# Patient Record
Sex: MALE | Race: WHITE | NOT HISPANIC OR LATINO | Employment: OTHER | URBAN - METROPOLITAN AREA
[De-identification: names, ages, dates, MRNs, and addresses within clinical notes are randomized per-mention and may not be internally consistent; named-entity substitution may affect disease eponyms.]

---

## 2018-03-22 ENCOUNTER — APPOINTMENT (OUTPATIENT)
Dept: RADIOLOGY | Facility: MEDICAL CENTER | Age: 68
DRG: 260 | End: 2018-03-22
Attending: HOSPITALIST
Payer: MEDICARE

## 2018-03-22 ENCOUNTER — HOSPITAL ENCOUNTER (OUTPATIENT)
Facility: MEDICAL CENTER | Age: 68
DRG: 260 | End: 2018-03-22
Payer: MEDICARE

## 2018-03-22 ENCOUNTER — RESOLUTE PROFESSIONAL BILLING HOSPITAL PROF FEE (OUTPATIENT)
Dept: HOSPITALIST | Facility: MEDICAL CENTER | Age: 68
End: 2018-03-22
Payer: MEDICARE

## 2018-03-22 ENCOUNTER — HOSPITAL ENCOUNTER (INPATIENT)
Facility: MEDICAL CENTER | Age: 68
LOS: 4 days | DRG: 260 | End: 2018-03-26
Attending: HOSPITALIST | Admitting: HOSPITALIST
Payer: MEDICARE

## 2018-03-22 DIAGNOSIS — I21.4 NSTEMI (NON-ST ELEVATED MYOCARDIAL INFARCTION) (HCC): ICD-10-CM

## 2018-03-22 PROBLEM — R91.8 PULMONARY INFILTRATES: Status: ACTIVE | Noted: 2018-03-22

## 2018-03-22 PROBLEM — Z89.511 S/P BILATERAL BKA (BELOW KNEE AMPUTATION) (HCC): Status: ACTIVE | Noted: 2018-03-22

## 2018-03-22 PROBLEM — R53.1 LEFT-SIDED WEAKNESS: Status: ACTIVE | Noted: 2018-03-22

## 2018-03-22 PROBLEM — Z89.512 S/P BILATERAL BKA (BELOW KNEE AMPUTATION) (HCC): Status: ACTIVE | Noted: 2018-03-22

## 2018-03-22 LAB
ALBUMIN SERPL BCP-MCNC: 2.5 G/DL (ref 3.2–4.9)
ALBUMIN/GLOB SERPL: 1 G/DL
ALP SERPL-CCNC: 79 U/L (ref 30–99)
ALT SERPL-CCNC: 5 U/L (ref 2–50)
ANION GAP SERPL CALC-SCNC: 4 MMOL/L (ref 0–11.9)
AST SERPL-CCNC: 13 U/L (ref 12–45)
BASOPHILS # BLD AUTO: 0.4 % (ref 0–1.8)
BASOPHILS # BLD: 0.03 K/UL (ref 0–0.12)
BILIRUB SERPL-MCNC: 0.5 MG/DL (ref 0.1–1.5)
BNP SERPL-MCNC: 650 PG/ML (ref 0–100)
BUN SERPL-MCNC: 22 MG/DL (ref 8–22)
CALCIUM SERPL-MCNC: 8.1 MG/DL (ref 8.5–10.5)
CHLORIDE SERPL-SCNC: 103 MMOL/L (ref 96–112)
CO2 SERPL-SCNC: 26 MMOL/L (ref 20–33)
CREAT SERPL-MCNC: 0.92 MG/DL (ref 0.5–1.4)
EOSINOPHIL # BLD AUTO: 0.1 K/UL (ref 0–0.51)
EOSINOPHIL NFR BLD: 1.3 % (ref 0–6.9)
ERYTHROCYTE [DISTWIDTH] IN BLOOD BY AUTOMATED COUNT: 44.5 FL (ref 35.9–50)
GLOBULIN SER CALC-MCNC: 2.4 G/DL (ref 1.9–3.5)
GLUCOSE BLD-MCNC: 373 MG/DL (ref 65–99)
GLUCOSE SERPL-MCNC: 298 MG/DL (ref 65–99)
HCT VFR BLD AUTO: 28.6 % (ref 42–52)
HGB BLD-MCNC: 9.7 G/DL (ref 14–18)
IMM GRANULOCYTES # BLD AUTO: 0.02 K/UL (ref 0–0.11)
IMM GRANULOCYTES NFR BLD AUTO: 0.3 % (ref 0–0.9)
LYMPHOCYTES # BLD AUTO: 1.21 K/UL (ref 1–4.8)
LYMPHOCYTES NFR BLD: 16.3 % (ref 22–41)
MCH RBC QN AUTO: 32.7 PG (ref 27–33)
MCHC RBC AUTO-ENTMCNC: 33.9 G/DL (ref 33.7–35.3)
MCV RBC AUTO: 96.3 FL (ref 81.4–97.8)
MONOCYTES # BLD AUTO: 0.63 K/UL (ref 0–0.85)
MONOCYTES NFR BLD AUTO: 8.5 % (ref 0–13.4)
NEUTROPHILS # BLD AUTO: 5.42 K/UL (ref 1.82–7.42)
NEUTROPHILS NFR BLD: 73.2 % (ref 44–72)
NRBC # BLD AUTO: 0 K/UL
NRBC BLD-RTO: 0 /100 WBC
PLATELET # BLD AUTO: 137 K/UL (ref 164–446)
PMV BLD AUTO: 9 FL (ref 9–12.9)
POTASSIUM SERPL-SCNC: 5 MMOL/L (ref 3.6–5.5)
PROCALCITONIN SERPL-MCNC: 0.1 NG/ML
PROT SERPL-MCNC: 4.9 G/DL (ref 6–8.2)
RBC # BLD AUTO: 2.97 M/UL (ref 4.7–6.1)
SODIUM SERPL-SCNC: 133 MMOL/L (ref 135–145)
TROPONIN I SERPL-MCNC: 1.93 NG/ML (ref 0–0.04)
WBC # BLD AUTO: 7.4 K/UL (ref 4.8–10.8)

## 2018-03-22 PROCEDURE — 80053 COMPREHEN METABOLIC PANEL: CPT

## 2018-03-22 PROCEDURE — 85025 COMPLETE CBC W/AUTO DIFF WBC: CPT

## 2018-03-22 PROCEDURE — 99223 1ST HOSP IP/OBS HIGH 75: CPT | Performed by: HOSPITALIST

## 2018-03-22 PROCEDURE — 36415 COLL VENOUS BLD VENIPUNCTURE: CPT

## 2018-03-22 PROCEDURE — 83880 ASSAY OF NATRIURETIC PEPTIDE: CPT

## 2018-03-22 PROCEDURE — 700102 HCHG RX REV CODE 250 W/ 637 OVERRIDE(OP): Performed by: HOSPITALIST

## 2018-03-22 PROCEDURE — 70450 CT HEAD/BRAIN W/O DYE: CPT

## 2018-03-22 PROCEDURE — 93005 ELECTROCARDIOGRAM TRACING: CPT | Performed by: HOSPITALIST

## 2018-03-22 PROCEDURE — 84484 ASSAY OF TROPONIN QUANT: CPT

## 2018-03-22 PROCEDURE — 82962 GLUCOSE BLOOD TEST: CPT

## 2018-03-22 PROCEDURE — 93010 ELECTROCARDIOGRAM REPORT: CPT | Performed by: INTERNAL MEDICINE

## 2018-03-22 PROCEDURE — 84145 PROCALCITONIN (PCT): CPT

## 2018-03-22 PROCEDURE — 770020 HCHG ROOM/CARE - TELE (206)

## 2018-03-22 PROCEDURE — A9270 NON-COVERED ITEM OR SERVICE: HCPCS | Performed by: HOSPITALIST

## 2018-03-22 RX ORDER — INSULIN GLARGINE 100 [IU]/ML
24 INJECTION, SOLUTION SUBCUTANEOUS NIGHTLY
Status: ON HOLD | COMMUNITY
End: 2019-06-11

## 2018-03-22 RX ORDER — ATORVASTATIN CALCIUM 40 MG/1
40 TABLET, FILM COATED ORAL
Status: DISCONTINUED | OUTPATIENT
Start: 2018-03-23 | End: 2018-03-26 | Stop reason: HOSPADM

## 2018-03-22 RX ORDER — DEXTROSE MONOHYDRATE 25 G/50ML
25 INJECTION, SOLUTION INTRAVENOUS
Status: DISCONTINUED | OUTPATIENT
Start: 2018-03-22 | End: 2018-03-26 | Stop reason: HOSPADM

## 2018-03-22 RX ORDER — POLYETHYLENE GLYCOL 3350 17 G/17G
1 POWDER, FOR SOLUTION ORAL
Status: DISCONTINUED | OUTPATIENT
Start: 2018-03-22 | End: 2018-03-26 | Stop reason: HOSPADM

## 2018-03-22 RX ORDER — INSULIN GLARGINE 100 [IU]/ML
10 INJECTION, SOLUTION SUBCUTANEOUS NIGHTLY
Status: DISCONTINUED | OUTPATIENT
Start: 2018-03-22 | End: 2018-03-26

## 2018-03-22 RX ORDER — ACETAMINOPHEN 325 MG/1
650 TABLET ORAL EVERY 6 HOURS PRN
Status: DISCONTINUED | OUTPATIENT
Start: 2018-03-22 | End: 2018-03-26 | Stop reason: HOSPADM

## 2018-03-22 RX ORDER — AMOXICILLIN 250 MG
2 CAPSULE ORAL 2 TIMES DAILY
Status: DISCONTINUED | OUTPATIENT
Start: 2018-03-22 | End: 2018-03-26 | Stop reason: HOSPADM

## 2018-03-22 RX ORDER — CEFAZOLIN SODIUM 2 G/100ML
2 INJECTION, SOLUTION INTRAVENOUS EVERY 8 HOURS
Status: DISCONTINUED | OUTPATIENT
Start: 2018-03-23 | End: 2018-03-24

## 2018-03-22 RX ORDER — SIMVASTATIN 20 MG
10 TABLET ORAL NIGHTLY
Status: DISCONTINUED | OUTPATIENT
Start: 2018-03-22 | End: 2018-03-22

## 2018-03-22 RX ORDER — INSULIN GLARGINE 100 [IU]/ML
23 INJECTION, SOLUTION SUBCUTANEOUS NIGHTLY
Status: DISCONTINUED | OUTPATIENT
Start: 2018-03-22 | End: 2018-03-22

## 2018-03-22 RX ORDER — SIMVASTATIN 10 MG
10 TABLET ORAL NIGHTLY
Status: ON HOLD | COMMUNITY
End: 2018-03-24

## 2018-03-22 RX ORDER — BISACODYL 10 MG
10 SUPPOSITORY, RECTAL RECTAL
Status: DISCONTINUED | OUTPATIENT
Start: 2018-03-22 | End: 2018-03-26 | Stop reason: HOSPADM

## 2018-03-22 RX ORDER — RAMIPRIL 5 MG/1
5 CAPSULE ORAL
Status: DISCONTINUED | OUTPATIENT
Start: 2018-03-22 | End: 2018-03-25

## 2018-03-22 RX ORDER — SODIUM CHLORIDE 9 MG/ML
INJECTION, SOLUTION INTRAVENOUS CONTINUOUS
Status: DISCONTINUED | OUTPATIENT
Start: 2018-03-23 | End: 2018-03-23

## 2018-03-22 RX ORDER — ONDANSETRON 4 MG/1
4 TABLET, ORALLY DISINTEGRATING ORAL EVERY 4 HOURS PRN
Status: DISCONTINUED | OUTPATIENT
Start: 2018-03-22 | End: 2018-03-26 | Stop reason: HOSPADM

## 2018-03-22 RX ORDER — RAMIPRIL 5 MG/1
5 CAPSULE ORAL
Status: ON HOLD | COMMUNITY
End: 2018-03-26

## 2018-03-22 RX ORDER — ONDANSETRON 2 MG/ML
4 INJECTION INTRAMUSCULAR; INTRAVENOUS EVERY 4 HOURS PRN
Status: DISCONTINUED | OUTPATIENT
Start: 2018-03-22 | End: 2018-03-23

## 2018-03-22 RX ADMIN — INSULIN GLARGINE 10 UNITS: 100 INJECTION, SOLUTION SUBCUTANEOUS at 22:16

## 2018-03-22 RX ADMIN — RAMIPRIL 5 MG: 5 CAPSULE ORAL at 22:15

## 2018-03-22 RX ADMIN — ASPIRIN 81 MG: 81 TABLET, COATED ORAL at 21:11

## 2018-03-22 RX ADMIN — SIMVASTATIN 10 MG: 20 TABLET, FILM COATED ORAL at 21:11

## 2018-03-22 ASSESSMENT — ENCOUNTER SYMPTOMS
NECK PAIN: 0
FOCAL WEAKNESS: 1
DIZZINESS: 0
COUGH: 0
BACK PAIN: 0
NAUSEA: 0
ORTHOPNEA: 0
CHILLS: 0
HEMOPTYSIS: 0
PALPITATIONS: 0
SPUTUM PRODUCTION: 0
VOMITING: 0

## 2018-03-22 ASSESSMENT — PATIENT HEALTH QUESTIONNAIRE - PHQ9
SUM OF ALL RESPONSES TO PHQ9 QUESTIONS 1 AND 2: 0
2. FEELING DOWN, DEPRESSED, IRRITABLE, OR HOPELESS: NOT AT ALL
1. LITTLE INTEREST OR PLEASURE IN DOING THINGS: NOT AT ALL

## 2018-03-22 ASSESSMENT — LIFESTYLE VARIABLES
EVER_SMOKED: NEVER
ALCOHOL_USE: NO

## 2018-03-22 ASSESSMENT — PAIN SCALES - GENERAL
PAINLEVEL_OUTOF10: 0
PAINLEVEL_OUTOF10: 0

## 2018-03-22 NOTE — PROGRESS NOTES
Patient is a direct admit from [facility].   Dr Varela is accepting the patient.   Dr Luz is consulting. Please call when patient arrives.   ADT signed and held, needs to be released when the patient arrives on the unit.

## 2018-03-23 ENCOUNTER — APPOINTMENT (OUTPATIENT)
Dept: RADIOLOGY | Facility: MEDICAL CENTER | Age: 68
DRG: 260 | End: 2018-03-23
Attending: HOSPITALIST
Payer: MEDICARE

## 2018-03-23 PROBLEM — E87.1 HYPONATREMIA: Status: ACTIVE | Noted: 2018-03-23

## 2018-03-23 PROBLEM — J96.01 ACUTE HYPOXEMIC RESPIRATORY FAILURE (HCC): Status: ACTIVE | Noted: 2018-03-22

## 2018-03-23 PROBLEM — N40.0 BPH (BENIGN PROSTATIC HYPERPLASIA): Status: ACTIVE | Noted: 2018-03-23

## 2018-03-23 PROBLEM — G93.41 ACUTE METABOLIC ENCEPHALOPATHY: Status: ACTIVE | Noted: 2018-03-23

## 2018-03-23 PROBLEM — I73.9 PVD (PERIPHERAL VASCULAR DISEASE) (HCC): Status: ACTIVE | Noted: 2018-03-23

## 2018-03-23 PROBLEM — D64.9 ANEMIA: Status: ACTIVE | Noted: 2018-03-23

## 2018-03-23 PROBLEM — R33.9 URINE RETENTION: Status: ACTIVE | Noted: 2018-03-23

## 2018-03-23 LAB
ANION GAP SERPL CALC-SCNC: 5 MMOL/L (ref 0–11.9)
BASOPHILS # BLD AUTO: 0.4 % (ref 0–1.8)
BASOPHILS # BLD: 0.03 K/UL (ref 0–0.12)
BUN SERPL-MCNC: 21 MG/DL (ref 8–22)
CALCIUM SERPL-MCNC: 7.9 MG/DL (ref 8.5–10.5)
CHLORIDE SERPL-SCNC: 102 MMOL/L (ref 96–112)
CO2 SERPL-SCNC: 26 MMOL/L (ref 20–33)
CREAT SERPL-MCNC: 0.8 MG/DL (ref 0.5–1.4)
EKG IMPRESSION: NORMAL
EOSINOPHIL # BLD AUTO: 0.09 K/UL (ref 0–0.51)
EOSINOPHIL NFR BLD: 1.2 % (ref 0–6.9)
ERYTHROCYTE [DISTWIDTH] IN BLOOD BY AUTOMATED COUNT: 43.7 FL (ref 35.9–50)
GLUCOSE BLD-MCNC: 116 MG/DL (ref 65–99)
GLUCOSE BLD-MCNC: 154 MG/DL (ref 65–99)
GLUCOSE BLD-MCNC: 177 MG/DL (ref 65–99)
GLUCOSE BLD-MCNC: 254 MG/DL (ref 65–99)
GLUCOSE BLD-MCNC: 459 MG/DL (ref 65–99)
GLUCOSE SERPL-MCNC: 390 MG/DL (ref 65–99)
HCT VFR BLD AUTO: 27.8 % (ref 42–52)
HGB BLD-MCNC: 9.4 G/DL (ref 14–18)
IMM GRANULOCYTES # BLD AUTO: 0.02 K/UL (ref 0–0.11)
IMM GRANULOCYTES NFR BLD AUTO: 0.3 % (ref 0–0.9)
INR PPP: 1.09 (ref 0.87–1.13)
LYMPHOCYTES # BLD AUTO: 1.06 K/UL (ref 1–4.8)
LYMPHOCYTES NFR BLD: 13.7 % (ref 22–41)
MCH RBC QN AUTO: 32.5 PG (ref 27–33)
MCHC RBC AUTO-ENTMCNC: 33.8 G/DL (ref 33.7–35.3)
MCV RBC AUTO: 96.2 FL (ref 81.4–97.8)
MONOCYTES # BLD AUTO: 0.55 K/UL (ref 0–0.85)
MONOCYTES NFR BLD AUTO: 7.1 % (ref 0–13.4)
NEUTROPHILS # BLD AUTO: 5.96 K/UL (ref 1.82–7.42)
NEUTROPHILS NFR BLD: 77.3 % (ref 44–72)
NRBC # BLD AUTO: 0 K/UL
NRBC BLD-RTO: 0 /100 WBC
PLATELET # BLD AUTO: 147 K/UL (ref 164–446)
PMV BLD AUTO: 9.4 FL (ref 9–12.9)
POTASSIUM SERPL-SCNC: 4.5 MMOL/L (ref 3.6–5.5)
PROTHROMBIN TIME: 13.8 SEC (ref 12–14.6)
RBC # BLD AUTO: 2.89 M/UL (ref 4.7–6.1)
SODIUM SERPL-SCNC: 133 MMOL/L (ref 135–145)
WBC # BLD AUTO: 7.7 K/UL (ref 4.8–10.8)

## 2018-03-23 PROCEDURE — 700105 HCHG RX REV CODE 258: Performed by: INTERNAL MEDICINE

## 2018-03-23 PROCEDURE — 700102 HCHG RX REV CODE 250 W/ 637 OVERRIDE(OP): Performed by: INTERNAL MEDICINE

## 2018-03-23 PROCEDURE — 700111 HCHG RX REV CODE 636 W/ 250 OVERRIDE (IP)

## 2018-03-23 PROCEDURE — 700111 HCHG RX REV CODE 636 W/ 250 OVERRIDE (IP): Performed by: HOSPITALIST

## 2018-03-23 PROCEDURE — A9270 NON-COVERED ITEM OR SERVICE: HCPCS | Performed by: HOSPITALIST

## 2018-03-23 PROCEDURE — 99233 SBSQ HOSP IP/OBS HIGH 50: CPT | Performed by: HOSPITALIST

## 2018-03-23 PROCEDURE — B2151ZZ FLUOROSCOPY OF LEFT HEART USING LOW OSMOLAR CONTRAST: ICD-10-PCS | Performed by: INTERNAL MEDICINE

## 2018-03-23 PROCEDURE — 93458 L HRT ARTERY/VENTRICLE ANGIO: CPT

## 2018-03-23 PROCEDURE — 82962 GLUCOSE BLOOD TEST: CPT | Mod: 91

## 2018-03-23 PROCEDURE — B2111ZZ FLUOROSCOPY OF MULTIPLE CORONARY ARTERIES USING LOW OSMOLAR CONTRAST: ICD-10-PCS | Performed by: INTERNAL MEDICINE

## 2018-03-23 PROCEDURE — 36415 COLL VENOUS BLD VENIPUNCTURE: CPT

## 2018-03-23 PROCEDURE — 85025 COMPLETE CBC W/AUTO DIFF WBC: CPT

## 2018-03-23 PROCEDURE — 99152 MOD SED SAME PHYS/QHP 5/>YRS: CPT

## 2018-03-23 PROCEDURE — 700102 HCHG RX REV CODE 250 W/ 637 OVERRIDE(OP): Performed by: HOSPITALIST

## 2018-03-23 PROCEDURE — 85610 PROTHROMBIN TIME: CPT

## 2018-03-23 PROCEDURE — A9270 NON-COVERED ITEM OR SERVICE: HCPCS | Performed by: INTERNAL MEDICINE

## 2018-03-23 PROCEDURE — C1769 GUIDE WIRE: HCPCS

## 2018-03-23 PROCEDURE — C1894 INTRO/SHEATH, NON-LASER: HCPCS

## 2018-03-23 PROCEDURE — 4A023N7 MEASUREMENT OF CARDIAC SAMPLING AND PRESSURE, LEFT HEART, PERCUTANEOUS APPROACH: ICD-10-PCS | Performed by: INTERNAL MEDICINE

## 2018-03-23 PROCEDURE — 360979 HCHG DIAGNOSTIC CATH

## 2018-03-23 PROCEDURE — 700101 HCHG RX REV CODE 250

## 2018-03-23 PROCEDURE — 80048 BASIC METABOLIC PNL TOTAL CA: CPT

## 2018-03-23 PROCEDURE — 770020 HCHG ROOM/CARE - TELE (206)

## 2018-03-23 RX ORDER — SODIUM CHLORIDE 450 MG/100ML
INJECTION, SOLUTION INTRAVENOUS
Status: DISCONTINUED
Start: 2018-03-23 | End: 2018-03-23

## 2018-03-23 RX ORDER — SODIUM CHLORIDE 9 MG/ML
INJECTION, SOLUTION INTRAVENOUS CONTINUOUS
Status: ACTIVE | OUTPATIENT
Start: 2018-03-23 | End: 2018-03-23

## 2018-03-23 RX ORDER — DIPHENHYDRAMINE HYDROCHLORIDE 50 MG/ML
25 INJECTION INTRAMUSCULAR; INTRAVENOUS EVERY 6 HOURS PRN
Status: DISCONTINUED | OUTPATIENT
Start: 2018-03-23 | End: 2018-03-24

## 2018-03-23 RX ORDER — TAMSULOSIN HYDROCHLORIDE 0.4 MG/1
0.4 CAPSULE ORAL
Status: DISCONTINUED | OUTPATIENT
Start: 2018-03-23 | End: 2018-03-26 | Stop reason: HOSPADM

## 2018-03-23 RX ORDER — SODIUM CHLORIDE 9 MG/ML
INJECTION, SOLUTION INTRAVENOUS CONTINUOUS
Status: DISPENSED | OUTPATIENT
Start: 2018-03-23 | End: 2018-03-23

## 2018-03-23 RX ORDER — DIPHENHYDRAMINE HYDROCHLORIDE 50 MG/ML
25 INJECTION INTRAMUSCULAR; INTRAVENOUS EVERY 6 HOURS PRN
Status: DISCONTINUED | OUTPATIENT
Start: 2018-03-23 | End: 2018-03-26 | Stop reason: HOSPADM

## 2018-03-23 RX ORDER — ONDANSETRON 2 MG/ML
4 INJECTION INTRAMUSCULAR; INTRAVENOUS EVERY 4 HOURS PRN
Status: DISCONTINUED | OUTPATIENT
Start: 2018-03-23 | End: 2018-03-26 | Stop reason: HOSPADM

## 2018-03-23 RX ORDER — OXYCODONE AND ACETAMINOPHEN 10; 325 MG/1; MG/1
1 TABLET ORAL ONCE
Status: COMPLETED | OUTPATIENT
Start: 2018-03-23 | End: 2018-03-23

## 2018-03-23 RX ORDER — DEXAMETHASONE SODIUM PHOSPHATE 4 MG/ML
4 INJECTION, SOLUTION INTRA-ARTICULAR; INTRALESIONAL; INTRAMUSCULAR; INTRAVENOUS; SOFT TISSUE
Status: DISCONTINUED | OUTPATIENT
Start: 2018-03-23 | End: 2018-03-24

## 2018-03-23 RX ORDER — HALOPERIDOL 5 MG/ML
1 INJECTION INTRAMUSCULAR EVERY 6 HOURS PRN
Status: DISCONTINUED | OUTPATIENT
Start: 2018-03-23 | End: 2018-03-26 | Stop reason: HOSPADM

## 2018-03-23 RX ORDER — LIDOCAINE HYDROCHLORIDE 20 MG/ML
INJECTION, SOLUTION INFILTRATION; PERINEURAL
Status: COMPLETED
Start: 2018-03-23 | End: 2018-03-23

## 2018-03-23 RX ORDER — ONDANSETRON 2 MG/ML
4 INJECTION INTRAMUSCULAR; INTRAVENOUS EVERY 4 HOURS PRN
Status: DISCONTINUED | OUTPATIENT
Start: 2018-03-23 | End: 2018-03-24

## 2018-03-23 RX ORDER — HEPARIN SODIUM,PORCINE 1000/ML
VIAL (ML) INJECTION
Status: COMPLETED
Start: 2018-03-23 | End: 2018-03-23

## 2018-03-23 RX ORDER — SCOLOPAMINE TRANSDERMAL SYSTEM 1 MG/1
1 PATCH, EXTENDED RELEASE TRANSDERMAL
Status: DISCONTINUED | OUTPATIENT
Start: 2018-03-23 | End: 2018-03-24

## 2018-03-23 RX ORDER — SCOLOPAMINE TRANSDERMAL SYSTEM 1 MG/1
1 PATCH, EXTENDED RELEASE TRANSDERMAL
Status: DISCONTINUED | OUTPATIENT
Start: 2018-03-23 | End: 2018-03-26 | Stop reason: HOSPADM

## 2018-03-23 RX ORDER — HALOPERIDOL 5 MG/ML
1 INJECTION INTRAMUSCULAR EVERY 6 HOURS PRN
Status: DISCONTINUED | OUTPATIENT
Start: 2018-03-23 | End: 2018-03-24

## 2018-03-23 RX ORDER — MIDAZOLAM HYDROCHLORIDE 1 MG/ML
INJECTION INTRAMUSCULAR; INTRAVENOUS
Status: DISPENSED
Start: 2018-03-23 | End: 2018-03-24

## 2018-03-23 RX ORDER — VERAPAMIL HYDROCHLORIDE 2.5 MG/ML
INJECTION, SOLUTION INTRAVENOUS
Status: COMPLETED
Start: 2018-03-23 | End: 2018-03-23

## 2018-03-23 RX ORDER — HEPARIN SODIUM 5000 [USP'U]/ML
5000 INJECTION, SOLUTION INTRAVENOUS; SUBCUTANEOUS EVERY 8 HOURS
Status: DISCONTINUED | OUTPATIENT
Start: 2018-03-23 | End: 2018-03-26 | Stop reason: HOSPADM

## 2018-03-23 RX ORDER — DEXAMETHASONE SODIUM PHOSPHATE 4 MG/ML
4 INJECTION, SOLUTION INTRA-ARTICULAR; INTRALESIONAL; INTRAMUSCULAR; INTRAVENOUS; SOFT TISSUE
Status: DISCONTINUED | OUTPATIENT
Start: 2018-03-23 | End: 2018-03-26 | Stop reason: HOSPADM

## 2018-03-23 RX ORDER — SODIUM CHLORIDE 9 MG/ML
INJECTION, SOLUTION INTRAVENOUS
Status: DISCONTINUED
Start: 2018-03-23 | End: 2018-03-23

## 2018-03-23 RX ADMIN — RAMIPRIL 5 MG: 5 CAPSULE ORAL at 21:22

## 2018-03-23 RX ADMIN — TAMSULOSIN HYDROCHLORIDE 0.4 MG: 0.4 CAPSULE ORAL at 15:14

## 2018-03-23 RX ADMIN — CEFAZOLIN SODIUM 2 G: 2 INJECTION, SOLUTION INTRAVENOUS at 00:19

## 2018-03-23 RX ADMIN — CEFAZOLIN SODIUM 2 G: 2 INJECTION, SOLUTION INTRAVENOUS at 15:15

## 2018-03-23 RX ADMIN — INSULIN HUMAN 2 UNITS: 100 INJECTION, SOLUTION PARENTERAL at 17:40

## 2018-03-23 RX ADMIN — INSULIN GLARGINE 10 UNITS: 100 INJECTION, SOLUTION SUBCUTANEOUS at 21:25

## 2018-03-23 RX ADMIN — HEPARIN SODIUM 5000 UNITS: 5000 INJECTION, SOLUTION INTRAVENOUS; SUBCUTANEOUS at 21:22

## 2018-03-23 RX ADMIN — CEFAZOLIN SODIUM 2 G: 2 INJECTION, SOLUTION INTRAVENOUS at 21:28

## 2018-03-23 RX ADMIN — INSULIN HUMAN 5 UNITS: 100 INJECTION, SOLUTION PARENTERAL at 05:28

## 2018-03-23 RX ADMIN — METOPROLOL TARTRATE 25 MG: 25 TABLET, FILM COATED ORAL at 21:21

## 2018-03-23 RX ADMIN — CEFAZOLIN SODIUM 2 G: 2 INJECTION, SOLUTION INTRAVENOUS at 05:40

## 2018-03-23 RX ADMIN — NITROGLYCERIN 10 ML: 20 INJECTION INTRAVENOUS at 14:07

## 2018-03-23 RX ADMIN — LIDOCAINE HYDROCHLORIDE: 20 INJECTION, SOLUTION INFILTRATION; PERINEURAL at 14:08

## 2018-03-23 RX ADMIN — HEPARIN SODIUM 2000 UNITS: 200 INJECTION, SOLUTION INTRAVENOUS at 14:07

## 2018-03-23 RX ADMIN — HEPARIN SODIUM: 1000 INJECTION, SOLUTION INTRAVENOUS; SUBCUTANEOUS at 14:08

## 2018-03-23 RX ADMIN — OXYCODONE HYDROCHLORIDE AND ACETAMINOPHEN 1 TABLET: 10; 325 TABLET ORAL at 21:33

## 2018-03-23 RX ADMIN — VERAPAMIL HYDROCHLORIDE 5 MG: 2.5 INJECTION, SOLUTION INTRAVENOUS at 14:08

## 2018-03-23 RX ADMIN — ATORVASTATIN CALCIUM 40 MG: 40 TABLET, FILM COATED ORAL at 21:21

## 2018-03-23 RX ADMIN — METOPROLOL TARTRATE 25 MG: 25 TABLET, FILM COATED ORAL at 00:18

## 2018-03-23 RX ADMIN — INSULIN HUMAN 9 UNITS: 100 INJECTION, SOLUTION PARENTERAL at 00:37

## 2018-03-23 RX ADMIN — OXYCODONE HYDROCHLORIDE AND ACETAMINOPHEN 1 TABLET: 10; 325 TABLET ORAL at 01:33

## 2018-03-23 RX ADMIN — METOPROLOL TARTRATE 25 MG: 25 TABLET, FILM COATED ORAL at 07:54

## 2018-03-23 RX ADMIN — SODIUM CHLORIDE: 9 INJECTION, SOLUTION INTRAVENOUS at 15:26

## 2018-03-23 ASSESSMENT — ENCOUNTER SYMPTOMS
CHILLS: 0
DIZZINESS: 0
TINGLING: 0
HEADACHES: 0
BLURRED VISION: 0
BACK PAIN: 0
FEVER: 0
COUGH: 0
SORE THROAT: 0
VOMITING: 0
NECK PAIN: 0
PALPITATIONS: 0
ABDOMINAL PAIN: 0
EYE PAIN: 0
SHORTNESS OF BREATH: 0
DEPRESSION: 0
INSOMNIA: 0
NAUSEA: 0
FOCAL WEAKNESS: 1

## 2018-03-23 ASSESSMENT — PAIN SCALES - GENERAL
PAINLEVEL_OUTOF10: 0
PAINLEVEL_OUTOF10: 7
PAINLEVEL_OUTOF10: 7

## 2018-03-23 NOTE — PROGRESS NOTES
Received bedside report from PM nurse. Assumed patient care. Chart reviewed. Pt was resting in bed. A&O x 4. No concerns, complaints or distress. NPO since midnight for a possible cath. Wife at bedside. Patient denies pain. POC updated with pt and on the patient communication board. Bed locked and in the lowest position. Call light within reach. Tele box on. Will continue to monitor.

## 2018-03-23 NOTE — ASSESSMENT & PLAN NOTE
Apparently had signs of left-sided drift at the outside hospital  No sx now  Pending MRI  Continue asa/statin

## 2018-03-23 NOTE — PROGRESS NOTES
Karen from Lab called with critical result of troponin 1.93 at 2141. Critical lab result read back to Marti.   Dr. Alexander  notified of critical lab result at 2146.  Critical lab result read back by Dr. Alexander. Will repeat troponin in 6 hours.

## 2018-03-23 NOTE — PROGRESS NOTES
Pt resting in bed at this time. Even visible chest rise. No signs of distress. Bed alarm on. Call light in place. Bed in low and locked position. Hourly rounding in place. Wife at bedside. NPO at this time.

## 2018-03-23 NOTE — PROCEDURES
DATE OF SERVICE:  03/23/2018    REFERRING PHYSICIAN:  Fidelina Flanagan MD    PROCEDURES:  1.  Left heart catheterization.  2.  Coronary angiography.  3.  Left ventriculogram.    PREPROCEDURE DIAGNOSIS:  Acute coronary syndrome.    POSTPROCEDURE DIAGNOSES:  1.  Multivessel coronary artery disease with nonobstructive left main coronary   artery disease, high-grade mid left anterior descending artery and distal   left anterior descending artery stenosis, high-grade proximal diagonal branch   stenosis, high-grade ramus stenosis, high-grade proximal obtuse marginal   branch stenosis and chronic total occlusion of the mid right coronary artery   with distal vessel filling via left to right collaterals.  2.  Normal left ventricular systolic function with ejection fraction of 55%.  3.  Mildly elevated left ventricular end-diastolic pressure.    INDICATION:  The patient is a 67-year-old male with past medical history   significant for diabetes and chronic tobacco abuse.  The patient was admitted   to [facility] for chest pain and ruled in for acute coronary   syndrome.  He was scheduled for cardiac catheterization.    DESCRIPTION OF PROCEDURE:  After informed consent was signed by the patient,   the patient was brought to the cardiac catheterization laboratory.  He was   prepped and draped in the usual sterile manner.  The right inguinal area was   anesthetized with 2% Xylocaine.  A 4-[place] sheath was inserted into the right   femoral artery using modified Seldinger technique under ultrasound guidance.    A 4-[place] pigtail catheter was positioned into the left ventricle.  Left   ventriculography was performed.  This was exchanged for a JL-4 catheter, which   was positioned into the left main coronary artery.  Coronary angiography was   performed.  This was exchanged for a 3DRC catheter, which was positioned into   the right coronary artery.  Angiography was performed.  The patient tolerated   the procedure  well.  At the end of procedure, all catheters and sheaths were   removed.  He was transferred to telemetry in stable condition.    HEMODYNAMIC DATA:  Hemodynamic data shows aortic pressures of 100/50 with mean   of 70 mmHg and LV of 100/0 with LVEDP of 15 mmHg.    AORTIC VALVE:  There was no significant gradient noted.    ANGIOGRAM:  LEFT MAIN CORONARY ARTERY:  Left main coronary artery is a long large caliber   vessel with mid eccentric 40% stenosis.  LEFT ANTERIOR DESCENDING ARTERY:  Left anterior descending artery is a long   large caliber vessel, which wraps around the apex.  Mid portion of the vessel,   there is a concentric 70-80% stenosis.  There is a moderate caliber   bifurcating diagonal branch containing proximal concentric 90% stenosis, which   extends into bilateral arms.  RAMUS INTERMEDIUS:  Ramus intermedius is a moderate length, small caliber   vessel with proximal concentric 99% stenosis.  LEFT CIRCUMFLEX ARTERY:  Left circumflex artery is a nondominant   moderate-caliber vessel with diffuse luminal irregularities of 10-20%.    Distally, there is a moderate caliber, moderate length obtuse marginal branch   with proximal concentric 99% stenosis.  RIGHT CORONARY ARTERY:  Right coronary artery is a dominant moderate-caliber   vessel, which is chronically occluded at the mid portion.  Distal vessel   including a long, moderate caliber posterior descending artery fills via left   to right collaterals.    IMPRESSION:  1.  Multivessel coronary artery disease with nonobstructive left main coronary   artery disease, high-grade mid left anterior descending artery and distal   left anterior descending artery stenosis, high-grade proximal diagonal branch   stenosis, high-grade ramus stenosis, high-grade proximal obtuse marginal   branch stenosis and chronic total occlusion of the mid right coronary artery   with distal vessel filling via left to right collaterals.  2.  Normal left ventricular systolic function  with ejection fraction of 55%.  3.  Mildly elevated left ventricular end-diastolic pressure.    RECOMMENDATIONS:  Recommend surgical consult for CABG.       ____________________________________     MD KINGSTON MEADOWS / LORNA    DD:  03/23/2018 14:22:21  DT:  03/23/2018 14:38:00    D#:  [id number]  Job#:  [id number]

## 2018-03-23 NOTE — CONSULTS
Consults   ,]    Admission Date / Service Date: 03/22/18    Patient's PCP: No primary care provider on file.    CC: Abnormal troponin      HPI: Mr. Patel is a pleasant 67-year-old gentleman who is transferred from [place]. He is accompanied by his wife. He had a chronic left lower extremity wound that would not heal so he underwent left below-knee amputation on Monday. Postoperatively he was requiring oxygen. At some point he was evaluated for left upper extremity drift. Labs were obtained and he had a troponin of around 2 which peaked at 3.3. His total CPK was 295, CK-MB was 5.5, ENP was 479, lactic acid was 0.9. I did review his EKG from yesterday and it showed no acute ischemic changes. His EKG here today shows sinus tachycardia with rate of 100 but no ischemic changes. His risk factors include smoking, just quit 6 weeks ago, hyperlipidemia, diabetes and age. He denies experiencing chest pain, pressure or tightness. He was given Lasix at the outside hospital and also treated with antibiotics for possible pneumonia. He is currently resting comfortably and lying in bed. He's never had stress testing previously. An echocardiogram is pending. Prior to surgery he was taking an aspirin daily but quit 10 days before his surgery. He's been on simvastatin for some time. He's never had serious bleeding issues and it sounds as though he'll be a candidate for dual antiplatelet therapy. He was given full dose Lovenox prior to transport. I did review his records from outside hospital. His kidney function is normal. His platelets are adequate. His hemoglobin has drifted down just a bit. Here his hemoglobin is 9.7.    Medications / Drug list prior to admission:  No current facility-administered medications on file prior to encounter.      No current outpatient prescriptions on file prior to encounter.       Current list of administered Medications:    Current Facility-Administered Medications:   •  aspirin EC (ECOTRIN) tablet  81 mg, 81 mg, Oral, QHS, Ciro Rogers M.D., 81 mg at 03/22/18 2111  •  ramipril (ALTACE) capsule 5 mg, 5 mg, Oral, QHS, Ciro Rogers M.D., 5 mg at 03/22/18 2215  •  insulin glargine (LANTUS) injection 10 Units, 10 Units, Subcutaneous, Nightly, Ciro Rogers M.D., 10 Units at 03/22/18 2216  •  senna-docusate (PERICOLACE or SENOKOT S) 8.6-50 MG per tablet 2 Tab, 2 Tab, Oral, BID **AND** polyethylene glycol/lytes (MIRALAX) PACKET 1 Packet, 1 Packet, Oral, QDAY PRN **AND** magnesium hydroxide (MILK OF MAGNESIA) suspension 30 mL, 30 mL, Oral, QDAY PRN **AND** bisacodyl (DULCOLAX) suppository 10 mg, 10 mg, Rectal, QDAY PRN, Ciro Rogers M.D.  •  ondansetron (ZOFRAN) syringe/vial injection 4 mg, 4 mg, Intravenous, Q4HRS PRN, Ciro Rogers M.D.  •  ondansetron (ZOFRAN ODT) dispertab 4 mg, 4 mg, Oral, Q4HRS PRN, Ciro Rogers M.D.  •  acetaminophen (TYLENOL) tablet 650 mg, 650 mg, Oral, Q6HRS PRN, Ciro Rogers M.D.  •  [START ON 3/23/2018] insulin regular (HUMULIN R) injection 2-9 Units, 2-9 Units, Subcutaneous, Q6HRS **AND** Accu-Chek Q6 if NPO, , , Q6H **AND** NOTIFY MD and PharmD, , , Once **AND** glucose 4 g chewable tablet 16 g, 16 g, Oral, Q15 MIN PRN **AND** dextrose 50% (D50W) injection 25 mL, 25 mL, Intravenous, Q15 MIN PRN, Ciro Rogers M.D.  •  [START ON 3/23/2018] ceFAZolin (ANCEF) IVPB 2 g, 2 g, Intravenous, Q8HRS, Ciro Rogers M.D.  •  [START ON 3/23/2018] atorvastatin (LIPITOR) tablet 40 mg, 40 mg, Oral, QHS, Fidelina Flanagan M.D.  •  [START ON 3/23/2018] metoprolol (LOPRESSOR) tablet 25 mg, 25 mg, Oral, TWICE DAILY, Fidelina Flanagan M.D.  •  [START ON 3/23/2018] NS infusion, , Intravenous, Continuous, Fidelina Flanagan M.D.    No past medical history on file.    No past surgical history on file.    Family History   Problem Relation Age of Onset   • Heart Attack Mother 60       Social History     Social History   • Marital status: N/A     Spouse name: N/A   • Number of children: N/A   • Years of  education: N/A     Occupational History   • Not on file.     Social History Main Topics   • Smoking status: Former Smoker     Types: Cigarettes   • Smokeless tobacco: Never Used      Comment: quit 6 weeks ago   • Alcohol use Not on file   • Drug use: Unknown   • Sexual activity: Not on file     Other Topics Concern   • Not on file     Social History Narrative   • No narrative on file       Allergies   Allergen Reactions   • Tea Tree Oil Rash     Unspecified       Review of systems:  He was having just a little bit of blood from the bandage from his left lower extremity.  All others systems reviewed and negative.    Physical exam:  Patient Vitals for the past 24 hrs:   BP Temp Pulse Resp SpO2 Weight   03/22/18 2000 - - - - - 68.6 kg (151 lb 3.8 oz)   03/22/18 1947 136/77 37.2 °C (99 °F) 90 17 97 % 68.6 kg (151 lb 3.8 oz)   03/22/18 1650 141/70 37.1 °C (98.7 °F) 93 16 97 % 68.6 kg (151 lb 3.8 oz)         General: No acute distress. Adequately nourished. Appears older than stated age  HEENT: EOM grossly intact, no scleral icterus, no pharyngeal erythema.   Neck:  + JVD, no bruits, trachea midline  CVS: RRR. Normal S1, S2. No M/R/G. No right LE edema.  2+ radial pulses, 1+ DT pulse on the right lower extremity  Resp: Diminished breath sounds at the bases. Normal respiratory effort.  Abdomen: Soft, NT, no bertin hepatomegaly.  MSK/Ext: No clubbing or cyanosis, surgically absent left lower extremity with bandage in place  Skin: Warm and dry, no rashes. [place] around the left lower extremity surgical site  Neurological: CN III-XII grossly intact. No focal deficits.   Psych: A&O x 3, appropriate affect, good judgement    Data:  Laboratory studies:  Lab Results   Component Value Date/Time    WBC 7.4 03/22/2018 08:40 PM    RBC 2.97 (L) 03/22/2018 08:40 PM    HEMOGLOBIN 9.7 (L) 03/22/2018 08:40 PM    HEMATOCRIT 28.6 (L) 03/22/2018 08:40 PM    MCV 96.3 03/22/2018 08:40 PM    MCH 32.7 03/22/2018 08:40 PM    MCHC 33.9  03/22/2018 08:40 PM    MPV 9.0 03/22/2018 08:40 PM    NEUTSPOLYS 73.20 (H) 03/22/2018 08:40 PM    LYMPHOCYTES 16.30 (L) 03/22/2018 08:40 PM    MONOCYTES 8.50 03/22/2018 08:40 PM    EOSINOPHILS 1.30 03/22/2018 08:40 PM    BASOPHILS 0.40 03/22/2018 08:40 PM        Lab Results   Component Value Date/Time    SODIUM 133 (L) 03/22/2018 08:40 PM    POTASSIUM 5.0 03/22/2018 08:40 PM    CHLORIDE 103 03/22/2018 08:40 PM    CO2 26 03/22/2018 08:40 PM    GLUCOSE 298 (H) 03/22/2018 08:40 PM    BUN 22 03/22/2018 08:40 PM    CREATININE 0.92 03/22/2018 08:40 PM        Recent Labs      03/22/18   2040   ASTSGOT  13   ALTSGPT  5   TBILIRUBIN  0.5   ALKPHOSPHAT  79   GLOBULIN  2.4     EKG shows sinus tachycardia and otherwise normal.    No results found for: PROTHROMBTM, INR     Imaging:  CT-HEAD W/O   Final Result      1.  No acute intracranial abnormality.   2.  Mild atrophy.               INTERPRETING LOCATION:  [address], [place], [address]      ECHOCARDIOGRAM COMP W/O CONT    (Results Pending)   MR-BRAIN-W/O    (Results Pending)   DX-CHEST-2 VIEWS    (Results Pending)       Principal Problem:    NSTEMI (non-ST elevated myocardial infarction) (CMS-HCC) POA: Yes  Active Problems:    Left-sided weakness POA: Yes    S/P bilateral BKA (below knee amputation) (CMS-HCC) POA: Yes    Pulmonary infiltrates POA: Yes    Insulin dependent diabetes mellitus (CMS-HCC) POA: Yes  Resolved Problems:    * No resolved hospital problems. *      Assessment / Plan:  1. Non-ST elevation MI, peak troponin 3.33, no EKG changes, no symptoms, possibly related to mild congestive heart failure versus supply versus demand perioperative ischemia. Peak troponin was 3.33, total , CK-MB was 5.5.  2. New neurologic deficits, possibly TIA, imaging of the head pending  3. Left below-knee amputation on March 18, 4 days ago due to nonhealing wound  4. Type 2 diabetes  5. Dyslipidemia  6. Tobacco use, quit 6 weeks ago  7. Mild anemia, slightly worse since  surgery  8. Acute hypoxic respiratory failure, he is being treated for a believe pneumonia and mild CHF, known to have bilateral pulmonary infiltrates from imaging trailer to transport.    Plan:  Given his presentation it is reasonable to obtain an echocardiogram and consider left heart catheterization. He needs to continue treatment for respiratory issues. I have asked that he be nothing by mouth after midnight for possible left heart catheterization tomorrow afternoon however I think it would be reasonable to obtain the echocardiogram and clinically have some idea whether he would benefit from heart catheterization at a later date. He and his wife seem anxious to have the procedure performed so that they can go home.    I changed his simvastatin to atorvastatin and started metoprolol. My partners will see him in the morning.    It is my pleasure to participate in the care of Mr. Bright.  Please do not hesitate to contact me with questions or concerns.    Fidelina Flanagan MD, [facility]  Cardiologist [facility]    Please note that this dictation was created using voice recognition software. I have made every reasonable attempt to correct obvious errors, but it is possible there are errors of grammar and possibly content that I did not discover before finalizing the note.

## 2018-03-23 NOTE — PROGRESS NOTES
"[facility] faxed back with statement \"unable to locate blood or wound cultures. \"  "

## 2018-03-23 NOTE — WOUND TEAM
"[facility]  Inpatient Services  Initial Wound and Skin Care Evaluation    Admission Date:   03/22/2018  HPI, PMH, SH: Reviewed  Unit where seen by Wound Team: [id number]    WOUND CONSULT RELATED TO: Left residual limb.    SUBJECTIVE:  \"I had surgery 3 days ago.\"    Self Report / Pain Level: 0/10    OBJECTIVE: Fatigued, wife at bedside.     WOUND TYPE, LOCATION, CHARACTERISTICS (Pressure ulcers: location, stage, POA or date identified)    Location and type of wound: Left residual limb, distal: Surgical incision.         Periwound:     Intact.  Drainage:     None.  Tissue Type and %:    100% red, dried blood.   Wound Edges:   Approximated with sutures.   Odor:      None.  Exposed structure(s):  None.  S&S of Infection:   None.      Measurements:   03/23/2018.  Length:     0.3 cm  Width:      10.2 cm  Depth:     NM      INTERVENTIONS BY WOUND TEAM: Removed dressing consisting of yellow petrolatum gauze, and roll gauze, from incision. Incision approximated, periwound intact, no signs of infection. Photo and measurements taken. Incision dressed with dry gauze 4x4s , roll gauze, and tubi  F.       Interdisciplinary consultation: Patient, patient's wife, ABDIAS Egan.    EVALUATION: Dressing to protect wound. Tubi  provides gentle compression.     Factors affecting wound healing: Diabetes, NSTEMI, PVD.   Goals: Incision will remain approximated and free from infection.     NURSING PLAN OF CARE ORDERS (X):    Dressing changes: See Dressing Care orders: X  Skin care: See Skin Care orders:   Rectal tube care: See Rectal Tube Care orders:   Other orders:    RSKIN: CURRENT (X) ORDERED (O)  Q shift Gurwinder:  X  Q shift pressure point assessments:  X  Pressure redistribution mattress     X   RUSLAN      Bariatric RUSLAN      Bariatric foam        Heel float boots       Heels floated on pillows      Barrier wipes      Barrier Cream      Barrier paste      Sacral silicone dressing      Silicone O2 tubing   X   Anchorfast   "    Trach with Optifoam split foam       Waffle cushion      Rectal tube or BMS      Antifungal tx    Turn q 2 hours   X  Up to chair     Ambulate   PT/OT     Dietician      PO     TF   TPN     PVN    NPO  1 # days   Other       WOUND TEAM PLAN OF CARE (X):   NPWT change 3 x week:        Dressing changes by wound team:       Follow up as needed:     X  Other (explain):    Anticipated discharge plans (X): To be determined.   SNF:           Home Care:           Outpatient Wound Center:            Self Care:            Other:

## 2018-03-23 NOTE — H&P
Hospital Medicine History and Physical    Date of Service  3/22/2018    Chief Complaint  Weakness    History of Presenting Illness  67 y.o. male who presented 3/22/2018 with weakness.    Patient has history of multiple medical problems including insulin-dependent diabetes and osteomyelitis left leg. Patient is status post left BKA. He has trouble providing exact course of events but his physician has been dealing with an infection in his left leg for some time. Most recently the patient had a wound VAC on for 6 weeks.  He was admitted to hospital in [place] for revision of his left BKA, this occurred on 3/19/2018. Patient underwent surgery without apparent complication. Postoperatively the patient was noted to be confused he currently had some left-sided drift upper extremities as well as some right-sided weakness over the past 2 days this has gradually improved and his strength is now back to normal. In addition his wife states that he was pretty confused and lethargic after the surgery, this is resolved, his personality is back to normal. Lastly the patient was noted to have an elevated troponin. Patient is completely chest pain-free at this time. HE did have some shortness of breath and hypoxia this responded to IV Lasix. CT scan of the chest was done to rule out pulmonary embolism, there is no PE but it did show bilateral infiltrates and the patient was started on broad-spectrum antibiotics with Zosyn today. Patient denies cough, no fever or chill sensation, says that his breathing is now near baseline.  Patient was given Lovenox to treat an STEMI.  Troponin level at outside hospital was 3.33 .    Primary Care Physician  No primary care provider on file.    Consultants  Cardioloy    Code Status  Full Code    Review of Systems  Review of Systems   Constitutional: Negative for chills and malaise/fatigue.   Respiratory: Negative for cough, hemoptysis and sputum production.    Cardiovascular: Negative for  chest pain, palpitations and orthopnea.   Gastrointestinal: Negative for nausea and vomiting.   Musculoskeletal: Negative for back pain and neck pain.   Skin: Negative for itching and rash.   Neurological: Positive for focal weakness. Negative for dizziness.   All other systems reviewed and are negative.       Past Medical History  Insulin-dependent diabetes with complications of neuropathy  Left BKA  BPH  Hypertension    Surgical History  Left BKA, revision of left BKA 3/19/2018    Medications  Gabapentin 600 mg 4 times a day   Insulin sliding scale   Flomax 0.4 mg daily   Lovenox 30 mg twice daily   Zosyn 4.5 mg every 6 hours   Simvastatin 10 mg at bedtime   Altace 5 mg daily aspirin 81 mg daily  Lantus 23 units bedtime    Family History  Parents with alcoholism    Social History  Social History   Substance Use Topics   • Smoking status: Not on file   • Smokeless tobacco: Not on file   • Alcohol use Not on file       Allergies  Allergies   Allergen Reactions   • Tea Tree Oil Rash     Unspecified        Physical Exam  Laboratory   Hemodynamics  Temp (24hrs), Av.2 °C (98.9 °F), Min:37.1 °C (98.7 °F), Max:37.2 °C (99 °F)   Temperature: 37.2 °C (99 °F)  Pulse  Av.5  Min: 90  Max: 93    Blood Pressure : 136/77      Respiratory      Respiration: 17, Pulse Oximetry: 97 %             Physical Exam   Constitutional: He is oriented to person, place, and time. He appears well-developed and well-nourished.   HENT:   Head: Normocephalic and atraumatic.   Eyes: Conjunctivae and EOM are normal. Right eye exhibits no discharge. Left eye exhibits no discharge.   Cardiovascular: Normal rate, regular rhythm and intact distal pulses.    No murmur heard.  Pulmonary/Chest: Effort normal. No respiratory distress. He has no wheezes. He has rales.   Bibasilar crackles   Abdominal: Soft. Bowel sounds are normal. He exhibits no distension. There is no tenderness. There is no rebound.   Musculoskeletal: Normal range of motion. He  exhibits no edema.   Left BKA dressing intact   Neurological: He is alert and oriented to person, place, and time. No cranial nerve deficit.   Speech in normal in content and character  No facial droop  5/5 strength in UE/LE bilaterally   Skin: Skin is warm and dry. He is not diaphoretic. No erythema.   Psychiatric: He has a normal mood and affect.               No results for input(s): ALTSGPT, ASTSGOT, ALKPHOSPHAT, TBILIRUBIN, DBILIRUBIN, GAMMAGT, AMYLASE, LIPASE, ALB, PREALBUMIN, GLUCOSE in the last 72 hours.              No results found for: TROPONINI  Urinalysis:  No results found for: SPECGRAVITY, GLUCOSEUR, KETONES, NITRITE, WBCURINE, RBCURINE, BACTERIA, EPITHELCELL     Imaging  CT scan of chest at outside hospital (report): no PE, bilateral infiltrates   Assessment/Plan     I anticipate this patient will require at least two midnights for appropriate medical management, necessitating inpatient admission.    * NSTEMI (non-ST elevated myocardial infarction) (CMS-HCC)- (present on admission)   Assessment & Plan    Appears patient had a perioperative myocardial infarction  He is now chest pain-free did show some signs of volume overload which is now resolved  Check a CT scan of his head 1st, consider aspirin and/or continuing Lovenox after results are back        Left-sided weakness- (present on admission)   Assessment & Plan    Patient had signs of left-sided drift at the outside hospital  No speech deficits, he was confused  He is now back to normal, his exam is nonfocal  CT scan of the head, would pursue an MRI of brain CT scan is unrevealing        Insulin dependent diabetes mellitus (CMS-HCC)- (present on admission)   Assessment & Plan    Continue Lantus, continue sliding scale  After his Lantus for tonight, we'll make patient nothing by mouth in case procedures planned for tomorrow        Pulmonary infiltrates- (present on admission)   Assessment & Plan    Bilateral pulmonary infiltrates noted on outside  hospital CT scan  Patient does not have a fever, no cough, symptoms resolved with diuresis  Suspect this is more pulmonary edema than pneumonia  Check BNP, check pro calcitonin        S/P bilateral BKA (below knee amputation) (CMS-[facility])- (present on admission)   Assessment & Plan    Post op day # 3 from left BKA stump revision  Continue wound care            VTE prophylaxis: SCD's for now.

## 2018-03-23 NOTE — PROGRESS NOTES
Cath lab requests the patient for procedure. Per Dr. Miller, pt should go for cath now. Pt off the floor on a ZOLL to cath. Wife updated.

## 2018-03-23 NOTE — ASSESSMENT & PLAN NOTE
Likely icu psychosis from hospitalization and recent illness. Better in day time worse at night.   Reorientation  Resolved per wife.

## 2018-03-23 NOTE — PROGRESS NOTES
[facility] Hospitalist Progress Note    Date of Service: 3/23/2018    Chief Complaint  67 y.o. male admitted 3/22/2018 with an infected diabetic ulcer s/p BKA at an outside hospital complicated by nstemi and acute delirium with left sided weakness that are now resolved.     Interval Problem Update  Stable over night.   No chest  Pain  troponins decreasing.       Consultants/Specialty  cards    Disposition  Get pt/ot eval and will determine    Add sq heparin      Review of Systems   Constitutional: Negative for chills and fever.   HENT: Negative for sore throat.    Eyes: Negative for blurred vision and pain.   Respiratory: Negative for cough and shortness of breath.    Cardiovascular: Negative for chest pain and palpitations.   Gastrointestinal: Negative for abdominal pain, nausea and vomiting.   Genitourinary: Negative for dysuria and urgency.   Musculoskeletal: Negative for back pain and neck pain.   Skin: Negative for itching and rash.   Neurological: Positive for focal weakness. Negative for dizziness, tingling and headaches.   Psychiatric/Behavioral: Negative for depression. The patient does not have insomnia.    All other systems reviewed and are negative.     Physical Exam  Laboratory/Imaging   Hemodynamics  Temp (24hrs), Av.9 °C (98.5 °F), Min:36.6 °C (97.9 °F), Max:37.2 °C (99 °F)   Temperature: 36.8 °C (98.3 °F)  Pulse  Av.3  Min: 81  Max: 93    Blood Pressure : 116/64      Respiratory      Respiration: 16, Pulse Oximetry: 97 %, O2 Daily Delivery Respiratory : Silicone Nasal Cannula             Fluids    Intake/Output Summary (Last 24 hours) at 18 0735  Last data filed at 18 0500   Gross per 24 hour   Intake              240 ml   Output             1400 ml   Net            -1160 ml       Nutrition  Orders Placed This Encounter   Procedures   • DIET NPO     Standing Status:   Standing     Number of Occurrences:   1     Order Specific Question:   Restrict to:     Answer:   Sips with  Medications [3]   • Diet NPO after Midnight     Standing Status:   Standing     Number of Occurrences:   8     Order Specific Question:   Restrict to:     Answer:   Sips with Medications [3]     Physical Exam   Constitutional: He is oriented to person, place, and time. He appears well-developed and well-nourished. No distress.   HENT:   Right Ear: External ear normal.   Left Ear: External ear normal.   Nose: Nose normal.   Eyes: Right eye exhibits no discharge. Left eye exhibits no discharge. No scleral icterus.   Neck: No JVD present. No tracheal deviation present.   Cardiovascular: Normal rate, normal heart sounds and intact distal pulses.    No murmur heard.  Cap refill 2 sec   Pulmonary/Chest: Effort normal and breath sounds normal. No respiratory distress. He has no wheezes. He has no rales.   Abdominal: Soft. Bowel sounds are normal. He exhibits no distension. There is no tenderness. There is no guarding.   Musculoskeletal: He exhibits no edema or tenderness.   Neurological: He is alert and oriented to person, place, and time.   Skin: Skin is warm and dry. He is not diaphoretic. No erythema.   Normal skin color   Psychiatric: He has a normal mood and affect. His behavior is normal.   Nursing note and vitals reviewed.      Recent Labs      03/22/18 2040 03/23/18 0215   WBC  7.4  7.7   RBC  2.97*  2.89*   HEMOGLOBIN  9.7*  9.4*   HEMATOCRIT  28.6*  27.8*   MCV  96.3  96.2   MCH  32.7  32.5   MCHC  33.9  33.8   RDW  44.5  43.7   PLATELETCT  137*  147*   MPV  9.0  9.4     Recent Labs      03/22/18 2040 03/23/18   0215   SODIUM  133*  133*   POTASSIUM  5.0  4.5   CHLORIDE  103  102   CO2  26  26   GLUCOSE  298*  390*   BUN  22  21   CREATININE  0.92  0.80   CALCIUM  8.1*  7.9*     Recent Labs      03/23/18   0215   INR  1.09     Recent Labs      03/22/18 2040   BNPBTYPENAT  650*              Assessment/Plan     * NSTEMI (non-ST elevated myocardial infarction) (CMS-HCC)- (present on admission)   Assessment  & Plan    Appears patient had a perioperative myocardial infarction  He is now chest pain-free did show some signs of volume overload which is now resolved  Asa/statin/ace/bb therapy  Cards consulted for workup  Ekg with mild st depressions v4/5  troponin decreased from previous value  Echo pending          Left-sided weakness- (present on admission)   Assessment & Plan    Patient had signs of left-sided drift at the outside hospital  No speech deficits, he was confused  He is now back to normal, his exam is nonfocal  CT scan of the head normal  Check MRI  Continue asa/statin        Insulin dependent diabetes mellitus (CMS-HCC)- (present on admission)   Assessment & Plan    Uncontrolled with hyperglycemia, vascular and neurologic complications.   Continue Lantus, continue sliding scale  Hold lantus today for possible cath with cards.         Acute hypoxemic respiratory failure (CMS-HCC)- (present on admission)   Assessment & Plan    Bilateral pulmonary infiltrates noted on outside hospital CT scan  Patient does not have a fever, no cough, symptoms resolved with diuresis  Suspect this is more pulmonary edema than pneumonia  Elevated BNP, normal procalcitonin  Echo pending  Recheck cxr        S/P bilateral BKA (below knee amputation) (CMS-HCC)- (present on admission)   Assessment & Plan    Post op day # 4 from left BKA stump revision  Continue wound care  Ongoing antibiotics  Need culture results from outside hospital.           Acute metabolic encephalopathy   Assessment & Plan    Unclear etiology  Suspect postop related   Ct normal  Mri pending  Improved per wife.           BPH (benign prostatic hyperplasia)   Assessment & Plan    flomax        PVD (peripheral vascular disease) (CMS-HCC)   Assessment & Plan    Asa/statin  2/2 diabetes  Consider plavix        Hyponatremia   Assessment & Plan    Suspect hypervolemic.   Diuresis  Echo pending        Anemia   Assessment & Plan    Unclear etiology  Start workup           Quality-Core Measures   Reviewed items::  EKG reviewed, Radiology images reviewed, Labs reviewed and Medications reviewed  Reed catheter::  No Reed  DVT prophylaxis pharmacological::  Heparin  DVT prophylaxis - mechanical:  SCDs  Ulcer Prophylaxis::  No  Assessed for rehabilitation services:  Patient was assess for and/or received rehabilitation services during this hospitalization

## 2018-03-23 NOTE — PROGRESS NOTES
Patient is back from cath lab. Right groin CDI, soft. Pt awake and alert, hungry. Dr. Ayala to bedside for rounds. Orders to remove Reed from [facility] facility. VSS. Will monitor closely. Bed rest.

## 2018-03-23 NOTE — PROGRESS NOTES
2 RN skin check with Keith RN:   Bilateral ears: redness/blanching  Right forearm: scar and sore(scabed over)  Buttock/sacrum: redness/blanching  LLE: amputation (recent revision done) surgical dressing in place.   RLE: scattered scabs on leg, 4th toe sore.

## 2018-03-23 NOTE — ASSESSMENT & PLAN NOTE
perioperative myocardial infarction   now chest pain-free Asa/statin/ace/bb therapy  Now with multivessel disease  Ct surgery to consult recommending no surgery. Too high risk  Trend on tele  Maximize medical therapy  Increase BB today.

## 2018-03-23 NOTE — ASSESSMENT & PLAN NOTE
Bilateral pulmonary infiltrates noted on outside hospital CT scan and cxr  On 2L still but improving with diuresis  Suspect copd as well given exam.   May need home o2.   Continue to diurese for now.

## 2018-03-23 NOTE — ASSESSMENT & PLAN NOTE
Uncontrolled with hyperglycemia, vascular and neurologic complications.   Continue Lantus, continue sliding scale

## 2018-03-23 NOTE — CARE PLAN
Problem: Safety  Goal: Will remain free from falls  Fall precautions in place. Bed wheels locked, bed is in the lowest position. Call light in reach. Bed alarm on and in place. Non-skid socks provided. Patient provides successful verbalization of fall and safety precautions and demonstration of use of call bell. Upper side rails are up. Hourly rounding in progress.     Problem: Skin Integrity  Goal: Risk for impaired skin integrity will decrease  Skin is assessed for integrity throughout the shift. Pt is encouraged to reposition and is turned at least every 2 hours. Pt is kept dry and clean.

## 2018-03-23 NOTE — CARE PLAN
Problem: Safety  Goal: Will remain free from falls  Outcome: PROGRESSING AS EXPECTED  Pt mobility assessed at beginning of shift. Pt is a 2 assist. Fall precautions in place. Non-slip socks on. Bed in lowest locked position. Bed alarm on. Call light within reach. Pt educated to call for assistance and verbalizes understanding.    Problem: Knowledge Deficit  Goal: Knowledge of disease process/condition, treatment plan, diagnostic tests, and medications will improve  Outcome: PROGRESSING AS EXPECTED  Pt educated about disease process. Reason why medications are taken. And informed about treatment plan.

## 2018-03-23 NOTE — PROGRESS NOTES
Med rec completed by interview with patient at bedside, [facility], and MAR from [facility]  Pt has not had any abx at home in the past 30 days but was given cefazolin 2 g IV q6h starting on 3/19/18 until today at 0507 along with Zosyn 4.5 g IV q6h which was given once at [facility] today at 1310  NKDA confirmed (tea tree oil caused a rash where applied)

## 2018-03-23 NOTE — PROGRESS NOTES
Cath lab expedited the echo. Will call cardiology when done so the decision to go for cath lab can be made .

## 2018-03-23 NOTE — PROGRESS NOTES
0156: cardiology paged.  0158: cardiology paged back. Per Dr. Flanagan fluids to be D/C along with the 2 view X-ray and AM troponin.

## 2018-03-24 ENCOUNTER — APPOINTMENT (OUTPATIENT)
Dept: RADIOLOGY | Facility: MEDICAL CENTER | Age: 68
DRG: 260 | End: 2018-03-24
Attending: HOSPITALIST
Payer: MEDICARE

## 2018-03-24 PROBLEM — I63.9 CVA (CEREBRAL VASCULAR ACCIDENT) (HCC): Status: ACTIVE | Noted: 2018-03-24

## 2018-03-24 PROBLEM — J44.9 COPD (CHRONIC OBSTRUCTIVE PULMONARY DISEASE) (HCC): Status: ACTIVE | Noted: 2018-03-24

## 2018-03-24 LAB
ALBUMIN SERPL BCP-MCNC: 2.4 G/DL (ref 3.2–4.9)
ALBUMIN/GLOB SERPL: 1.1 G/DL
ALP SERPL-CCNC: 72 U/L (ref 30–99)
ALT SERPL-CCNC: <5 U/L (ref 2–50)
ANION GAP SERPL CALC-SCNC: 3 MMOL/L (ref 0–11.9)
AST SERPL-CCNC: 13 U/L (ref 12–45)
BASOPHILS # BLD AUTO: 0.4 % (ref 0–1.8)
BASOPHILS # BLD: 0.02 K/UL (ref 0–0.12)
BILIRUB SERPL-MCNC: 0.4 MG/DL (ref 0.1–1.5)
BUN SERPL-MCNC: 14 MG/DL (ref 8–22)
CALCIUM SERPL-MCNC: 7.9 MG/DL (ref 8.5–10.5)
CHLORIDE SERPL-SCNC: 105 MMOL/L (ref 96–112)
CO2 SERPL-SCNC: 30 MMOL/L (ref 20–33)
CREAT SERPL-MCNC: 0.51 MG/DL (ref 0.5–1.4)
EOSINOPHIL # BLD AUTO: 0.15 K/UL (ref 0–0.51)
EOSINOPHIL NFR BLD: 2.7 % (ref 0–6.9)
ERYTHROCYTE [DISTWIDTH] IN BLOOD BY AUTOMATED COUNT: 43.5 FL (ref 35.9–50)
EST. AVERAGE GLUCOSE BLD GHB EST-MCNC: 266 MG/DL
FOLATE SERPL-MCNC: 15.3 NG/ML
GLOBULIN SER CALC-MCNC: 2.2 G/DL (ref 1.9–3.5)
GLUCOSE BLD-MCNC: 124 MG/DL (ref 65–99)
GLUCOSE BLD-MCNC: 150 MG/DL (ref 65–99)
GLUCOSE BLD-MCNC: 178 MG/DL (ref 65–99)
GLUCOSE BLD-MCNC: 227 MG/DL (ref 65–99)
GLUCOSE SERPL-MCNC: 117 MG/DL (ref 65–99)
HBA1C MFR BLD: 10.9 % (ref 0–5.6)
HCT VFR BLD AUTO: 28.6 % (ref 42–52)
HGB BLD-MCNC: 10 G/DL (ref 14–18)
IMM GRANULOCYTES # BLD AUTO: 0.02 K/UL (ref 0–0.11)
IMM GRANULOCYTES NFR BLD AUTO: 0.4 % (ref 0–0.9)
IRON SATN MFR SERPL: 14 % (ref 15–55)
IRON SERPL-MCNC: 36 UG/DL (ref 50–180)
LV EJECT FRACT  99904: 60
LV EJECT FRACT MOD 2C 99903: 62.28
LV EJECT FRACT MOD 4C 99902: 56.64
LV EJECT FRACT MOD BP 99901: 59.33
LYMPHOCYTES # BLD AUTO: 1.08 K/UL (ref 1–4.8)
LYMPHOCYTES NFR BLD: 19.4 % (ref 22–41)
MCH RBC QN AUTO: 33.6 PG (ref 27–33)
MCHC RBC AUTO-ENTMCNC: 35 G/DL (ref 33.7–35.3)
MCV RBC AUTO: 96 FL (ref 81.4–97.8)
MONOCYTES # BLD AUTO: 0.55 K/UL (ref 0–0.85)
MONOCYTES NFR BLD AUTO: 9.9 % (ref 0–13.4)
NEUTROPHILS # BLD AUTO: 3.76 K/UL (ref 1.82–7.42)
NEUTROPHILS NFR BLD: 67.2 % (ref 44–72)
NRBC # BLD AUTO: 0 K/UL
NRBC BLD-RTO: 0 /100 WBC
PLATELET # BLD AUTO: 177 K/UL (ref 164–446)
PMV BLD AUTO: 9.1 FL (ref 9–12.9)
POTASSIUM SERPL-SCNC: 3.9 MMOL/L (ref 3.6–5.5)
PROT SERPL-MCNC: 4.6 G/DL (ref 6–8.2)
RBC # BLD AUTO: 2.98 M/UL (ref 4.7–6.1)
SODIUM SERPL-SCNC: 138 MMOL/L (ref 135–145)
TIBC SERPL-MCNC: 251 UG/DL (ref 250–450)
TSH SERPL DL<=0.005 MIU/L-ACNC: 4.13 UIU/ML (ref 0.38–5.33)
VIT B12 SERPL-MCNC: 249 PG/ML (ref 211–911)
WBC # BLD AUTO: 5.6 K/UL (ref 4.8–10.8)

## 2018-03-24 PROCEDURE — 700102 HCHG RX REV CODE 250 W/ 637 OVERRIDE(OP): Performed by: INTERNAL MEDICINE

## 2018-03-24 PROCEDURE — 80053 COMPREHEN METABOLIC PANEL: CPT

## 2018-03-24 PROCEDURE — 36415 COLL VENOUS BLD VENIPUNCTURE: CPT

## 2018-03-24 PROCEDURE — 700111 HCHG RX REV CODE 636 W/ 250 OVERRIDE (IP): Performed by: INTERNAL MEDICINE

## 2018-03-24 PROCEDURE — 71045 X-RAY EXAM CHEST 1 VIEW: CPT

## 2018-03-24 PROCEDURE — 83550 IRON BINDING TEST: CPT

## 2018-03-24 PROCEDURE — A9270 NON-COVERED ITEM OR SERVICE: HCPCS | Performed by: HOSPITALIST

## 2018-03-24 PROCEDURE — 99233 SBSQ HOSP IP/OBS HIGH 50: CPT | Performed by: HOSPITALIST

## 2018-03-24 PROCEDURE — 82607 VITAMIN B-12: CPT

## 2018-03-24 PROCEDURE — 84443 ASSAY THYROID STIM HORMONE: CPT

## 2018-03-24 PROCEDURE — A9270 NON-COVERED ITEM OR SERVICE: HCPCS | Performed by: INTERNAL MEDICINE

## 2018-03-24 PROCEDURE — 770020 HCHG ROOM/CARE - TELE (206)

## 2018-03-24 PROCEDURE — 82746 ASSAY OF FOLIC ACID SERUM: CPT

## 2018-03-24 PROCEDURE — 83540 ASSAY OF IRON: CPT

## 2018-03-24 PROCEDURE — 83036 HEMOGLOBIN GLYCOSYLATED A1C: CPT

## 2018-03-24 PROCEDURE — 93306 TTE W/DOPPLER COMPLETE: CPT

## 2018-03-24 PROCEDURE — 700102 HCHG RX REV CODE 250 W/ 637 OVERRIDE(OP): Performed by: HOSPITALIST

## 2018-03-24 PROCEDURE — 93306 TTE W/DOPPLER COMPLETE: CPT | Mod: 26 | Performed by: INTERNAL MEDICINE

## 2018-03-24 PROCEDURE — 82962 GLUCOSE BLOOD TEST: CPT | Mod: 91

## 2018-03-24 PROCEDURE — 97161 PT EVAL LOW COMPLEX 20 MIN: CPT

## 2018-03-24 PROCEDURE — 700111 HCHG RX REV CODE 636 W/ 250 OVERRIDE (IP): Performed by: HOSPITALIST

## 2018-03-24 PROCEDURE — 85025 COMPLETE CBC W/AUTO DIFF WBC: CPT

## 2018-03-24 PROCEDURE — 700105 HCHG RX REV CODE 258: Performed by: HOSPITALIST

## 2018-03-24 PROCEDURE — 70551 MRI BRAIN STEM W/O DYE: CPT

## 2018-03-24 RX ORDER — TAMSULOSIN HYDROCHLORIDE 0.4 MG/1
0.4 CAPSULE ORAL
Qty: 30 CAP | Refills: 1 | Status: SHIPPED | OUTPATIENT
Start: 2018-03-25

## 2018-03-24 RX ORDER — ATORVASTATIN CALCIUM 40 MG/1
40 TABLET, FILM COATED ORAL
Qty: 30 TAB | Refills: 1 | Status: SHIPPED | OUTPATIENT
Start: 2018-03-24 | End: 2018-04-09 | Stop reason: SDUPTHER

## 2018-03-24 RX ORDER — DIPHENHYDRAMINE HYDROCHLORIDE 50 MG/ML
25 INJECTION INTRAMUSCULAR; INTRAVENOUS ONCE
Status: COMPLETED | OUTPATIENT
Start: 2018-03-24 | End: 2018-03-24

## 2018-03-24 RX ORDER — FUROSEMIDE 10 MG/ML
20 INJECTION INTRAMUSCULAR; INTRAVENOUS
Status: DISCONTINUED | OUTPATIENT
Start: 2018-03-24 | End: 2018-03-25

## 2018-03-24 RX ORDER — DIPHENHYDRAMINE HCL 25 MG
25 TABLET ORAL ONCE
Status: COMPLETED | OUTPATIENT
Start: 2018-03-24 | End: 2018-03-24

## 2018-03-24 RX ORDER — ACETAMINOPHEN 325 MG/1
650 TABLET ORAL ONCE
Status: COMPLETED | OUTPATIENT
Start: 2018-03-24 | End: 2018-03-24

## 2018-03-24 RX ADMIN — TAMSULOSIN HYDROCHLORIDE 0.4 MG: 0.4 CAPSULE ORAL at 07:31

## 2018-03-24 RX ADMIN — ASPIRIN 81 MG: 81 TABLET, COATED ORAL at 07:31

## 2018-03-24 RX ADMIN — FUROSEMIDE 20 MG: 10 INJECTION, SOLUTION INTRAMUSCULAR; INTRAVENOUS at 09:02

## 2018-03-24 RX ADMIN — CEFAZOLIN SODIUM 2 G: 2 INJECTION, SOLUTION INTRAVENOUS at 05:21

## 2018-03-24 RX ADMIN — INSULIN GLARGINE 10 UNITS: 100 INJECTION, SOLUTION SUBCUTANEOUS at 21:51

## 2018-03-24 RX ADMIN — ACETAMINOPHEN 650 MG: 325 TABLET, FILM COATED ORAL at 08:59

## 2018-03-24 RX ADMIN — IRON DEXTRAN 1600 MG: 50 INJECTION INTRAMUSCULAR; INTRAVENOUS at 12:03

## 2018-03-24 RX ADMIN — ACETAMINOPHEN 650 MG: 325 TABLET, FILM COATED ORAL at 21:32

## 2018-03-24 RX ADMIN — INSULIN HUMAN 3 UNITS: 100 INJECTION, SOLUTION PARENTERAL at 17:29

## 2018-03-24 RX ADMIN — HEPARIN SODIUM 5000 UNITS: 5000 INJECTION, SOLUTION INTRAVENOUS; SUBCUTANEOUS at 21:32

## 2018-03-24 RX ADMIN — METOPROLOL TARTRATE 25 MG: 25 TABLET, FILM COATED ORAL at 07:31

## 2018-03-24 RX ADMIN — IRON DEXTRAN 25 MG: 50 INJECTION INTRAMUSCULAR; INTRAVENOUS at 09:34

## 2018-03-24 RX ADMIN — METOPROLOL TARTRATE 25 MG: 25 TABLET, FILM COATED ORAL at 21:32

## 2018-03-24 RX ADMIN — ATORVASTATIN CALCIUM 40 MG: 40 TABLET, FILM COATED ORAL at 21:32

## 2018-03-24 RX ADMIN — DIPHENHYDRAMINE HCL 25 MG: 25 TABLET ORAL at 08:59

## 2018-03-24 RX ADMIN — RAMIPRIL 5 MG: 5 CAPSULE ORAL at 21:32

## 2018-03-24 RX ADMIN — HEPARIN SODIUM 5000 UNITS: 5000 INJECTION, SOLUTION INTRAVENOUS; SUBCUTANEOUS at 05:18

## 2018-03-24 RX ADMIN — ONDANSETRON HYDROCHLORIDE 4 MG: 2 INJECTION, SOLUTION INTRAMUSCULAR; INTRAVENOUS at 05:41

## 2018-03-24 RX ADMIN — INSULIN HUMAN 2 UNITS: 100 INJECTION, SOLUTION PARENTERAL at 00:20

## 2018-03-24 RX ADMIN — HEPARIN SODIUM 5000 UNITS: 5000 INJECTION, SOLUTION INTRAVENOUS; SUBCUTANEOUS at 16:31

## 2018-03-24 ASSESSMENT — ENCOUNTER SYMPTOMS
SORE THROAT: 0
DEPRESSION: 0
NAUSEA: 1
SHORTNESS OF BREATH: 0
EYE PAIN: 0
HEADACHES: 0
COUGH: 0
BLURRED VISION: 0
ABDOMINAL PAIN: 0
CHILLS: 0
FOCAL WEAKNESS: 1
VOMITING: 1
DIZZINESS: 0
WEAKNESS: 0
WHEEZING: 0
DOUBLE VISION: 0
LOSS OF CONSCIOUSNESS: 0
NECK PAIN: 0
FOCAL WEAKNESS: 0
ORTHOPNEA: 0
BACK PAIN: 0
FALLS: 0
TINGLING: 0
PALPITATIONS: 0
FEVER: 0

## 2018-03-24 ASSESSMENT — PAIN SCALES - GENERAL
PAINLEVEL_OUTOF10: 0
PAINLEVEL_OUTOF10: 0
PAINLEVEL_OUTOF10: 5
PAINLEVEL_OUTOF10: 0

## 2018-03-24 NOTE — PROGRESS NOTES
Assumed care of PT A&O 4. Pt resting in bed with no signs of labored breathing. On 2L . Tele monitor in place, cardiac rhythm being monitored. Call light within reach, bed in lowest position, upper bed rails up bed alarm on. Pt was updated on plan of care for the night . Will continue to monitor. Right groin cath site CDI assessed with Day RN. Pt on bedrest until 9pm.

## 2018-03-24 NOTE — ASSESSMENT & PLAN NOTE
Appears embolic  No deficits  Neuro consulted  Pending carotid duplex  No anticoagulation unless afib documented  Discussed with cards and they will place recorder prior to dc.   Asa/statin  No deficits now.   No therapy needed likely but pt/ot pending.

## 2018-03-24 NOTE — PROGRESS NOTES
Reed catheter d/c'ed per orders. 8 ml taken from the balloon. Bladder protocol initiated. Urinal within reach. Pt educated to notify staff if assistance is needed.

## 2018-03-24 NOTE — PROGRESS NOTES
Noted a skin tear on the ischium/coccyx area on the left side. Patient has been turning and repositioning self during this stay. Pt is able to sit up on the edge of bed by himself as well. Pt has been stating he has been uncomfortable in this hospital bed and has repositioned self often. Waffle cushion added to bed. Mepilex applied. Wound consult reordered. Photo uploaded. LDA opened. Patient has been laying on his stomach since noon today and states it's much more comfortable.

## 2018-03-24 NOTE — PROGRESS NOTES
Initiated Iron test dose. Pt premedicated with Tylenol and benadryl. Monitoring in the room . VSS.

## 2018-03-24 NOTE — PROGRESS NOTES
Pt resting in bed at this time. Even visible chest rise. No signs of distress. Bed alarm on. Call light in place. Bed in low and locked position. Cath site dressing is CDI. Tibial pulse present on R- leg.  Hourly rounding in place.

## 2018-03-24 NOTE — RESPIRATORY CARE
COPD EDUCATION by COPD CLINICAL EDUCATOR  3/24/2018 at 6:17 AM by Sharee Peck     Patient reviewed by COPD education team. Patient does not qualify for COPD program.

## 2018-03-24 NOTE — PROGRESS NOTES
[facility] Hospitalist Progress Note    Date of Service: 3/24/2018    Chief Complaint  67 y.o. male admitted 3/22/2018 with an infected diabetic ulcer s/p BKA at an outside hospital complicated by nstemi and acute delirium with left sided weakness that are now resolved. A cardia cath has shown multivessel CAD. Also noted to have embolic appearing new CVA. No deficits noted since initial event.     Interval Problem Update  Vomited once over night.   Voiding  No other events.   No chest pain  New cva noted on MRI          Consultants/Specialty  cards  Neuro    Disposition  Get pt/ot eval and will determine    Mri with new cva- multiple    PROCEDURES: 3/23/18  1.  Left heart catheterization.  2.  Coronary angiography.  3.  Left ventriculogram.  PREPROCEDURE DIAGNOSIS:  Acute coronary syndrome.  POSTPROCEDURE DIAGNOSES:  1.  Multivessel coronary artery disease with nonobstructive left main coronary   artery disease, high-grade mid left anterior descending artery and distal   left anterior descending artery stenosis, high-grade proximal diagonal branch   stenosis, high-grade ramus stenosis, high-grade proximal obtuse marginal   branch stenosis and chronic total occlusion of the mid right coronary artery   with distal vessel filling via left to right collaterals.  2.  Normal left ventricular systolic function with ejection fraction of 55%.  3.  Mildly elevated left ventricular end-diastolic pressure.        Review of Systems   Constitutional: Positive for malaise/fatigue. Negative for chills and fever.   HENT: Negative for sore throat.    Eyes: Negative for blurred vision and pain.   Respiratory: Negative for cough and shortness of breath.    Cardiovascular: Negative for chest pain and palpitations.   Gastrointestinal: Positive for nausea and vomiting. Negative for abdominal pain.   Genitourinary: Negative for dysuria and urgency.   Musculoskeletal: Negative for back pain and neck pain.   Skin: Negative for itching and rash.    Neurological: Negative for tingling and focal weakness.   Psychiatric/Behavioral: Negative for depression and suicidal ideas.   All other systems reviewed and are negative.     Physical Exam  Laboratory/Imaging   Hemodynamics  Temp (24hrs), Av.9 °C (98.4 °F), Min:36.7 °C (98 °F), Max:37.2 °C (99 °F)   Temperature: 36.7 °C (98 °F)  Pulse  Av.2  Min: 71  Max: 93    Blood Pressure : 101/59      Respiratory      Respiration: 16, Pulse Oximetry: 95 %        RUL Breath Sounds: Clear, RML Breath Sounds: Diminished, RLL Breath Sounds: Diminished, CAROL Breath Sounds: Clear, LLL Breath Sounds: Diminished    Fluids    Intake/Output Summary (Last 24 hours) at 18 0749  Last data filed at 18 0600   Gross per 24 hour   Intake             1650 ml   Output             1150 ml   Net              500 ml       Nutrition  Orders Placed This Encounter   Procedures   • Diet Order     Standing Status:   Standing     Number of Occurrences:   1     Order Specific Question:   Diet:     Answer:   Cardiac [6]     Physical Exam   Constitutional: He is oriented to person, place, and time. He appears well-developed and well-nourished. No distress.   HENT:   Right Ear: External ear normal.   Left Ear: External ear normal.   Mouth/Throat: Oropharynx is clear and moist.   Eyes: Conjunctivae are normal. Right eye exhibits no discharge. Left eye exhibits no discharge.   Neck: No JVD present. No tracheal deviation present.   Cardiovascular: Normal rate, normal heart sounds and intact distal pulses.    No murmur heard.  Pulmonary/Chest: Effort normal and breath sounds normal. No respiratory distress. He has no rales.   Abdominal: Soft. Bowel sounds are normal. He exhibits no distension. There is no tenderness.   Musculoskeletal: He exhibits no edema or deformity.   Right BKA dressed   Neurological: He is alert and oriented to person, place, and time.   Skin: Skin is warm and dry. He is not diaphoretic. No erythema.   Psychiatric: He  has a normal mood and affect. His behavior is normal.   Nursing note and vitals reviewed.      Recent Labs      03/22/18 2040 03/23/18 0215 03/24/18 0428   WBC  7.4  7.7  5.6   RBC  2.97*  2.89*  2.98*   HEMOGLOBIN  9.7*  9.4*  10.0*   HEMATOCRIT  28.6*  27.8*  28.6*   MCV  96.3  96.2  96.0   MCH  32.7  32.5  33.6*   MCHC  33.9  33.8  35.0   RDW  44.5  43.7  43.5   PLATELETCT  137*  147*  177   MPV  9.0  9.4  9.1     Recent Labs      03/22/18 2040 03/23/18 0215 03/24/18 0428   SODIUM  133*  133*  138   POTASSIUM  5.0  4.5  3.9   CHLORIDE  103  102  105   CO2  26  26  30   GLUCOSE  298*  390*  117*   BUN  22  21  14   CREATININE  0.92  0.80  0.51   CALCIUM  8.1*  7.9*  7.9*     Recent Labs      03/23/18 0215   INR  1.09     Recent Labs      03/22/18 2040   BNPBTYPENAT  650*              Assessment/Plan     * NSTEMI (non-ST elevated myocardial infarction) (CMS-HCC)- (present on admission)   Assessment & Plan     perioperative myocardial infarction   now chest pain-free Asa/statin/ace/bb therapy  Now with multivessel disease  Ct surgery to consult pending  Echo pending  Trend on tele  Maximize medical therapy            Left-sided weakness- (present on admission)   Assessment & Plan    Apparently had signs of left-sided drift at the outside hospital  No sx now  Pending MRI  Continue asa/statin        Insulin dependent diabetes mellitus (CMS-HCC)- (present on admission)   Assessment & Plan    Uncontrolled with hyperglycemia, vascular and neurologic complications.   Continue Lantus, continue sliding scale  Restart lantus today   No metformin          Acute hypoxemic respiratory failure (CMS-HCC)- (present on admission)   Assessment & Plan    Bilateral pulmonary infiltrates noted on outside hospital CT scan and cxr  On 2L still but improving with diuresis  Suspect copd as well given exam.   Echo pending but ef on cath 55%  Restart lasix at low dose today and trend  Consider steroids for copd.          S/P bilateral BKA (below knee amputation) (CMS-HCC)- (present on admission)   Assessment & Plan    Post op day # 5 from left BKA stump revision  Continue wound care  Ongoing antibiotics  No positive culture results from outside hospital.           CVA (cerebral vascular accident) (CMS-HCC)   Assessment & Plan    Appears embolic  No deficits  Neuro consulted  Check carotid duplex  Consider full anticoagulation per neuro recs            COPD (chronic obstructive pulmonary disease) (CMS-HCC)   Assessment & Plan    No obvious flare  This is a presumed diagnosis based on exam and long hx of smoking  Rt protocol  Consider steroids with worsening of wheezing.   Doubt cardiac asthma          Urine retention   Assessment & Plan    Dc mcnair, bladder scan protocol, flomax trial  Working for now        Acute metabolic encephalopathy   Assessment & Plan    Unclear etiology  Suspect postop related   Ct normal  Mri pending  Resolved per wife.           BPH (benign prostatic hyperplasia)   Assessment & Plan    flomax        PVD (peripheral vascular disease) (CMS-HCC)   Assessment & Plan    Asa/statin  2/2 diabetes  Consider plavix        Hyponatremia   Assessment & Plan    Suspect hypervolemic.   Resolving with Diuresis  Echo pending        Anemia   Assessment & Plan    Iron deficiency and chronic disease related. Start fe therapy.           Quality-Core Measures   Reviewed items::  EKG reviewed, Radiology images reviewed, Labs reviewed and Medications reviewed  Mcnair catheter::  No Mcnair  DVT prophylaxis pharmacological::  Heparin  DVT prophylaxis - mechanical:  SCDs  Ulcer Prophylaxis::  No  Assessed for rehabilitation services:  Patient was assess for and/or received rehabilitation services during this hospitalization

## 2018-03-24 NOTE — CONSULTS
DATE OF SERVICE:  03/24/2018    REFERRING PHYSICIAN:  Neal Miller MD    REASON FOR CONSULTATION:  Evaluation of elevated troponins, non-STEMI   myocardial infarction and coronary artery disease.    CHIEF COMPLAINT:  Weakness and left-sided upper extremity drift following a   left BKA.    HISTORY OF PRESENT ILLNESS:  The patient is a 67-year-old white male with   multiple medical problems who actually presented to our hospital complaining   of weakness and left-sided upper extremity drifting indicative of a   neurological event.  His problem started multiple weeks ago when he developed   osteomyelitis of a left below-the-knee amputation.  He was placed on a wound   VAC and antibiotics for multiple weeks and then was admitted to the hospital   in [place] on 03/19/2018 for revision of his left BKA.  The patient underwent   surgery without apparent complication, but postoperatively, he was noted to   have left-sided upper extremity weakness, which lasted a number of days and   then gradually improved and back to normal.  In addition, the patient   describes weakness and lethargy post-surgery and in addition, it was concerned   that he was confused postoperatively.  Most of this all improved in the   ensuing days after surgery, but during this time, he was noted to have a   troponin bump of 3.33 and a BNP of 479.  For that reason, he was transferred   to [facility] where he underwent a heart catheterization yesterday   by Dr. Neal Miller.  The patient never had chest pain or shortness ob breath.  The patient was found to have multivessel coronary artery  disease.  I had the opportunity to review the angiograms and even though he   does have what appears to be stenosis of the left anterior descending, I   honestly do not believe it is flow limiting.  He does have other chronic   disease and obstructions of the circumflex as well as the right coronary   artery.  Ultimately, at some point, he may benefit from  coronary artery bypass   grafting.  Unfortunately, the patient has a lot of comorbidities, the most   significant of which is the possibility of neurological event in the   perioperative period, which would increase his risk of coronary artery bypass   grafting of having a stroke.  In addition, he is being treated for pneumonia   as well as osteomyelitis of his left below-the-knee amputation.    PAST MEDICAL HISTORY:  Significant for insulin-dependent diabetes with   complications of a neuropathy, status post revision of the left BKA.  He has   BPH and hypertension.    FAMILY HISTORY:  No premature coronary disease.  Positive for alcoholism.    PSYCHOSOCIAL HISTORY:  Denies recreational drug use.      MEDICATIONS:  Include the time of admission, gabapentin, insulin, Flomax,   Lovenox, Zosyn, simvastatin, Altace and Lantus.    REVIEW OF SYSTEMS:    Constitutional:  negative for chills, fever Positive for malaise/fatique.  Respiratory:  Negative for cough.  Cardiovascular:  Positive for chest pain.  Negative for palpitations, orthopnea, claudication, and PND.  Gastrointestinal:  negative for abdominal pain.  Musculoskeletal:  Positive for myalgias.  Neurological:  Positive for dizziness.    All other systems were reviewed with the patient and are negative except what is mentioned in the aforementioned HPI, PMH and PSH.    PHYSICAL EXAMINATION:  GENERAL:  He is a disheveled appearing male.  He is lying in bed, presently is   neurologically intact.  He is normocephalic.  His sclera nonicteric.  NECK:  Examination of his neck reveals no adenopathy.  I do not appreciate   carotid bruits.    HEART:  Examination of his heart reveals no murmurs.  CHEST:  Examination of his chest reveal crackles at the bases.  ABDOMEN:  Without masses.  EXTREMITIES:  Lower extremities, obviously he has left BKA and has decreased   pulses on the right side.  NEURO:  Grossly intact.  AAO x 3.   SKIN:   Normal skin turgor, no stasis dermatitis  or ulcers noted.  MUSCULOSKELETAL:  Unable to ambulate due to recent BKA.      IMPRESSION:  A 67-year-old male with multiple comorbidities and ongoing active   infection of the lung as well as the osteomyelitis of his left below-the-knee   amputation.  In addition, the patient has unresolved neurological issues.  He   also has 3-vessel coronary artery disease, but I do not believe that his left   anterior descending is flow limiting at this point.    PLAN:  Given the patient's multiple comorbidities, I believe that surgical   intervention is not warranted at this time.  The postoperative complication   and mortality rate excessively high and given the fact the patient has no   ongoing chest pain and has never had chest pain, I believe the patient should   be treated medically at this time and then to resolve both his neurological as   well as his infectious issues.  I believe the patient could be sent home from   the regards to his coronary artery disease and placed on maximal medical   therapy.  I would be happy to follow him up in my clinic with many other   issues resolve and if he has ongoing angina, then we should go ahead and   perform coronary artery bypass grafting.       ____________________________________     MD AUTUMN PRADO / LRONA    DD:  03/24/2018 09:34:35  DT:  03/24/2018 10:20:05    D#:  [id number]  Job#:  [id number]

## 2018-03-24 NOTE — ASSESSMENT & PLAN NOTE
No obvious flare  This is a presumed diagnosis based on exam and long hx of smoking  Rt protocol  Consider steroids with worsening of wheezing.   Doubt cardiac asthma

## 2018-03-24 NOTE — PROGRESS NOTES
Cardiology Progress Note               Author: Elijah Hamilton Date & Time created: 3/24/2018  1:06 PM     67 years old male who was transferred from [facility]. He had abnormal troponin. History of chronic left lower extremity wounds in which he underwent left below the knee amputation on Monday. History of hypertension, diabetes, BPH    Consultation: Abnormal troponin    3/24/18: denies any chest pain   /75  HR 86    Labs reviewed   TSH 4.130    Cardiac catheterization:  3/23/18  1.  Multivessel coronary artery disease with nonobstructive left main coronary   artery disease, high-grade mid left anterior descending artery and distal   left anterior descending artery stenosis, high-grade proximal diagonal branch   stenosis, high-grade ramus stenosis, high-grade proximal obtuse marginal   branch stenosis and chronic total occlusion of the mid right coronary artery   with distal vessel filling via left to right collaterals.  2.  Normal left ventricular systolic function with ejection fraction of 55%.  3.  Mildly elevated left ventricular end-diastolic pressure.      Echocardiography Laboratory  3/24/18  No prior study is available for comparison.   Normal left ventricular size, wall thickness, and systolic function.  Left ventricular ejection fraction is visually estimated to be 60%.  Grade II diastolic dysfunction.  The right ventricle was normal in size and function.  Mild mitral regurgitation.  Trace pericardial effusion.    Review of Systems   Constitutional: Negative for malaise/fatigue.   Eyes: Negative for blurred vision and double vision.   Respiratory: Negative for cough, shortness of breath and wheezing.    Cardiovascular: Negative for chest pain, palpitations, orthopnea and leg swelling.   Musculoskeletal: Positive for joint pain. Negative for falls.   Neurological: Positive for focal weakness. Negative for dizziness, loss of consciousness, weakness and headaches.   All other systems reviewed and are  negative.      Physical Exam   Constitutional: He is oriented to person, place, and time. He appears well-developed and well-nourished.   HENT:   Head: Normocephalic.   Neck: Normal range of motion. No JVD present.   Cardiovascular: Normal rate, regular rhythm and normal heart sounds.    No murmur heard.  Pulmonary/Chest: Effort normal and breath sounds normal. No respiratory distress. He has no wheezes.   Abdominal: Bowel sounds are normal.   Musculoskeletal: He exhibits no edema or tenderness.   Left BKA, left sided weakness    Neurological: He is alert and oriented to person, place, and time.   Skin: Skin is warm and dry.   Psychiatric: His behavior is normal. Judgment normal.   Nursing note and vitals reviewed.      Hemodynamics:  Temp (24hrs), Av.9 °C (98.4 °F), Min:36.7 °C (98 °F), Max:37.2 °C (99 °F)  Temperature: 36.8 °C (98.3 °F)  Pulse  Av.3  Min: 71  Max: 99   Blood Pressure : 155/75     Respiratory:    Respiration: 16, Pulse Oximetry: 96 %        RUL Breath Sounds: Clear, RML Breath Sounds: Diminished, RLL Breath Sounds: Diminished, CAROL Breath Sounds: Clear, LLL Breath Sounds: Diminished  Fluids:  Date 18 0700 - 18 0659   Shift 6736-9479 8752-1313 8832-8041 24 Hour Total   I  N  T  A  K  E   P.O. 180   180    I.V. 350   350    Shift Total 530   530   O  U  T  P  U  T   Urine 1000   1000    Shift Total 1000   1000   Weight (kg) 73.4 73.4 73.4 73.4       Weight: 73.4 kg (161 lb 13.1 oz)  GI/Nutrition:  Orders Placed This Encounter   Procedures   • Diet Order     Standing Status:   Standing     Number of Occurrences:   1     Order Specific Question:   Diet:     Answer:   Cardiac [6]     Lab Results:  Recent Labs      18   2040  18   0215  18   0428   WBC  7.4  7.7  5.6   RBC  2.97*  2.89*  2.98*   HEMOGLOBIN  9.7*  9.4*  10.0*   HEMATOCRIT  28.6*  27.8*  28.6*   MCV  96.3  96.2  96.0   MCH  32.7  32.5  33.6*   MCHC  33.9  33.8  35.0   RDW  44.5  43.7  43.5   PLATELETCT   137*  147*  177   MPV  9.0  9.4  9.1     Recent Labs      03/22/18   2040  03/23/18   0215  03/24/18   0428   SODIUM  133*  133*  138   POTASSIUM  5.0  4.5  3.9   CHLORIDE  103  102  105   CO2  26  26  30   GLUCOSE  298*  390*  117*   BUN  22  21  14   CREATININE  0.92  0.80  0.51   CALCIUM  8.1*  7.9*  7.9*     Recent Labs      03/23/18   0215   INR  1.09     Recent Labs      03/22/18 2040   BNPBTYPENAT  650*     Recent Labs      03/22/18 2040   TROPONINI  1.93*   BNPBTYPENAT  650*             Medical Decision Making, by Problem:  Active Hospital Problems    Diagnosis   • *NSTEMI (non-ST elevated myocardial infarction) (CMS-HCC) [I21.4]   • Left-sided weakness [R53.1]   • S/P bilateral BKA (below knee amputation) (CMS-HCC) [Z89.512, Z89.511]   • Acute hypoxemic respiratory failure (CMS-HCC) [J96.01]   • Insulin dependent diabetes mellitus (CMS-HCC) [E11.9, Z79.4]   • COPD (chronic obstructive pulmonary disease) (CMS-HCC) [J44.9]   • CVA (cerebral vascular accident) (CMS-HCC) [I63.9]   • Anemia [D64.9]   • Hyponatremia [E87.1]   • PVD (peripheral vascular disease) (CMS-HCC) [I73.9]   • BPH (benign prostatic hyperplasia) [N40.0]   • Acute metabolic encephalopathy [G93.41]   • Urine retention [R33.9]       Plan:  1. CAD:  - PCI showed multivessel disease   - ECHO: ef 60% with Grade II diastolic dysfunction.  - CTS consulted recommend treating medically.  - Denies any chest pain at this time.  - Continue aspirin 81 mg, atorvastatin 40 mg, Altase 5 mg and metoprolol 25 mg twice a day.    2. Left sided weakness:  - CT scan head normal   - continue asa and statin  - MRI brain: small acute infarct in the posterior limb of the right internal capsule and right posterior temporal lobe.           Quality-Core Measures

## 2018-03-24 NOTE — CONSULTS
NEUROLOGY NOTE    Referring Physician  Dr Ayala      CHIEF COMPLAINT:    Mild left limb weakness        IMPRESSION:    1. Change of mental status-- now subsided-- could be due to multiple tiny strokes, could be due to seizures secondary to these cortical strokes-- could be due to metabolic-toxic  2. insulin-dependent diabetes and osteomyelitis left leg, bilteral BKA, COPD, Peripheral vessel disease, CVA, BPH    PLAN/RECOMMENDATIONS:      The patient is today awake and oriented    Explain to the patient, the stroke he had are most likely embolic-- cardiac embolic or artery to artery  especially cardiac embolic is most likely since bilateral     Agree with carotid Duplex  May benefit from cardiac monitoring - like one month cardiac loop recorder    ________________________________________________________________________    If circumstances change do not hesitate to re-consult neurology.     At this point, patient remains stable. The patient does have a tendency to develop seizures with multiple embolic strokes, trial of anti-seizure medication in case of another change of mental status       We did tell the patient, at times, people with stroke like this case could develop seizures  Please Call neurologist if any spells of change of mental status      Advise cardiac consultation and probable one month cardiac loop recorder implanted either during this admission or outpatient      Thank you for the consult.     Jimbo Munoz M.D.  [facility]  2:10 PM03/24/18    ________________________________________________________________________          SIGNATURE:  Jimbo Munoz        MRI of brain 3/22/2018  1.  Small acute infarct in the posterior limb of the right internal capsule. Additional scattered punctate acute infarcts in the right posterior temporal lobe, left frontal lobe and left frontoparietal junction.  2.  Mild diffuse cerebral substance loss.  3.  Mild microangiopathic ischemic change versus  demyelination or gliosis.  4.  Findings of sinusitis as described above.  5.  Bilateral mastoid effusions. Findings could be consistent with mastoiditis in the appropriate clinical setting.                PRESENT ILLNESS:       67 y.o. male who presented 3/22/2018 with mild left limb weakness and MRI of brain showed multiple strokes-- embolic is most likely     Patient has history of multiple medical problems including insulin-dependent diabetes and osteomyelitis left leg. Patient is status post left BKA. He has trouble providing exact course of events but his physician has been dealing with an infection in his left leg for some time. Most recently the patient had a wound VAC on for 6 weeks.  He was admitted to [facility] in [place] for revision of his left BKA, this occurred on 3/19/2018. Patient underwent surgery without apparent complication. Postoperatively the patient was noted to be confused he currently had some left-sided drift upper extremities as well as some right-sided weakness over the past 2 days this has gradually improved and his strength is now back to normal. In addition his wife states that he was pretty confused and lethargic after the surgery, this is resolved, his personality is back to normal. Lastly the patient was noted to have an elevated troponin.    PAST MEDICAL HISTORY:  No past medical history on file.    PAST SURGICAL HISTORY:  No past surgical history on file.    FAMILY HISTORY:  Family History   Problem Relation Age of Onset   • Heart Attack Mother 60       SOCIAL HISTORY:  Social History     Social History   • Marital status: N/A     Spouse name: N/A   • Number of children: N/A   • Years of education: N/A     Occupational History   • Not on file.     Social History Main Topics   • Smoking status: Former Smoker     Types: Cigarettes   • Smokeless tobacco: Never Used      Comment: quit 6 weeks ago   • Alcohol use Not on file   • Drug use: Unknown   • Sexual activity: Not on file      Other Topics Concern   • Not on file     Social History Narrative   • No narrative on file     ALLERGIES:  Allergies   Allergen Reactions   • Tea Tree Oil Rash     Unspecified     TOBHX  History   Smoking Status   • Former Smoker   • Types: Cigarettes   Smokeless Tobacco   • Never Used     Comment: quit 6 weeks ago     ALCHX  History   Alcohol use Not on file     DRUGHX  History   Drug use: Unknown           MEDICATIONS:  Current Facility-Administered Medications   Medication Dose   • IRON REPLACEMENT per pharmacy protocol     • furosemide (LASIX) injection 20 mg  20 mg   • iron dextran complex (INFED) 1,600 mg in  mL IVPB  1,600 mg   • Pharmacy Consult Request     • pneumococcal 13-Pamela Conj Vacc (PREVNAR 13) syringe 0.5 mL  0.5 mL   • heparin injection 5,000 Units  5,000 Units   • aspirin EC (ECOTRIN) tablet 81 mg  81 mg   • ondansetron (ZOFRAN) syringe/vial injection 4 mg  4 mg   • dexamethasone (DECADRON) injection 4 mg  4 mg   • diphenhydrAMINE (BENADRYL) injection 25 mg  25 mg   • haloperidol lactate (HALDOL) injection 1 mg  1 mg   • scopolamine (TRANSDERM-SCOP) patch 1 Patch  1 Patch   • tamsulosin (FLOMAX) capsule 0.4 mg  0.4 mg   • ramipril (ALTACE) capsule 5 mg  5 mg   • insulin glargine (LANTUS) injection 10 Units  10 Units   • senna-docusate (PERICOLACE or SENOKOT S) 8.6-50 MG per tablet 2 Tab  2 Tab    And   • polyethylene glycol/lytes (MIRALAX) PACKET 1 Packet  1 Packet    And   • magnesium hydroxide (MILK OF MAGNESIA) suspension 30 mL  30 mL    And   • bisacodyl (DULCOLAX) suppository 10 mg  10 mg   • ondansetron (ZOFRAN ODT) dispertab 4 mg  4 mg   • acetaminophen (TYLENOL) tablet 650 mg  650 mg   • insulin regular (HUMULIN R) injection 2-9 Units  2-9 Units    And   • glucose 4 g chewable tablet 16 g  16 g    And   • dextrose 50% (D50W) injection 25 mL  25 mL   • ceFAZolin (ANCEF) IVPB 2 g  2 g   • atorvastatin (LIPITOR) tablet 40 mg  40 mg   • metoprolol (LOPRESSOR) tablet 25 mg  25 mg  "      REVIEW OF SYSTEM:    Constitutional: Denies fevers, Denies weight changes   Eyes: Denies changes in vision, no eye pain   Ears/Nose/Throat/Mouth: Denies nasal congestion or sore throat   Cardiovascular: CAD, peripheral vessel disease   Respiratory: COPD  Gastrointestinal/Hepatic: Denies abdominal pain, nausea, vomiting, diarrhea, constipation or GI bleeding   Genitourinary: Denies bladder dysfunction, dysuria or frequency   Musculoskeletal/Rheum: Denies joint pain and swelling   Skin/Breast: Denies rash, denies breast lumps or discharge   Neurological: stroke  Psychiatric: denies mood disorder   Endocrine: DM  Heme/Oncology/Lymph Nodes: Denies enlarged lymph nodes, denies brusing or known bleeding disorder   Allergic/Immunologic: Denies hx of allergies       PHYSICAL AND NEUROLOGICAL EXMAINATIONS:  VITAL SIGNS: /80   Pulse 76   Temp 36.9 °C (98.4 °F)   Resp 16   Ht 1.702 m (5' 7\")   Wt 73.4 kg (161 lb 13.1 oz)   SpO2 96%   BMI 25.34 kg/m²   CURRENT WEIGHT:   BMI: Body mass index is 25.34 kg/m².  PREVIOUS WEIGHTS:  Wt Readings from Last 25 Encounters:   03/23/18 73.4 kg (161 lb 13.1 oz)       General appearance of patient: WDWN(+) NAD(+)    EYES  o Fundus : Papilledem(-) Exudates(-) Hemorrhage(-)  Nervous System  Orientation to time, place and person(+)  Memory normal(-)  Language: aphasia(-)  Knowledge: past(+) Current(+)  Attention(+)  Cranial Nerves  • Nerve 2: intact  • Nerve 3,4,6: intact  • Nerve 5 : intact  • Nerve 7: intact  • Nerve 8: intact  • Nerve 9 & 10: intact  • Nerve 11: intact  • Nerve 12: intact  Muscle Power and muscle tone: symmetric week  bilteral BKA  Sensory System: Pin sensation decreased over both leg  Reflexes: symmetric decreased  Cerebellar Function FNP normal   Gait : bed ridden  Heart and Vascular  Peripheral Vasucular system : Edema (-) Swelling(-)  RHB, Breathing sound clear  abdomen bowel sound normoactive  Extremities freely moveable  Joints no contracture   "     NEUROIMAGING: I reviewed the MRI/CT of brain       LAB:  Recent Labs      03/22/18 2040  03/23/18   0215  03/24/18   0428   WBC  7.4  7.7  5.6   RBC  2.97*  2.89*  2.98*   HEMOGLOBIN  9.7*  9.4*  10.0*   HEMATOCRIT  28.6*  27.8*  28.6*   MCV  96.3  96.2  96.0   MCH  32.7  32.5  33.6*   MCHC  33.9  33.8  35.0   RDW  44.5  43.7  43.5   PLATELETCT  137*  147*  177   MPV  9.0  9.4  9.1           IMPRESSION:    1. Change of mental status-- now subsided-- could be due to multiple tiny strokes, could be due to seizures secondary to these cortical strokes-- could be due to metabolic-toxic  2. insulin-dependent diabetes and osteomyelitis left leg, bilteral BKA, COPD, Peripheral vessel disease, CVA, BPH    PLAN/RECOMMENDATIONS:      The patient is today awake and oriented    Explain to the patient, the stroke he had are most likely embolic-- cardiac embolic or artery to artery  especially cardiac embolic is most likely since bilateral     Agree with carotid Duplex  May benefit from cardiac monitoring - like one month cardiac loop recorder    ________________________________________________________________________    If circumstances change do not hesitate to re-consult neurology.     At this point, patient remains stable. The patient does have a tendency to develop seizures with multiple embolic strokes, trial of anti-seizure medication in case of another change of mental status       We did tell the patient, at times, people with stroke like this case could develop seizures  Please Call neurologist if any spells of change of mental status      Advise cardiac consultation and probable one month cardiac loop recorder implanted either during this admission or outpatient      Thank you for the consult.     Jimbo Munoz M.D.  [facility]  2:10 PM03/24/18    ________________________________________________________________________          SIGNATURE:  Jimbo Munoz        MRI of brain 3/22/2018  1.  Small  acute infarct in the posterior limb of the right internal capsule. Additional scattered punctate acute infarcts in the right posterior temporal lobe, left frontal lobe and left frontoparietal junction.  2.  Mild diffuse cerebral substance loss.  3.  Mild microangiopathic ischemic change versus demyelination or gliosis.  4.  Findings of sinusitis as described above.  5.  Bilateral mastoid effusions. Findings could be consistent with mastoiditis in the appropriate clinical setting.

## 2018-03-24 NOTE — CARE PLAN
Problem: Skin Integrity  Goal: Risk for impaired skin integrity will decrease  Skin is assessed for integrity throughout the shift. Pt is encouraged to reposition and is turned at least every 2 hours. Pt is kept dry and clean.     Problem: Urinary Elimination:  Goal: Ability to reestablish a normal urinary elimination pattern will improve    Intervention: Assess and monitor for signs and symptoms of urinary retention  Intake and output of fluid being monitored. Pt urinates without any problems. Lasix ordered and administered.

## 2018-03-25 PROBLEM — G93.40 ACUTE ENCEPHALOPATHY: Status: ACTIVE | Noted: 2018-03-25

## 2018-03-25 PROBLEM — I10 HTN (HYPERTENSION): Status: ACTIVE | Noted: 2018-03-25

## 2018-03-25 PROBLEM — S31.801A TEAR OF SKIN OF BUTTOCK: Status: ACTIVE | Noted: 2018-03-25

## 2018-03-25 PROBLEM — E87.6 HYPOKALEMIA: Status: ACTIVE | Noted: 2018-03-25

## 2018-03-25 LAB
ALBUMIN SERPL BCP-MCNC: 2.5 G/DL (ref 3.2–4.9)
ALBUMIN/GLOB SERPL: 1.2 G/DL
ALP SERPL-CCNC: 60 U/L (ref 30–99)
ALT SERPL-CCNC: <5 U/L (ref 2–50)
ANION GAP SERPL CALC-SCNC: 6 MMOL/L (ref 0–11.9)
AST SERPL-CCNC: 13 U/L (ref 12–45)
BASOPHILS # BLD AUTO: 0.5 % (ref 0–1.8)
BASOPHILS # BLD: 0.03 K/UL (ref 0–0.12)
BILIRUB SERPL-MCNC: 0.4 MG/DL (ref 0.1–1.5)
BUN SERPL-MCNC: 12 MG/DL (ref 8–22)
CALCIUM SERPL-MCNC: 8.5 MG/DL (ref 8.5–10.5)
CHLORIDE SERPL-SCNC: 104 MMOL/L (ref 96–112)
CHOLEST SERPL-MCNC: 118 MG/DL (ref 100–199)
CO2 SERPL-SCNC: 29 MMOL/L (ref 20–33)
CREAT SERPL-MCNC: 0.58 MG/DL (ref 0.5–1.4)
EOSINOPHIL # BLD AUTO: 0.15 K/UL (ref 0–0.51)
EOSINOPHIL NFR BLD: 2.3 % (ref 0–6.9)
ERYTHROCYTE [DISTWIDTH] IN BLOOD BY AUTOMATED COUNT: 42.5 FL (ref 35.9–50)
GLOBULIN SER CALC-MCNC: 2.1 G/DL (ref 1.9–3.5)
GLUCOSE BLD-MCNC: 170 MG/DL (ref 65–99)
GLUCOSE BLD-MCNC: 186 MG/DL (ref 65–99)
GLUCOSE BLD-MCNC: 209 MG/DL (ref 65–99)
GLUCOSE BLD-MCNC: 212 MG/DL (ref 65–99)
GLUCOSE BLD-MCNC: 224 MG/DL (ref 65–99)
GLUCOSE BLD-MCNC: 239 MG/DL (ref 65–99)
GLUCOSE BLD-MCNC: 77 MG/DL (ref 65–99)
GLUCOSE SERPL-MCNC: 67 MG/DL (ref 65–99)
HCT VFR BLD AUTO: 27.9 % (ref 42–52)
HDLC SERPL-MCNC: 31 MG/DL
HGB BLD-MCNC: 9.7 G/DL (ref 14–18)
IMM GRANULOCYTES # BLD AUTO: 0.02 K/UL (ref 0–0.11)
IMM GRANULOCYTES NFR BLD AUTO: 0.3 % (ref 0–0.9)
LDLC SERPL CALC-MCNC: 70 MG/DL
LYMPHOCYTES # BLD AUTO: 1.34 K/UL (ref 1–4.8)
LYMPHOCYTES NFR BLD: 20.8 % (ref 22–41)
MCH RBC QN AUTO: 33 PG (ref 27–33)
MCHC RBC AUTO-ENTMCNC: 34.8 G/DL (ref 33.7–35.3)
MCV RBC AUTO: 94.9 FL (ref 81.4–97.8)
MONOCYTES # BLD AUTO: 0.66 K/UL (ref 0–0.85)
MONOCYTES NFR BLD AUTO: 10.2 % (ref 0–13.4)
NEUTROPHILS # BLD AUTO: 4.24 K/UL (ref 1.82–7.42)
NEUTROPHILS NFR BLD: 65.9 % (ref 44–72)
NRBC # BLD AUTO: 0 K/UL
NRBC BLD-RTO: 0 /100 WBC
PLATELET # BLD AUTO: 196 K/UL (ref 164–446)
PMV BLD AUTO: 9 FL (ref 9–12.9)
POTASSIUM SERPL-SCNC: 3.5 MMOL/L (ref 3.6–5.5)
PROT SERPL-MCNC: 4.6 G/DL (ref 6–8.2)
RBC # BLD AUTO: 2.94 M/UL (ref 4.7–6.1)
SODIUM SERPL-SCNC: 139 MMOL/L (ref 135–145)
TRIGL SERPL-MCNC: 83 MG/DL (ref 0–149)
WBC # BLD AUTO: 6.4 K/UL (ref 4.8–10.8)

## 2018-03-25 PROCEDURE — 80061 LIPID PANEL: CPT

## 2018-03-25 PROCEDURE — 36415 COLL VENOUS BLD VENIPUNCTURE: CPT

## 2018-03-25 PROCEDURE — 80053 COMPREHEN METABOLIC PANEL: CPT

## 2018-03-25 PROCEDURE — 700102 HCHG RX REV CODE 250 W/ 637 OVERRIDE(OP): Performed by: INTERNAL MEDICINE

## 2018-03-25 PROCEDURE — 99233 SBSQ HOSP IP/OBS HIGH 50: CPT | Performed by: HOSPITALIST

## 2018-03-25 PROCEDURE — A9270 NON-COVERED ITEM OR SERVICE: HCPCS | Performed by: INTERNAL MEDICINE

## 2018-03-25 PROCEDURE — 770020 HCHG ROOM/CARE - TELE (206)

## 2018-03-25 PROCEDURE — 82962 GLUCOSE BLOOD TEST: CPT | Mod: 91

## 2018-03-25 PROCEDURE — A9270 NON-COVERED ITEM OR SERVICE: HCPCS | Performed by: HOSPITALIST

## 2018-03-25 PROCEDURE — 700111 HCHG RX REV CODE 636 W/ 250 OVERRIDE (IP): Performed by: HOSPITALIST

## 2018-03-25 PROCEDURE — 93880 EXTRACRANIAL BILAT STUDY: CPT | Mod: 26,GZ | Performed by: SURGERY

## 2018-03-25 PROCEDURE — 700102 HCHG RX REV CODE 250 W/ 637 OVERRIDE(OP): Performed by: HOSPITALIST

## 2018-03-25 PROCEDURE — 80048 BASIC METABOLIC PNL TOTAL CA: CPT

## 2018-03-25 PROCEDURE — 700111 HCHG RX REV CODE 636 W/ 250 OVERRIDE (IP): Performed by: INTERNAL MEDICINE

## 2018-03-25 PROCEDURE — 85025 COMPLETE CBC W/AUTO DIFF WBC: CPT | Mod: 91

## 2018-03-25 PROCEDURE — 93880 EXTRACRANIAL BILAT STUDY: CPT

## 2018-03-25 RX ORDER — ACETAMINOPHEN 325 MG/1
650 TABLET ORAL ONCE
Status: COMPLETED | OUTPATIENT
Start: 2018-03-25 | End: 2018-03-25

## 2018-03-25 RX ORDER — FUROSEMIDE 40 MG/1
40 TABLET ORAL
Status: DISCONTINUED | OUTPATIENT
Start: 2018-03-25 | End: 2018-03-26 | Stop reason: HOSPADM

## 2018-03-25 RX ORDER — CLOPIDOGREL BISULFATE 75 MG/1
75 TABLET ORAL DAILY
Status: DISCONTINUED | OUTPATIENT
Start: 2018-03-25 | End: 2018-03-26 | Stop reason: HOSPADM

## 2018-03-25 RX ORDER — POTASSIUM CHLORIDE 20 MEQ/1
40 TABLET, EXTENDED RELEASE ORAL ONCE
Status: COMPLETED | OUTPATIENT
Start: 2018-03-25 | End: 2018-03-25

## 2018-03-25 RX ORDER — METOPROLOL TARTRATE 50 MG/1
50 TABLET, FILM COATED ORAL TWICE DAILY
Status: DISCONTINUED | OUTPATIENT
Start: 2018-03-25 | End: 2018-03-26 | Stop reason: HOSPADM

## 2018-03-25 RX ORDER — LABETALOL HYDROCHLORIDE 5 MG/ML
10 INJECTION, SOLUTION INTRAVENOUS EVERY 4 HOURS PRN
Status: DISCONTINUED | OUTPATIENT
Start: 2018-03-25 | End: 2018-03-26 | Stop reason: HOSPADM

## 2018-03-25 RX ORDER — RAMIPRIL 5 MG/1
10 CAPSULE ORAL
Status: DISCONTINUED | OUTPATIENT
Start: 2018-03-26 | End: 2018-03-25

## 2018-03-25 RX ORDER — RAMIPRIL 5 MG/1
10 CAPSULE ORAL
Status: DISCONTINUED | OUTPATIENT
Start: 2018-03-25 | End: 2018-03-26 | Stop reason: HOSPADM

## 2018-03-25 RX ADMIN — ASPIRIN 81 MG: 81 TABLET, COATED ORAL at 10:45

## 2018-03-25 RX ADMIN — INSULIN HUMAN 3 UNITS: 100 INJECTION, SOLUTION PARENTERAL at 18:25

## 2018-03-25 RX ADMIN — FUROSEMIDE 40 MG: 40 TABLET ORAL at 10:45

## 2018-03-25 RX ADMIN — TAMSULOSIN HYDROCHLORIDE 0.4 MG: 0.4 CAPSULE ORAL at 10:45

## 2018-03-25 RX ADMIN — HEPARIN SODIUM 5000 UNITS: 5000 INJECTION, SOLUTION INTRAVENOUS; SUBCUTANEOUS at 22:18

## 2018-03-25 RX ADMIN — ACETAMINOPHEN 650 MG: 325 TABLET, FILM COATED ORAL at 22:18

## 2018-03-25 RX ADMIN — HEPARIN SODIUM 5000 UNITS: 5000 INJECTION, SOLUTION INTRAVENOUS; SUBCUTANEOUS at 13:02

## 2018-03-25 RX ADMIN — RAMIPRIL 10 MG: 5 CAPSULE ORAL at 11:24

## 2018-03-25 RX ADMIN — METOPROLOL TARTRATE 50 MG: 50 TABLET, FILM COATED ORAL at 22:18

## 2018-03-25 RX ADMIN — POTASSIUM CHLORIDE 40 MEQ: 1500 TABLET, EXTENDED RELEASE ORAL at 10:45

## 2018-03-25 RX ADMIN — ONDANSETRON 4 MG: 4 TABLET, ORALLY DISINTEGRATING ORAL at 16:34

## 2018-03-25 RX ADMIN — ONDANSETRON HYDROCHLORIDE 4 MG: 2 INJECTION, SOLUTION INTRAMUSCULAR; INTRAVENOUS at 02:53

## 2018-03-25 RX ADMIN — INSULIN HUMAN 2 UNITS: 100 INJECTION, SOLUTION PARENTERAL at 05:28

## 2018-03-25 RX ADMIN — HEPARIN SODIUM 5000 UNITS: 5000 INJECTION, SOLUTION INTRAVENOUS; SUBCUTANEOUS at 05:22

## 2018-03-25 RX ADMIN — ACETAMINOPHEN 650 MG: 325 TABLET, FILM COATED ORAL at 16:34

## 2018-03-25 RX ADMIN — CLOPIDOGREL 75 MG: 75 TABLET, FILM COATED ORAL at 10:45

## 2018-03-25 RX ADMIN — METOPROLOL TARTRATE 50 MG: 50 TABLET, FILM COATED ORAL at 10:45

## 2018-03-25 RX ADMIN — INSULIN HUMAN 2 UNITS: 100 INJECTION, SOLUTION PARENTERAL at 00:12

## 2018-03-25 RX ADMIN — INSULIN HUMAN 3 UNITS: 100 INJECTION, SOLUTION PARENTERAL at 13:00

## 2018-03-25 RX ADMIN — ATORVASTATIN CALCIUM 40 MG: 40 TABLET, FILM COATED ORAL at 22:18

## 2018-03-25 ASSESSMENT — PAIN SCALES - GENERAL
PAINLEVEL_OUTOF10: 7
PAINLEVEL_OUTOF10: 0
PAINLEVEL_OUTOF10: 7

## 2018-03-25 ASSESSMENT — ENCOUNTER SYMPTOMS
NAUSEA: 0
ABDOMINAL PAIN: 0
CARDIOVASCULAR NEGATIVE: 1
SORE THROAT: 0
WEAKNESS: 1
FOCAL WEAKNESS: 0
DEPRESSION: 0
NECK PAIN: 0
COUGH: 0
CHILLS: 0
BACK PAIN: 0
ORTHOPNEA: 0
TINGLING: 0
BLURRED VISION: 0
VOMITING: 0
DOUBLE VISION: 0
SHORTNESS OF BREATH: 0

## 2018-03-25 NOTE — WOUND TEAM
[facility]  Inpatient Services  Initial Wound & Skin Care Evaluation    Admission Date:   3/22/18  Consult Date:    3/24/18  HPI, PMH, SH: Reviewed  Unit where seen by Wound Team:[id number]    WOUND CONSULT RELATED TO: sacral skin tear    SUBJECTIVE:  Pt states sacral area feel better with the waffle cushion, pt states he moves around in bed alot    Self Report / Pain Level:  No complaints    OBJECTIVE: pt in bed side lying , mepilex in place, wife at bedside  WOUND TYPE, LOCATION, CHARACTERISTICS (Pressure ulcers: location, stage, POA or date identified)    Wound Type/Location:  Sacrum denuded     Periwound:    Light blanching erythema  Drainage:     Scant serous  Tissue Type and %:    100% pale pink  Wound Edges:    Flat and attached  Odor:     none  Exposed structure(s):   none  S&S of Infection:    none  Measurements:   3/24/18  Length:    2.1cm   Width:     1.5cm   Depth:     0.1cm   Tracts/undermining:         INTERVENTIONS BY WOUND TEAM:Mepilex peeled back Area cleansed with normal saline, mepilex replaced  Dressing types:foam  Patient/family educated on rationale for plan of care_X___- pt aware and capable of staying off back side, wife is supportive  Interdisciplinary Collaboration: RN    EVALUATION:  Last Gurwinder Score by RN:  Factors affecting wound healing: multiple medical condtions  Goals: decrease area of wound by 10% in 2 weeks    NURSING PLAN OF CARE: (X)  Dressing changes: See Dressing Maintenance orders:   Skin care: See Skin Care orders:   Rectal tube care: See Rectal Tube Care orders:   Other orders:    RSKIN: CURRENT (X) ORDERED (O)  Q shift Gurwinder:  X  Q shift pressure point assessments:  X  Atmosair        RUSLAN      Bariatric RUSLAN      Bariatric foam        Heel float boots       Heels floated on pillows      Barrier wipes      Barrier Cream      Barrier paste      Sacral silicone dressing    X  Silicone O2 tubing      Anchorfast      Trach with Optifoam split foam       Waffle matress  X      Rectal tube or BMS      Antifungal tx    Turn q 2 hours X  Up to chair    Ambulate   PT/OT     Dietician      PO     TF   TPN     PVN    NPO   # days   Other       WOUND TEAM PLAN OF CARE (X):   NPWT change 3 x week:        Dressing changes by wound team:       Follow up as needed:     X  Other (explain):    Anticipated discharge plans (X):  SNF:           Home Care:           Outpatient Wound Center:            Self Care:            Other:     tbd

## 2018-03-25 NOTE — PROGRESS NOTES
Cardiology Progress Note               Author: Iván Welch Date & Time created: 3/25/2018  11:23 AM     ID: 67-year-old man with a recent below-knee amputation transferred for non-ST elevation myocardial infarction and found to have multivessel coronary artery disease, but also having sustained a small stroke. He has been seen by cardiothoracic surgery who feels appropriately that he is too high risk at least presently for bypass surgery.    Interval History:  No acute events    Chief Complaint:  Follow-up multivessel coronary artery disease    Review of Systems   Cardiovascular: Negative.    Musculoskeletal: Positive for joint pain.   Neurological: Positive for weakness.   All other systems reviewed and are negative.      Physical Exam   Constitutional: He is oriented to person, place, and time. He appears well-developed and well-nourished.   Thin, frail, elderly man accompanied by his wife in no distress   HENT:   Head: Normocephalic.   Neck: Normal range of motion. No JVD present.   Cardiovascular: Normal rate, regular rhythm and normal heart sounds.    No murmur heard.  Pulmonary/Chest: Effort normal and breath sounds normal. No respiratory distress. He has no wheezes. He has no rales.   Abdominal: Bowel sounds are normal.   Musculoskeletal: He exhibits no edema or tenderness.   Left BKA, left sided weakness    Neurological: He is alert and oriented to person, place, and time.   Skin: Skin is warm and dry.   Psychiatric: His behavior is normal. Judgment normal.   Nursing note and vitals reviewed.      Hemodynamics:  Temp (24hrs), Av.7 °C (98.1 °F), Min:36.1 °C (97 °F), Max:37.3 °C (99.2 °F)  Temperature: 37.3 °C (99.2 °F)  Pulse  Av.4  Min: 71  Max: 100   Blood Pressure : (!) 164/95     Respiratory:    Respiration: 13, Pulse Oximetry: 93 %        RUL Breath Sounds: Clear, RML Breath Sounds: Clear, RLL Breath Sounds: Diminished, CAROL Breath Sounds: Clear, LLL Breath Sounds: Diminished  Fluids:  Date  03/25/18 0700 - 03/26/18 0659   Shift 1437-3512 4384-0983 9675-4829 24 Hour Total   I  N  T  A  K  E   Shift Total       O  U  T  P  U  T   Urine 650   650    Shift Total 650   650   Weight (kg) 59.3 59.3 59.3 59.3       Weight: 59.3 kg (130 lb 11.7 oz)  GI/Nutrition:  Orders Placed This Encounter   Procedures   • Diet Order     Standing Status:   Standing     Number of Occurrences:   1     Order Specific Question:   Diet:     Answer:   Cardiac [6]   • DIET NPO     Standing Status:   Standing     Number of Occurrences:   8     Order Specific Question:   Restrict to:     Answer:   Sips with Medications [3]     Lab Results:  Recent Labs      03/23/18 0215 03/24/18 0428 03/25/18 0207   WBC  7.7  5.6  6.4   RBC  2.89*  2.98*  2.94*   HEMOGLOBIN  9.4*  10.0*  9.7*   HEMATOCRIT  27.8*  28.6*  27.9*   MCV  96.2  96.0  94.9   MCH  32.5  33.6*  33.0   MCHC  33.8  35.0  34.8   RDW  43.7  43.5  42.5   PLATELETCT  147*  177  196   MPV  9.4  9.1  9.0     Recent Labs      03/23/18 0215 03/24/18 0428 03/25/18 0207   SODIUM  133*  138  139   POTASSIUM  4.5  3.9  3.5*   CHLORIDE  102  105  104   CO2  26  30  29   GLUCOSE  390*  117*  67   BUN  21  14  12   CREATININE  0.80  0.51  0.58   CALCIUM  7.9*  7.9*  8.5     Recent Labs      03/23/18 0215   INR  1.09     Recent Labs      03/22/18   2040   BNPBTYPENAT  650*     Recent Labs      03/22/18 2040   TROPONINI  1.93*   BNPBTYPENAT  650*     Recent Labs      03/25/18   0207   TRIGLYCERIDE  83   HDL  31*   LDL  70         Medical Decision Making, by Problem:  Active Hospital Problems    Diagnosis   • *NSTEMI (non-ST elevated myocardial infarction) (CMS-HCC) [I21.4]   • S/P bilateral BKA (below knee amputation) (CMS-[facility]) [Z89.512, Z89.511]   • Acute hypoxemic respiratory failure (CMS-[facility]) [J96.01]   • Insulin dependent diabetes mellitus (CMS-HCC) [E11.9, Z79.4]   • Tear of skin of buttock [S31.801A]   • HTN (hypertension) [I10]   • Hypokalemia [E87.6]   • COPD  (chronic obstructive pulmonary disease) (CMS-HCC) [J44.9]   • CVA (cerebral vascular accident) (CMS-HCC) [I63.9]   • Anemia [D64.9]   • Hyponatremia [E87.1]   • PVD (peripheral vascular disease) (CMS-HCC) [I73.9]   • BPH (benign prostatic hyperplasia) [N40.0]   • Acute metabolic encephalopathy [G93.41]   • Urine retention [R33.9]       Plan:    Stroke: Evaluated by neurology, no carotid studies done and I did order those. To prevent recurrent strokes and potentially  atrial fibrillation will plan for an implantable monitor tomorrow.    Non-ST elevation MI: As he is not a surgical candidate I did add Plavix to his medical regimen without a loading dose. In addition to that, he is on medical management including aspirin, atorvastatin 40 mg by mouth daily at bedtime. His blood pressure has been elevated related to which we increased his metoprolol to 50 mg by mouth twice a day today. I also increased his ramipril to 10 mg by mouth daily giving a every morning.    Hypertension/dyslipidemia: As above    Disposition: In my opinion he should be ready for discharge likely tomorrow from the cardiovascular perspective after his implantable monitor has been placed. I discussed with nursing putting him nothing by mouth after midnight for that.    Quality-Core Measures   Reviewed items::  EKG reviewed, Radiology images reviewed, Labs reviewed and Medications reviewed    Thank you for allowing me to participate in the care of this patient. Please call if I any clarification would be useful.    Iván Welch MD  Cardiologist, [facility] [place]

## 2018-03-25 NOTE — PROGRESS NOTES
Received report on patient. He is sitting up at the edge of the bed has no complaints of pain and no indications of distress. Bed in the lowest position and call light and personal belongings within reach.

## 2018-03-25 NOTE — CARE PLAN
Problem: Communication  Goal: The ability to communicate needs accurately and effectively will improve  Outcome: PROGRESSING AS EXPECTED  Patient and spouse address any questions or concerns that they have with the patient's care, treatment, and medications.    Problem: Infection  Goal: Will remain free from infection  Outcome: PROGRESSING AS EXPECTED  Patient does not have any signs or symptoms of an infection and WBCs are within normal limits.

## 2018-03-25 NOTE — PROGRESS NOTES
Notified Dr. Ayala of elevated BP. Discussed the need for PRN meds and to change the diet to diabetic cardiac. MD to place orders.

## 2018-03-25 NOTE — ASSESSMENT & PLAN NOTE
skin tear on the ischium/coccyx area   Not infected  Wound care ongoing  Possible not POA but not clear.

## 2018-03-25 NOTE — DISCHARGE PLANNING
Medical Social Work    Anticipated Discharge Disposition: Pending PT/OT Evals     Action: Pt and pt's wife state they would like to d/c today, however pt is pending PT/OT at this time to determine d/c planning needs.  Pt is also requiring 2L of Home O2.  Per nursing notes RNs have attempted to wean with no success and pt is on 2L of O2 at this time.  Per chart review pt's facesheet does not have any insurance listed as well as no demographics.  This  emailed PFA to please see pt to determine demographics and if pt has insurance.     Barriers: Pending PT/OT, insurance and demographics unknown and pt may require home oxygen.     Plan: Await PT/OT eval and await to see if pt has insurance and demographic information    Care Transition Team Assessment    Information Source  Orientation : Oriented x 4  Information Given By: Patient  Informant's Name: Jarret Bright  Who is responsible for making decisions for patient? : Patient    Readmission Evaluation  Is this a readmission?: No    Elopement Risk  Legal Hold: No  Ambulatory or Self Mobile in Wheelchair: Yes  Disoriented: No  Psychiatric Symptoms: None  History of Wandering: No  Elopement this Admit: No  Vocalizing Wanting to Leave: No  Displays Behaviors, Body Language Wanting to Leave: No-Not at Risk for Elopement  Elopement Risk: Not at Risk for Elopement    Interdisciplinary Discharge Planning  Does Admitting Nurse Feel This Could be a Complex Discharge?: No  Lives with - Patient's Self Care Capacity: Spouse  Patient or legal guardian wants to designate a caregiver (see row info): No  Support Systems: Family Member(s), Friends / Neighbors  Housing / Facility: 1 Story House  Do You Take your Prescribed Medications Regularly: Yes  Able to Return to Previous ADL's: Yes  Mobility Issues: Yes  Prior Services: None  Patient Expects to be Discharged to:: home  Assistance Needed: Unknown at this Time  Durable Medical Equipment: Not Applicable    Discharge  Preparedness  What is your plan after discharge?: Uncertain - pending medical team collaboration, Home with help  What are your discharge supports?: Spouse  Prior Functional Level: Independent with Activities of Daily Living, Independent with Medication Management  Difficulity with ADLs: None  Difficulity with IADLs: None    Functional Assesment  Prior Functional Level: Independent with Activities of Daily Living, Independent with Medication Management    Finances  Financial Barriers to Discharge: No  Prescription Coverage: Yes    Vision / Hearing Impairment  Vision Impairment : No  Hearing Impairment : No    Values / Beliefs / Concerns  Values / Beliefs Concerns : No    Advance Directive  Advance Directive?: None  Advance Directive offered?: AD Booklet refused    Domestic Abuse  Have you ever been the victim of abuse or violence?: No  Physical Abuse or Sexual Abuse: No  Verbal Abuse or Emotional Abuse: No  Possible Abuse Reported to:: Not Applicable    Psychological Assessment  History of Substance Abuse: None  History of Psychiatric Problems: No  Non-compliant with Treatment: No  Newly Diagnosed Illness: No    Discharge Risks or Barriers  Discharge risks or barriers?: No    Anticipated Discharge Information  Anticipated discharge disposition: Discharge needs currently unknown, Home

## 2018-03-25 NOTE — PROGRESS NOTES
"Pt c/o having \"a rough night\". C/o sweating, freezing, distress over night. States \" they tried to kill me last night.\" Pt is sitting on the edge of the bed, incontinent of loose BM. States \" this happened last night.\" Will clean pt up. Right now, pt is A&O x 4.   "

## 2018-03-25 NOTE — DIETARY
Nutrition Services     Provided pt with heart healthy education and handouts from the [employer] diet manual.   Also provided pt with contact info for outpatient nutrition services.    RD will con't to monitor per dept policy

## 2018-03-25 NOTE — PROGRESS NOTES
Pt is cleaned up. Pt and wife state they want to go home. Attempted to wean off O2. Still unable to. Back to 2L of O2 , sat'ing at 95%.

## 2018-03-25 NOTE — PROGRESS NOTES
[facility] Hospitalist Progress Note    Date of Service: 3/25/2018    Chief Complaint  67 y.o. male admitted 3/22/2018 with an infected diabetic ulcer s/p BKA at an outside hospital complicated by nstemi and acute delirium with left sided weakness that are now resolved. A cardia cath has shown multivessel CAD. Also noted to have embolic appearing new CVA. No deficits noted since initial event.     Interval Problem Update  Delirium again over night now resolved.   No other events.   Hypertensive    Consultants/Specialty  cards  Neuro  Ct surgery    Disposition  Pending pt/ot eval.   Not clear yet.       PROCEDURES: 3/23/18  1.  Left heart catheterization.  2.  Coronary angiography.  3.  Left ventriculogram.  PREPROCEDURE DIAGNOSIS:  Acute coronary syndrome.  POSTPROCEDURE DIAGNOSES:  1.  Multivessel coronary artery disease with nonobstructive left main coronary   artery disease, high-grade mid left anterior descending artery and distal   left anterior descending artery stenosis, high-grade proximal diagonal branch   stenosis, high-grade ramus stenosis, high-grade proximal obtuse marginal   branch stenosis and chronic total occlusion of the mid right coronary artery   with distal vessel filling via left to right collaterals.  2.  Normal left ventricular systolic function with ejection fraction of 55%.  3.  Mildly elevated left ventricular end-diastolic pressure.    MRI brain:  1.  Small acute infarct in the posterior limb of the right internal capsule. Additional scattered punctate acute infarcts in the right posterior temporal lobe, left frontal lobe and left frontoparietal junction.  2.  Mild diffuse cerebral substance loss.  3.  Mild microangiopathic ischemic change versus demyelination or gliosis.  4.  Findings of sinusitis as described above.  5.  Bilateral mastoid effusions. Findings could be consistent with mastoiditis in the appropriate clinical setting.      Review of Systems   Constitutional: Positive for  malaise/fatigue. Negative for chills.   HENT: Negative for sore throat.    Eyes: Negative for blurred vision and double vision.   Respiratory: Negative for cough and shortness of breath.    Cardiovascular: Negative for chest pain and orthopnea.   Gastrointestinal: Negative for abdominal pain, nausea and vomiting.   Genitourinary: Negative for dysuria and urgency.   Musculoskeletal: Negative for back pain and neck pain.   Skin: Negative for itching and rash.   Neurological: Negative for tingling and focal weakness.   Psychiatric/Behavioral: Negative for depression and suicidal ideas.   All other systems reviewed and are negative.     Physical Exam  Laboratory/Imaging   Hemodynamics  Temp (24hrs), Av.8 °C (98.2 °F), Min:36.1 °C (97 °F), Max:37.3 °C (99.2 °F)   Temperature: 37.3 °C (99.2 °F)  Pulse  Av.4  Min: 71  Max: 100    Blood Pressure : (!) 164/95      Respiratory      Respiration: 13, Pulse Oximetry: 93 %        RUL Breath Sounds: Clear, RML Breath Sounds: Clear, RLL Breath Sounds: Diminished, CAROL Breath Sounds: Clear, LLL Breath Sounds: Diminished    Fluids    Intake/Output Summary (Last 24 hours) at 18 0826  Last data filed at 18 0600   Gross per 24 hour   Intake             1090 ml   Output             1650 ml   Net             -560 ml       Nutrition  Orders Placed This Encounter   Procedures   • Diet Order     Standing Status:   Standing     Number of Occurrences:   1     Order Specific Question:   Diet:     Answer:   Cardiac [6]     Physical Exam   Constitutional: He is oriented to person, place, and time. He appears well-developed and well-nourished. No distress.   HENT:   Nose: Nose normal.   Mouth/Throat: Oropharynx is clear and moist.   Eyes: EOM are normal. No scleral icterus.   Neck: No JVD present. No tracheal deviation present.   Cardiovascular: Normal rate, normal heart sounds and intact distal pulses.    No murmur heard.  Pulmonary/Chest: Effort normal and breath sounds  normal. No respiratory distress. He has no rales.   Abdominal: Soft. Bowel sounds are normal. He exhibits no distension. There is no tenderness.   Musculoskeletal: He exhibits no edema or deformity.   Right BKA dressed   Neurological: He is alert and oriented to person, place, and time. He displays normal reflexes. No cranial nerve deficit. Coordination normal.   Skin: Skin is warm and dry. He is not diaphoretic. No erythema.   Psychiatric: He has a normal mood and affect. His behavior is normal.   Nursing note and vitals reviewed.      Recent Labs      03/23/18 0215 03/24/18 0428 03/25/18 0207   WBC  7.7  5.6  6.4   RBC  2.89*  2.98*  2.94*   HEMOGLOBIN  9.4*  10.0*  9.7*   HEMATOCRIT  27.8*  28.6*  27.9*   MCV  96.2  96.0  94.9   MCH  32.5  33.6*  33.0   MCHC  33.8  35.0  34.8   RDW  43.7  43.5  42.5   PLATELETCT  147*  177  196   MPV  9.4  9.1  9.0     Recent Labs      03/23/18 0215 03/24/18 0428  03/25/18   0207   SODIUM  133*  138  139   POTASSIUM  4.5  3.9  3.5*   CHLORIDE  102  105  104   CO2  26  30  29   GLUCOSE  390*  117*  67   BUN  21  14  12   CREATININE  0.80  0.51  0.58   CALCIUM  7.9*  7.9*  8.5     Recent Labs      03/23/18   0215   INR  1.09     Recent Labs      03/22/18   2040   BNPBTYPENAT  650*     Recent Labs      03/25/18   0207   TRIGLYCERIDE  83   HDL  31*   LDL  70          Assessment/Plan     * NSTEMI (non-ST elevated myocardial infarction) (CMS-HCC)- (present on admission)   Assessment & Plan     perioperative myocardial infarction   now chest pain-free Asa/statin/ace/bb therapy  Now with multivessel disease  Ct surgery to consult recommending no surgery. Too high risk  Trend on tele  Maximize medical therapy  Increase BB today.             Insulin dependent diabetes mellitus (CMS-HCC)- (present on admission)   Assessment & Plan    Uncontrolled with hyperglycemia, vascular and neurologic complications.   Continue Lantus, continue sliding scale            Acute hypoxemic  respiratory failure (CMS-HCC)- (present on admission)   Assessment & Plan    Bilateral pulmonary infiltrates noted on outside hospital CT scan and cxr  On 2L still but improving with diuresis  Suspect copd as well given exam.   May need home o2.   Continue to diurese for now.             S/P bilateral BKA (below knee amputation) (CMS-HCC)- (present on admission)   Assessment & Plan    Post op day # 5 from left BKA   Continue wound care  Stop antibiotics  No positive culture results from outside hospital.           Hypokalemia   Assessment & Plan    Trend and replace.           HTN (hypertension)   Assessment & Plan    Poor control  Increase BB and trend.         Tear of skin of buttock   Assessment & Plan    skin tear on the ischium/coccyx area   Not infected  Wound care ongoing  Possible not POA but not clear.           CVA (cerebral vascular accident) (CMS-HCC)   Assessment & Plan    Appears embolic  No deficits  Neuro consulted  Pending carotid duplex  No anticoagulation unless afib documented  Discussed with cards and they will place recorder prior to dc.   Asa/statin  No deficits now.   No therapy needed likely but pt/ot pending.                 COPD (chronic obstructive pulmonary disease) (CMS-HCC)   Assessment & Plan    No obvious flare  This is a presumed diagnosis based on exam and long hx of smoking  Rt protocol  Consider steroids with worsening of wheezing.   Doubt cardiac asthma          Urine retention   Assessment & Plan    Dc mcnair, bladder scan protocol, flomax trial  Working for now        Acute metabolic encephalopathy   Assessment & Plan    Likely icu psychosis from hospitalization and recent illness. Better in day time worse at night.   Reorientation  Resolved per wife.           BPH (benign prostatic hyperplasia)   Assessment & Plan    flomax        PVD (peripheral vascular disease) (CMS-HCC)   Assessment & Plan    Asa/statin  2/2 diabetes  Consider plavix        Hyponatremia   Assessment & Plan     Suspect hypervolemic.   Resolved with Diuresis          Anemia   Assessment & Plan    Iron deficiency and chronic disease related. On fe therapy.           Quality-Core Measures   Reviewed items::  EKG reviewed, Radiology images reviewed, Labs reviewed and Medications reviewed  Reed catheter::  No Reed  DVT prophylaxis pharmacological::  Heparin  DVT prophylaxis - mechanical:  SCDs  Ulcer Prophylaxis::  No  Assessed for rehabilitation services:  Patient was assess for and/or received rehabilitation services during this hospitalization

## 2018-03-26 ENCOUNTER — PATIENT OUTREACH (OUTPATIENT)
Dept: HEALTH INFORMATION MANAGEMENT | Facility: OTHER | Age: 68
End: 2018-03-26

## 2018-03-26 VITALS
HEART RATE: 83 BPM | HEIGHT: 67 IN | WEIGHT: 127.87 LBS | TEMPERATURE: 98.9 F | RESPIRATION RATE: 17 BRPM | SYSTOLIC BLOOD PRESSURE: 154 MMHG | BODY MASS INDEX: 20.07 KG/M2 | OXYGEN SATURATION: 95 % | DIASTOLIC BLOOD PRESSURE: 86 MMHG

## 2018-03-26 LAB
ANION GAP SERPL CALC-SCNC: 5 MMOL/L (ref 0–11.9)
BASOPHILS # BLD AUTO: 0.5 % (ref 0–1.8)
BASOPHILS # BLD: 0.03 K/UL (ref 0–0.12)
BUN SERPL-MCNC: 9 MG/DL (ref 8–22)
CALCIUM SERPL-MCNC: 7.9 MG/DL (ref 8.5–10.5)
CHLORIDE SERPL-SCNC: 99 MMOL/L (ref 96–112)
CO2 SERPL-SCNC: 29 MMOL/L (ref 20–33)
CREAT SERPL-MCNC: 0.7 MG/DL (ref 0.5–1.4)
EOSINOPHIL # BLD AUTO: 0.1 K/UL (ref 0–0.51)
EOSINOPHIL NFR BLD: 1.6 % (ref 0–6.9)
ERYTHROCYTE [DISTWIDTH] IN BLOOD BY AUTOMATED COUNT: 42.7 FL (ref 35.9–50)
GLUCOSE BLD-MCNC: 202 MG/DL (ref 65–99)
GLUCOSE BLD-MCNC: 268 MG/DL (ref 65–99)
GLUCOSE BLD-MCNC: 278 MG/DL (ref 65–99)
GLUCOSE SERPL-MCNC: 304 MG/DL (ref 65–99)
HCT VFR BLD AUTO: 28 % (ref 42–52)
HGB BLD-MCNC: 9.8 G/DL (ref 14–18)
IMM GRANULOCYTES # BLD AUTO: 0.07 K/UL (ref 0–0.11)
IMM GRANULOCYTES NFR BLD AUTO: 1.1 % (ref 0–0.9)
LYMPHOCYTES # BLD AUTO: 1.25 K/UL (ref 1–4.8)
LYMPHOCYTES NFR BLD: 19.7 % (ref 22–41)
MCH RBC QN AUTO: 33.3 PG (ref 27–33)
MCHC RBC AUTO-ENTMCNC: 35 G/DL (ref 33.7–35.3)
MCV RBC AUTO: 95.2 FL (ref 81.4–97.8)
MONOCYTES # BLD AUTO: 0.57 K/UL (ref 0–0.85)
MONOCYTES NFR BLD AUTO: 9 % (ref 0–13.4)
NEUTROPHILS # BLD AUTO: 4.31 K/UL (ref 1.82–7.42)
NEUTROPHILS NFR BLD: 68.1 % (ref 44–72)
NRBC # BLD AUTO: 0 K/UL
NRBC BLD-RTO: 0 /100 WBC
PLATELET # BLD AUTO: 184 K/UL (ref 164–446)
PMV BLD AUTO: 9 FL (ref 9–12.9)
POTASSIUM SERPL-SCNC: 4.1 MMOL/L (ref 3.6–5.5)
RBC # BLD AUTO: 2.94 M/UL (ref 4.7–6.1)
SODIUM SERPL-SCNC: 133 MMOL/L (ref 135–145)
WBC # BLD AUTO: 6.3 K/UL (ref 4.8–10.8)

## 2018-03-26 PROCEDURE — G8988 SELF CARE GOAL STATUS: HCPCS | Mod: CI

## 2018-03-26 PROCEDURE — A9270 NON-COVERED ITEM OR SERVICE: HCPCS | Performed by: INTERNAL MEDICINE

## 2018-03-26 PROCEDURE — G8987 SELF CARE CURRENT STATUS: HCPCS | Mod: CI

## 2018-03-26 PROCEDURE — G8978 MOBILITY CURRENT STATUS: HCPCS | Mod: CI

## 2018-03-26 PROCEDURE — 700102 HCHG RX REV CODE 250 W/ 637 OVERRIDE(OP): Performed by: HOSPITALIST

## 2018-03-26 PROCEDURE — 700111 HCHG RX REV CODE 636 W/ 250 OVERRIDE (IP): Performed by: HOSPITALIST

## 2018-03-26 PROCEDURE — 3E0234Z INTRODUCTION OF SERUM, TOXOID AND VACCINE INTO MUSCLE, PERCUTANEOUS APPROACH: ICD-10-PCS | Performed by: HOSPITALIST

## 2018-03-26 PROCEDURE — 99239 HOSP IP/OBS DSCHRG MGMT >30: CPT | Performed by: HOSPITALIST

## 2018-03-26 PROCEDURE — A9270 NON-COVERED ITEM OR SERVICE: HCPCS | Performed by: HOSPITALIST

## 2018-03-26 PROCEDURE — 0JH632Z INSERTION OF MONITORING DEVICE INTO CHEST SUBCUTANEOUS TISSUE AND FASCIA, PERCUTANEOUS APPROACH: ICD-10-PCS | Performed by: INTERNAL MEDICINE

## 2018-03-26 PROCEDURE — 90471 IMMUNIZATION ADMIN: CPT

## 2018-03-26 PROCEDURE — 700102 HCHG RX REV CODE 250 W/ 637 OVERRIDE(OP): Performed by: INTERNAL MEDICINE

## 2018-03-26 PROCEDURE — G8979 MOBILITY GOAL STATUS: HCPCS | Mod: CI

## 2018-03-26 PROCEDURE — 82962 GLUCOSE BLOOD TEST: CPT

## 2018-03-26 PROCEDURE — G8989 SELF CARE D/C STATUS: HCPCS | Mod: CI

## 2018-03-26 PROCEDURE — C1764 EVENT RECORDER, CARDIAC: HCPCS

## 2018-03-26 PROCEDURE — 90670 PCV13 VACCINE IM: CPT | Performed by: HOSPITALIST

## 2018-03-26 PROCEDURE — 700101 HCHG RX REV CODE 250

## 2018-03-26 PROCEDURE — 97166 OT EVAL MOD COMPLEX 45 MIN: CPT

## 2018-03-26 PROCEDURE — 97161 PT EVAL LOW COMPLEX 20 MIN: CPT

## 2018-03-26 PROCEDURE — G8980 MOBILITY D/C STATUS: HCPCS | Mod: CI

## 2018-03-26 PROCEDURE — 33282 HCHG CARDIAC LR INSERTION: CPT

## 2018-03-26 RX ORDER — METOPROLOL TARTRATE 50 MG/1
50 TABLET, FILM COATED ORAL 2 TIMES DAILY
Qty: 60 TAB | Refills: 1 | Status: SHIPPED | OUTPATIENT
Start: 2018-03-26 | End: 2018-04-09

## 2018-03-26 RX ORDER — CLOPIDOGREL BISULFATE 75 MG/1
75 TABLET ORAL DAILY
Qty: 30 TAB | Refills: 1 | Status: SHIPPED | OUTPATIENT
Start: 2018-03-27 | End: 2018-04-09 | Stop reason: SDUPTHER

## 2018-03-26 RX ORDER — LIDOCAINE HYDROCHLORIDE AND EPINEPHRINE BITARTRATE 20; .01 MG/ML; MG/ML
INJECTION, SOLUTION SUBCUTANEOUS
Status: COMPLETED
Start: 2018-03-26 | End: 2018-03-26

## 2018-03-26 RX ORDER — INSULIN GLARGINE 100 [IU]/ML
15 INJECTION, SOLUTION SUBCUTANEOUS NIGHTLY
Status: DISCONTINUED | OUTPATIENT
Start: 2018-03-26 | End: 2018-03-26 | Stop reason: HOSPADM

## 2018-03-26 RX ORDER — FUROSEMIDE 40 MG/1
40 TABLET ORAL DAILY
Qty: 30 TAB | Refills: 1 | Status: SHIPPED | OUTPATIENT
Start: 2018-03-27 | End: 2018-04-09 | Stop reason: SDUPTHER

## 2018-03-26 RX ORDER — RAMIPRIL 10 MG/1
10 CAPSULE ORAL DAILY
Qty: 30 CAP | Refills: 11 | Status: SHIPPED | OUTPATIENT
Start: 2018-03-27 | End: 2019-04-09

## 2018-03-26 RX ADMIN — METOPROLOL TARTRATE 50 MG: 50 TABLET, FILM COATED ORAL at 08:00

## 2018-03-26 RX ADMIN — LIDOCAINE HYDROCHLORIDE AND EPINEPHRINE: 20; 10 INJECTION, SOLUTION INFILTRATION; PERINEURAL at 11:00

## 2018-03-26 RX ADMIN — HEPARIN SODIUM 5000 UNITS: 5000 INJECTION, SOLUTION INTRAVENOUS; SUBCUTANEOUS at 12:38

## 2018-03-26 RX ADMIN — TAMSULOSIN HYDROCHLORIDE 0.4 MG: 0.4 CAPSULE ORAL at 08:00

## 2018-03-26 RX ADMIN — FUROSEMIDE 40 MG: 40 TABLET ORAL at 08:00

## 2018-03-26 RX ADMIN — INSULIN HUMAN 5 UNITS: 100 INJECTION, SOLUTION PARENTERAL at 00:55

## 2018-03-26 RX ADMIN — ASPIRIN 81 MG: 81 TABLET, COATED ORAL at 12:38

## 2018-03-26 RX ADMIN — INSULIN HUMAN 5 UNITS: 100 INJECTION, SOLUTION PARENTERAL at 05:50

## 2018-03-26 RX ADMIN — INSULIN HUMAN 3 UNITS: 100 INJECTION, SOLUTION PARENTERAL at 12:38

## 2018-03-26 RX ADMIN — CLOPIDOGREL 75 MG: 75 TABLET, FILM COATED ORAL at 12:38

## 2018-03-26 RX ADMIN — RAMIPRIL 10 MG: 5 CAPSULE ORAL at 08:00

## 2018-03-26 RX ADMIN — PNEUMOCOCCAL 13-VALENT CONJUGATE VACCINE 0.5 ML: 2.2; 2.2; 2.2; 2.2; 2.2; 4.4; 2.2; 2.2; 2.2; 2.2; 2.2; 2.2; 2.2 INJECTION, SUSPENSION INTRAMUSCULAR at 05:45

## 2018-03-26 ASSESSMENT — COGNITIVE AND FUNCTIONAL STATUS - GENERAL
SUGGESTED CMS G CODE MODIFIER MOBILITY: CK
MOBILITY SCORE: 19
TOILETING: A LITTLE
HELP NEEDED FOR BATHING: A LITTLE
SUGGESTED CMS G CODE MODIFIER DAILY ACTIVITY: CJ
WALKING IN HOSPITAL ROOM: A LITTLE
STANDING UP FROM CHAIR USING ARMS: A LITTLE
DRESSING REGULAR LOWER BODY CLOTHING: A LITTLE
DAILY ACTIVITIY SCORE: 21
CLIMB 3 TO 5 STEPS WITH RAILING: TOTAL

## 2018-03-26 ASSESSMENT — ENCOUNTER SYMPTOMS
SORE THROAT: 0
SPUTUM PRODUCTION: 0
ABDOMINAL PAIN: 0
HEMOPTYSIS: 0
MUSCULOSKELETAL NEGATIVE: 1
BACK PAIN: 0
BRUISES/BLEEDS EASILY: 0
ORTHOPNEA: 0
GASTROINTESTINAL NEGATIVE: 1
SHORTNESS OF BREATH: 0
FEVER: 0
CONSTITUTIONAL NEGATIVE: 1
RESPIRATORY NEGATIVE: 1
PND: 0
CHILLS: 0
WEAKNESS: 0
FOCAL WEAKNESS: 0
DIZZINESS: 0
BLURRED VISION: 0
CLAUDICATION: 0
TINGLING: 0
STRIDOR: 0
COUGH: 0
VOMITING: 0
NEUROLOGICAL NEGATIVE: 1
WHEEZING: 0
EYES NEGATIVE: 1
CARDIOVASCULAR NEGATIVE: 1
NECK PAIN: 0
DOUBLE VISION: 0
LOSS OF CONSCIOUSNESS: 0
NAUSEA: 0
DEPRESSION: 0
PALPITATIONS: 0

## 2018-03-26 ASSESSMENT — ACTIVITIES OF DAILY LIVING (ADL): TOILETING: INDEPENDENT

## 2018-03-26 ASSESSMENT — GAIT ASSESSMENTS
DEVIATION: STEP TO
GAIT LEVEL OF ASSIST: STAND BY ASSIST
ASSISTIVE DEVICE: FRONT WHEEL WALKER
DISTANCE (FEET): 30

## 2018-03-26 ASSESSMENT — LIFESTYLE VARIABLES: EVER_SMOKED: YES

## 2018-03-26 NOTE — PROGRESS NOTES
Patient returned from cath lab. Do drainage from incision site. Dressing clean dry and intact. Continue to monitor.

## 2018-03-26 NOTE — CARE PLAN
Problem: Knowledge Deficit  Goal: Knowledge of disease process/condition, treatment plan, diagnostic tests, and medications will improve  POC discussed with the patient and family. All questions and concerns addressed and answered. Patient is involved in POC and verbalizes the understanding of the disease process, medications, tests and treatments. Questions on POC encouraged throughout care.       Problem: Pain Management  Goal: Pain level will decrease to patient's comfort goal  Pain is assessed throughout the shift and per unit protocol. Pharmacological and non-pharmacological measures to treat pain are offered to patient. Pt is medicated per MAR.

## 2018-03-26 NOTE — PROGRESS NOTES
Received report on patient. He is sleeping in bed, no indications of pain or distress. Bed in the lowest position and call light and personal belongings within reach.

## 2018-03-26 NOTE — DISCHARGE PLANNING
Medical Social Work     Anticipated Discharge Disposition: Home with O2     Action: Pt will d/c to home when home O2 is arranged     Barriers: Awaiting home O2 study from bedside RN/CNA and orders from attending MD.     Plan: SW will obtain choice from pt and send referral when orders are in.

## 2018-03-26 NOTE — PROGRESS NOTES
Patient resting in bed. Stated he is hungry. NPO order discontinued. Bed in low position, call bell within reach. Continue to monitor.

## 2018-03-26 NOTE — THERAPY
"Occupational Therapy Evaluation completed.   Functional Status:  Supervision mobility. MOD I ADLs at EOB.  Plan of Care: Patient with no further skilled OT needs in the acute care setting at this time  Discharge Recommendations:  Equipment: Front-Wheel Walker. Post-acute therapy Currently anticipate no further skilled therapy needs once patient is discharged from the inpatient setting.    Pt is a 68 y/o male who underwent a recent revision of L BKA at an outside hospital on 3/19/18. Pt developed post-op L sided weakness and was found to have had a stroke. H/o infected L stump. Pt lives with supportive wife and usually ambulated with a cane and prosthesis. Pt required supervision for short distance mobility with walker this date. Recommend a FWW and HHC prior to home. Pt on 2L/min O2 during OT eval and sats remained in the 90's. Socialwork informed of D/C recommendations/needs. Defer further mobility and LE ex to pt as needed. UE strength is symmetrical at this time so it appears LUE weakness has resolved. No further acute care OT planned.     See \"Rehab Therapy-Acute\" Patient Summary Report for complete documentation.    "

## 2018-03-26 NOTE — DISCHARGE PLANNING
Medical Social Work    SW updated hospitalist RN that pt is on 2.5L O2 and is not on O2 at home. D/C orders in. SW unsure if pt will need home O2. SW following.

## 2018-03-26 NOTE — PROGRESS NOTES
Cardiology Progress Note               Author: Dre Aguirre Date & Time created: 3/26/2018  11:36 AM     Interval History:  NPO overnight awaiting ILR  No further chest pain  No new neuro complaints  Tired and wants to go home  No significant carotid disease    Chief Complaint:  CAD s/p nSTEMI with MV disease  HLD  CVA    Review of Systems   Constitutional: Negative.  Negative for chills, fever and malaise/fatigue.   HENT: Negative.  Negative for sore throat.    Eyes: Negative.    Respiratory: Negative.  Negative for cough, hemoptysis, sputum production, shortness of breath, wheezing and stridor.    Cardiovascular: Negative.  Negative for chest pain, palpitations, orthopnea, claudication, leg swelling and PND.   Gastrointestinal: Negative.    Genitourinary: Negative.    Musculoskeletal: Negative.    Skin: Negative.    Neurological: Negative.  Negative for dizziness, loss of consciousness and weakness.   Endo/Heme/Allergies: Negative.  Does not bruise/bleed easily.   All other systems reviewed and are negative.      Physical Exam   Constitutional: He is oriented to person, place, and time. He appears well-developed and well-nourished. No distress.   HENT:   Head: Normocephalic.   Mouth/Throat: Oropharynx is clear and moist.   Eyes: EOM are normal. Pupils are equal, round, and reactive to light. Right eye exhibits no discharge. Left eye exhibits no discharge. No scleral icterus.   Neck: Normal range of motion. Neck supple. No JVD present. No tracheal deviation present.   Cardiovascular: Normal rate, regular rhythm, S1 normal, S2 normal, normal heart sounds, intact distal pulses and normal pulses.  Exam reveals no gallop, no S3, no S4 and no friction rub.    No murmur heard.   No systolic murmur is present    No diastolic murmur is present   Pulses:       Carotid pulses are 2+ on the right side, and 2+ on the left side.       Radial pulses are 2+ on the right side, and 2+ on the left side.        Dorsalis pedis  pulses are 2+ on the right side, and 2+ on the left side.        Posterior tibial pulses are 2+ on the right side, and 2+ on the left side.   Pulmonary/Chest: Effort normal and breath sounds normal. No respiratory distress. He has no wheezes. He has no rales.   Abdominal: Soft. Bowel sounds are normal. He exhibits no distension and no mass. There is no tenderness. There is no rebound and no guarding.   Musculoskeletal: He exhibits no edema.   Neurological: He is alert and oriented to person, place, and time. No cranial nerve deficit.   Skin: Skin is warm and dry. He is not diaphoretic. No pallor.   Psychiatric: He has a normal mood and affect. His behavior is normal. Judgment and thought content normal.   Nursing note and vitals reviewed.      Hemodynamics:  Temp (24hrs), Av.8 °C (98.3 °F), Min:36.1 °C (96.9 °F), Max:37.2 °C (98.9 °F)  Temperature: 37.2 °C (98.9 °F)  Pulse  Av  Min: 71  Max: 100   Blood Pressure : 154/86     Respiratory:    Respiration: 17, Pulse Oximetry: 95 %        RUL Breath Sounds: Clear, RML Breath Sounds: Clear, RLL Breath Sounds: Clear, CAROL Breath Sounds: Clear, LLL Breath Sounds: Clear  Fluids:     Weight: 58 kg (127 lb 13.9 oz)  GI/Nutrition:  Orders Placed This Encounter   Procedures   • DIET NPO     Standing Status:   Standing     Number of Occurrences:   1     Order Specific Question:   Restrict to:     Answer:   Sips with Medications [3]     Lab Results:  Recent Labs      188  18   2342   WBC  5.6  6.4  6.3   RBC  2.98*  2.94*  2.94*   HEMOGLOBIN  10.0*  9.7*  9.8*   HEMATOCRIT  28.6*  27.9*  28.0*   MCV  96.0  94.9  95.2   MCH  33.6*  33.0  33.3*   MCHC  35.0  34.8  35.0   RDW  43.5  42.5  42.7   PLATELETCT  177  196  184   MPV  9.1  9.0  9.0     Recent Labs      18   0207  18   2342   SODIUM  138  139  133*   POTASSIUM  3.9  3.5*  4.1   CHLORIDE  105  104  99   CO2  30  29  29   GLUCOSE  117*  67  304*   BUN  14   12  9   CREATININE  0.51  0.58  0.70   CALCIUM  7.9*  8.5  7.9*                 Recent Labs      03/25/18   0207   TRIGLYCERIDE  83   HDL  31*   LDL  70         Medical Decision Making, by Problem:  Active Hospital Problems    Diagnosis   • *NSTEMI (non-ST elevated myocardial infarction) (CMS-HCC) [I21.4]   • S/P bilateral BKA (below knee amputation) (CMS-HCC) [Z89.512, Z89.511]   • Acute hypoxemic respiratory failure (CMS-HCC) [J96.01]   • Insulin dependent diabetes mellitus (CMS-HCC) [E11.9, Z79.4]   • Tear of skin of buttock [S31.801A]   • HTN (hypertension) [I10]   • Hypokalemia [E87.6]   • COPD (chronic obstructive pulmonary disease) (CMS-HCC) [J44.9]   • CVA (cerebral vascular accident) (CMS-HCC) [I63.9]   • Anemia [D64.9]   • Hyponatremia [E87.1]   • PVD (peripheral vascular disease) (CMS-HCC) [I73.9]   • BPH (benign prostatic hyperplasia) [N40.0]   • Acute metabolic encephalopathy [G93.41]   • Urine retention [R33.9]   Echo:  CONCLUSIONS  No prior study is available for comparison.   Normal left ventricular size, wall thickness, and systolic function.  Left ventricular ejection fraction is visually estimated to be 60%.  Grade II diastolic dysfunction.  The right ventricle was normal in size and function.  Mild mitral regurgitation.  Trace pericardial effusion.CONCLUSIONS  No prior study is available for comparison.   Normal left ventricular size, wall thickness, and systolic function.  Left ventricular ejection fraction is visually estimated to be 60%.  Grade II diastolic dysfunction.  The right ventricle was normal in size and function.  Mild mitral regurgitation.  Trace pericardial effusion.    Carotid Duplex:  FINDINGS   Right carotid.    Flow velocities and Doppler waveforms are normal throughout the carotid    system without evidence of plaque.    Left carotid.    Very mild plaque of the carotid bifurcation. Doppler velocities are normal.      Bilateral subclavian and vertebral artery waveforms are  antegrade and    waveforms are normal in character and velocity.    MRI:  1.  Small acute infarct in the posterior limb of the right internal capsule. Additional scattered punctate acute infarcts in the right posterior temporal lobe, left frontal lobe and left frontoparietal junction.  2.  Mild diffuse cerebral substance loss.  3.  Mild microangiopathic ischemic change versus demyelination or gliosis.  4.  Findings of sinusitis as described above.  5.  Bilateral mastoid effusions. Findings could be consistent with mastoiditis in the appropriate clinical setting.    Plan:  66 y/o M with MV CAD and recent CVA.  We will await the placement of his ILD today and he can then be discharged with follow up.  We will get him back for surgical evaluation for MV CABG once he is more stable afterhis recent CVA.      1. nSTEMI    - asa    - atorva    - clopidoglre 75    - metop 50 BID    - ramipril 10 BID    2. CVA    - awaiting ILR    3. HLD    - atorva 40        Quality-Core Measures   Reviewed items::  EKG reviewed, Labs reviewed, Medications reviewed and Radiology images reviewed  Reed catheter::  No Reed  DVT prophylaxis pharmacological::  Heparin  DVT prophylaxis - mechanical:  SCDs  Ulcer Prophylaxis::  No

## 2018-03-26 NOTE — PROGRESS NOTES
[facility] Hospitalist Progress Note    Date of Service: 3/26/2018    Chief Complaint  67 y.o. male admitted 3/22/2018 with an infected diabetic ulcer s/p BKA revision at an outside hospital complicated by nstemi and acute delirium with left sided weakness that are now resolved. A cardia cath has shown multivessel CAD. Also noted to have embolic appearing new CVA. No deficits noted since initial event.     Interval Problem Update  Marked hyperglycemia over the last day. No fevers. No other events.       Consultants/Specialty  cards  Neuro  Ct surgery    Disposition  Pending pt/ot eval.   Not clear yet. Await pt/ot eval  Possible snf.       PROCEDURES: 3/23/18  1.  Left heart catheterization.  2.  Coronary angiography.  3.  Left ventriculogram.  PREPROCEDURE DIAGNOSIS:  Acute coronary syndrome.  POSTPROCEDURE DIAGNOSES:  1.  Multivessel coronary artery disease with nonobstructive left main coronary   artery disease, high-grade mid left anterior descending artery and distal   left anterior descending artery stenosis, high-grade proximal diagonal branch   stenosis, high-grade ramus stenosis, high-grade proximal obtuse marginal   branch stenosis and chronic total occlusion of the mid right coronary artery   with distal vessel filling via left to right collaterals.  2.  Normal left ventricular systolic function with ejection fraction of 55%.  3.  Mildly elevated left ventricular end-diastolic pressure.    MRI brain:  1.  Small acute infarct in the posterior limb of the right internal capsule. Additional scattered punctate acute infarcts in the right posterior temporal lobe, left frontal lobe and left frontoparietal junction.  2.  Mild diffuse cerebral substance loss.  3.  Mild microangiopathic ischemic change versus demyelination or gliosis.  4.  Findings of sinusitis as described above.  5.  Bilateral mastoid effusions. Findings could be consistent with mastoiditis in the appropriate clinical setting.      Review of Systems    Constitutional: Positive for malaise/fatigue. Negative for chills.   HENT: Negative for sore throat.    Eyes: Negative for blurred vision and double vision.   Respiratory: Negative for cough and shortness of breath.    Cardiovascular: Negative for chest pain and orthopnea.   Gastrointestinal: Negative for abdominal pain, nausea and vomiting.   Genitourinary: Negative for dysuria and urgency.   Musculoskeletal: Negative for back pain and neck pain.   Skin: Negative for itching and rash.   Neurological: Negative for tingling and focal weakness.   Psychiatric/Behavioral: Negative for depression and suicidal ideas.   All other systems reviewed and are negative.     Physical Exam  Laboratory/Imaging   Hemodynamics  Temp (24hrs), Av.8 °C (98.3 °F), Min:36.1 °C (96.9 °F), Max:37.2 °C (98.9 °F)   Temperature: 37.2 °C (98.9 °F)  Pulse  Av  Min: 71  Max: 100    Blood Pressure : 154/86      Respiratory      Respiration: 17, Pulse Oximetry: 95 %        RUL Breath Sounds: Clear, RML Breath Sounds: Clear, RLL Breath Sounds: Clear, CAROL Breath Sounds: Clear, LLL Breath Sounds: Clear    Fluids    Intake/Output Summary (Last 24 hours) at 18 1059  Last data filed at 18 0600   Gross per 24 hour   Intake              460 ml   Output             1600 ml   Net            -1140 ml       Nutrition  Orders Placed This Encounter   Procedures   • DIET NPO     Standing Status:   Standing     Number of Occurrences:   1     Order Specific Question:   Restrict to:     Answer:   Sips with Medications [3]     Physical Exam   Constitutional: He is oriented to person, place, and time. He appears well-developed and well-nourished. No distress.   HENT:   Nose: Nose normal.   Mouth/Throat: Oropharynx is clear and moist.   Eyes: EOM are normal. No scleral icterus.   Neck: No JVD present. No tracheal deviation present.   Cardiovascular: Normal rate, normal heart sounds and intact distal pulses.    No murmur heard.  Pulmonary/Chest:  Effort normal and breath sounds normal. No respiratory distress. He has no rales.   Abdominal: Soft. Bowel sounds are normal. He exhibits no distension. There is no tenderness.   Musculoskeletal: He exhibits no edema or deformity.   Right BKA dressed   Neurological: He is alert and oriented to person, place, and time. He displays normal reflexes. No cranial nerve deficit. Coordination normal.   Skin: Skin is warm and dry. He is not diaphoretic. No erythema.   Psychiatric: He has a normal mood and affect. His behavior is normal.   Nursing note and vitals reviewed.      Recent Labs      03/24/18   0428  03/25/18   0207  03/25/18   2342   WBC  5.6  6.4  6.3   RBC  2.98*  2.94*  2.94*   HEMOGLOBIN  10.0*  9.7*  9.8*   HEMATOCRIT  28.6*  27.9*  28.0*   MCV  96.0  94.9  95.2   MCH  33.6*  33.0  33.3*   MCHC  35.0  34.8  35.0   RDW  43.5  42.5  42.7   PLATELETCT  177  196  184   MPV  9.1  9.0  9.0     Recent Labs      03/24/18   0428  03/25/18   0207  03/25/18   2342   SODIUM  138  139  133*   POTASSIUM  3.9  3.5*  4.1   CHLORIDE  105  104  99   CO2  30  29  29   GLUCOSE  117*  67  304*   BUN  14  12  9   CREATININE  0.51  0.58  0.70   CALCIUM  7.9*  8.5  7.9*             Recent Labs      03/25/18   0207   TRIGLYCERIDE  83   HDL  31*   LDL  70          Assessment/Plan     * NSTEMI (non-ST elevated myocardial infarction) (CMS-HCC)- (present on admission)   Assessment & Plan     perioperative myocardial infarction   now chest pain-free Asa/statin/ace/bb therapy  Now with multivessel disease  Ct surgery to consult recommending no surgery. Too high risk  Trend on tele  Maximize medical therapy  Increase BB today.             Insulin dependent diabetes mellitus (CMS-HCC)- (present on admission)   Assessment & Plan    Uncontrolled with hyperglycemia, vascular and neurologic complications.   Continue Lantus, continue sliding scale            Acute hypoxemic respiratory failure (CMS-HCC)- (present on admission)   Assessment & Plan     Bilateral pulmonary infiltrates noted on outside hospital CT scan and cxr  On 2L still but improving with diuresis  Suspect copd as well given exam.   May need home o2.   Continue to diurese for now.             S/P bilateral BKA (below knee amputation) (CMS-HCC)- (present on admission)   Assessment & Plan    Post op day # 5 from left BKA   Continue wound care  Stop antibiotics  No positive culture results from outside hospital.           Hypokalemia   Assessment & Plan    Trend and replace.           HTN (hypertension)   Assessment & Plan    Poor control  Increase BB and trend.         Tear of skin of buttock   Assessment & Plan    skin tear on the ischium/coccyx area   Not infected  Wound care ongoing  Possible not POA but not clear.           CVA (cerebral vascular accident) (CMS-HCC)   Assessment & Plan    Appears embolic  No deficits  Neuro consulted  Pending carotid duplex  No anticoagulation unless afib documented  Discussed with cards and they will place recorder prior to dc.   Asa/statin  No deficits now.   No therapy needed likely but pt/ot pending.                 COPD (chronic obstructive pulmonary disease) (CMS-HCC)   Assessment & Plan    No obvious flare  This is a presumed diagnosis based on exam and long hx of smoking  Rt protocol  Consider steroids with worsening of wheezing.   Doubt cardiac asthma          Urine retention   Assessment & Plan    Dc mcnair, bladder scan protocol, flomax trial  Working for now        Acute metabolic encephalopathy   Assessment & Plan    Likely icu psychosis from hospitalization and recent illness. Better in day time worse at night.   Reorientation  Resolved per wife.           BPH (benign prostatic hyperplasia)   Assessment & Plan    flomax        PVD (peripheral vascular disease) (CMS-HCC)   Assessment & Plan    Asa/statin  2/2 diabetes  Consider plavix        Hyponatremia   Assessment & Plan    Suspect hypervolemic.   Resolved with Diuresis          Anemia    Assessment & Plan    Iron deficiency and chronic disease related. On fe therapy.           Quality-Core Measures   Reviewed items::  EKG reviewed, Radiology images reviewed, Labs reviewed and Medications reviewed  Reed catheter::  No Reed  DVT prophylaxis pharmacological::  Heparin  DVT prophylaxis - mechanical:  SCDs  Ulcer Prophylaxis::  No  Assessed for rehabilitation services:  Patient was assess for and/or received rehabilitation services during this hospitalization

## 2018-03-26 NOTE — DISCHARGE INSTRUCTIONS
Discharge Instructions    Discharged to home by car with relative. Discharged via wheelchair, hospital escort: Yes.  Special equipment needed: Not Applicable    Be sure to schedule a follow-up appointment with your primary care doctor or any specialists as instructed.     Discharge Plan:   Diet Plan: Discussed  Activity Level: Discussed  Confirmed Follow up Appointment: Appointment Scheduled  Confirmed Symptoms Management: Discussed  Medication Reconciliation Updated: Yes  Pneumococcal Vaccine Given - only chart on this line when given: Given (See MAR)  Influenza Vaccine Indication: Not indicated: Previously immunized this influenza season and > 8 years of age    I understand that a diet low in cholesterol, fat, and sodium is recommended for good health. Unless I have been given specific instructions below for another diet, I accept this instruction as my diet prescription.   Other diet: diabetic    Special Instructions: Diagnosis:  Acute Coronary Syndrome (ACS) is a diagnosis that encompasses cardiac-related chest pain and heart attack. ACS occurs when the blood flow to the heart muscle is severely reduced or cut off completely due to a slow process called atherosclerosis.  Atherosclerosis is a disease in which the coronary arteries become narrow from a buildup of fat, cholesterol, and other substances that combine to form plaque. If the plaque breaks, a blood clot will form and block the blood flow to the heart muscle. This lack of blood flow can cause damage or death to the heart muscle which is called a heart attack or Myocardial Infarction (MI). There are two different types of MIs:  ST Elevation Myocardial Infarction or STEMI (the most severe type of heart attack) and Non-ST Elevation Myocardial Infarction or NSTEMI.    Treatment Plan:  · Cardiac Diet  - Low fat, low salt, low cholesterol   · Cardiac Rehab  - Your doctor has ordered you a referral to [facility].  Call [phone] to schedule an  appointment.  · Attend my follow-up appointment with my Cardiologist.  · Take my medications as prescribed by my doctor  · Exercise daily  · Lower my bad cholesterol and raise my good cholesterol, lower my blood pressure, Reduce stress and Control my diabetes    Medications:  Certain medications are used to treat ACS.  Remember to always take medications as prescribed and never stop talking medications unless told by your doctor.    You have been prescribed the following medicatons:    Aspirin - Aspirin is used as a blood thinning medication and you will require this medication indefinitely.  Anti-platelet/blood thinner - Your Anti-platelet/Blood thinning medication is called plavix, and is used in combination with aspirin to prevent clots from forming in your heart and/or around your stent.  Your doctor will determine how long you need to be on this medicine.  Beta-Blocker - Beta-Blocker metoprolol is used to lower blood pressure and heart rate, and/or helps your heart heal after a heart attack.  Statin - Statin atorvastatin is used to lower cholesterol.    · Is patient discharged on Warfarin / Coumadin?   No     Depression / Suicide Risk    As you are discharged from this [facility] facility, it is important to learn how to keep safe from harming yourself.    Recognize the warning signs:  · Abrupt changes in personality, positive or negative- including increase in energy   · Giving away possessions  · Change in eating patterns- significant weight changes-  positive or negative  · Change in sleeping patterns- unable to sleep or sleeping all the time   · Unwillingness or inability to communicate  · Depression  · Unusual sadness, discouragement and loneliness  · Talk of wanting to die  · Neglect of personal appearance   · Rebelliousness- reckless behavior  · Withdrawal from people/activities they love  · Confusion- inability to concentrate     If you or a loved one observes any of these behaviors or has concerns  about self-harm, here's what you can do:  · Talk about it- your feelings and reasons for harming yourself  · Remove any means that you might use to hurt yourself (examples: pills, rope, extension cords, firearm)  · Get professional help from the community (Mental Health, Substance Abuse, psychological counseling)  · Do not be alone:Call your Safe Contact- someone whom you trust who will be there for you.  · Call your local CRISIS HOTLINE 724-5237 or 693-599-5471  · Call your local Children's Mobile Crisis Response Team [place] (465) 620-0791 or www.ThePresent.Co  · Call the toll free National Suicide Prevention Hotlines   · National Suicide Prevention Lifeline 007-132-THBY (8100)  · National Hope Line Network 800-SUICIDE (879-7617)

## 2018-03-26 NOTE — CARE PLAN
Problem: Venous Thromboembolism (VTW)/Deep Vein Thrombosis (DVT) Prevention:  Goal: Patient will participate in Venous Thrombosis (VTE)/Deep Vein Thrombosis (DVT)Prevention Measures  Outcome: PROGRESSING AS EXPECTED  Patient receives heparin injections    Problem: Urinary Elimination:  Goal: Ability to reestablish a normal urinary elimination pattern will improve  Outcome: PROGRESSING AS EXPECTED  Patient has appropriate output and has no complaints of any complications with urinating.

## 2018-03-26 NOTE — PROGRESS NOTES
Patient and family given discharge information. verbalized understanding. Iv and tele discontinued. Patient taken to lobby via wheelchair accompanied by RN.

## 2018-03-26 NOTE — OP REPORT
[facility] ELECTROPHYSIOLOGY PROCEDURE NOTE    PROCEDURE PERFORMED: Implantable Loop Recorder    DATE OF SERVICE: 3/26/2018    : Dre Perla MD    ASSISTANT: None    ANESTHESIA: Local    EBL: <5 cc    SPECIMENS: None    INDICATION(S): Cryptogenic stroke    DESCRIPTION OF PROCEDURE:  After informed written consent, the patient was brought to the cath lab pre/post procedure area. The patient was prepped and draped in the usual sterile fashion. The procedure was performed with local anesthetic. Using the supplied incision tool, a <1 cm incision was made in the skin about 2 cm leftward and lateral to the sternal border. Using the supplied insertion tool, a tunnel was made in the subcutaneous tissue at a 45 degree angle to the sternum and the device was inserted with the supplied plunger. Manual compression was used until hemostasis achieved. Sterile dressing was applied.    IMPLANTED DEVICE INFORMATION:  Model: [employer]onic LINQ   Serial number: [id number]     IMPRESSIONS:  1. Successful implantable loop recorder implantation    RECOMMENDATIONS:  1. Routine follow-up and device interrogation

## 2018-03-26 NOTE — PROGRESS NOTES
Report received from night rn. No overnight issues. Patient has been NPO for procedure. Family at the bedside. R BKA observed. No signs of bleeding or infection. Blood thinners held until after procedure. Bed in low position, call bell within reach. Continue to monitor.

## 2018-03-26 NOTE — DISCHARGE SUMMARY
CHIEF COMPLAINT ON ADMISSION  No chief complaint on file.      CODE STATUS  Full Code    HPI & HOSPITAL COURSE  This is a 67 y.o. male admitted 3/22/2018 with an infected diabetic ulcer s/p BKA revision at an outside hospital complicated by nstemi and acute delirium with left sided weakness that are now resolved. A cardia cath has shown multivessel CAD. Also noted to have embolic appearing new CVA. No deficits noted since initial event. He was seen by cardiology and CT surgery and they have recommended medical management as he is too high risk for bypass. He has had an implantable loop recorder placed and will follow up with cardiology as his cva appears embolic. He remained in NSR here in the hospital. He has been cleared by therapy.     The patient met 2-midnight criteria for an inpatient stay at the time of discharge.    Therefore, he is discharged in good and stable condition with close outpatient follow-up.    SPECIFIC OUTPATIENT FOLLOW-UP  Cardiology  pcp    DISCHARGE PROBLEM LIST  Principal Problem:    NSTEMI (non-ST elevated myocardial infarction) (CMS-HCC) POA: Yes  Active Problems:    S/P bilateral BKA (below knee amputation) (CMS-HCC) POA: Yes    Acute hypoxemic respiratory failure (CMS-HCC) POA: Yes    Insulin dependent diabetes mellitus (CMS-HCC) POA: Yes    Anemia POA: Unknown    Hyponatremia POA: Unknown    PVD (peripheral vascular disease) (CMS-HCC) POA: Unknown    BPH (benign prostatic hyperplasia) POA: Unknown    Acute metabolic encephalopathy POA: Unknown    Urine retention POA: Unknown    COPD (chronic obstructive pulmonary disease) (CMS-HCC) POA: Unknown    CVA (cerebral vascular accident) (CMS-HCC) POA: Unknown    Tear of skin of buttock POA: Unknown    HTN (hypertension) POA: Unknown    Hypokalemia POA: Unknown  Resolved Problems:    * No resolved hospital problems. *      FOLLOW UP  No future appointments.  [facility], Emergency Dept  [address]  [address]  [phone]    As needed, If symptoms worsen    Neal Miller M.D.  [address]  [address]  [place] [address]  [phone]            MEDICATIONS ON DISCHARGE   Jarret Bright   Home Medication Instructions [id number]:[id number]    Printed on:03/26/18 3675   Medication Information                      aspirin EC (ECOTRIN) 81 MG Tablet Delayed Response  Take 81 mg by mouth every bedtime.             atorvastatin (LIPITOR) 40 MG Tab  Take 1 Tab by mouth every bedtime.             clopidogrel (PLAVIX) 75 MG Tab  Take 1 Tab by mouth every day.             furosemide (LASIX) 40 MG Tab  Take 1 Tab by mouth every day.             insulin glargine (LANTUS) 100 UNIT/ML Solution  Inject 23 Units as instructed every evening.             metoprolol (LOPRESSOR) 50 MG Tab  Take 1 Tab by mouth 2 Times a Day.             NS SOLN 100 mL with piperacillin-tazobactam 4.5 (4-0.5) g SOLR 4.5 g  4.5 g by Intravenous route every 6 hours. Dose given at [facility]             ramipril (ALTACE) 10 MG capsule  Take 1 Cap by mouth every day.             tamsulosin (FLOMAX) 0.4 MG capsule  Take 1 Cap by mouth ONE-HALF HOUR AFTER BREAKFAST.                 DIET  Orders Placed This Encounter   Procedures   • DIET ORDER     Standing Status:   Standing     Number of Occurrences:   1     Order Specific Question:   Diet:     Answer:   Diabetic [3]       ACTIVITY  As tolerated.  Weight bearing as tolerated      CONSULTATIONS  cardiology- dr alcocer  Ct surgery- dr thomas  EP cardiology- dr staton  neurology- dr guerrero    PROCEDURES  IMPLANTED DEVICE INFORMATION:  Model: Medtronic LINQ   Serial number: [id number]      IMPRESSIONS:  1. Successful implantable loop recorder implantation     RECOMMENDATIONS:  1. Routine follow-up and device interrogation    Mri brain  1.  Small acute infarct in the posterior limb of the right internal capsule. Additional scattered punctate acute infarcts in the right posterior temporal lobe, left frontal lobe and  left frontoparietal junction.  2.  Mild diffuse cerebral substance loss.  3.  Mild microangiopathic ischemic change versus demyelination or gliosis.  4.  Findings of sinusitis as described above.  5.  Bilateral mastoid effusions. Findings could be consistent with mastoiditis in the appropriate clinical setting.  LABORATORY  Lab Results   Component Value Date/Time    SODIUM 133 (L) 03/25/2018 11:42 PM    POTASSIUM 4.1 03/25/2018 11:42 PM    CHLORIDE 99 03/25/2018 11:42 PM    CO2 29 03/25/2018 11:42 PM    GLUCOSE 304 (H) 03/25/2018 11:42 PM    BUN 9 03/25/2018 11:42 PM    CREATININE 0.70 03/25/2018 11:42 PM        Lab Results   Component Value Date/Time    WBC 6.3 03/25/2018 11:42 PM    HEMOGLOBIN 9.8 (L) 03/25/2018 11:42 PM    HEMATOCRIT 28.0 (L) 03/25/2018 11:42 PM    PLATELETCT 184 03/25/2018 11:42 PM        Total time of the discharge process exceeds 32 minutes

## 2018-03-27 NOTE — DISCHARGE PLANNING
Medical Social Work    SW noticed pt discharged in [facility]. SW called charge RN due to pt leaving with home O2 being arranged. Per bedside RN, pt does not need home O2 as he didn't drop low enough to qualify.

## 2018-03-27 NOTE — THERAPY
"Physical Therapy Evaluation completed.   Bed Mobility:  Supine to Sit: Modified Independent  Transfers: Sit to Stand: Supervised  Gait: Level Of Assist: Stand by Assist with Front-Wheel Walker       Plan of Care: Patient with no further skilled PT needs in the acute care setting at this time  Discharge Recommendations: Equipment: Front-Wheel Walker. Post-acute therapy Discharge to home with outpatient or home health for additional skilled therapy services.    See \"Rehab Therapy-Acute\" Patient Summary Report for complete documentation.     "

## 2018-04-06 ENCOUNTER — NON-PROVIDER VISIT (OUTPATIENT)
Dept: CARDIOLOGY | Facility: MEDICAL CENTER | Age: 68
End: 2018-04-06
Payer: MEDICARE

## 2018-04-06 DIAGNOSIS — R55 SYNCOPE AND COLLAPSE: ICD-10-CM

## 2018-04-06 PROCEDURE — 93291 INTERROG DEV EVAL SCRMS IP: CPT | Performed by: INTERNAL MEDICINE

## 2018-04-06 NOTE — NON-PROVIDER
S/p new implantable loop recorder, implanted on 3-. Appropriate device function demonstrated. Advised to watch for redness, swelling, oozing or fever. Pt verbalized understanding.  Has remote monitor.

## 2018-04-09 ENCOUNTER — OFFICE VISIT (OUTPATIENT)
Dept: CARDIOLOGY | Facility: MEDICAL CENTER | Age: 68
End: 2018-04-09
Payer: MEDICARE

## 2018-04-09 ENCOUNTER — OFFICE VISIT (OUTPATIENT)
Dept: NEUROLOGY | Facility: MEDICAL CENTER | Age: 68
End: 2018-04-09
Payer: MEDICARE

## 2018-04-09 VITALS
SYSTOLIC BLOOD PRESSURE: 116 MMHG | BODY MASS INDEX: 18.79 KG/M2 | HEIGHT: 67 IN | HEART RATE: 76 BPM | TEMPERATURE: 98.2 F | DIASTOLIC BLOOD PRESSURE: 64 MMHG

## 2018-04-09 VITALS
WEIGHT: 120 LBS | HEIGHT: 67 IN | HEART RATE: 74 BPM | SYSTOLIC BLOOD PRESSURE: 100 MMHG | OXYGEN SATURATION: 99 % | BODY MASS INDEX: 18.83 KG/M2 | DIASTOLIC BLOOD PRESSURE: 60 MMHG

## 2018-04-09 DIAGNOSIS — I25.10 CORONARY ARTERY DISEASE INVOLVING NATIVE CORONARY ARTERY OF NATIVE HEART WITHOUT ANGINA PECTORIS: ICD-10-CM

## 2018-04-09 DIAGNOSIS — I21.4 NSTEMI (NON-ST ELEVATED MYOCARDIAL INFARCTION) (HCC): ICD-10-CM

## 2018-04-09 DIAGNOSIS — I63.139 CEREBROVASCULAR ACCIDENT (CVA) DUE TO EMBOLISM OF CAROTID ARTERY, UNSPECIFIED BLOOD VESSEL LATERALITY (HCC): ICD-10-CM

## 2018-04-09 DIAGNOSIS — Z89.512 S/P BKA (BELOW KNEE AMPUTATION) UNILATERAL, LEFT (HCC): ICD-10-CM

## 2018-04-09 DIAGNOSIS — E78.2 HYPERLIPEMIA, MIXED: ICD-10-CM

## 2018-04-09 DIAGNOSIS — I63.89 CEREBROVASCULAR ACCIDENT (CVA) DUE TO OTHER MECHANISM (HCC): ICD-10-CM

## 2018-04-09 DIAGNOSIS — I10 ESSENTIAL HYPERTENSION: ICD-10-CM

## 2018-04-09 PROCEDURE — 99214 OFFICE O/P EST MOD 30 MIN: CPT | Performed by: NURSE PRACTITIONER

## 2018-04-09 PROCEDURE — 99214 OFFICE O/P EST MOD 30 MIN: CPT | Performed by: PHYSICIAN ASSISTANT

## 2018-04-09 RX ORDER — FUROSEMIDE 40 MG/1
40 TABLET ORAL PRN
Qty: 30 TAB | Refills: 1 | Status: SHIPPED | OUTPATIENT
Start: 2018-04-09 | End: 2018-06-28 | Stop reason: SDUPTHER

## 2018-04-09 RX ORDER — CLOPIDOGREL BISULFATE 75 MG/1
75 TABLET ORAL DAILY
Qty: 30 TAB | Refills: 11 | Status: SHIPPED | OUTPATIENT
Start: 2018-04-09 | End: 2019-04-11 | Stop reason: SDUPTHER

## 2018-04-09 RX ORDER — ATORVASTATIN CALCIUM 40 MG/1
40 TABLET, FILM COATED ORAL EVERY EVENING
Qty: 30 TAB | Refills: 11 | Status: SHIPPED | OUTPATIENT
Start: 2018-04-09 | End: 2019-04-11 | Stop reason: SDUPTHER

## 2018-04-09 ASSESSMENT — ENCOUNTER SYMPTOMS
MYALGIAS: 0
WEAKNESS: 0
CLAUDICATION: 0
PND: 0
NEUROLOGICAL NEGATIVE: 1
CONSTITUTIONAL NEGATIVE: 1
SHORTNESS OF BREATH: 0
PALPITATIONS: 0
PSYCHIATRIC NEGATIVE: 1
ABDOMINAL PAIN: 0
COUGH: 0
ORTHOPNEA: 0
DIZZINESS: 0
EYES NEGATIVE: 1

## 2018-04-09 NOTE — PROGRESS NOTES
Chief Complaint   Patient presents with   • Possible Stroke     hospital follow up       Subjective:   Jarret Bright is a 67 y.o. male who presents today with his wife, Dolly, for hospital follow-up. He had left-sided weakness after revision of his left BKA. He also had positive troponins while at the hospital in [place]. He was transferred to [facility] for further workup.    He is an insulin-dependent diabetic and had a left BKA with osteomyelitis that underwent revision on March 19, 2018. He developed left-sided weakness and some confusion. He never had chest tightness, heaviness or pressure.    He underwent cardiac catheterization on March 23, 2018 which showed extensive three-vessel disease. Her cardiac surgeon, Dr. Blanco, he did not feel the LAD was obstructive enough to put the patient through bypass surgery at this time given his comorbidities. Since the patient has not had any anginal symptoms it was decided to revisit the issue of bypass surgery in the future.    The patient denies any chest symptoms. He is using a walker to go from one room to another and denies any anginal symptoms or shortness of breath. He states he feels generally well.        Past Medical History:   Diagnosis Date   • CAD (coronary artery disease)    • Diabetes (CMS-[facility])     insulin dependent.   • Hypertension      Past Surgical History:   Procedure Laterality Date   • CARDIAC CATH  03/23/2018    3 vessel disease, needs CABG when stable from other problems.     Family History   Problem Relation Age of Onset   • Heart Attack Mother 60     Social History     Social History   • Marital status:      Spouse name: N/A   • Number of children: N/A   • Years of education: N/A     Occupational History   • Not on file.     Social History Main Topics   • Smoking status: Former Smoker     Years: 30.00     Types: Cigarettes, Cigars     Quit date: 1/1/2018   • Smokeless tobacco: Never Used   • Alcohol use No   • Drug use: No   •  Sexual activity: Not on file     Other Topics Concern   • Not on file     Social History Narrative   • No narrative on file     Allergies   Allergen Reactions   • Tea Tree Oil Rash     Unspecified     Outpatient Encounter Prescriptions as of 4/9/2018   Medication Sig Dispense Refill   • clopidogrel (PLAVIX) 75 MG Tab Take 1 Tab by mouth every day. 30 Tab 11   • furosemide (LASIX) 40 MG Tab Take 1 Tab by mouth as needed. For swelling. 30 Tab 1   • metoprolol (LOPRESSOR) 25 MG Tab Take 1 Tab by mouth 2 times a day. 60 Tab 11   • atorvastatin (LIPITOR) 40 MG Tab Take 1 Tab by mouth every evening. 30 Tab 11   • ramipril (ALTACE) 10 MG capsule Take 1 Cap by mouth every day. 30 Cap 11   • tamsulosin (FLOMAX) 0.4 MG capsule Take 1 Cap by mouth ONE-HALF HOUR AFTER BREAKFAST. 30 Cap 1   • aspirin EC (ECOTRIN) 81 MG Tablet Delayed Response Take 81 mg by mouth every bedtime.     • insulin glargine (LANTUS) 100 UNIT/ML Solution Inject 14 Units as instructed every evening.     • [DISCONTINUED] clopidogrel (PLAVIX) 75 MG Tab Take 1 Tab by mouth every day. 30 Tab 1   • [DISCONTINUED] furosemide (LASIX) 40 MG Tab Take 1 Tab by mouth every day. 30 Tab 1   • [DISCONTINUED] metoprolol (LOPRESSOR) 50 MG Tab Take 1 Tab by mouth 2 Times a Day. 60 Tab 1   • [DISCONTINUED] atorvastatin (LIPITOR) 40 MG Tab Take 1 Tab by mouth every bedtime. 30 Tab 1   • [DISCONTINUED] NS SOLN 100 mL with piperacillin-tazobactam 4.5 (4-0.5) g SOLR 4.5 g 4.5 g by Intravenous route every 6 hours. Dose given at [facility]       No facility-administered encounter medications on file as of 4/9/2018.      Review of Systems   Constitutional: Negative for malaise/fatigue.   Respiratory: Negative for cough and shortness of breath.    Cardiovascular: Negative for chest pain, palpitations, orthopnea, claudication, leg swelling and PND.   Gastrointestinal: Negative for abdominal pain.   Musculoskeletal: Negative for myalgias.   Neurological: Negative for dizziness  "and weakness.        Objective:   /60   Pulse 74   Ht 1.702 m (5' 7\")   Wt 54.4 kg (120 lb)   SpO2 99%   BMI 18.79 kg/m²     Physical Exam   Constitutional: He is oriented to person, place, and time. He appears well-developed and well-nourished.   Thin male seated in wheelchair in no acute distress.   HENT:   Head: Normocephalic.   Eyes: EOM are normal.   Neck: Normal range of motion. No JVD present.   Cardiovascular: Normal rate and regular rhythm.  Exam reveals no friction rub.    No murmur heard.  Pulmonary/Chest: Effort normal. He has decreased breath sounds (throughout but clear.).   Abdominal: Soft. Bowel sounds are normal.   Musculoskeletal: He exhibits no edema.   Left BKA with dressing intact.   Neurological: He is alert and oriented to person, place, and time.   Skin: Skin is warm and dry.   Psychiatric: He has a normal mood and affect.     March 23, 2018: Cardiac catheterization:   IMPRESSION:  1.  Multivessel coronary artery disease with nonobstructive left main coronary   artery disease, high-grade mid left anterior descending artery and distal   left anterior descending artery stenosis, high-grade proximal diagonal branch   stenosis, high-grade ramus stenosis, high-grade proximal obtuse marginal   branch stenosis and chronic total occlusion of the mid right coronary artery   with distal vessel filling via left to right collaterals.  2.  Normal left ventricular systolic function with ejection fraction of 55%.  3.  Mildly elevated left ventricular end-diastolic pressure.     RECOMMENDATIONS:  Recommend surgical consult for CABG.      March 24, 2018:Transthoracic Echo Report  No prior study is available for comparison.   Normal left ventricular size, wall thickness, and systolic function.  Left ventricular ejection fraction is visually estimated to be 60%.  Grade II diastolic dysfunction.  The right ventricle was normal in size and function.  Mild mitral regurgitation.  Trace pericardial " effusion.      Results for ESTHER DE GUZMAN (MRN [id number])    Ref. Range 3/25/2018 02:07   Cholesterol,Tot Latest Ref Range: 100 - 199 mg/dL 118   Triglycerides Latest Ref Range: 0 - 149 mg/dL 83   HDL Latest Ref Range: >=40 mg/dL 31 (A)   LDL Latest Ref Range: <100 mg/dL 70     Assessment:     1. S/P BKA (below knee amputation) unilateral, left (CMS-HCC)     2. NSTEMI (non-ST elevated myocardial infarction) ([facility]     3. Coronary artery disease involving native coronary artery of native heart without angina pectoris      3 vessel disease, needs CABG eventually.   4. Cerebrovascular accident (CVA) due to other mechanism (CMS-HCC)     5. Essential hypertension     6. Hyperlipemia, mixed  COMP METABOLIC PANEL    LIPID PROFILE       Medical Decision Making:  Today's Assessment / Status / Plan:     Status post BKA: Revision done. Patient appears to be doing well.    Non-ST elevated MI: Patient had positive troponins therefore is felt to have an MI which may have been demand ischemia.    Coronary artery disease: He has three-vessel disease and may need bypass in the future but he is stable at this time. He has not had any chest discomfort. We will reevaluate his coronary artery disease at his follow-up appointment. If he develops exertional chest discomfort he will let us know.    CVA: Followed by neurology.    Hypertension: His blood pressure is very low in the office today. I will reduce metoprolol to 25 mg twice a day.    I will have him hold the Lasix as he has no fluid overload signs and has a normal ejection fraction. He may take the Lasix if he develops ankle edema or weight gain.    Hyperlipidemia: He has been changed to atorvastatin. We will check lipids and metabolic panel in 3 months.    He will follow-up with Dr. Fidelina Flanagan in 3 months to evaluate him for coronary artery disease. He will follow with other providers for his diabetes and his CVA.    Collaborating Provider: Dr. Parmar.

## 2018-04-09 NOTE — LETTER
[facility]   [address]  [place] [address]  Phone: [phone]  Fax: [phone]              Jarret Bright  1950    Encounter Date: 4/9/2018    NANDA Chen          PROGRESS NOTE:  Chief Complaint   Patient presents with   • Possible Stroke     hospital follow up       Subjective:   Jarret Bright is a 67 y.o. male who presents today with his wife, Dolly, for hospital follow-up. He had left-sided weakness after revision of his left BKA. He also had positive troponins while at the hospital in [place]. He was transferred to [facility] for further workup.    He is an insulin-dependent diabetic and had a left BKA with osteomyelitis that underwent revision on March 19, 2018. He developed left-sided weakness and some confusion. He never had chest tightness, heaviness or pressure.    He underwent cardiac catheterization on March 23, 2018 which showed extensive three-vessel disease. Her cardiac surgeon, Dr. Blanco, he did not feel the LAD was obstructive enough to put the patient through bypass surgery at this time given his comorbidities. Since the patient has not had any anginal symptoms it was decided to revisit the issue of bypass surgery in the future.    The patient denies any chest symptoms. He is using a walker to go from one room to another and denies any anginal symptoms or shortness of breath. He states he feels generally well.        Past Medical History:   Diagnosis Date   • CAD (coronary artery disease)    • Diabetes (CMS-[facility])     insulin dependent.   • Hypertension      Past Surgical History:   Procedure Laterality Date   • CARDIAC CATH  03/23/2018    3 vessel disease, needs CABG when stable from other problems.     Family History   Problem Relation Age of Onset   • Heart Attack Mother 60     Social History     Social History   • Marital status:      Spouse name: N/A   • Number of children: N/A   • Years of  education: N/A     Occupational History   • Not on file.     Social History Main Topics   • Smoking status: Former Smoker     Years: 30.00     Types: Cigarettes, Cigars     Quit date: 1/1/2018   • Smokeless tobacco: Never Used   • Alcohol use No   • Drug use: No   • Sexual activity: Not on file     Other Topics Concern   • Not on file     Social History Narrative   • No narrative on file     Allergies   Allergen Reactions   • Tea Tree Oil Rash     Unspecified     Outpatient Encounter Prescriptions as of 4/9/2018   Medication Sig Dispense Refill   • clopidogrel (PLAVIX) 75 MG Tab Take 1 Tab by mouth every day. 30 Tab 11   • furosemide (LASIX) 40 MG Tab Take 1 Tab by mouth as needed. For swelling. 30 Tab 1   • metoprolol (LOPRESSOR) 25 MG Tab Take 1 Tab by mouth 2 times a day. 60 Tab 11   • atorvastatin (LIPITOR) 40 MG Tab Take 1 Tab by mouth every evening. 30 Tab 11   • ramipril (ALTACE) 10 MG capsule Take 1 Cap by mouth every day. 30 Cap 11   • tamsulosin (FLOMAX) 0.4 MG capsule Take 1 Cap by mouth ONE-HALF HOUR AFTER BREAKFAST. 30 Cap 1   • aspirin EC (ECOTRIN) 81 MG Tablet Delayed Response Take 81 mg by mouth every bedtime.     • insulin glargine (LANTUS) 100 UNIT/ML Solution Inject 14 Units as instructed every evening.     • [DISCONTINUED] clopidogrel (PLAVIX) 75 MG Tab Take 1 Tab by mouth every day. 30 Tab 1   • [DISCONTINUED] furosemide (LASIX) 40 MG Tab Take 1 Tab by mouth every day. 30 Tab 1   • [DISCONTINUED] metoprolol (LOPRESSOR) 50 MG Tab Take 1 Tab by mouth 2 Times a Day. 60 Tab 1   • [DISCONTINUED] atorvastatin (LIPITOR) 40 MG Tab Take 1 Tab by mouth every bedtime. 30 Tab 1   • [DISCONTINUED] NS SOLN 100 mL with piperacillin-tazobactam 4.5 (4-0.5) g SOLR 4.5 g 4.5 g by Intravenous route every 6 hours. Dose given at [facility]       No facility-administered encounter medications on file as of 4/9/2018.      Review of Systems   Constitutional: Negative for malaise/fatigue.   Respiratory: Negative for  "cough and shortness of breath.    Cardiovascular: Negative for chest pain, palpitations, orthopnea, claudication, leg swelling and PND.   Gastrointestinal: Negative for abdominal pain.   Musculoskeletal: Negative for myalgias.   Neurological: Negative for dizziness and weakness.        Objective:   /60   Pulse 74   Ht 1.702 m (5' 7\")   Wt 54.4 kg (120 lb)   SpO2 99%   BMI 18.79 kg/m²      Physical Exam   Constitutional: He is oriented to person, place, and time. He appears well-developed and well-nourished.   Thin male seated in wheelchair in no acute distress.   HENT:   Head: Normocephalic.   Eyes: EOM are normal.   Neck: Normal range of motion. No JVD present.   Cardiovascular: Normal rate and regular rhythm.  Exam reveals no friction rub.    No murmur heard.  Pulmonary/Chest: Effort normal. He has decreased breath sounds (throughout but clear.).   Abdominal: Soft. Bowel sounds are normal.   Musculoskeletal: He exhibits no edema.   Left BKA with dressing intact.   Neurological: He is alert and oriented to person, place, and time.   Skin: Skin is warm and dry.   Psychiatric: He has a normal mood and affect.     March 23, 2018: Cardiac catheterization:   IMPRESSION:  1.  Multivessel coronary artery disease with nonobstructive left main coronary   artery disease, high-grade mid left anterior descending artery and distal   left anterior descending artery stenosis, high-grade proximal diagonal branch   stenosis, high-grade ramus stenosis, high-grade proximal obtuse marginal   branch stenosis and chronic total occlusion of the mid right coronary artery   with distal vessel filling via left to right collaterals.  2.  Normal left ventricular systolic function with ejection fraction of 55%.  3.  Mildly elevated left ventricular end-diastolic pressure.     RECOMMENDATIONS:  Recommend surgical consult for CABG.      March 24, 2018:Transthoracic Echo Report  No prior study is available for comparison.   Normal left " ventricular size, wall thickness, and systolic function.  Left ventricular ejection fraction is visually estimated to be 60%.  Grade II diastolic dysfunction.  The right ventricle was normal in size and function.  Mild mitral regurgitation.  Trace pericardial effusion.      Results for ESTHER DE GUZMAN (MRN [id number])    Ref. Range 3/25/2018 02:07   Cholesterol,Tot Latest Ref Range: 100 - 199 mg/dL 118   Triglycerides Latest Ref Range: 0 - 149 mg/dL 83   HDL Latest Ref Range: >=40 mg/dL 31 (A)   LDL Latest Ref Range: <100 mg/dL 70     Assessment:     1. S/P BKA (below knee amputation) unilateral, left (CMS-[facility])     2. NSTEMI (non-ST elevated myocardial infarction) ([facility])     3. Coronary artery disease involving native coronary artery of native heart without angina pectoris      3 vessel disease, needs CABG eventually.   4. Cerebrovascular accident (CVA) due to other mechanism (CMS-HCC)     5. Essential hypertension     6. Hyperlipemia, mixed  COMP METABOLIC PANEL    LIPID PROFILE       Medical Decision Making:  Today's Assessment / Status / Plan:     Status post BKA: Revision done. Patient appears to be doing well.    Non-ST elevated MI: Patient had positive troponins therefore is felt to have an MI which may have been demand ischemia.    Coronary artery disease: He has three-vessel disease and may need bypass in the future but he is stable at this time. He has not had any chest discomfort. We will reevaluate his coronary artery disease at his follow-up appointment. If he develops exertional chest discomfort he will let us know.    CVA: Followed by neurology.    Hypertension: His blood pressure is very low in the office today. I will reduce metoprolol to 25 mg twice a day.    I will have him hold the Lasix as he has no fluid overload signs and has a normal ejection fraction. He may take the Lasix if he develops ankle edema or weight gain.    Hyperlipidemia: He has been changed to atorvastatin. We will check lipids and  metabolic panel in 3 months.    He will follow-up with Dr. Fidelina Flanagan in 3 months to evaluate him for coronary artery disease. He will follow with other providers for his diabetes and his CVA.    Collaborating Provider: Dr. Parmar.        No Recipients

## 2018-04-09 NOTE — PROGRESS NOTES
"Subjective:      Jarret Bright is a 67 y.o. male who presents with New Patient (CVA)    Patient had a likely embolic stroke per imaging  and was discharged from the hospital in march 22, 2018.      Symptoms associated with this episode:  in hospital for bka    Symptoms have resolved.    Patient was seen in the hospital by neurology.  Dr. guerrero    Stroke Prevention Medications taking currently: aspirin and plavix  (Prior to this event patient was taking following anti-thrombotic:aspirin)    (PCP) Primary Doctor:  [facility] reviewed    Medications and Allergies reviewed    Social: lives in [place] with his wife   Works  - retired road maintenance    Test Results Reviewed:    MRI:  1.  Small acute infarct in the posterior limb of the right internal capsule. Additional scattered punctate acute infarcts in the right posterior temporal lobe, left frontal lobe and left frontoparietal junction.  2.  Mild diffuse cerebral substance loss.    Echo:  No prior study is available for comparison.   Normal left ventricular size, wall thickness, and systolic function.  Left ventricular ejection fraction is visually estimated to be 60%.  Grade II diastolic dysfunction.  The right ventricle was normal in size and function.  Mild mitral regurgitation.  Trace pericardial effusion.    CUS:  minimal bilateral ICA thickening   nl subclavians and vertebrals        DX:  Stroke       Personal Risk factors associated with recurrent stroke:    Some risk factors for stroke are \"non-modifiable\" meaning we cannot change them.  Examples of these types of risk factors are:    *   Age - once we turn 55, stroke becomes more common.  It is associated with aging and in fact every decade over 55 our stroke risk doubles.  * Gender - Men have more strokes than women, although this gender difference is only slight  * Ethnicity and/or family history:  if your parents, grandparents, siblings or children have had stroke or if you " have ancestors of , , or  descent, you may have inherited some genes pre-disposing your toward stroke    To reduce your risk of recurrent stroke, we want to focus on risk factors we can modify.  Any of the checked items below are your personal risk factors and you should work with your PCP (primary care provider) to get them to the goal (goals are in bold).   These risk factors are:     _X__Diabetes - goal HA1c is <7.0%: Current: 10.9.  Let us know if you wish to be referred to a diabetes specialist in our office.      _X__Hypertension - goal is <130 if you have diabetes.   Current: 116/64  Recommend taking bp once daily and taking log to PCP.    Continue current Blood pressure medication - avoid salt    _X__Hyperlipidemia - goal LDL is <70.  Current: 70.  Continue taking cholesterol medication as prescribed.  Modify diet to reduce cholesterol - less cheese, avoid egg yolks, reduce intake of pork, shrimp, and beef.    _X__Smoking: quit 4 months ago.  Once you are 5 years out from when you quit smoking, the history of smoking does not appear to be a significant risk factor for recurrent stroke.  If you are quitting, implement stress reduction with exercise such as daily walking, yoga, or meditation.  If you are unable to quit on your own, ask your PCP about chantix or referral for smoking cessation    ___Overweight:  Current BMI - 18    Body Mass Index (BMI) is your height compared to your weight.  Goal BMI to reduce risk of recurrent stroke is <25   BMI 25-27 diet/exercise to get to goal BMI.    BMI >27 patient should begin with goal of 10% weight loss  Work on this risk factor aggressively with your PCP             _X__Physical Inactivity: Counseled on initiating 30 minutes of moderate exercise most days, but at least three times per week. Moderate exercise can be walking, swimming, cycling.  Work up to goal by setting realistic goal and then increasing it weekly or  "monthly.    _?__Atrial Fibrillation: implanted loop monitor    ___Sleep Apnea: not diagnosed with sleep apnea.    If you snore and have episodes where you stop breathing, speak to your PCP about whether a pulse oximetry test or referral for a sleep study are needed.   Untreated sleep apnea is associated with recurrent stroke    _X__Alcohol or Drug use: Current user of alcohol.  Counseled to avoid binge drinking and men can have a maximum of 2 drinks daily and women 1 Drink maximum per day - preferably red wine;  Recreational Drug use:  Denies    ___Testosterone Use: Denies    _X__Other: recent surgery and infection                            Review of Systems   Constitutional: Negative.    Eyes: Negative.    Neurological: Negative.    Psychiatric/Behavioral: Negative.           Objective:     /64   Pulse 76   Temp 36.8 °C (98.2 °F)   Ht 1.702 m (5' 7\")   BMI 18.79 kg/m²      Physical Exam   Constitutional: He is oriented to person, place, and time. He appears well-developed and well-nourished.   HENT:   Head: Normocephalic.   Eyes: Conjunctivae and EOM are normal.   Pulmonary/Chest: Effort normal.   Musculoskeletal:   Left bka  Atrophy bilateral thighs and hamstrings   Neurological: He is alert and oriented to person, place, and time. No cranial nerve deficit or sensory deficit. Coordination normal.   Skin: Skin is warm and dry.   Psychiatric: Judgment normal.   Irritated with having to come to this doctor's visit   Vitals reviewed.              Assessment/Plan:     Ischemic stroke - mechanism likely embolic.  Continue aspirin and plavix at this time and continue to follow up with cardiology who may prescribe stronger blood thinner depending upon results of heart monitor.  Work with PCP on risk factors checked above to reduce risk of recurrent stroke.    Counseled on when to call 911 and if desired additional information on stroke was provided    Return to our office: not required at this time.  One time " appointment to review all your risk factors for stroke.

## 2018-04-09 NOTE — PATIENT INSTRUCTIONS
"  DX:  Stroke       Personal Risk factors associated with recurrent stroke:    Some risk factors for stroke are \"non-modifiable\" meaning we cannot change them.  Examples of these types of risk factors are:    *   Age - once we turn 55, stroke becomes more common.  It is associated with aging and in fact every decade over 55 our stroke risk doubles.  * Gender - Men have more strokes than women, although this gender difference is only slight  * Ethnicity and/or family history:  if your parents, grandparents, siblings or children have had stroke or if you have ancestors of , , or  descent, you may have inherited some genes pre-disposing your toward stroke    To reduce your risk of recurrent stroke, we want to focus on risk factors we can modify.  Any of the checked items below are your personal risk factors and you should work with your PCP (primary care provider) to get them to the goal (goals are in bold).   These risk factors are:     _X__Diabetes - goal HA1c is <7.0%: Current: 10.9.  Let us know if you wish to be referred to a diabetes specialist in our office.      _X__Hypertension - goal is <130 if you have diabetes.   Current: 116/64  Recommend taking bp once daily and taking log to PCP.    Continue current Blood pressure medication - avoid salt    _X__Hyperlipidemia - goal LDL is <70.  Current: 70.  Continue taking cholesterol medication as prescribed.  Modify diet to reduce cholesterol - less cheese, avoid egg yolks, reduce intake of pork, shrimp, and beef.    _X__Smoking: quit 4 months ago.  Once you are 5 years out from when you quit smoking, the history of smoking does not appear to be a significant risk factor for recurrent stroke.  If you are quitting, implement stress reduction with exercise such as daily walking, yoga, or meditation.  If you are unable to quit on your own, ask your PCP about chantix or referral for smoking cessation    ___Overweight:  Current BMI - " 18    Body Mass Index (BMI) is your height compared to your weight.  Goal BMI to reduce risk of recurrent stroke is <25   BMI 25-27 diet/exercise to get to goal BMI.    BMI >27 patient should begin with goal of 10% weight loss  Work on this risk factor aggressively with your PCP             _X__Physical Inactivity: Counseled on initiating 30 minutes of moderate exercise most days, but at least three times per week. Moderate exercise can be walking, swimming, cycling.  Work up to goal by setting realistic goal and then increasing it weekly or monthly.    _?__Atrial Fibrillation: implanted loop monitor    ___Sleep Apnea: not diagnosed with sleep apnea.    If you snore and have episodes where you stop breathing, speak to your PCP about whether a pulse oximetry test or referral for a sleep study are needed.   Untreated sleep apnea is associated with recurrent stroke    _X__Alcohol or Drug use: Current user of alcohol.  Counseled to avoid binge drinking and men can have a maximum of 2 drinks daily and women 1 Drink maximum per day - preferably red wine;  Recreational Drug use:  Denies    ___Testosterone Use: Denies    _X__Other: recent surgery and infection    Plan:    Ischemic stroke - mechanism likely embolic.  Continue aspirin and plavix at this time and continue to follow up with cardiology who may prescribe stronger blood thinner depending upon results of heart monitor.  Work with PCP on risk factors checked above to reduce risk of recurrent stroke.    Counseled on when to call 911 and if desired additional information on stroke was provided    Return to our office: not required at this time.  One time appointment to review all your risk factors for stroke.

## 2018-06-22 ENCOUNTER — TELEPHONE (OUTPATIENT)
Dept: CARDIOLOGY | Facility: MEDICAL CENTER | Age: 68
End: 2018-06-22

## 2018-06-22 NOTE — TELEPHONE ENCOUNTER
fyi:remote transmission  1-SB episode on 6- at 23:26 <6 seconds, rates ~39-76 bpm  To be scanned into media for review.  tyree de leon

## 2018-06-25 ENCOUNTER — TELEPHONE (OUTPATIENT)
Dept: CARDIOLOGY | Facility: MEDICAL CENTER | Age: 68
End: 2018-06-25

## 2018-06-25 NOTE — TELEPHONE ENCOUNTER
"Patient's device transmitted via home monitor-- had vinh/heart block episode 6/23 2:00 pm--unknown if patient was symptomatic with episode--wife states \"he has been out of it\" for a few days and was in emergency room last week due to de-hydration.  Report scanned and copy placed on desk for review.  "

## 2018-06-28 ENCOUNTER — TELEPHONE (OUTPATIENT)
Dept: CARDIOLOGY | Facility: MEDICAL CENTER | Age: 68
End: 2018-06-28

## 2018-06-28 DIAGNOSIS — I10 ESSENTIAL HYPERTENSION: Primary | ICD-10-CM

## 2018-06-28 RX ORDER — FUROSEMIDE 40 MG/1
TABLET ORAL
Qty: 30 TAB | Refills: 11 | Status: SHIPPED | OUTPATIENT
Start: 2018-06-28 | End: 2018-07-18

## 2018-06-28 NOTE — TELEPHONE ENCOUNTER
Called wife to give recommendation. Scheduled FV with Emelia to discuss, schedule PM implant.    Message   Received: Today   Message Contents   KHRIS Moss L.P.N.   Caller: Unspecified (3 days ago, 11:13 AM)             Patient with paroxysmal high grade AV block. No substantial slowing of AV node. If during waking hours here, I think will need PPM and ILR removal. Can schedule when I get back unless Tremaine can do sooner.     Dre

## 2018-06-28 NOTE — TELEPHONE ENCOUNTER
Spoke to patient wife (bryanna)   I will call [facility] to get the most recent labs

## 2018-07-02 ENCOUNTER — TELEPHONE (OUTPATIENT)
Dept: CARDIOLOGY | Facility: MEDICAL CENTER | Age: 68
End: 2018-07-02

## 2018-07-02 NOTE — TELEPHONE ENCOUNTER
Called pt. He said he was sleeping at the time of transmission. He has FV 7-5-18 with Emelia. He will call if he has any sx. In the interim.     Stephanie Smith   You; Casimiro Lora M.D. 47 minutes ago (12:45 PM)      Lidia,   Will you pls call pt and see if symptomatic on 6- at 22:27. Episode of what appears as Second degree Mobitz. Has appt 7-5-2018 with EA.   To be scanned into media for review.   tyree de leon (Routing comment)

## 2018-07-06 ENCOUNTER — TELEPHONE (OUTPATIENT)
Dept: CARDIOLOGY | Facility: MEDICAL CENTER | Age: 68
End: 2018-07-06

## 2018-07-06 NOTE — TELEPHONE ENCOUNTER
"Called pt. Denies symptoms at time of event. Wife states he had a \"really good day\" yesterday. Appt scheduled w/SM 7/9/18.  "

## 2018-07-06 NOTE — TELEPHONE ENCOUNTER
Patient's loop recorder transmitted via home monitor-- had 4 second pause with heart block 7/5 @ 10 am--was scheduled 7/5 with Emelia to discuss pacemaker per Dr. Perla, however appointment was cancelled.  At this time is scheduled with Miguel Angel 7/9 to discuss. Report scanned for review.

## 2018-07-09 ENCOUNTER — TELEPHONE (OUTPATIENT)
Dept: CARDIOLOGY | Facility: MEDICAL CENTER | Age: 68
End: 2018-07-09

## 2018-07-09 ENCOUNTER — HOSPITAL ENCOUNTER (OUTPATIENT)
Facility: MEDICAL CENTER | Age: 68
End: 2018-07-10
Attending: INTERNAL MEDICINE | Admitting: INTERNAL MEDICINE
Payer: MEDICARE

## 2018-07-09 ENCOUNTER — OFFICE VISIT (OUTPATIENT)
Dept: CARDIOLOGY | Facility: MEDICAL CENTER | Age: 68
End: 2018-07-09
Payer: MEDICARE

## 2018-07-09 ENCOUNTER — APPOINTMENT (OUTPATIENT)
Dept: RADIOLOGY | Facility: MEDICAL CENTER | Age: 68
End: 2018-07-09
Attending: INTERNAL MEDICINE
Payer: MEDICARE

## 2018-07-09 VITALS
WEIGHT: 120 LBS | DIASTOLIC BLOOD PRESSURE: 56 MMHG | SYSTOLIC BLOOD PRESSURE: 88 MMHG | OXYGEN SATURATION: 99 % | BODY MASS INDEX: 18.83 KG/M2 | HEIGHT: 67 IN | HEART RATE: 72 BPM

## 2018-07-09 DIAGNOSIS — I44.30 HEART BLOCK ATRIOVENTRICULAR: ICD-10-CM

## 2018-07-09 DIAGNOSIS — I95.89 OTHER SPECIFIED HYPOTENSION: ICD-10-CM

## 2018-07-09 DIAGNOSIS — R00.1 BRADYCARDIA: ICD-10-CM

## 2018-07-09 LAB
ALBUMIN SERPL BCP-MCNC: 3.6 G/DL (ref 3.2–4.9)
ALBUMIN/GLOB SERPL: 1.7 G/DL
ALP SERPL-CCNC: 79 U/L (ref 30–99)
ALT SERPL-CCNC: 28 U/L (ref 2–50)
ANION GAP SERPL CALC-SCNC: 5 MMOL/L (ref 0–11.9)
AST SERPL-CCNC: 28 U/L (ref 12–45)
BILIRUB SERPL-MCNC: 0.6 MG/DL (ref 0.1–1.5)
BUN SERPL-MCNC: 26 MG/DL (ref 8–22)
CALCIUM SERPL-MCNC: 9.2 MG/DL (ref 8.5–10.5)
CHLORIDE SERPL-SCNC: 110 MMOL/L (ref 96–112)
CO2 SERPL-SCNC: 24 MMOL/L (ref 20–33)
CREAT SERPL-MCNC: 0.95 MG/DL (ref 0.5–1.4)
ERYTHROCYTE [DISTWIDTH] IN BLOOD BY AUTOMATED COUNT: 49.4 FL (ref 35.9–50)
GLOBULIN SER CALC-MCNC: 2.1 G/DL (ref 1.9–3.5)
GLUCOSE BLD-MCNC: 272 MG/DL (ref 65–99)
GLUCOSE SERPL-MCNC: 219 MG/DL (ref 65–99)
HCT VFR BLD AUTO: 33.8 % (ref 42–52)
HGB BLD-MCNC: 11.6 G/DL (ref 14–18)
INR PPP: 1.11 (ref 0.87–1.13)
MCH RBC QN AUTO: 34 PG (ref 27–33)
MCHC RBC AUTO-ENTMCNC: 34.3 G/DL (ref 33.7–35.3)
MCV RBC AUTO: 99.1 FL (ref 81.4–97.8)
PLATELET # BLD AUTO: 165 K/UL (ref 164–446)
PMV BLD AUTO: 8.8 FL (ref 9–12.9)
POTASSIUM SERPL-SCNC: 4.3 MMOL/L (ref 3.6–5.5)
PROT SERPL-MCNC: 5.7 G/DL (ref 6–8.2)
PROTHROMBIN TIME: 14 SEC (ref 12–14.6)
RBC # BLD AUTO: 3.41 M/UL (ref 4.7–6.1)
SODIUM SERPL-SCNC: 139 MMOL/L (ref 135–145)
WBC # BLD AUTO: 7 K/UL (ref 4.8–10.8)

## 2018-07-09 PROCEDURE — 33284 HCHG CARDIAC LR REMOVAL: CPT

## 2018-07-09 PROCEDURE — 80053 COMPREHEN METABOLIC PANEL: CPT

## 2018-07-09 PROCEDURE — 71045 X-RAY EXAM CHEST 1 VIEW: CPT

## 2018-07-09 PROCEDURE — 99153 MOD SED SAME PHYS/QHP EA: CPT

## 2018-07-09 PROCEDURE — 33208 INSRT HEART PM ATRIAL & VENT: CPT

## 2018-07-09 PROCEDURE — 85610 PROTHROMBIN TIME: CPT

## 2018-07-09 PROCEDURE — 700111 HCHG RX REV CODE 636 W/ 250 OVERRIDE (IP)

## 2018-07-09 PROCEDURE — 85027 COMPLETE CBC AUTOMATED: CPT

## 2018-07-09 PROCEDURE — C1894 INTRO/SHEATH, NON-LASER: HCPCS

## 2018-07-09 PROCEDURE — 93000 ELECTROCARDIOGRAM COMPLETE: CPT | Performed by: NURSE PRACTITIONER

## 2018-07-09 PROCEDURE — 304853 HCHG PPM TEST CABLE

## 2018-07-09 PROCEDURE — 99214 OFFICE O/P EST MOD 30 MIN: CPT | Performed by: NURSE PRACTITIONER

## 2018-07-09 PROCEDURE — 99152 MOD SED SAME PHYS/QHP 5/>YRS: CPT

## 2018-07-09 PROCEDURE — C1785 PMKR, DUAL, RATE-RESP: HCPCS

## 2018-07-09 PROCEDURE — 304952 HCHG R 2 PADS

## 2018-07-09 PROCEDURE — 700102 HCHG RX REV CODE 250 W/ 637 OVERRIDE(OP): Performed by: INTERNAL MEDICINE

## 2018-07-09 PROCEDURE — C1892 INTRO/SHEATH,FIXED,PEEL-AWAY: HCPCS

## 2018-07-09 PROCEDURE — 160002 HCHG RECOVERY MINUTES (STAT)

## 2018-07-09 PROCEDURE — 305387 HCHG SUTURES

## 2018-07-09 PROCEDURE — C1898 LEAD, PMKR, OTHER THAN TRANS: HCPCS

## 2018-07-09 PROCEDURE — A9270 NON-COVERED ITEM OR SERVICE: HCPCS | Performed by: INTERNAL MEDICINE

## 2018-07-09 PROCEDURE — G0378 HOSPITAL OBSERVATION PER HR: HCPCS

## 2018-07-09 PROCEDURE — 82962 GLUCOSE BLOOD TEST: CPT

## 2018-07-09 PROCEDURE — 700117 HCHG RX CONTRAST REV CODE 255: Performed by: INTERNAL MEDICINE

## 2018-07-09 RX ORDER — CLOPIDOGREL BISULFATE 75 MG/1
75 TABLET ORAL DAILY
Status: DISCONTINUED | OUTPATIENT
Start: 2018-07-10 | End: 2018-07-10 | Stop reason: HOSPADM

## 2018-07-09 RX ORDER — TAMSULOSIN HYDROCHLORIDE 0.4 MG/1
0.4 CAPSULE ORAL
Status: DISCONTINUED | OUTPATIENT
Start: 2018-07-09 | End: 2018-07-10 | Stop reason: HOSPADM

## 2018-07-09 RX ORDER — RAMIPRIL 5 MG/1
10 CAPSULE ORAL DAILY
Status: DISCONTINUED | OUTPATIENT
Start: 2018-07-10 | End: 2018-07-10 | Stop reason: HOSPADM

## 2018-07-09 RX ORDER — BUPIVACAINE HYDROCHLORIDE 2.5 MG/ML
INJECTION, SOLUTION EPIDURAL; INFILTRATION; INTRACAUDAL
Status: COMPLETED
Start: 2018-07-09 | End: 2018-07-09

## 2018-07-09 RX ORDER — ATORVASTATIN CALCIUM 40 MG/1
40 TABLET, FILM COATED ORAL EVERY EVENING
Status: DISCONTINUED | OUTPATIENT
Start: 2018-07-09 | End: 2018-07-10 | Stop reason: HOSPADM

## 2018-07-09 RX ORDER — OXYCODONE HYDROCHLORIDE 5 MG/1
2.5 TABLET ORAL
Status: DISCONTINUED | OUTPATIENT
Start: 2018-07-09 | End: 2018-07-10 | Stop reason: HOSPADM

## 2018-07-09 RX ORDER — MIDAZOLAM HYDROCHLORIDE 1 MG/ML
INJECTION INTRAMUSCULAR; INTRAVENOUS
Status: COMPLETED
Start: 2018-07-09 | End: 2018-07-09

## 2018-07-09 RX ORDER — CEFAZOLIN SODIUM 1 G/3ML
INJECTION, POWDER, FOR SOLUTION INTRAMUSCULAR; INTRAVENOUS
Status: COMPLETED
Start: 2018-07-09 | End: 2018-07-09

## 2018-07-09 RX ORDER — INSULIN GLARGINE 100 [IU]/ML
24 INJECTION, SOLUTION SUBCUTANEOUS NIGHTLY
Status: DISCONTINUED | OUTPATIENT
Start: 2018-07-09 | End: 2018-07-10 | Stop reason: HOSPADM

## 2018-07-09 RX ADMIN — INSULIN GLARGINE 24 UNITS: 100 INJECTION, SOLUTION SUBCUTANEOUS at 21:28

## 2018-07-09 RX ADMIN — ATORVASTATIN CALCIUM 40 MG: 40 TABLET, FILM COATED ORAL at 18:29

## 2018-07-09 RX ADMIN — TAMSULOSIN HYDROCHLORIDE 0.4 MG: 0.4 CAPSULE ORAL at 18:30

## 2018-07-09 RX ADMIN — METOPROLOL TARTRATE 25 MG: 25 TABLET, FILM COATED ORAL at 18:29

## 2018-07-09 RX ADMIN — LIDOCAINE HYDROCHLORIDE 100 MG: 20 INJECTION, SOLUTION INTRAVENOUS at 15:43

## 2018-07-09 RX ADMIN — IOHEXOL 10 ML: 350 INJECTION, SOLUTION INTRAVENOUS at 15:36

## 2018-07-09 RX ADMIN — CEFAZOLIN 3000 MG: 330 INJECTION, POWDER, FOR SOLUTION INTRAMUSCULAR; INTRAVENOUS at 15:33

## 2018-07-09 RX ADMIN — MIDAZOLAM HYDROCHLORIDE 2 MG: 1 INJECTION, SOLUTION INTRAMUSCULAR; INTRAVENOUS at 15:34

## 2018-07-09 RX ADMIN — ASPIRIN 81 MG: 81 TABLET, COATED ORAL at 18:30

## 2018-07-09 RX ADMIN — LIDOCAINE HYDROCHLORIDE 200 MG: 20 INJECTION, SOLUTION INTRAVENOUS at 14:15

## 2018-07-09 RX ADMIN — BUPIVACAINE HYDROCHLORIDE: 2.5 INJECTION, SOLUTION EPIDURAL; INFILTRATION; INTRACAUDAL; PERINEURAL at 15:33

## 2018-07-09 RX ADMIN — MIDAZOLAM HYDROCHLORIDE 1 MG: 1 INJECTION, SOLUTION INTRAMUSCULAR; INTRAVENOUS at 15:34

## 2018-07-09 RX ADMIN — FENTANYL CITRATE 100 MCG: 50 INJECTION, SOLUTION INTRAMUSCULAR; INTRAVENOUS at 15:34

## 2018-07-09 ASSESSMENT — COGNITIVE AND FUNCTIONAL STATUS - GENERAL
DAILY ACTIVITIY SCORE: 19
MOVING FROM LYING ON BACK TO SITTING ON SIDE OF FLAT BED: A LITTLE
CLIMB 3 TO 5 STEPS WITH RAILING: A LITTLE
TURNING FROM BACK TO SIDE WHILE IN FLAT BAD: A LITTLE
SUGGESTED CMS G CODE MODIFIER DAILY ACTIVITY: CK
MOBILITY SCORE: 18
SUGGESTED CMS G CODE MODIFIER MOBILITY: CK
WALKING IN HOSPITAL ROOM: A LITTLE
DRESSING REGULAR LOWER BODY CLOTHING: A LITTLE
MOVING TO AND FROM BED TO CHAIR: A LITTLE
PERSONAL GROOMING: A LITTLE
TOILETING: A LITTLE
DRESSING REGULAR UPPER BODY CLOTHING: A LITTLE
HELP NEEDED FOR BATHING: A LITTLE
STANDING UP FROM CHAIR USING ARMS: A LITTLE

## 2018-07-09 ASSESSMENT — PAIN SCALES - GENERAL
PAINLEVEL_OUTOF10: 0

## 2018-07-09 ASSESSMENT — PATIENT HEALTH QUESTIONNAIRE - PHQ9
5. POOR APPETITE OR OVEREATING: NOT AT ALL
6. FEELING BAD ABOUT YOURSELF - OR THAT YOU ARE A FAILURE OR HAVE LET YOURSELF OR YOUR FAMILY DOWN: NOT AL ALL
SUM OF ALL RESPONSES TO PHQ9 QUESTIONS 1 AND 2: 1
SUM OF ALL RESPONSES TO PHQ QUESTIONS 1-9: 5
1. LITTLE INTEREST OR PLEASURE IN DOING THINGS: NOT AT ALL
9. THOUGHTS THAT YOU WOULD BE BETTER OFF DEAD, OR OF HURTING YOURSELF: NOT AT ALL
8. MOVING OR SPEAKING SO SLOWLY THAT OTHER PEOPLE COULD HAVE NOTICED. OR THE OPPOSITE, BEING SO FIGETY OR RESTLESS THAT YOU HAVE BEEN MOVING AROUND A LOT MORE THAN USUAL: NOT AT ALL
2. FEELING DOWN, DEPRESSED, IRRITABLE, OR HOPELESS: SEVERAL DAYS
4. FEELING TIRED OR HAVING LITTLE ENERGY: SEVERAL DAYS
7. TROUBLE CONCENTRATING ON THINGS, SUCH AS READING THE NEWSPAPER OR WATCHING TELEVISION: NOT AT ALL
3. TROUBLE FALLING OR STAYING ASLEEP OR SLEEPING TOO MUCH: NEARLY EVERY DAY

## 2018-07-09 ASSESSMENT — COPD QUESTIONNAIRES
IN THE PAST 12 MONTHS DO YOU DO LESS THAN YOU USED TO BECAUSE OF YOUR BREATHING PROBLEMS: AGREE
DURING THE PAST 4 WEEKS HOW MUCH DID YOU FEEL SHORT OF BREATH: NONE/LITTLE OF THE TIME
DO YOU EVER COUGH UP ANY MUCUS OR PHLEGM?: NO/ONLY WITH OCCASIONAL COLDS OR INFECTIONS
HAVE YOU SMOKED AT LEAST 100 CIGARETTES IN YOUR ENTIRE LIFE: YES
COPD SCREENING SCORE: 5

## 2018-07-09 ASSESSMENT — ENCOUNTER SYMPTOMS
COUGH: 0
CLAUDICATION: 0
PALPITATIONS: 0
MYALGIAS: 0
PND: 0
ABDOMINAL PAIN: 0
SHORTNESS OF BREATH: 0
ORTHOPNEA: 0
DIZZINESS: 1
WEAKNESS: 0

## 2018-07-09 ASSESSMENT — LIFESTYLE VARIABLES
EVER_SMOKED: YES
ALCOHOL_USE: NO

## 2018-07-09 NOTE — OR NURSING
1603 patient arrived from cath lab s/p Permanent Pacemaker Implantation and Loop recorder removal, pm left chest dressing clean dry intact, patient awake, vss, no c/o cp, s.o.b, plan of care discussed with patient agreeable.  1630 patient tolerating oral fluids and snacks.  1716 report given to Claudia CALERO T824 bed 2 all questions answered.  1720 patient having dinner  1755 patient transported to room T824 bed 2 with all his personal belongings  1800 checked surgical site at the bed side with RN lorren no bleeding, patient not in any distress.

## 2018-07-09 NOTE — TELEPHONE ENCOUNTER
Patient scheduled for PM insert w/loop removal at [facility] with Dr. Perla today 7-9-18.

## 2018-07-09 NOTE — PROGRESS NOTES
Chief Complaint   Patient presents with   • Coronary Artery Disease     follow up       Subjective:   Jarret Bright is a 67 y.o. male who presents today with his wife, Dolly, to follow-up on abnormal reading of his loop recorder.    He was found to have a 4 second pause on his loop recorder on July 6, 2018.  He had had previous episodes of heart block on the loop recorder as well.    The patient complains of feeling fatigued and some lightheadedness with position change.  He is not very active secondary to a left BKA.    He denies chest tightness, heaviness or pressure.  No palpitations.  No near syncope.    Past Medical History:   Diagnosis Date   • CAD (coronary artery disease)    • Diabetes (HCC)     insulin dependent.   • Hypertension      Past Surgical History:   Procedure Laterality Date   • CARDIAC CATH  03/23/2018    3 vessel disease, needs CABG when stable from other problems.     Family History   Problem Relation Age of Onset   • Heart Attack Mother 60     Social History     Social History   • Marital status:      Spouse name: N/A   • Number of children: N/A   • Years of education: N/A     Occupational History   • Not on file.     Social History Main Topics   • Smoking status: Former Smoker     Years: 30.00     Types: Cigarettes, Cigars     Quit date: 1/1/2018   • Smokeless tobacco: Never Used   • Alcohol use No   • Drug use: No   • Sexual activity: Not on file     Other Topics Concern   • Not on file     Social History Narrative   • No narrative on file     Allergies   Allergen Reactions   • Tea Tree Oil Rash     Unspecified     Outpatient Encounter Prescriptions as of 7/9/2018   Medication Sig Dispense Refill   • clopidogrel (PLAVIX) 75 MG Tab Take 1 Tab by mouth every day. 30 Tab 11   • metoprolol (LOPRESSOR) 25 MG Tab Take 1 Tab by mouth 2 times a day. 60 Tab 11   • atorvastatin (LIPITOR) 40 MG Tab Take 1 Tab by mouth every evening. 30 Tab 11   • ramipril (ALTACE) 10 MG capsule Take 1 Cap by  "mouth every day. 30 Cap 11   • tamsulosin (FLOMAX) 0.4 MG capsule Take 1 Cap by mouth ONE-HALF HOUR AFTER BREAKFAST. 30 Cap 1   • aspirin EC (ECOTRIN) 81 MG Tablet Delayed Response Take 81 mg by mouth every bedtime.     • insulin glargine (LANTUS) 100 UNIT/ML Solution Inject 24 Units as instructed every evening.     • furosemide (LASIX) 40 MG Tab TAKE 1 TABLET BY MOUTH ONCE DAILY AS NEEDED FOR SWELLING. 30 Tab 11     No facility-administered encounter medications on file as of 7/9/2018.      Review of Systems   Constitutional: Positive for malaise/fatigue.   Respiratory: Negative for cough and shortness of breath.    Cardiovascular: Negative for chest pain, palpitations, orthopnea, claudication, leg swelling and PND.   Gastrointestinal: Negative for abdominal pain.   Musculoskeletal: Negative for myalgias.   Neurological: Positive for dizziness (lightheaded.). Negative for weakness.        Objective:   BP (!) 88/56   Pulse 72   Ht 1.702 m (5' 7\")   SpO2 99%     Physical Exam   Constitutional: He is oriented to person, place, and time. He appears well-developed and well-nourished.   Thin male seated in wheelchair in no acute distress.   HENT:   Head: Normocephalic.   Eyes: EOM are normal.   Neck: Normal range of motion. No JVD present.   Cardiovascular: Normal rate and regular rhythm.  Exam reveals no friction rub.    Murmur heard.   Systolic murmur is present with a grade of 2/6   Pulmonary/Chest: Effort normal. He has decreased breath sounds (throughout but clear.).   Abdominal: Soft. Bowel sounds are normal.   Musculoskeletal: He exhibits no edema.   Left BKA with dressing intact.   Neurological: He is alert and oriented to person, place, and time.   Skin: Skin is warm and dry.   Psychiatric: He has a normal mood and affect.     Today: EKG is normal sinus rhythm heart rate of 68.  First-degree AV block.    Loop recorder readings: Patient has second or third-degree heart block on June 25, 2018, and July 5, " 2018.    Assessment:     1. Bradycardia  [facility] EKG (Clinic Performed)   2. Heart block atrioventricular      7/5/18:  Pt has 4 sec pause on loop recorder.   3. Insulin dependent diabetes mellitus (HCC)     4. Other specified hypotension         Medical Decision Making:  Today's Assessment / Status / Plan:     Heart block: Patient has some third-degree and second-degree heart block on his loop recorder.  Fortunately he has not had any syncopal episodes.  I consulted with Dr. Wilda Jamison she and I agree that the patient needs a pacemaker.  He and his wife are agreeable to undergo the procedure.    The patient is from [facility] and is anxious to have the procedure done.  We are able to get him scheduled today.  He has not had any food and only took his medications with water.  He will be scheduled for permanent pacemaker insertion.    Hypertension: The patient's blood pressure is very low.  It may be that he took his blood pressure medication this morning.  Possibly he is dehydrated.    Patient will be admitted to the hospital and undergo pacemaker insertion.  Follow-up will be determined at discharge.    Collaborating Provider: Dr Wilda Jamison.

## 2018-07-09 NOTE — OP REPORT
[facility] ELECTROPHYSIOLOGY PROCEDURE NOTE    PROCEDURE PERFORMED:   1) Permanent Pacemaker Implantation  2) ILR removal    DATE OF SERVICE: 7/9/2018    : Dre Perla MD    ASSISTANT: None    ANESTHESIA: Local and moderate sedation given at my direction (start time 1500, stop time 1548, total dose 3 mg IV versed, 100 mcg IV fentanyl)    EBL: 30 cc    SPECIMENS: None    INDICATION(S): Mobitz II AV block    DESCRIPTION OF PROCEDURE:  After informed written consent, the patient was brought to the electrophysiology lab in the fasting, unsedated state. The patient was prepped and draped in the usual sterile fashion. The procedure was performed under moderate sedation with local anesthetic. A left upper extremity venogram was performed, demonstrating a patent subclavian vein. A left infraclavicular incision was made with a scalpel and the pectoral device pocket was created using a combination of blunt dissection and electrocautery. The modified Seldinger technique was used to gain access to the left axillary vein. A peel-away hemostasis sheath was placed in the vein. Under fluoroscopic guidance, the pacemaker leads were introduced into the heart. The ventricular lead was advanced to the RVOT and then lowered into position at the RV apex. The atrial lead was positioned on R atrial appendage. The leads were tested and had satisfactory sensing and pacing parameters. High output ventricular pacing did not produce extracardiac stimulation. The leads were sutured to the underlying pectoral muscle with interrupted silk over a silastic suture sleeve. The device pocket was irrigated with antibiotic solution, inspected, and no bleeding was seen. The leads were connected to the pacemaker pulse generator and the device was inserted into the pocket. The wound was closed with three layers of absorbable sutures. Following recovery from sedation, the patient was transferred to a monitored bed in good condition.     The area  around the original loop incision was anesthetized with lidocaine and small incision was made and the device removed with a pair of mosquito clamps.    IMPLANTED DEVICE INFORMATION:  Pulse generator is a MDT model A2DR01  Serial # [id number]    EXPLANTED DEVICE INFORMATION:  MDT model #LNQ11  Serial #[id number]    LEAD INFORMATION:  1)Right atrial lead is a MDT model #5076-52 , serial #[id number], P wave 2.0 millivolts, threshold 0.7 Volts at 0.5 milliseconds, pacing impedance 765 Ohms.    2)Right ventricular lead is a MDT model #5076-58 , serial #[id number] ,R wave 13.4 millivolts, threshold 0.3 Volts at 0.5 milliseconds, pacing impedance 988 Ohms.    DEVICE PROGRAMMING:  DDDR 60 -130 ppm    FLUOROSCOPY TIME: 2:42    COMPLICATION(S): None    IMPRESSIONS:  1. Successful dual chamber pacemaker implantation and loop recorder removal    RECOMMENDATIONS:  1. Transfer to monitored bed  2. Chest x-ray  3. Device interrogation prior to hospital discharge  4. Followup in device clinic

## 2018-07-10 ENCOUNTER — APPOINTMENT (OUTPATIENT)
Dept: RADIOLOGY | Facility: MEDICAL CENTER | Age: 68
End: 2018-07-10
Attending: INTERNAL MEDICINE
Payer: MEDICARE

## 2018-07-10 VITALS
SYSTOLIC BLOOD PRESSURE: 152 MMHG | RESPIRATION RATE: 12 BRPM | HEART RATE: 68 BPM | TEMPERATURE: 98.5 F | WEIGHT: 134.7 LBS | OXYGEN SATURATION: 97 % | DIASTOLIC BLOOD PRESSURE: 80 MMHG | HEIGHT: 67 IN | BODY MASS INDEX: 21.14 KG/M2

## 2018-07-10 LAB
EKG IMPRESSION: NORMAL
EKG IMPRESSION: NORMAL

## 2018-07-10 PROCEDURE — 93010 ELECTROCARDIOGRAM REPORT: CPT | Performed by: INTERNAL MEDICINE

## 2018-07-10 PROCEDURE — A9270 NON-COVERED ITEM OR SERVICE: HCPCS | Performed by: INTERNAL MEDICINE

## 2018-07-10 PROCEDURE — 71045 X-RAY EXAM CHEST 1 VIEW: CPT

## 2018-07-10 PROCEDURE — G0378 HOSPITAL OBSERVATION PER HR: HCPCS

## 2018-07-10 PROCEDURE — 700102 HCHG RX REV CODE 250 W/ 637 OVERRIDE(OP): Performed by: INTERNAL MEDICINE

## 2018-07-10 PROCEDURE — 93005 ELECTROCARDIOGRAM TRACING: CPT | Performed by: INTERNAL MEDICINE

## 2018-07-10 RX ADMIN — CLOPIDOGREL 75 MG: 75 TABLET, FILM COATED ORAL at 05:05

## 2018-07-10 RX ADMIN — OXYCODONE HYDROCHLORIDE 2.5 MG: 5 TABLET ORAL at 07:59

## 2018-07-10 RX ADMIN — TAMSULOSIN HYDROCHLORIDE 0.4 MG: 0.4 CAPSULE ORAL at 05:05

## 2018-07-10 RX ADMIN — OXYCODONE HYDROCHLORIDE 2.5 MG: 5 TABLET ORAL at 03:39

## 2018-07-10 RX ADMIN — RAMIPRIL 10 MG: 5 CAPSULE ORAL at 05:05

## 2018-07-10 RX ADMIN — METOPROLOL TARTRATE 25 MG: 25 TABLET, FILM COATED ORAL at 05:05

## 2018-07-10 ASSESSMENT — PAIN SCALES - GENERAL
PAINLEVEL_OUTOF10: 8
PAINLEVEL_OUTOF10: 0

## 2018-07-10 NOTE — PROGRESS NOTES
Patient discharged from hospital with spouse. Patient removed from monitor and monitor room notified. Patient left floor in wheelchair with spouse pushing/did not want staff escort out per patient and spouse. Patient left with all belongings. Discharge instructions given to patient and spouse and they discussed understanding of the information.

## 2018-07-10 NOTE — PROGRESS NOTES
2 RN Skin assessment done with [facility] [place]:  Pt has small open scab to upper back, dried scab to left ankle, coccyx pink but blanching, pt has hx of BKA left leg-dusky at base of stump but no open area. Left chest pacer site with dsg CDI.

## 2018-07-10 NOTE — PROGRESS NOTES
Received report from night shift RN Scar. Assumed care of patient. Patient SB on the monitor at this time. Patient is resting in bed with no family present at bedside at this time. Patient declines any needs at this time. Plan of care discussed with patient. Bed locked and in the lowest position with call light within reach.

## 2018-07-10 NOTE — DISCHARGE INSTRUCTIONS
No lifting over 5 # with the left arm. No above the head activities with the left arm. Immobilizer at night. Follow[facility]up [facility] in 1 week.   Report any swelling or redness at the site.   Please instruct patient to monitor his BP at home and if it continues to be at 150 mmHg systolically he should call for medication adjustment.    Discharge Instructions    Discharged to home by car with relative. Discharged via wheelchair, hospital escort: Refused.  Special equipment needed: Not Applicable    Be sure to schedule a follow-up appointment with your primary care doctor or any specialists as instructed.     Discharge Plan:   Diet Plan: Discussed  Activity Level: Discussed  Confirmed Follow up Appointment: Appointment Scheduled  Confirmed Symptoms Management: Discussed  Medication Reconciliation Updated: Yes  Influenza Vaccine Indication: Not indicated: Previously immunized this influenza season and > 8 years of age    I understand that a diet low in cholesterol, fat, and sodium is recommended for good health. Unless I have been given specific instructions below for another diet, I accept this instruction as my diet prescription.   Other diet: Regular    · Is patient discharged on Warfarin / Coumadin?   No       Pacemaker Implantation, Adult  Pacemaker implantation is a procedure to place a pacemaker inside your chest. A pacemaker is a small computer that sends electrical signals to the heart and helps your heart beat normally. A pacemaker also stores information about your heart rhythms. You may need pacemaker implantation if you:  · Have a slow heartbeat (bradycardia).  · Faint (syncope).  · Have shortness of breath (dyspnea) due to heart problems.  The pacemaker attaches to your heart through a wire, called a lead. Sometimes just one lead is needed. Other times, there will be two leads. There are two types of pacemakers:  · Transvenous pacemaker. This type is placed under the skin or muscle of your chest. The  lead goes through a vein in the chest area to reach the inside of the heart.  · Epicardial pacemaker. This type is placed under the skin or muscle of your chest or belly. The lead goes through your chest to the outside of the heart.  Tell a health care provider about:  · Any allergies you have.  · All medicines you are taking, including vitamins, herbs, eye drops, creams, and over-the-counter medicines.  · Any problems you or family members have had with anesthetic medicines.  · Any blood or bone disorders you have.  · Any surgeries you have had.  · Any medical conditions you have.  · Whether you are pregnant or may be pregnant.  What are the risks?  Generally, this is a safe procedure. However, problems may occur, including:  · Infection.  · Bleeding.  · Failure of the pacemaker or the lead.  · Collapse of a lung or bleeding into a lung.  · Blood clot inside a blood vessel with a lead.  · Damage to the heart.  · Infection inside the heart (endocarditis).  · Allergic reactions to medicines.  What happens before the procedure?  Staying hydrated   Follow instructions from your health care provider about hydration, which may include:  · Up to 2 hours before the procedure - you may continue to drink clear liquids, such as water, clear fruit juice, black coffee, and plain tea.  Eating and drinking restrictions   Follow instructions from your health care provider about eating and drinking, which may include:  · 8 hours before the procedure - stop eating heavy meals or foods such as meat, fried foods, or fatty foods.  · 6 hours before the procedure - stop eating light meals or foods, such as toast or cereal.  · 6 hours before the procedure - stop drinking milk or drinks that contain milk.  · 2 hours before the procedure - stop drinking clear liquids.  Medicines  · Ask your health care provider about:  ¨ Changing or stopping your regular medicines. This is especially important if you are taking diabetes medicines or blood  thinners.  ¨ Taking medicines such as aspirin and ibuprofen. These medicines can thin your blood. Do not take these medicines before your procedure if your health care provider instructs you not to.  · You may be given antibiotic medicine to help prevent infection.  General instructions  · You will have a heart evaluation. This may include an electrocardiogram (ECG), chest X-ray, and heart imaging (echocardiogram,  or echo) tests.  · You will have blood tests.  · Do not use any products that contain nicotine or tobacco, such as cigarettes and e-cigarettes. If you need help quitting, ask your health care provider.  · Plan to have someone take you home from the hospital or clinic.  · If you will be going home right after the procedure, plan to have someone with you for 24 hours.  · Ask your health care provider how your surgical site will be marked or identified.  What happens during the procedure?  · To reduce your risk of infection:  ¨ Your health care team will wash or sanitize their hands.  ¨ Your skin will be washed with soap.  ¨ Hair may be removed from the surgical area.  · An IV tube will be inserted into one of your veins.  · You will be given one or more of the following:  ¨ A medicine to help you relax (sedative).  ¨ A medicine to numb the area (local anesthetic).  ¨ A medicine to make you fall asleep (general anesthetic).  · If you are getting a transvenous pacemaker:  ¨ An incision will be made in your upper chest.  ¨ A pocket will be made for the pacemaker. It may be placed under the skin or between layers of muscle.  ¨ The lead will be inserted into a blood vessel that returns to the heart.  ¨ While X-rays are taken by an imaging machine (fluoroscopy), the lead will be advanced through the vein to the inside of your heart.  ¨ The other end of the lead will be tunneled under the skin and attached to the pacemaker.  · If you are getting an epicardial pacemaker:  ¨ An incision will be made near your ribs  or breastbone (sternum) for the lead.  ¨ The lead will be attached to the outside of your heart.  ¨ Another incision will be made in your chest or upper belly to create a pocket for the pacemaker.  ¨ The free end of the lead will be tunneled under the skin and attached to the pacemaker.  · The transvenous or epicardial pacemaker will be tested. Imaging studies may be done to check the lead position.  · The incisions will be closed with stitches (sutures), adhesive strips, or skin glue.  · Bandages (dressing) will be placed over the incisions.  The procedure may vary among health care providers and hospitals.  What happens after the procedure?  · Your blood pressure, heart rate, breathing rate, and blood oxygen level will be monitored until the medicines you were given have worn off.  · You will be given antibiotics and pain medicine.  · ECG and chest x-rays will be done.  · You will wear a continuous type of ECG (Holter monitor) to check your heart rhythm.  · Your health care provider will program the pacemaker.  · Do not drive for 24 hours if you received a sedative.  This information is not intended to replace advice given to you by your health care provider. Make sure you discuss any questions you have with your health care provider.  Document Released: 12/08/2003 Document Revised: 07/07/2017 Document Reviewed: 05/31/2017  [employer] Interactive Patient Education © 2017 [employer]        Pacemaker Implantation, Care After  Refer to this sheet over the next few weeks. These instructions provide you with information on caring for yourself after the procedure. Your health care provider may also give you more specific instructions. Your treatment has been planned according to current medical practices, but problems sometimes occur. Call your health care provider if you have any problems or questions regarding your pacemaker.   WHAT TO EXPECT AFTER THE PROCEDURE  You may feel pain. Some pain is normal. It may last a  few days.  A slight bump may be seen over the skin where the device was placed. Sometimes, it is possible to feel the device under the skin. This is normal.  In the months and years afterward, your health care provider will check the device, the leads, and the battery every few months. Eventually, when the battery is low, the device will be replaced.  HOME CARE INSTRUCTIONS  Medicines  Take medicines only as directed by your health care provider.  If you were prescribed an antibiotic medicine, finish it all even if you start to feel better.  Do not take any other medicines without asking your health care provider first. Some medicines, including certain painkillers, can cause bleeding in your stomach after surgery.  Wound Care  Do not remove the bandage on your chest until directed to do so by your health care provider.  After your bandage is removed, you may see pieces of tape called skin adhesive strips over the area where the cut was made (incision site). Let them fall off on their own.  Check the incision site every day to make sure it is not infected, bleeding, or starting to pull apart.  Do not use lotions or ointments near the incision site unless directed to do so.  Keep the incision area clean and dry for 2-3 days after the procedure or as directed by your health care provider. It takes several weeks for the incision site to completely heal.  Do not take baths, swim, or use a hot tub until your health care provider approves.  Activities  Try to walk a little every day. Exercising is important after this procedure. It is also important to use your shoulder on the side of the pacemaker in daily tasks that do not require exaggerated motion.  Avoid sudden jerking, pulling, or chopping movements that pull your upper arm far away from your body for at least 6 weeks.  Do not lift your upper arm above your shoulders for at least 6 weeks. This means no tennis, golf, or swimming for this period of time. If you sleep  with the arm above your head, use a restraint to prevent this from happening as you sleep.  You may go back to work when your health care provider says it is okay. Check with your health care provider before you start to drive or play sports.  Other Instructions  Follow diet instructions if they were provided. You should be able to eat what you usually do right away, but you may need to limit your salt intake.  Weigh yourself every day. If you suddenly gain weight, fluid may be building up in your body.  Always carry your pacemaker identification card with you. The card should list the implant date, device model, and . Consider wearing a medical alert bracelet or necklace.  Tell all health care providers that you have a pacemaker. This may prevent them from giving you a magnetic resource imaging scan (MRI) because of the strong magnets used during that test.  If you must pass through a metal detector, quickly walk through it. Do not stop under the detector or stand near it.  Avoid places or objects with a strong electric or magnetic field, including:  Airport security romero. When at the airport, let officials know you have a pacemaker. Your ID card will let you be checked in a way that is safe for you and that will not damage your pacemaker. Also, do not let a security person wave a magnetic wand near your pacemaker. That can make it stop working.  Power plants.  Large electrical generators.  Radiofrequency transmission towers, such as cell phone and radio towers.  Do not use amateur (ham) radio equipment or electric (arc) welding torches. Some devices are safe to use if held at least 1 foot from your pacemaker. These include power tools, lawn mowers, and speakers. If you are unsure of whether something is safe to use, ask your health care provider.  You may safely use electric blankets, heating pads, computers, and microwave ovens.  When using your cell phone, hold it to the ear opposite the pacemaker.  Do not leave your cell phone in a pocket over the pacemaker.  Keep all follow-up visits as directed by your health care provider. This is how your health care provider makes sure your chest is healing the way it should. Ask your health care provider when you should come back to have your stitches or staples taken out.  Have your pacemaker checked every 3-6 months or as directed by your health care provider. Most pacemakers last for 4-8 years before a new one is needed.  SEEK MEDICAL CARE IF:  You gain weight suddenly.  Your legs or feet swell more than they have before.  It feels like your heart is fluttering or skipping beats (heart palpitations).  You have a fever.  SEEK IMMEDIATE MEDICAL CARE IF:  You have chest pain.  You feel more short of breath than you have felt before.  You feel more light-headed than you have felt before.  You have problems with your incision site, such as swelling or bleeding, or it starts to open up.  You have drainage, redness, swelling, or pain at your incision site.     This information is not intended to replace advice given to you by your health care provider. Make sure you discuss any questions you have with your health care provider.     Document Released: 07/07/2006 Document Revised: 01/08/2016 Document Reviewed: 04/19/2013  [employer] Interactive Patient Education ©2016 [employer]      Depression / Suicide Risk    As you are discharged from this [facility] facility, it is important to learn how to keep safe from harming yourself.    Recognize the warning signs:  · Abrupt changes in personality, positive or negative- including increase in energy   · Giving away possessions  · Change in eating patterns- significant weight changes-  positive or negative  · Change in sleeping patterns- unable to sleep or sleeping all the time   · Unwillingness or inability to communicate  · Depression  · Unusual sadness, discouragement and loneliness  · Talk of wanting to die  · Neglect of  personal appearance   · Rebelliousness- reckless behavior  · Withdrawal from people/activities they love  · Confusion- inability to concentrate     If you or a loved one observes any of these behaviors or has concerns about self-harm, here's what you can do:  · Talk about it- your feelings and reasons for harming yourself  · Remove any means that you might use to hurt yourself (examples: pills, rope, extension cords, firearm)  · Get professional help from the community (Mental Health, Substance Abuse, psychological counseling)  · Do not be alone:Call your Safe Contact- someone whom you trust who will be there for you.  · Call your local CRISIS HOTLINE 098-3424 or 113-969-5064  · Call your local Children's Mobile Crisis Response Team [place] (242) 330-7986 or www.[employer]  · Call the toll free National Suicide Prevention Hotlines   · National Suicide Prevention Lifeline 140-039-OYUX (5900)  · National Hope Line Network 800-SUICIDE (043-8655)

## 2018-07-10 NOTE — CARE PLAN
Problem: Communication  Goal: The ability to communicate needs accurately and effectively will improve  Outcome: PROGRESSING AS EXPECTED  Patient verbalizes to call out if needing assistance    Problem: Safety  Goal: Will remain free from falls  Outcome: PROGRESSING AS EXPECTED  Has not fallen this hospital stay

## 2018-07-10 NOTE — PROGRESS NOTES
Pt arrived to room 824-2, transferred to bed via slideboard, VSS, denies pain, A+Ox4, dsg to left chest CDI, sling in place to LUE, oriented to call light and in reach.

## 2018-07-10 NOTE — RESPIRATORY CARE
COPD EDUCATION by COPD CLINICAL EDUCATOR  7/10/2018 at 6:01 AM by Sharee Peck     Patient reviewed by COPD education team. Patient does not qualify for COPD program.

## 2018-07-10 NOTE — CARE PLAN
Problem: Safety  Goal: Will remain free from injury  Outcome: PROGRESSING AS EXPECTED  Discussed with the patient the importance of calling for assistance prior to getting out of bed in order to help prevent falls. Patient accepting of the information. All questions answered at this time.     Problem: Mobility  Goal: Risk for activity intolerance will decrease  Outcome: PROGRESSING AS EXPECTED  Discussed with the patient the importance of getting out of bed during the day in order to help keep up mobility. Patient accepting of the information. All questions answered at this time.

## 2018-07-10 NOTE — DISCHARGE SUMMARY
DISCHARGE DIAGNOSES:  1.  History of cerebrovascular accident.  2.  Non-ST-elevation myocardial infarction myocardial infarction on   03/25/2018.  3.  Insulin-dependent diabetes mellitus.  4.  Left below-knee amputation.  5.  History of cryptogenic stroke with implantation of a loop recorder by Dr. Dre Perla on 03/26/2018.  6.  Evidence of second and third degree heart block on loop recorder.  7.  Coronary artery disease, moderately severe.    HISTORY OF PRESENT HOSPITALIZATION:  The patient is a 67-year-old    male who resides in [place], [place].  The patient was seen in the office by   LAWANDA Castellon at which time he was seen in the office as his loop   recorder demonstrated second and third degree heart block.  The patient   therefore was admitted electively yesterday after he was seen for a permanent   pacemaker implantation.  Please see procedure report.  Complications of the   procedure were none.  Impressions of the procedure were permanent pacemaker   implantation and a loop recorder removal.  The implanted device is a Medtronic   model A2DRO1, serial #[id number].  The explanted device is a Medtronic model   LNQ11.  Right atrial lead is a Medtronic model 5076.  P-waves are measured at   2 mV at implant, threshold 0.7 volts, pacing impedance was 765 ohms.  Right   ventricular lead is a Medtronic model 5076-58, R-waves at implant were   measured at 13.4, threshold of 0.3 and a pacing impedance of 988 ohms.  He is   set in DDDR mode, lower rate at 60, upper tracking at 130.  This a.m. capture   thresholds in both leads were at 0.375 volts, atrial impedance 465 ohms,   P-waves are measured at 1.8 mV, R-waves were measured at 13 mV, and impedance   was 665 ohms.  The patient is A paced 1% and V paced 0.2%.  Chest x-ray this   morning demonstrates no evidence of late pneumothorax, site appears   uncomplicated.  Tegaderm dressing is in place.  Loop recorder site is   uncomplicated, again  Tegaderm dressing in place.  The patient's blood pressure   was mildly elevated this morning.  He was having some pain actually in the   nonimplanted shoulder on the right.  Blood pressure was 163/80, did come down   to 152/80.  H and H was at 11.6 and 33.8 prior to procedure.  Electrolytes are   stable at 139, potassium 4.3, chloride 110, CO2 of 24, BUN was 26 and   creatinine 0.95.  EGFR is greater than 60.    IMPRESSION:  1.  History of second and third degree heart block noted on loop recorder with   a Medtronic dual-chamber pacemaker implanted as noted.  2.  Peripheral vascular disease.  3.  History of osteomyelitis with left below-knee amputation.  4.  Coronary artery disease with moderately severe disease, which will require   coronary artery bypass grafting in the near future.  5.  History of Non-ST-elevation myocardial infarction.  6.  Chronic obstructive pulmonary disease.  7.  Diabetes mellitus.  8.  Hypertension.  9.  Mixed hyperlipidemia.    PLAN:  The patient will be discharged to home.  Arm restricted activities were   reviewed.  The importance of keeping the dressings on and dry until he is   seen in the office were reviewed with the patient.  He verbalized   understanding.  His blood pressure was mildly elevated and he will monitor   that at home and certainly if the systolic pressure remains at 150mmHg or above he   will contact our office.       ____________________________________     LEA Moreno / LORNA    DD:  07/10/2018 08:16:22  DT:  07/10/2018 08:55:32    D#:  [id number]  Job#:  [id number]    cc: Dre Perla MD

## 2018-07-18 ENCOUNTER — NON-PROVIDER VISIT (OUTPATIENT)
Dept: CARDIOLOGY | Facility: MEDICAL CENTER | Age: 68
End: 2018-07-18
Payer: MEDICARE

## 2018-07-18 ENCOUNTER — OFFICE VISIT (OUTPATIENT)
Dept: CARDIOLOGY | Facility: MEDICAL CENTER | Age: 68
End: 2018-07-18
Payer: MEDICARE

## 2018-07-18 ENCOUNTER — TELEPHONE (OUTPATIENT)
Dept: CARDIOLOGY | Facility: MEDICAL CENTER | Age: 68
End: 2018-07-18

## 2018-07-18 VITALS
OXYGEN SATURATION: 99 % | SYSTOLIC BLOOD PRESSURE: 112 MMHG | WEIGHT: 125 LBS | HEART RATE: 74 BPM | BODY MASS INDEX: 19.62 KG/M2 | HEIGHT: 67 IN | DIASTOLIC BLOOD PRESSURE: 68 MMHG

## 2018-07-18 DIAGNOSIS — Z95.0 CARDIAC PACEMAKER IN SITU: ICD-10-CM

## 2018-07-18 DIAGNOSIS — I63.89 CEREBROVASCULAR ACCIDENT (CVA) DUE TO OTHER MECHANISM (HCC): ICD-10-CM

## 2018-07-18 DIAGNOSIS — E78.2 HYPERLIPEMIA, MIXED: ICD-10-CM

## 2018-07-18 DIAGNOSIS — I21.4 NSTEMI (NON-ST ELEVATED MYOCARDIAL INFARCTION) (HCC): ICD-10-CM

## 2018-07-18 DIAGNOSIS — I25.10 CORONARY ARTERY DISEASE INVOLVING NATIVE CORONARY ARTERY OF NATIVE HEART WITHOUT ANGINA PECTORIS: ICD-10-CM

## 2018-07-18 DIAGNOSIS — I44.2 AV BLOCK, 3RD DEGREE (HCC): ICD-10-CM

## 2018-07-18 DIAGNOSIS — Z89.512 S/P BKA (BELOW KNEE AMPUTATION) UNILATERAL, LEFT (HCC): ICD-10-CM

## 2018-07-18 DIAGNOSIS — I10 ESSENTIAL HYPERTENSION: ICD-10-CM

## 2018-07-18 DIAGNOSIS — R00.1 BRADYCARDIA: ICD-10-CM

## 2018-07-18 DIAGNOSIS — I44.30 HEART BLOCK ATRIOVENTRICULAR: ICD-10-CM

## 2018-07-18 DIAGNOSIS — I95.89 OTHER SPECIFIED HYPOTENSION: ICD-10-CM

## 2018-07-18 DIAGNOSIS — R55 SYNCOPE AND COLLAPSE: ICD-10-CM

## 2018-07-18 PROCEDURE — 93280 PM DEVICE PROGR EVAL DUAL: CPT | Performed by: INTERNAL MEDICINE

## 2018-07-18 PROCEDURE — 99214 OFFICE O/P EST MOD 30 MIN: CPT | Performed by: INTERNAL MEDICINE

## 2018-07-18 NOTE — PROGRESS NOTES
"Subjective:   Chief Complaint:   Chief Complaint   Patient presents with   • Bradycardia       Jarret Bright is a 67 y.o. male who is here for follow-up of coronary disease.  I met him during a recent hospitalization in March 2010.  He is follow-up in the office by our nurse practitioner.  He has had a left BKA due to osteomyelitis.  He is an insulin-dependent diabetic.  He had a non-ST elevation MI and had severe three-vessel disease but was turned down for surgery at that time due to multiple comorbidities.  He had a loop recorder placed for possible A. fib due to multiple strokes on MRI.  He had a 4 second pause including second and third-degree heart block during sinus rhythm in July and had a pacemaker placed on July 9, 2018.  The loop recorder was removed at that time.  He had a history of syncope.    He had his device interrogated this morning.  This shows that he is atrial sensed and ventricular sensed but has not needed pacing.  There has been no evidence of atrial fibrillation.    He reports chest pain in the morning and evening. Sometimes wakes up with it, leaves when he takes meds, doesn't last long. Happens while sitting in the evening, only lasts a minute or so.  Does not happen with physical activity, he is walking up and down stairs or walking in the home with his prosthesis and cane.    He reports mild R foot swelling, no orthopnea, no dyspnea on exertion.  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  He was off of his bioprosthetic leg for many months because of osteomyelitis.    He is on DAPT, no bleeding issues.  On metoprolol, no longer on lasix.     DATA REVIEWED by me:  ECG July 10, 2018 sinus rhythm, rate 68, first-degree AV delay    Echo March 24, 2018  EF 60%, stage II diastolic dysfunction, mild MR, trace pericardial effusion, RVSP 35 mmHg    Carotid Doppler study 3/24/2018  Minimal bilateral carotid plaque in the ICAs, subclavian and " vertebral is within normal limits    MRI brain 3/22/2018  Small acute infarct in the posterior limb of the right internal capsule, additional scattered punctate acute infarcts in the right posterior temporal lobe and left frontal lobe and left frontoparietal junction    Left heart catheterization March 23, 2018  IMPRESSION:  1.  Multivessel coronary artery disease with nonobstructive left main coronary   artery disease, high-grade mid left anterior descending artery and distal   left anterior descending artery stenosis, high-grade proximal diagonal branch   stenosis, high-grade ramus stenosis, high-grade proximal obtuse marginal   branch stenosis and chronic total occlusion of the mid right coronary artery   with distal vessel filling via left to right collaterals.  2.  Normal left ventricular systolic function with ejection fraction of 55%.  3.  Mildly elevated left ventricular end-diastolic pressure.     Most recent labs:     July 9, 2018 hemoglobin 11.6, platelets 165, sodium 139, potassium 4.3, creatinine 0.95, LFTs normal  March 25, 2018 total cholesterol 118, triglycerides 83, HDL 31, LDL 70    Past Medical History:   Diagnosis Date   • CAD (coronary artery disease)    • Diabetes (HCC)     insulin dependent.   • Hypertension      Past Surgical History:   Procedure Laterality Date   • CARDIAC CATH  03/23/2018    3 vessel disease, needs CABG when stable from other problems.     Family History   Problem Relation Age of Onset   • Heart Attack Mother 60     Social History     Social History   • Marital status:      Spouse name: N/A   • Number of children: N/A   • Years of education: N/A     Occupational History   • Not on file.     Social History Main Topics   • Smoking status: Former Smoker     Years: 30.00     Types: Cigarettes, Cigars     Quit date: 1/1/2018   • Smokeless tobacco: Never Used   • Alcohol use No   • Drug use: No   • Sexual activity: Not on file     Other Topics Concern   • Not on file  "    Social History Narrative   • No narrative on file     Allergies   Allergen Reactions   • Tea Tree Oil Rash     Unspecified       Current Outpatient Prescriptions   Medication Sig Dispense Refill   • clopidogrel (PLAVIX) 75 MG Tab Take 1 Tab by mouth every day. 30 Tab 11   • metoprolol (LOPRESSOR) 25 MG Tab Take 1 Tab by mouth 2 times a day. 60 Tab 11   • atorvastatin (LIPITOR) 40 MG Tab Take 1 Tab by mouth every evening. 30 Tab 11   • ramipril (ALTACE) 10 MG capsule Take 1 Cap by mouth every day. 30 Cap 11   • tamsulosin (FLOMAX) 0.4 MG capsule Take 1 Cap by mouth ONE-HALF HOUR AFTER BREAKFAST. 30 Cap 1   • aspirin EC (ECOTRIN) 81 MG Tablet Delayed Response Take 81 mg by mouth every bedtime.     • insulin glargine (LANTUS) 100 UNIT/ML Solution Inject 24 Units as instructed every evening.       No current facility-administered medications for this visit.        ROS  All others systems reviewed and negative.     Objective:     Blood pressure 112/68, pulse 74, height 1.702 m (5' 7\"), weight 56.7 kg (125 lb), SpO2 99 %. Body mass index is 19.58 kg/m².    Physical Exam   General: No acute distress. Adequately nourished. Appears older than stated age, thin  HEENT: EOM grossly intact, no scleral icterus, no pharyngeal erythema.   Neck:  + JVD at 90°, no bruits, trachea midline  CVS: RRR. Normal S1, S2. No M/R/G. No right LE edema on the right.  2+ radial pulses, 1+ DT pulse on the right lower extremity  Resp: Diminished breath sounds at the bases. Normal respiratory effort.  Abdomen: Soft, NT, no bertin hepatomegaly.  MSK/Ext: No clubbing or cyanosis, surgically absent left lower extremity with bandage in place  Skin: Warm and dry, no rashes.  Left lower extremity bioprosthetic leg  Neurological: CN III-XII grossly intact. No focal deficits.   Psych: A&O x 3, appropriate affect, good judgement      Assessment:     1. Coronary artery disease involving native coronary artery of native heart without angina pectoris     2. " NSTEMI (non-ST elevated myocardial infarction) ([facility])     3. Bradycardia     4. Heart block atrioventricular     5. Other specified hypotension     6. Essential hypertension     7. S/P BKA (below knee amputation) unilateral, left (HCC)     8. Cerebrovascular accident (CVA) due to other mechanism (HCC)     9. Hyperlipemia, mixed     10. Syncope and collapse         Medical Decision Making:  Today's Assessment / Status / Plan:       1. CAD, prior non-ST elevation MI, peak troponin 3.33.  Significant three-vessel disease but no plans for bypass as he may not survive it.  Should he have improvement in functional status we may reconsider the option but he does not appear to be having symptoms.  2.  Multiple CVAs.  No signs of atrial fibrillation or flutter at this time.  3.  Complete heart block on loop recorder now status post permanent pacemaker.  4. Type 2 diabetes, insulin-dependent  5. Dyslipidemia  6. Tobacco use, quit Jan 2018, feels better  7. Mild anemia, slightly worse since surgery  8. Left below-knee amputation on March 18, 4 days ago due to nonhealing wound and osteomyelitis  9.  Hypertension  10.  COPD  11.  Peripheral arterial disease    He will continue taking his current medications.  I would recommend long-term aspirin and Plavix as long as he tolerates it.  No plans to increase his Lipitor.  I have asked him to return in 6 months but have asked him to call if he begins to experience any chest pains.  I would first trial isosorbide.  He did continue to have improvement in functional status we could consider some type of intervention but overall I feel it is in his best interest to avoid attempts at surgery.  His LV function has been good.      Return in about 6 months (around 1/18/2019).    It is my pleasure to participate in the care of Mr. Bright.  Please do not hesitate to contact me with questions or concerns.    Fidelina Flanagan MD, [facility]  Cardiologist [facility]    Please note that this dictation was created using voice recognition software. I have made every reasonable attempt to correct obvious errors, but it is possible there are errors of grammar and possibly content that I did not discover before finalizing the note.

## 2018-07-18 NOTE — PROGRESS NOTES
Wound site is healing well.  Patient advised to watch for increased redness, swelling, oozing or fever--verbalized understanding. He will return in 5-6 weeks for final threshold testing.

## 2018-09-07 ENCOUNTER — NON-PROVIDER VISIT (OUTPATIENT)
Dept: CARDIOLOGY | Facility: MEDICAL CENTER | Age: 68
End: 2018-09-07
Payer: MEDICARE

## 2018-09-07 DIAGNOSIS — Z95.0 CARDIAC PACEMAKER IN SITU: ICD-10-CM

## 2018-09-07 DIAGNOSIS — I44.2 AV BLOCK, 3RD DEGREE (HCC): ICD-10-CM

## 2018-09-07 PROCEDURE — 93280 PM DEVICE PROGR EVAL DUAL: CPT | Performed by: INTERNAL MEDICINE

## 2019-01-15 ENCOUNTER — HOSPITAL ENCOUNTER (OUTPATIENT)
Dept: RADIOLOGY | Facility: MEDICAL CENTER | Age: 69
End: 2019-01-15

## 2019-04-09 ENCOUNTER — OFFICE VISIT (OUTPATIENT)
Dept: CARDIOLOGY | Facility: MEDICAL CENTER | Age: 69
End: 2019-04-09
Payer: MEDICARE

## 2019-04-09 ENCOUNTER — NON-PROVIDER VISIT (OUTPATIENT)
Dept: CARDIOLOGY | Facility: MEDICAL CENTER | Age: 69
End: 2019-04-09
Payer: MEDICARE

## 2019-04-09 VITALS
BODY MASS INDEX: 19.46 KG/M2 | SYSTOLIC BLOOD PRESSURE: 118 MMHG | OXYGEN SATURATION: 97 % | WEIGHT: 124.01 LBS | HEART RATE: 90 BPM | HEIGHT: 67 IN | DIASTOLIC BLOOD PRESSURE: 60 MMHG

## 2019-04-09 DIAGNOSIS — Z95.0 CARDIAC PACEMAKER IN SITU: ICD-10-CM

## 2019-04-09 DIAGNOSIS — E78.2 HYPERLIPEMIA, MIXED: ICD-10-CM

## 2019-04-09 DIAGNOSIS — R00.1 BRADYCARDIA: ICD-10-CM

## 2019-04-09 DIAGNOSIS — Z89.512 S/P BKA (BELOW KNEE AMPUTATION) UNILATERAL, LEFT (HCC): ICD-10-CM

## 2019-04-09 DIAGNOSIS — I63.89 CEREBROVASCULAR ACCIDENT (CVA) DUE TO OTHER MECHANISM (HCC): ICD-10-CM

## 2019-04-09 DIAGNOSIS — R55 SYNCOPE AND COLLAPSE: ICD-10-CM

## 2019-04-09 DIAGNOSIS — I44.2 AV BLOCK, 3RD DEGREE (HCC): ICD-10-CM

## 2019-04-09 DIAGNOSIS — I73.9 PVD (PERIPHERAL VASCULAR DISEASE) (HCC): ICD-10-CM

## 2019-04-09 DIAGNOSIS — D64.9 ANEMIA, UNSPECIFIED TYPE: ICD-10-CM

## 2019-04-09 DIAGNOSIS — I25.10 CORONARY ARTERY DISEASE INVOLVING NATIVE CORONARY ARTERY OF NATIVE HEART WITHOUT ANGINA PECTORIS: ICD-10-CM

## 2019-04-09 DIAGNOSIS — I21.4 NSTEMI (NON-ST ELEVATED MYOCARDIAL INFARCTION) (HCC): ICD-10-CM

## 2019-04-09 DIAGNOSIS — I10 ESSENTIAL HYPERTENSION: ICD-10-CM

## 2019-04-09 PROCEDURE — 93280 PM DEVICE PROGR EVAL DUAL: CPT | Performed by: INTERNAL MEDICINE

## 2019-04-09 PROCEDURE — 99214 OFFICE O/P EST MOD 30 MIN: CPT | Mod: 25 | Performed by: INTERNAL MEDICINE

## 2019-04-09 RX ORDER — LISINOPRIL 10 MG/1
TABLET ORAL
Refills: 0 | COMMUNITY
Start: 2019-02-15 | End: 2019-06-09

## 2019-04-09 NOTE — PATIENT INSTRUCTIONS
-Metoprolol tartrate was 25 mg AM and PM, now 12.5 mg AM and PM by cutting the 25 mg in half, take 1/2 in the morning, 1/2 at night.  -If your blood pressure continues to be under 120 (top number) or if dizziness/lightheadedness continues, but the lisinopril in half from 10 mg to 5 mg and call my office: [phone].  -If you start having chest pains, call the office for instructions, we can give you medications to help.  If you have severe pain at rest for more than 15 minutes go to the hospital.

## 2019-04-09 NOTE — PROGRESS NOTES
Subjective:   Chief Complaint:   Chief Complaint   Patient presents with   • Coronary Artery Disease       Jarret Bright is a 68 y.o. male who is here for follow-up of coronary disease and a pacemaker.  I met him during a hospitalization in March 2018.  He had a non-ST elevation MI after left BKA at OSH, transferred to us, had heart cath with severe three-vessel disease but was turned down for surgery at that time due to multiple comorbidities.  He is maintained on dual antiplatelet therapy indefinitely as long as tolerated.  No bleeding issues thus far, only minor bruising.    He had a loop recorder placed for possible A. fib due to multiple strokes on MRI.  He had a 4 second pause including second and third-degree heart block during sinus rhythm in July and had a pacemaker placed on July 9, 2018.  The loop recorder was removed at that time.  He had a history of syncope.  He does not necessarily notice benefit from a pacemaker but he has not passed out.  EF remained normal at 60%.    Blood pressure is controlled today.  Has hyperlipidemia, last LDL cholesterol 70.  Takes atorvastatin 40 mg.  He has had a left BKA due to osteomyelitis.    He is an insulin-dependent diabetic.    He is underweight but has been gaining in strength with physical therapy.  Has minimal carotid artery plaque.    He had his device interrogated this morning.  He is ventricular paced rarely and mostly atrial paced.  He had 23 seconds of an atrial tachycardia at a rate of 150 which could have been atrial flutter.  There have been no other significant mode switches like this.    He is walking with a cane and has bioprosthetic.  No chest pain.  He denies significant shortness of breath.  No palpitations.      He has had some mild dizziness and lightheadedness at times and particularly when standing up or walking.  His blood pressure has reduced and it could be related to medications.    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  He was  previously off of his bioprosthetic leg for many months because of osteomyelitis but now doing much better.    Here with his wife today.  They live in [place] and travel an hour and a half each way.  She is ecstatic that he quit smoking.  He likes to collect vintage cigarette lighters.    DATA REVIEWED by me:  ECG July 10, 2018 sinus rhythm, rate 68, first-degree AV delay    Echo March 24, 2018  EF 60%, stage II diastolic dysfunction, mild MR, trace pericardial effusion, RVSP 35 mmHg    Carotid Doppler study 3/24/2018  Minimal bilateral carotid plaque in the ICAs, subclavian and vertebral is within normal limits    MRI brain 3/22/2018  Small acute infarct in the posterior limb of the right internal capsule, additional scattered punctate acute infarcts in the right posterior temporal lobe and left frontal lobe and left frontoparietal junction    Left heart catheterization March 23, 2018  IMPRESSION:  1.  Multivessel coronary artery disease with nonobstructive left main coronary   artery disease, high-grade mid left anterior descending artery and distal   left anterior descending artery stenosis, high-grade proximal diagonal branch   stenosis, high-grade ramus stenosis, high-grade proximal obtuse marginal   branch stenosis and chronic total occlusion of the mid right coronary artery   with distal vessel filling via left to right collaterals.  2.  Normal left ventricular systolic function with ejection fraction of 55%.  3.  Mildly elevated left ventricular end-diastolic pressure.    Pacemaker interrogation 4/9/2019  DDDR, rate 60, V paced 0.1%, atrial paced 9%, 2 nonsustained mode switches.     Most recent labs:     July 9, 2018 hemoglobin 11.6, platelets 165, sodium 139, potassium 4.3, creatinine 0.95, LFTs normal  March 25, 2018 total cholesterol 118, triglycerides 83, HDL 31, LDL 70    Past Medical History:   Diagnosis Date   • CAD (coronary artery disease)    • Diabetes (HCC)     insulin dependent.   • Hypertension   "    Past Surgical History:   Procedure Laterality Date   • RODERICK CARDIAC CATH  03/23/2018    3 vessel disease, needs CABG when stable from other problems.     Family History   Problem Relation Age of Onset   • Heart Attack Mother 60     Social History     Social History   • Marital status:      Spouse name: N/A   • Number of children: N/A   • Years of education: N/A     Occupational History   • Not on file.     Social History Main Topics   • Smoking status: Former Smoker     Years: 30.00     Types: Cigarettes, Cigars     Quit date: 1/1/2018   • Smokeless tobacco: Never Used   • Alcohol use No   • Drug use: No   • Sexual activity: Not on file     Other Topics Concern   • Not on file     Social History Narrative   • No narrative on file     Allergies   Allergen Reactions   • Tea Tree Oil Rash     Unspecified       Current Outpatient Prescriptions   Medication Sig Dispense Refill   • lisinopril (PRINIVIL) 10 MG Tab Take  by mouth.  0   • metoprolol (LOPRESSOR) 25 MG Tab Take 0.5 Tabs by mouth 2 times a day. 90 Tab 3   • clopidogrel (PLAVIX) 75 MG Tab Take 1 Tab by mouth every day. 30 Tab 11   • atorvastatin (LIPITOR) 40 MG Tab Take 1 Tab by mouth every evening. 30 Tab 11   • tamsulosin (FLOMAX) 0.4 MG capsule Take 1 Cap by mouth ONE-HALF HOUR AFTER BREAKFAST. 30 Cap 1   • aspirin EC (ECOTRIN) 81 MG Tablet Delayed Response Take 81 mg by mouth every bedtime.     • insulin glargine (LANTUS) 100 UNIT/ML Solution Inject 24 Units as instructed every evening.       No current facility-administered medications for this visit.        ROS    All others systems reviewed and negative.     Objective:     /60 (BP Location: Left arm, Patient Position: Sitting, BP Cuff Size: Adult)   Pulse 90   Ht 1.702 m (5' 7\")   Wt 56.3 kg (124 lb 0.1 oz)   SpO2 97%  Body mass index is 19.42 kg/m².    Physical Exam   General: No acute distress.  Thin appears older than stated age  HEENT: EOM grossly intact, no scleral icterus, no " pharyngeal erythema.   Neck:  + JVD at 90°, no bruits, trachea midline  CVS: RRR. Normal S1, S2. No M/R/G. No right LE edema on the right.  2+ radial pulses, 1+ DT pulse on the right lower extremity, pacemaker pocket intact.  Resp: CTAB, no wheezing. Normal respiratory effort.  Abdomen: Soft, NT, no bertin hepatomegaly.  MSK/Ext: No clubbing or cyanosis, surgically absent left lower extremity with bioprosthetic in place  Skin: Warm and dry, no rashes.  Left lower extremity bioprosthetic leg  Neurological: CN III-XII grossly intact. No focal deficits.  Ambulating with a cane  Psych: A&O x 3, appropriate affect, good judgement        Assessment:     1. Cardiac pacemaker in situ     2. AV block, 3rd degree ([facility])     3. Coronary artery disease involving native coronary artery of native heart without angina pectoris     4. NSTEMI (non-ST elevated myocardial infarction) ([facility])     5. Bradycardia     6. Essential hypertension     7. S/P BKA (below knee amputation) unilateral, left ([facility])     8. Cerebrovascular accident (CVA) due to other mechanism     9. Hyperlipemia, mixed     10. Syncope and collapse     11. PVD (peripheral vascular disease) ([facility])     12. Anemia, unspecified type     13. Insulin dependent diabetes mellitus ([facility])         Medical Decision Making:  Today's Assessment / Status / Plan:       1. CAD, prior non-ST elevation MI, peak troponin 3.33.  Significant three-vessel disease but no plans for bypass as he may not survive it.  Despite improvement in his functional status, I would not send him for open heart surgery with his comorbidities.  2.  Multiple CVAs.  23-second atrial tachycardia which could have been atrial flutter, rate was 150.  No other alert so I would not consider this as a source of stroke at this time but will continue to monitor closely.    3.  Complete heart block on loop recorder now status post permanent pacemaker.  No more syncope.  4. Type 2 diabetes, insulin-dependent  5.  Dyslipidemia  6. Tobacco use, quit Jan 2018, feels better  7. Mild anemia, slightly worse since surgery  8. Left below-knee amputation on March 18, able to use his prosthesis on a cane at this point.  9.  Hypertension, controlled, may be having symptoms from low blood pressure.  10.  COPD  11.  Peripheral arterial disease    -He will continue taking his current medications.  I would recommend long-term aspirin and Plavix as long as he tolerates it.  No plans to increase his Lipitor.  -I have asked him to return in 1 year but have asked him to call if he begins to experience any chest pains.  I would first trial isosorbide and other medications.  His heart catheterization could always be reviewed for possible PCI if he is having issues impacting his quality of life or his EF reduces.  -Continue to monitor for atrial fibrillation or flutter, this would  and we would place him on apixaban, again, did see 23 seconds of an atrial tachycardia which could have been atrial flutter.  -Return to see the MD in 1 year but continue PM checks annually but this is assuming he is able to get his home monitor system up and going.  Her support staff will be calling him at home tomorrow to set this up.  -Reduce metoprolol due to lower blood pressures.    Written instructions given today:  -Metoprolol tartrate was 25 mg AM and PM, now 12.5 mg AM and PM by cutting the 25 mg in half, take 1/2 in the morning, 1/2 at night.  -If your blood pressure continues to be under 120 (top number) or if dizziness/lightheadedness continues, but the lisinopril in half from 10 mg to 5 mg and call my office: [phone].  -If you start having chest pains, call the office for instructions, we can give you medications to help.  If you have severe pain at rest for more than 15 minutes go to the hospital.       Return in about 1 year (around 4/9/2020).    It is my pleasure to participate in the care of Mr. Bright.  Please do not hesitate to  contact me with questions or concerns.    Fidelina Flanagan MD, [facility]  Cardiologist [facility]    Please note that this dictation was created using voice recognition software. I have made every reasonable attempt to correct obvious errors, but it is possible there are errors of grammar and possibly content that I did not discover before finalizing the note.

## 2019-04-09 NOTE — LETTER
[facility]   [address], [address]  [place] [address]  Phone: [phone]  Fax: [phone]              Jarret Bright  1950    Encounter Date: 4/9/2019    Fidelina Flanagan          PROGRESS NOTE:  Subjective:   Chief Complaint:   Chief Complaint   Patient presents with   • Coronary Artery Disease       Jarret Bright is a 68 y.o. male who is here for follow-up of coronary disease and a pacemaker.  I met him during a hospitalization in March 2018.  He had a non-ST elevation MI after left BKA at OSH, transferred to us, had heart cath with severe three-vessel disease but was turned down for surgery at that time due to multiple comorbidities.  He is maintained on dual antiplatelet therapy indefinitely as long as tolerated.  No bleeding issues thus far, only minor bruising.    He had a loop recorder placed for possible A. fib due to multiple strokes on MRI.  He had a 4 second pause including second and third-degree heart block during sinus rhythm in July and had a pacemaker placed on July 9, 2018.  The loop recorder was removed at that time.  He had a history of syncope.  He does not necessarily notice benefit from a pacemaker but he has not passed out.  EF remained normal at 60%.    Blood pressure is controlled today.  Has hyperlipidemia, last LDL cholesterol 70.  Takes atorvastatin 40 mg.  He has had a left BKA due to osteomyelitis.    He is an insulin-dependent diabetic.    He is underweight but has been gaining in strength with physical therapy.  Has minimal carotid artery plaque.    He had his device interrogated this morning.  He is ventricular paced rarely and mostly atrial paced.  He had 23 seconds of an atrial tachycardia at a rate of 150 which could have been atrial flutter.  There have been no other significant mode switches like this.    He is walking with a cane and has bioprosthetic.  No chest pain.  He denies significant shortness of breath.  No  palpitations.      He has had some mild dizziness and lightheadedness at times and particularly when standing up or walking.  His blood pressure has reduced and it could be related to medications.    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  He was previously off of his bioprosthetic leg for many months because of osteomyelitis but now doing much better.    Here with his wife today.  They live in [place] and travel an hour and a half each way.  She is ecstatic that he quit smoking.  He likes to collect vintage cigarette lighters.    DATA REVIEWED by me:  ECG July 10, 2018 sinus rhythm, rate 68, first-degree AV delay    Echo March 24, 2018  EF 60%, stage II diastolic dysfunction, mild MR, trace pericardial effusion, RVSP 35 mmHg    Carotid Doppler study 3/24/2018  Minimal bilateral carotid plaque in the ICAs, subclavian and vertebral is within normal limits    MRI brain 3/22/2018  Small acute infarct in the posterior limb of the right internal capsule, additional scattered punctate acute infarcts in the right posterior temporal lobe and left frontal lobe and left frontoparietal junction    Left heart catheterization March 23, 2018  IMPRESSION:  1.  Multivessel coronary artery disease with nonobstructive left main coronary   artery disease, high-grade mid left anterior descending artery and distal   left anterior descending artery stenosis, high-grade proximal diagonal branch   stenosis, high-grade ramus stenosis, high-grade proximal obtuse marginal   branch stenosis and chronic total occlusion of the mid right coronary artery   with distal vessel filling via left to right collaterals.  2.  Normal left ventricular systolic function with ejection fraction of 55%.  3.  Mildly elevated left ventricular end-diastolic pressure.    Pacemaker interrogation 4/9/2019  DDDR, rate 60, V paced 0.1%, atrial paced 9%, 2 nonsustained mode switches.     Most recent labs:     July 9, 2018 hemoglobin 11.6, platelets 165, sodium  139, potassium 4.3, creatinine 0.95, LFTs normal  March 25, 2018 total cholesterol 118, triglycerides 83, HDL 31, LDL 70    Past Medical History:   Diagnosis Date   • CAD (coronary artery disease)    • Diabetes (HCC)     insulin dependent.   • Hypertension      Past Surgical History:   Procedure Laterality Date   • Z CARDIAC CATH  03/23/2018    3 vessel disease, needs CABG when stable from other problems.     Family History   Problem Relation Age of Onset   • Heart Attack Mother 60     Social History     Social History   • Marital status:      Spouse name: N/A   • Number of children: N/A   • Years of education: N/A     Occupational History   • Not on file.     Social History Main Topics   • Smoking status: Former Smoker     Years: 30.00     Types: Cigarettes, Cigars     Quit date: 1/1/2018   • Smokeless tobacco: Never Used   • Alcohol use No   • Drug use: No   • Sexual activity: Not on file     Other Topics Concern   • Not on file     Social History Narrative   • No narrative on file     Allergies   Allergen Reactions   • Tea Tree Oil Rash     Unspecified       Current Outpatient Prescriptions   Medication Sig Dispense Refill   • lisinopril (PRINIVIL) 10 MG Tab Take  by mouth.  0   • metoprolol (LOPRESSOR) 25 MG Tab Take 0.5 Tabs by mouth 2 times a day. 90 Tab 3   • clopidogrel (PLAVIX) 75 MG Tab Take 1 Tab by mouth every day. 30 Tab 11   • atorvastatin (LIPITOR) 40 MG Tab Take 1 Tab by mouth every evening. 30 Tab 11   • tamsulosin (FLOMAX) 0.4 MG capsule Take 1 Cap by mouth ONE-HALF HOUR AFTER BREAKFAST. 30 Cap 1   • aspirin EC (ECOTRIN) 81 MG Tablet Delayed Response Take 81 mg by mouth every bedtime.     • insulin glargine (LANTUS) 100 UNIT/ML Solution Inject 24 Units as instructed every evening.       No current facility-administered medications for this visit.        ROS    All others systems reviewed and negative.     Objective:     /60 (BP Location: Left arm, Patient Position: Sitting, BP Cuff  "Size: Adult)   Pulse 90   Ht 1.702 m (5' 7\")   Wt 56.3 kg (124 lb 0.1 oz)   SpO2 97%  Body mass index is 19.42 kg/m².    Physical Exam   General: No acute distress.  Thin appears older than stated age  HEENT: EOM grossly intact, no scleral icterus, no pharyngeal erythema.   Neck:  + JVD at 90°, no bruits, trachea midline  CVS: RRR. Normal S1, S2. No M/R/G. No right LE edema on the right.  2+ radial pulses, 1+ DT pulse on the right lower extremity, pacemaker pocket intact.  Resp: CTAB, no wheezing. Normal respiratory effort.  Abdomen: Soft, NT, no bertin hepatomegaly.  MSK/Ext: No clubbing or cyanosis, surgically absent left lower extremity with bioprosthetic in place  Skin: Warm and dry, no rashes.  Left lower extremity bioprosthetic leg  Neurological: CN III-XII grossly intact. No focal deficits.  Ambulating with a cane  Psych: A&O x 3, appropriate affect, good judgement        Assessment:     1. Cardiac pacemaker in situ     2. AV block, 3rd degree ([facility])     3. Coronary artery disease involving native coronary artery of native heart without angina pectoris     4. NSTEMI (non-ST elevated myocardial infarction) ([facility])     5. Bradycardia     6. Essential hypertension     7. S/P BKA (below knee amputation) unilateral, left ([facility])     8. Cerebrovascular accident (CVA) due to other mechanism     9. Hyperlipemia, mixed     10. Syncope and collapse     11. PVD (peripheral vascular disease) ([facility])     12. Anemia, unspecified type     13. Insulin dependent diabetes mellitus ([facility])         Medical Decision Making:  Today's Assessment / Status / Plan:       1. CAD, prior non-ST elevation MI, peak troponin 3.33.  Significant three-vessel disease but no plans for bypass as he may not survive it.  Despite improvement in his functional status, I would not send him for open heart surgery with his comorbidities.  2.  Multiple CVAs.  23-second atrial tachycardia which could have been atrial flutter, rate was 150.  No other alert so I " would not consider this as a source of stroke at this time but will continue to monitor closely.    3.  Complete heart block on loop recorder now status post permanent pacemaker.  No more syncope.  4. Type 2 diabetes, insulin-dependent  5. Dyslipidemia  6. Tobacco use, quit Jan 2018, feels better  7. Mild anemia, slightly worse since surgery  8. Left below-knee amputation on March 18, able to use his prosthesis on a cane at this point.  9.  Hypertension, controlled, may be having symptoms from low blood pressure.  10.  COPD  11.  Peripheral arterial disease    -He will continue taking his current medications.  I would recommend long-term aspirin and Plavix as long as he tolerates it.  No plans to increase his Lipitor.  -I have asked him to return in 1 year but have asked him to call if he begins to experience any chest pains.  I would first trial isosorbide and other medications.  His heart catheterization could always be reviewed for possible PCI if he is having issues impacting his quality of life or his EF reduces.  -Continue to monitor for atrial fibrillation or flutter, this would  and we would place him on apixaban, again, did see 23 seconds of an atrial tachycardia which could have been atrial flutter.  -Return to see the MD in 1 year but continue PM checks annually but this is assuming he is able to get his home monitor system up and going.  Her support staff will be calling him at home tomorrow to set this up.  -Reduce metoprolol due to lower blood pressures.    Written instructions given today:  -Metoprolol tartrate was 25 mg AM and PM, now 12.5 mg AM and PM by cutting the 25 mg in half, take 1/2 in the morning, 1/2 at night.  -If your blood pressure continues to be under 120 (top number) or if dizziness/lightheadedness continues, but the lisinopril in half from 10 mg to 5 mg and call my office: [phone].  -If you start having chest pains, call the office for instructions, we can give  you medications to help.  If you have severe pain at rest for more than 15 minutes go to the hospital.       Return in about 1 year (around 4/9/2020).    It is my pleasure to participate in the care of Mr. Bright.  Please do not hesitate to contact me with questions or concerns.    Fidelina Flanagan MD, [facility]  Cardiologist [facility]    Please note that this dictation was created using voice recognition software. I have made every reasonable attempt to correct obvious errors, but it is possible there are errors of grammar and possibly content that I did not discover before finalizing the note.      Prashanth Ocasio MD  [address] [facility] [place] [address]  VIA Facsimile: [phone]

## 2019-04-11 DIAGNOSIS — E78.5 HYPERLIPIDEMIA, UNSPECIFIED HYPERLIPIDEMIA TYPE: ICD-10-CM

## 2019-04-11 DIAGNOSIS — I25.10 CORONARY ARTERY DISEASE INVOLVING NATIVE CORONARY ARTERY OF NATIVE HEART WITHOUT ANGINA PECTORIS: ICD-10-CM

## 2019-04-11 RX ORDER — CLOPIDOGREL BISULFATE 75 MG/1
75 TABLET ORAL DAILY
Qty: 30 TAB | Refills: 11 | Status: ON HOLD | OUTPATIENT
Start: 2019-04-11 | End: 2019-06-22

## 2019-04-11 RX ORDER — ATORVASTATIN CALCIUM 40 MG/1
40 TABLET, FILM COATED ORAL EVERY EVENING
Qty: 30 TAB | Refills: 11 | Status: ON HOLD | OUTPATIENT
Start: 2019-04-11 | End: 2019-06-11

## 2019-06-09 ENCOUNTER — HOSPITAL ENCOUNTER (INPATIENT)
Facility: MEDICAL CENTER | Age: 69
LOS: 13 days | DRG: 637 | End: 2019-06-22
Attending: EMERGENCY MEDICINE | Admitting: INTERNAL MEDICINE
Payer: MEDICARE

## 2019-06-09 ENCOUNTER — HOSPITAL ENCOUNTER (OUTPATIENT)
Dept: RADIOLOGY | Facility: MEDICAL CENTER | Age: 69
End: 2019-06-09

## 2019-06-09 DIAGNOSIS — E86.0 DEHYDRATION: ICD-10-CM

## 2019-06-09 DIAGNOSIS — R11.2 INTRACTABLE VOMITING WITH NAUSEA, UNSPECIFIED VOMITING TYPE: ICD-10-CM

## 2019-06-09 DIAGNOSIS — R19.7 DIARRHEA, UNSPECIFIED TYPE: ICD-10-CM

## 2019-06-09 DIAGNOSIS — D62 ACUTE BLOOD LOSS ANEMIA: ICD-10-CM

## 2019-06-09 DIAGNOSIS — E11.10 DIABETIC KETOACIDOSIS WITHOUT COMA ASSOCIATED WITH TYPE 2 DIABETES MELLITUS (HCC): ICD-10-CM

## 2019-06-09 DIAGNOSIS — E11.65 TYPE 2 DIABETES MELLITUS WITH HYPERGLYCEMIA, UNSPECIFIED WHETHER LONG TERM INSULIN USE (HCC): ICD-10-CM

## 2019-06-09 DIAGNOSIS — R79.89 ELEVATED TROPONIN: ICD-10-CM

## 2019-06-09 DIAGNOSIS — Z79.4 TYPE 2 DIABETES MELLITUS WITH HYPERGLYCEMIA, WITH LONG-TERM CURRENT USE OF INSULIN (HCC): ICD-10-CM

## 2019-06-09 DIAGNOSIS — E87.29 HIGH ANION GAP METABOLIC ACIDOSIS: ICD-10-CM

## 2019-06-09 DIAGNOSIS — I10 ESSENTIAL HYPERTENSION: ICD-10-CM

## 2019-06-09 DIAGNOSIS — K63.89 POLYP OF ILEUM: ICD-10-CM

## 2019-06-09 DIAGNOSIS — E11.65 TYPE 2 DIABETES MELLITUS WITH HYPERGLYCEMIA, WITH LONG-TERM CURRENT USE OF INSULIN (HCC): ICD-10-CM

## 2019-06-09 DIAGNOSIS — I25.9: ICD-10-CM

## 2019-06-09 DIAGNOSIS — I21.4 NSTEMI (NON-ST ELEVATED MYOCARDIAL INFARCTION) (HCC): ICD-10-CM

## 2019-06-09 PROBLEM — K52.9 COLITIS: Status: ACTIVE | Noted: 2019-06-09

## 2019-06-09 PROBLEM — I24.89 DEMAND ISCHEMIA OF MYOCARDIUM (HCC): Status: ACTIVE | Noted: 2019-06-09

## 2019-06-09 LAB
ALBUMIN SERPL BCP-MCNC: 3.8 G/DL (ref 3.2–4.9)
ALBUMIN/GLOB SERPL: 1.8 G/DL
ALP SERPL-CCNC: 43 U/L (ref 30–99)
ALT SERPL-CCNC: 10 U/L (ref 2–50)
AMPHET UR QL SCN: NEGATIVE
ANION GAP SERPL CALC-SCNC: 24 MMOL/L (ref 0–11.9)
APPEARANCE UR: CLEAR
AST SERPL-CCNC: 19 U/L (ref 12–45)
B-OH-BUTYR SERPL-MCNC: >8 MMOL/L (ref 0.02–0.27)
BACTERIA #/AREA URNS HPF: NEGATIVE /HPF
BARBITURATES UR QL SCN: NEGATIVE
BASOPHILS # BLD AUTO: 0.2 % (ref 0–1.8)
BASOPHILS # BLD: 0.03 K/UL (ref 0–0.12)
BENZODIAZ UR QL SCN: NEGATIVE
BILIRUB SERPL-MCNC: 0.5 MG/DL (ref 0.1–1.5)
BILIRUB UR QL STRIP.AUTO: NEGATIVE
BUN SERPL-MCNC: 19 MG/DL (ref 8–22)
BZE UR QL SCN: NEGATIVE
CALCIUM SERPL-MCNC: 8.2 MG/DL (ref 8.5–10.5)
CANNABINOIDS UR QL SCN: NEGATIVE
CHLORIDE SERPL-SCNC: 108 MMOL/L (ref 96–112)
CO2 SERPL-SCNC: 9 MMOL/L (ref 20–33)
COLOR UR: YELLOW
CREAT SERPL-MCNC: 0.99 MG/DL (ref 0.5–1.4)
CRP SERPL HS-MCNC: 0.14 MG/DL (ref 0–0.75)
EKG IMPRESSION: NORMAL
EOSINOPHIL # BLD AUTO: 0.01 K/UL (ref 0–0.51)
EOSINOPHIL NFR BLD: 0.1 % (ref 0–6.9)
EPI CELLS #/AREA URNS HPF: NEGATIVE /HPF
ERYTHROCYTE [DISTWIDTH] IN BLOOD BY AUTOMATED COUNT: 48.6 FL (ref 35.9–50)
ERYTHROCYTE [SEDIMENTATION RATE] IN BLOOD BY WESTERGREN METHOD: 1 MM/HOUR (ref 0–20)
ETHANOL BLD-MCNC: 0.01 G/DL
GLOBULIN SER CALC-MCNC: 2.1 G/DL (ref 1.9–3.5)
GLUCOSE SERPL-MCNC: 296 MG/DL (ref 65–99)
GLUCOSE UR STRIP.AUTO-MCNC: 500 MG/DL
HCT VFR BLD AUTO: 31.6 % (ref 42–52)
HGB BLD-MCNC: 10.3 G/DL (ref 14–18)
HYALINE CASTS #/AREA URNS LPF: ABNORMAL /LPF
IMM GRANULOCYTES # BLD AUTO: 0.1 K/UL (ref 0–0.11)
IMM GRANULOCYTES NFR BLD AUTO: 0.5 % (ref 0–0.9)
KETONES UR STRIP.AUTO-MCNC: >=160 MG/DL
LACTATE BLD-SCNC: 1.6 MMOL/L (ref 0.5–2)
LEUKOCYTE ESTERASE UR QL STRIP.AUTO: NEGATIVE
LYMPHOCYTES # BLD AUTO: 1.39 K/UL (ref 1–4.8)
LYMPHOCYTES NFR BLD: 7.6 % (ref 22–41)
MAGNESIUM SERPL-MCNC: 1.6 MG/DL (ref 1.5–2.5)
MCH RBC QN AUTO: 33.9 PG (ref 27–33)
MCHC RBC AUTO-ENTMCNC: 32.6 G/DL (ref 33.7–35.3)
MCV RBC AUTO: 103.9 FL (ref 81.4–97.8)
METHADONE UR QL SCN: NEGATIVE
MICRO URNS: ABNORMAL
MONOCYTES # BLD AUTO: 0.76 K/UL (ref 0–0.85)
MONOCYTES NFR BLD AUTO: 4.2 % (ref 0–13.4)
NEUTROPHILS # BLD AUTO: 15.96 K/UL (ref 1.82–7.42)
NEUTROPHILS NFR BLD: 87.4 % (ref 44–72)
NITRITE UR QL STRIP.AUTO: NEGATIVE
NRBC # BLD AUTO: 0 K/UL
NRBC BLD-RTO: 0 /100 WBC
OPIATES UR QL SCN: POSITIVE
OXYCODONE UR QL SCN: NEGATIVE
PCP UR QL SCN: NEGATIVE
PH UR STRIP.AUTO: 5 [PH]
PLATELET # BLD AUTO: 198 K/UL (ref 164–446)
PMV BLD AUTO: 8.8 FL (ref 9–12.9)
POTASSIUM SERPL-SCNC: 4.6 MMOL/L (ref 3.6–5.5)
PREALB SERPL-MCNC: 28 MG/DL (ref 18–38)
PROPOXYPH UR QL SCN: NEGATIVE
PROT SERPL-MCNC: 5.9 G/DL (ref 6–8.2)
PROT UR QL STRIP: 30 MG/DL
RBC # BLD AUTO: 3.04 M/UL (ref 4.7–6.1)
RBC # URNS HPF: ABNORMAL /HPF
RBC UR QL AUTO: ABNORMAL
SODIUM SERPL-SCNC: 141 MMOL/L (ref 135–145)
SP GR UR STRIP.AUTO: 1.04
TROPONIN I SERPL-MCNC: 1.58 NG/ML (ref 0–0.04)
UROBILINOGEN UR STRIP.AUTO-MCNC: 0.2 MG/DL
WBC # BLD AUTO: 18.3 K/UL (ref 4.8–10.8)
WBC #/AREA URNS HPF: ABNORMAL /HPF

## 2019-06-09 PROCEDURE — 700105 HCHG RX REV CODE 258: Performed by: STUDENT IN AN ORGANIZED HEALTH CARE EDUCATION/TRAINING PROGRAM

## 2019-06-09 PROCEDURE — 770020 HCHG ROOM/CARE - TELE (206)

## 2019-06-09 PROCEDURE — 93005 ELECTROCARDIOGRAM TRACING: CPT | Performed by: EMERGENCY MEDICINE

## 2019-06-09 PROCEDURE — 82550 ASSAY OF CK (CPK): CPT

## 2019-06-09 PROCEDURE — 80307 DRUG TEST PRSMV CHEM ANLYZR: CPT

## 2019-06-09 PROCEDURE — 83880 ASSAY OF NATRIURETIC PEPTIDE: CPT

## 2019-06-09 PROCEDURE — 85652 RBC SED RATE AUTOMATED: CPT

## 2019-06-09 PROCEDURE — 85025 COMPLETE CBC W/AUTO DIFF WBC: CPT

## 2019-06-09 PROCEDURE — 83605 ASSAY OF LACTIC ACID: CPT

## 2019-06-09 PROCEDURE — 700111 HCHG RX REV CODE 636 W/ 250 OVERRIDE (IP): Performed by: STUDENT IN AN ORGANIZED HEALTH CARE EDUCATION/TRAINING PROGRAM

## 2019-06-09 PROCEDURE — 94760 N-INVAS EAR/PLS OXIMETRY 1: CPT

## 2019-06-09 PROCEDURE — 84134 ASSAY OF PREALBUMIN: CPT

## 2019-06-09 PROCEDURE — 84100 ASSAY OF PHOSPHORUS: CPT

## 2019-06-09 PROCEDURE — 86140 C-REACTIVE PROTEIN: CPT

## 2019-06-09 PROCEDURE — 82010 KETONE BODYS QUAN: CPT

## 2019-06-09 PROCEDURE — 84484 ASSAY OF TROPONIN QUANT: CPT

## 2019-06-09 PROCEDURE — 83735 ASSAY OF MAGNESIUM: CPT

## 2019-06-09 PROCEDURE — 36415 COLL VENOUS BLD VENIPUNCTURE: CPT

## 2019-06-09 PROCEDURE — 81001 URINALYSIS AUTO W/SCOPE: CPT

## 2019-06-09 PROCEDURE — 96374 THER/PROPH/DIAG INJ IV PUSH: CPT

## 2019-06-09 PROCEDURE — 99285 EMERGENCY DEPT VISIT HI MDM: CPT

## 2019-06-09 PROCEDURE — 80053 COMPREHEN METABOLIC PANEL: CPT

## 2019-06-09 PROCEDURE — 82803 BLOOD GASES ANY COMBINATION: CPT

## 2019-06-09 RX ORDER — METOCLOPRAMIDE HYDROCHLORIDE 5 MG/5ML
10 SOLUTION ORAL 4 TIMES DAILY PRN
Status: DISCONTINUED | OUTPATIENT
Start: 2019-06-09 | End: 2019-06-10

## 2019-06-09 RX ORDER — HYDROMORPHONE HYDROCHLORIDE 1 MG/ML
0.5 INJECTION, SOLUTION INTRAMUSCULAR; INTRAVENOUS; SUBCUTANEOUS ONCE
Status: COMPLETED | OUTPATIENT
Start: 2019-06-09 | End: 2019-06-09

## 2019-06-09 RX ORDER — POLYETHYLENE GLYCOL 3350 17 G/17G
1 POWDER, FOR SOLUTION ORAL
Status: DISCONTINUED | OUTPATIENT
Start: 2019-06-09 | End: 2019-06-09 | Stop reason: SINTOL

## 2019-06-09 RX ORDER — ATORVASTATIN CALCIUM 80 MG/1
80 TABLET, FILM COATED ORAL EVERY EVENING
Status: DISCONTINUED | OUTPATIENT
Start: 2019-06-10 | End: 2019-06-09

## 2019-06-09 RX ORDER — SODIUM CHLORIDE 9 MG/ML
1000 INJECTION, SOLUTION INTRAVENOUS ONCE
Status: COMPLETED | OUTPATIENT
Start: 2019-06-09 | End: 2019-06-09

## 2019-06-09 RX ORDER — LISINOPRIL AND HYDROCHLOROTHIAZIDE 12.5; 1 MG/1; MG/1
1 TABLET ORAL DAILY
Status: ON HOLD | COMMUNITY
End: 2019-06-11

## 2019-06-09 RX ORDER — CLOPIDOGREL BISULFATE 75 MG/1
75 TABLET ORAL DAILY
Status: DISCONTINUED | OUTPATIENT
Start: 2019-06-10 | End: 2019-06-10

## 2019-06-09 RX ORDER — ENALAPRILAT 1.25 MG/ML
1.25 INJECTION INTRAVENOUS EVERY 6 HOURS PRN
Status: DISCONTINUED | OUTPATIENT
Start: 2019-06-09 | End: 2019-06-22 | Stop reason: HOSPADM

## 2019-06-09 RX ORDER — AMOXICILLIN 250 MG
2 CAPSULE ORAL 2 TIMES DAILY PRN
Status: DISCONTINUED | OUTPATIENT
Start: 2019-06-09 | End: 2019-06-09 | Stop reason: SINTOL

## 2019-06-09 RX ORDER — SIMVASTATIN 40 MG
40 TABLET ORAL DAILY
Status: ON HOLD | COMMUNITY
End: 2019-06-11

## 2019-06-09 RX ORDER — ONDANSETRON 4 MG/1
4 TABLET, ORALLY DISINTEGRATING ORAL EVERY 4 HOURS PRN
Status: DISCONTINUED | OUTPATIENT
Start: 2019-06-09 | End: 2019-06-22 | Stop reason: HOSPADM

## 2019-06-09 RX ORDER — NITROGLYCERIN 0.4 MG/1
0.4 TABLET SUBLINGUAL
Status: DISCONTINUED | OUTPATIENT
Start: 2019-06-09 | End: 2019-06-22 | Stop reason: HOSPADM

## 2019-06-09 RX ORDER — TAMSULOSIN HYDROCHLORIDE 0.4 MG/1
0.4 CAPSULE ORAL
Status: DISCONTINUED | OUTPATIENT
Start: 2019-06-10 | End: 2019-06-22 | Stop reason: HOSPADM

## 2019-06-09 RX ORDER — OMEPRAZOLE 20 MG/1
20 CAPSULE, DELAYED RELEASE ORAL DAILY
Status: DISCONTINUED | OUTPATIENT
Start: 2019-06-10 | End: 2019-06-10

## 2019-06-09 RX ORDER — GLIPIZIDE 2.5 MG/1
2.5 TABLET, EXTENDED RELEASE ORAL 2 TIMES DAILY
Status: ON HOLD | COMMUNITY
End: 2019-06-11

## 2019-06-09 RX ORDER — ASPIRIN 81 MG/1
324 TABLET, CHEWABLE ORAL DAILY
Status: DISCONTINUED | OUTPATIENT
Start: 2019-06-10 | End: 2019-06-09

## 2019-06-09 RX ORDER — MORPHINE SULFATE 4 MG/ML
2-4 INJECTION, SOLUTION INTRAMUSCULAR; INTRAVENOUS
Status: DISCONTINUED | OUTPATIENT
Start: 2019-06-09 | End: 2019-06-10

## 2019-06-09 RX ORDER — ASPIRIN 325 MG
325 TABLET ORAL DAILY
Status: DISCONTINUED | OUTPATIENT
Start: 2019-06-10 | End: 2019-06-09

## 2019-06-09 RX ORDER — MORPHINE SULFATE 4 MG/ML
2-4 INJECTION, SOLUTION INTRAMUSCULAR; INTRAVENOUS
Status: DISCONTINUED | OUTPATIENT
Start: 2019-06-09 | End: 2019-06-22 | Stop reason: HOSPADM

## 2019-06-09 RX ORDER — ASPIRIN 300 MG/1
300 SUPPOSITORY RECTAL DAILY
Status: DISCONTINUED | OUTPATIENT
Start: 2019-06-10 | End: 2019-06-09

## 2019-06-09 RX ORDER — ATORVASTATIN CALCIUM 80 MG/1
80 TABLET, FILM COATED ORAL EVERY EVENING
Status: DISCONTINUED | OUTPATIENT
Start: 2019-06-10 | End: 2019-06-22 | Stop reason: HOSPADM

## 2019-06-09 RX ORDER — ACETAMINOPHEN 325 MG/1
650 TABLET ORAL EVERY 6 HOURS PRN
Status: DISCONTINUED | OUTPATIENT
Start: 2019-06-09 | End: 2019-06-22 | Stop reason: HOSPADM

## 2019-06-09 RX ORDER — SODIUM CHLORIDE 9 MG/ML
INJECTION, SOLUTION INTRAVENOUS CONTINUOUS
Status: DISCONTINUED | OUTPATIENT
Start: 2019-06-09 | End: 2019-06-10

## 2019-06-09 RX ORDER — ATORVASTATIN CALCIUM 20 MG/1
40 TABLET, FILM COATED ORAL EVERY EVENING
Status: DISCONTINUED | OUTPATIENT
Start: 2019-06-09 | End: 2019-06-09

## 2019-06-09 RX ORDER — METOPROLOL TARTRATE 1 MG/ML
5 INJECTION, SOLUTION INTRAVENOUS ONCE
Status: DISCONTINUED | OUTPATIENT
Start: 2019-06-09 | End: 2019-06-10

## 2019-06-09 RX ORDER — BISACODYL 10 MG
10 SUPPOSITORY, RECTAL RECTAL
Status: DISCONTINUED | OUTPATIENT
Start: 2019-06-09 | End: 2019-06-09 | Stop reason: SINTOL

## 2019-06-09 RX ORDER — PANTOPRAZOLE SODIUM 40 MG/1
40 TABLET, DELAYED RELEASE ORAL 2 TIMES DAILY
Status: ON HOLD | COMMUNITY
End: 2019-06-11

## 2019-06-09 RX ORDER — PROMETHAZINE HYDROCHLORIDE 6.25 MG/5ML
25 SYRUP ORAL EVERY 4 HOURS PRN
Status: DISCONTINUED | OUTPATIENT
Start: 2019-06-09 | End: 2019-06-10

## 2019-06-09 RX ORDER — ONDANSETRON 2 MG/ML
4 INJECTION INTRAMUSCULAR; INTRAVENOUS EVERY 4 HOURS PRN
Status: DISCONTINUED | OUTPATIENT
Start: 2019-06-09 | End: 2019-06-10

## 2019-06-09 RX ADMIN — SODIUM CHLORIDE 1000 ML: 9 INJECTION, SOLUTION INTRAVENOUS at 22:56

## 2019-06-09 RX ADMIN — HYDROMORPHONE HYDROCHLORIDE 0.5 MG: 1 INJECTION, SOLUTION INTRAMUSCULAR; INTRAVENOUS; SUBCUTANEOUS at 22:55

## 2019-06-09 ASSESSMENT — LIFESTYLE VARIABLES: DO YOU DRINK ALCOHOL: NO

## 2019-06-10 ENCOUNTER — APPOINTMENT (OUTPATIENT)
Dept: RADIOLOGY | Facility: MEDICAL CENTER | Age: 69
DRG: 637 | End: 2019-06-10
Attending: STUDENT IN AN ORGANIZED HEALTH CARE EDUCATION/TRAINING PROGRAM
Payer: MEDICARE

## 2019-06-10 ENCOUNTER — APPOINTMENT (OUTPATIENT)
Dept: CARDIOLOGY | Facility: MEDICAL CENTER | Age: 69
DRG: 637 | End: 2019-06-10
Attending: STUDENT IN AN ORGANIZED HEALTH CARE EDUCATION/TRAINING PROGRAM
Payer: MEDICARE

## 2019-06-10 PROBLEM — R10.84 GENERALIZED ABDOMINAL PAIN: Status: ACTIVE | Noted: 2019-06-10

## 2019-06-10 PROBLEM — E11.10 DIABETIC KETOACIDOSIS WITHOUT COMA ASSOCIATED WITH TYPE 2 DIABETES MELLITUS (HCC): Status: ACTIVE | Noted: 2019-06-10

## 2019-06-10 PROBLEM — D53.9 MACROCYTIC ANEMIA: Status: ACTIVE | Noted: 2018-03-23

## 2019-06-10 PROBLEM — Z79.4 TYPE 2 DIABETES MELLITUS WITH HYPERGLYCEMIA, WITH LONG-TERM CURRENT USE OF INSULIN (HCC): Status: ACTIVE | Noted: 2018-03-22

## 2019-06-10 PROBLEM — D72.829 LEUKOCYTOSIS: Status: ACTIVE | Noted: 2019-06-10

## 2019-06-10 LAB
ANION GAP SERPL CALC-SCNC: 11 MMOL/L (ref 0–11.9)
ANION GAP SERPL CALC-SCNC: 20 MMOL/L (ref 0–11.9)
ANION GAP SERPL CALC-SCNC: 7 MMOL/L (ref 0–11.9)
ANION GAP SERPL CALC-SCNC: 7 MMOL/L (ref 0–11.9)
ANION GAP SERPL CALC-SCNC: 9 MMOL/L (ref 0–11.9)
APTT PPP: 35 SEC (ref 24.7–36)
APTT PPP: >240 SEC (ref 24.7–36)
BASE EXCESS BLDA CALC-SCNC: -19 MMOL/L (ref -4–3)
BASOPHILS # BLD AUTO: 0.1 % (ref 0–1.8)
BASOPHILS # BLD: 0.01 K/UL (ref 0–0.12)
BNP SERPL-MCNC: 482 PG/ML (ref 0–100)
BODY TEMPERATURE: ABNORMAL CENTIGRADE
BUN SERPL-MCNC: 19 MG/DL (ref 8–22)
BUN SERPL-MCNC: 21 MG/DL (ref 8–22)
BUN SERPL-MCNC: 24 MG/DL (ref 8–22)
BUN SERPL-MCNC: 27 MG/DL (ref 8–22)
BUN SERPL-MCNC: 29 MG/DL (ref 8–22)
CALCIUM SERPL-MCNC: 7.6 MG/DL (ref 8.5–10.5)
CALCIUM SERPL-MCNC: 7.7 MG/DL (ref 8.5–10.5)
CALCIUM SERPL-MCNC: 7.9 MG/DL (ref 8.5–10.5)
CHLORIDE SERPL-SCNC: 111 MMOL/L (ref 96–112)
CHLORIDE SERPL-SCNC: 111 MMOL/L (ref 96–112)
CHLORIDE SERPL-SCNC: 114 MMOL/L (ref 96–112)
CHLORIDE SERPL-SCNC: 114 MMOL/L (ref 96–112)
CHLORIDE SERPL-SCNC: 115 MMOL/L (ref 96–112)
CK SERPL-CCNC: 115 U/L (ref 0–154)
CO2 SERPL-SCNC: 10 MMOL/L (ref 20–33)
CO2 SERPL-SCNC: 13 MMOL/L (ref 20–33)
CO2 SERPL-SCNC: 15 MMOL/L (ref 20–33)
CO2 SERPL-SCNC: 16 MMOL/L (ref 20–33)
CO2 SERPL-SCNC: 16 MMOL/L (ref 20–33)
CREAT SERPL-MCNC: 0.93 MG/DL (ref 0.5–1.4)
CREAT SERPL-MCNC: 0.94 MG/DL (ref 0.5–1.4)
CREAT SERPL-MCNC: 0.96 MG/DL (ref 0.5–1.4)
CREAT SERPL-MCNC: 1.01 MG/DL (ref 0.5–1.4)
CREAT SERPL-MCNC: 1.01 MG/DL (ref 0.5–1.4)
D DIMER PPP IA.FEU-MCNC: 1.49 UG/ML (FEU) (ref 0–0.5)
EKG IMPRESSION: NORMAL
EKG IMPRESSION: NORMAL
EOSINOPHIL # BLD AUTO: 0 K/UL (ref 0–0.51)
EOSINOPHIL NFR BLD: 0 % (ref 0–6.9)
ERYTHROCYTE [DISTWIDTH] IN BLOOD BY AUTOMATED COUNT: 48.2 FL (ref 35.9–50)
EST. AVERAGE GLUCOSE BLD GHB EST-MCNC: 151 MG/DL
FERRITIN SERPL-MCNC: 434.7 NG/ML (ref 22–322)
FOLATE SERPL-MCNC: >23.8 NG/ML
GLUCOSE BLD-MCNC: 105 MG/DL (ref 65–99)
GLUCOSE BLD-MCNC: 121 MG/DL (ref 65–99)
GLUCOSE BLD-MCNC: 135 MG/DL (ref 65–99)
GLUCOSE BLD-MCNC: 147 MG/DL (ref 65–99)
GLUCOSE BLD-MCNC: 154 MG/DL (ref 65–99)
GLUCOSE BLD-MCNC: 163 MG/DL (ref 65–99)
GLUCOSE BLD-MCNC: 166 MG/DL (ref 65–99)
GLUCOSE BLD-MCNC: 169 MG/DL (ref 65–99)
GLUCOSE BLD-MCNC: 190 MG/DL (ref 65–99)
GLUCOSE BLD-MCNC: 190 MG/DL (ref 65–99)
GLUCOSE BLD-MCNC: 217 MG/DL (ref 65–99)
GLUCOSE BLD-MCNC: 260 MG/DL (ref 65–99)
GLUCOSE BLD-MCNC: 310 MG/DL (ref 65–99)
GLUCOSE BLD-MCNC: 66 MG/DL (ref 65–99)
GLUCOSE SERPL-MCNC: 145 MG/DL (ref 65–99)
GLUCOSE SERPL-MCNC: 163 MG/DL (ref 65–99)
GLUCOSE SERPL-MCNC: 180 MG/DL (ref 65–99)
GLUCOSE SERPL-MCNC: 224 MG/DL (ref 65–99)
GLUCOSE SERPL-MCNC: 278 MG/DL (ref 65–99)
HBA1C MFR BLD: 6.9 % (ref 0–5.6)
HCO3 BLDA-SCNC: 8 MMOL/L (ref 17–25)
HCT VFR BLD AUTO: 24.3 % (ref 42–52)
HGB BLD-MCNC: 8.2 G/DL (ref 14–18)
IMM GRANULOCYTES # BLD AUTO: 0.1 K/UL (ref 0–0.11)
IMM GRANULOCYTES NFR BLD AUTO: 0.7 % (ref 0–0.9)
IRON SATN MFR SERPL: 26 % (ref 15–55)
IRON SERPL-MCNC: 64 UG/DL (ref 50–180)
LV EJECT FRACT  99904: 60
LV EJECT FRACT MOD 2C 99903: 52.87
LV EJECT FRACT MOD 4C 99902: 61.71
LV EJECT FRACT MOD BP 99901: 61.37
LYMPHOCYTES # BLD AUTO: 1.06 K/UL (ref 1–4.8)
LYMPHOCYTES NFR BLD: 7.3 % (ref 22–41)
MAGNESIUM SERPL-MCNC: 2.4 MG/DL (ref 1.5–2.5)
MAGNESIUM SERPL-MCNC: 2.7 MG/DL (ref 1.5–2.5)
MCH RBC QN AUTO: 34 PG (ref 27–33)
MCHC RBC AUTO-ENTMCNC: 33.6 G/DL (ref 33.7–35.3)
MCV RBC AUTO: 101.3 FL (ref 81.4–97.8)
MONOCYTES # BLD AUTO: 1.39 K/UL (ref 0–0.85)
MONOCYTES NFR BLD AUTO: 9.5 % (ref 0–13.4)
NEUTROPHILS # BLD AUTO: 12.02 K/UL (ref 1.82–7.42)
NEUTROPHILS NFR BLD: 82.4 % (ref 44–72)
NRBC # BLD AUTO: 0 K/UL
NRBC BLD-RTO: 0 /100 WBC
PCO2 BLDA: 21.4 MMHG (ref 26–37)
PH BLDA: 7.19 [PH] (ref 7.4–7.5)
PHOSPHATE SERPL-MCNC: 2 MG/DL (ref 2.5–4.5)
PHOSPHATE SERPL-MCNC: 2.7 MG/DL (ref 2.5–4.5)
PHOSPHATE SERPL-MCNC: 3.8 MG/DL (ref 2.5–4.5)
PLATELET # BLD AUTO: 147 K/UL (ref 164–446)
PMV BLD AUTO: 8.5 FL (ref 9–12.9)
PO2 BLDA: 82.2 MMHG (ref 64–87)
POTASSIUM SERPL-SCNC: 4.3 MMOL/L (ref 3.6–5.5)
POTASSIUM SERPL-SCNC: 4.5 MMOL/L (ref 3.6–5.5)
POTASSIUM SERPL-SCNC: 4.5 MMOL/L (ref 3.6–5.5)
POTASSIUM SERPL-SCNC: 4.8 MMOL/L (ref 3.6–5.5)
POTASSIUM SERPL-SCNC: 4.8 MMOL/L (ref 3.6–5.5)
RBC # BLD AUTO: 2.38 M/UL (ref 4.7–6.1)
SAO2 % BLDA: 93.2 % (ref 93–99)
SODIUM SERPL-SCNC: 135 MMOL/L (ref 135–145)
SODIUM SERPL-SCNC: 137 MMOL/L (ref 135–145)
SODIUM SERPL-SCNC: 137 MMOL/L (ref 135–145)
SODIUM SERPL-SCNC: 139 MMOL/L (ref 135–145)
SODIUM SERPL-SCNC: 141 MMOL/L (ref 135–145)
TIBC SERPL-MCNC: 249 UG/DL (ref 250–450)
TROPONIN I SERPL-MCNC: 20.96 NG/ML (ref 0–0.04)
TROPONIN I SERPL-MCNC: 29.99 NG/ML (ref 0–0.04)
TROPONIN I SERPL-MCNC: 3.09 NG/ML (ref 0–0.04)
TROPONIN I SERPL-MCNC: 30.99 NG/ML (ref 0–0.04)
VIT B12 SERPL-MCNC: 203 PG/ML (ref 211–911)
WBC # BLD AUTO: 14.6 K/UL (ref 4.8–10.8)

## 2019-06-10 PROCEDURE — 700105 HCHG RX REV CODE 258: Performed by: STUDENT IN AN ORGANIZED HEALTH CARE EDUCATION/TRAINING PROGRAM

## 2019-06-10 PROCEDURE — 93306 TTE W/DOPPLER COMPLETE: CPT | Mod: 26 | Performed by: INTERNAL MEDICINE

## 2019-06-10 PROCEDURE — 93005 ELECTROCARDIOGRAM TRACING: CPT | Performed by: STUDENT IN AN ORGANIZED HEALTH CARE EDUCATION/TRAINING PROGRAM

## 2019-06-10 PROCEDURE — A9270 NON-COVERED ITEM OR SERVICE: HCPCS | Performed by: STUDENT IN AN ORGANIZED HEALTH CARE EDUCATION/TRAINING PROGRAM

## 2019-06-10 PROCEDURE — 85730 THROMBOPLASTIN TIME PARTIAL: CPT

## 2019-06-10 PROCEDURE — 700101 HCHG RX REV CODE 250: Performed by: STUDENT IN AN ORGANIZED HEALTH CARE EDUCATION/TRAINING PROGRAM

## 2019-06-10 PROCEDURE — 99291 CRITICAL CARE FIRST HOUR: CPT | Mod: GC | Performed by: INTERNAL MEDICINE

## 2019-06-10 PROCEDURE — 700105 HCHG RX REV CODE 258

## 2019-06-10 PROCEDURE — 93010 ELECTROCARDIOGRAM REPORT: CPT | Performed by: INTERNAL MEDICINE

## 2019-06-10 PROCEDURE — 83550 IRON BINDING TEST: CPT

## 2019-06-10 PROCEDURE — 82607 VITAMIN B-12: CPT

## 2019-06-10 PROCEDURE — 700111 HCHG RX REV CODE 636 W/ 250 OVERRIDE (IP): Performed by: STUDENT IN AN ORGANIZED HEALTH CARE EDUCATION/TRAINING PROGRAM

## 2019-06-10 PROCEDURE — 82962 GLUCOSE BLOOD TEST: CPT | Mod: 91

## 2019-06-10 PROCEDURE — 82746 ASSAY OF FOLIC ACID SERUM: CPT

## 2019-06-10 PROCEDURE — 770022 HCHG ROOM/CARE - ICU (200)

## 2019-06-10 PROCEDURE — 700111 HCHG RX REV CODE 636 W/ 250 OVERRIDE (IP): Performed by: INTERNAL MEDICINE

## 2019-06-10 PROCEDURE — 84100 ASSAY OF PHOSPHORUS: CPT | Mod: 91

## 2019-06-10 PROCEDURE — 83036 HEMOGLOBIN GLYCOSYLATED A1C: CPT

## 2019-06-10 PROCEDURE — 700102 HCHG RX REV CODE 250 W/ 637 OVERRIDE(OP): Performed by: STUDENT IN AN ORGANIZED HEALTH CARE EDUCATION/TRAINING PROGRAM

## 2019-06-10 PROCEDURE — 700105 HCHG RX REV CODE 258: Performed by: INTERNAL MEDICINE

## 2019-06-10 PROCEDURE — 84484 ASSAY OF TROPONIN QUANT: CPT

## 2019-06-10 PROCEDURE — 83540 ASSAY OF IRON: CPT

## 2019-06-10 PROCEDURE — 85379 FIBRIN DEGRADATION QUANT: CPT

## 2019-06-10 PROCEDURE — 51798 US URINE CAPACITY MEASURE: CPT

## 2019-06-10 PROCEDURE — A9270 NON-COVERED ITEM OR SERVICE: HCPCS | Performed by: INTERNAL MEDICINE

## 2019-06-10 PROCEDURE — 99292 CRITICAL CARE ADDL 30 MIN: CPT | Performed by: INTERNAL MEDICINE

## 2019-06-10 PROCEDURE — 80048 BASIC METABOLIC PNL TOTAL CA: CPT | Mod: 91

## 2019-06-10 PROCEDURE — 700101 HCHG RX REV CODE 250: Performed by: INTERNAL MEDICINE

## 2019-06-10 PROCEDURE — 83735 ASSAY OF MAGNESIUM: CPT

## 2019-06-10 PROCEDURE — 71045 X-RAY EXAM CHEST 1 VIEW: CPT

## 2019-06-10 PROCEDURE — 82728 ASSAY OF FERRITIN: CPT

## 2019-06-10 PROCEDURE — 85025 COMPLETE CBC W/AUTO DIFF WBC: CPT

## 2019-06-10 PROCEDURE — C9113 INJ PANTOPRAZOLE SODIUM, VIA: HCPCS | Performed by: STUDENT IN AN ORGANIZED HEALTH CARE EDUCATION/TRAINING PROGRAM

## 2019-06-10 PROCEDURE — 99223 1ST HOSP IP/OBS HIGH 75: CPT | Performed by: INTERNAL MEDICINE

## 2019-06-10 PROCEDURE — 93005 ELECTROCARDIOGRAM TRACING: CPT | Performed by: INTERNAL MEDICINE

## 2019-06-10 PROCEDURE — 93306 TTE W/DOPPLER COMPLETE: CPT

## 2019-06-10 PROCEDURE — 700102 HCHG RX REV CODE 250 W/ 637 OVERRIDE(OP): Performed by: INTERNAL MEDICINE

## 2019-06-10 RX ORDER — DEXTROSE AND SODIUM CHLORIDE 5; .9 G/100ML; G/100ML
INJECTION, SOLUTION INTRAVENOUS CONTINUOUS
Status: DISCONTINUED | OUTPATIENT
Start: 2019-06-10 | End: 2019-06-10

## 2019-06-10 RX ORDER — POTASSIUM CHLORIDE 7.45 MG/ML
10 INJECTION INTRAVENOUS ONCE
Status: COMPLETED | OUTPATIENT
Start: 2019-06-10 | End: 2019-06-11

## 2019-06-10 RX ORDER — METOCLOPRAMIDE HYDROCHLORIDE 5 MG/ML
5 INJECTION INTRAMUSCULAR; INTRAVENOUS EVERY 6 HOURS PRN
Status: DISCONTINUED | OUTPATIENT
Start: 2019-06-10 | End: 2019-06-16

## 2019-06-10 RX ORDER — HEPARIN SODIUM 1000 [USP'U]/ML
2200 INJECTION, SOLUTION INTRAVENOUS; SUBCUTANEOUS PRN
Status: DISCONTINUED | OUTPATIENT
Start: 2019-06-10 | End: 2019-06-10

## 2019-06-10 RX ORDER — SODIUM CHLORIDE 9 MG/ML
1000 INJECTION, SOLUTION INTRAVENOUS ONCE
Status: COMPLETED | OUTPATIENT
Start: 2019-06-10 | End: 2019-06-10

## 2019-06-10 RX ORDER — DEXTROSE AND SODIUM CHLORIDE 10; .45 G/100ML; G/100ML
INJECTION, SOLUTION INTRAVENOUS CONTINUOUS
Status: DISCONTINUED | OUTPATIENT
Start: 2019-06-10 | End: 2019-06-11

## 2019-06-10 RX ORDER — HEPARIN SODIUM 1000 [USP'U]/ML
4000 INJECTION, SOLUTION INTRAVENOUS; SUBCUTANEOUS ONCE
Status: COMPLETED | OUTPATIENT
Start: 2019-06-10 | End: 2019-06-10

## 2019-06-10 RX ORDER — SODIUM CHLORIDE 9 MG/ML
INJECTION, SOLUTION INTRAVENOUS CONTINUOUS
Status: DISCONTINUED | OUTPATIENT
Start: 2019-06-10 | End: 2019-06-10

## 2019-06-10 RX ORDER — SODIUM CHLORIDE, SODIUM LACTATE, POTASSIUM CHLORIDE, AND CALCIUM CHLORIDE .6; .31; .03; .02 G/100ML; G/100ML; G/100ML; G/100ML
1000 INJECTION, SOLUTION INTRAVENOUS ONCE
Status: COMPLETED | OUTPATIENT
Start: 2019-06-11 | End: 2019-06-11

## 2019-06-10 RX ORDER — PANTOPRAZOLE SODIUM 40 MG/10ML
40 INJECTION, POWDER, LYOPHILIZED, FOR SOLUTION INTRAVENOUS 2 TIMES DAILY
Status: DISCONTINUED | OUTPATIENT
Start: 2019-06-10 | End: 2019-06-11

## 2019-06-10 RX ORDER — DEXTROSE AND SODIUM CHLORIDE 10; .45 G/100ML; G/100ML
INJECTION, SOLUTION INTRAVENOUS CONTINUOUS
Status: DISCONTINUED | OUTPATIENT
Start: 2019-06-10 | End: 2019-06-10

## 2019-06-10 RX ORDER — POTASSIUM CHLORIDE 7.45 MG/ML
10 INJECTION INTRAVENOUS
Status: COMPLETED | OUTPATIENT
Start: 2019-06-10 | End: 2019-06-10

## 2019-06-10 RX ORDER — METOPROLOL TARTRATE 1 MG/ML
5 INJECTION, SOLUTION INTRAVENOUS ONCE
Status: COMPLETED | OUTPATIENT
Start: 2019-06-10 | End: 2019-06-10

## 2019-06-10 RX ORDER — SODIUM CHLORIDE, SODIUM LACTATE, POTASSIUM CHLORIDE, CALCIUM CHLORIDE 600; 310; 30; 20 MG/100ML; MG/100ML; MG/100ML; MG/100ML
INJECTION, SOLUTION INTRAVENOUS CONTINUOUS
Status: DISCONTINUED | OUTPATIENT
Start: 2019-06-10 | End: 2019-06-10

## 2019-06-10 RX ORDER — SODIUM CHLORIDE, SODIUM LACTATE, POTASSIUM CHLORIDE, CALCIUM CHLORIDE 600; 310; 30; 20 MG/100ML; MG/100ML; MG/100ML; MG/100ML
INJECTION, SOLUTION INTRAVENOUS
Status: COMPLETED
Start: 2019-06-10 | End: 2019-06-10

## 2019-06-10 RX ORDER — DEXTROSE MONOHYDRATE 25 G/50ML
25 INJECTION, SOLUTION INTRAVENOUS ONCE
Status: DISCONTINUED | OUTPATIENT
Start: 2019-06-10 | End: 2019-06-10

## 2019-06-10 RX ORDER — ONDANSETRON 2 MG/ML
4 INJECTION INTRAMUSCULAR; INTRAVENOUS EVERY 4 HOURS
Status: DISCONTINUED | OUTPATIENT
Start: 2019-06-10 | End: 2019-06-11

## 2019-06-10 RX ORDER — CHOLECALCIFEROL (VITAMIN D3) 125 MCG
1000 CAPSULE ORAL DAILY
Status: DISCONTINUED | OUTPATIENT
Start: 2019-06-10 | End: 2019-06-22 | Stop reason: HOSPADM

## 2019-06-10 RX ORDER — SODIUM CHLORIDE 9 MG/ML
2000 INJECTION, SOLUTION INTRAVENOUS ONCE
Status: DISCONTINUED | OUTPATIENT
Start: 2019-06-10 | End: 2019-06-10

## 2019-06-10 RX ORDER — SODIUM CHLORIDE, SODIUM LACTATE, POTASSIUM CHLORIDE, AND CALCIUM CHLORIDE .6; .31; .03; .02 G/100ML; G/100ML; G/100ML; G/100ML
2000 INJECTION, SOLUTION INTRAVENOUS ONCE
Status: DISCONTINUED | OUTPATIENT
Start: 2019-06-10 | End: 2019-06-10

## 2019-06-10 RX ORDER — MAGNESIUM SULFATE HEPTAHYDRATE 40 MG/ML
4 INJECTION, SOLUTION INTRAVENOUS ONCE
Status: COMPLETED | OUTPATIENT
Start: 2019-06-10 | End: 2019-06-10

## 2019-06-10 RX ORDER — SODIUM CHLORIDE, SODIUM LACTATE, POTASSIUM CHLORIDE, AND CALCIUM CHLORIDE .6; .31; .03; .02 G/100ML; G/100ML; G/100ML; G/100ML
1000 INJECTION, SOLUTION INTRAVENOUS ONCE
Status: COMPLETED | OUTPATIENT
Start: 2019-06-10 | End: 2019-06-10

## 2019-06-10 RX ORDER — DEXTROSE, SODIUM CHLORIDE, SODIUM LACTATE, POTASSIUM CHLORIDE, AND CALCIUM CHLORIDE 5; .6; .31; .03; .02 G/100ML; G/100ML; G/100ML; G/100ML; G/100ML
INJECTION, SOLUTION INTRAVENOUS CONTINUOUS
Status: DISCONTINUED | OUTPATIENT
Start: 2019-06-10 | End: 2019-06-11

## 2019-06-10 RX ORDER — CYANOCOBALAMIN 1000 UG/ML
1000 INJECTION, SOLUTION INTRAMUSCULAR; SUBCUTANEOUS ONCE
Status: COMPLETED | OUTPATIENT
Start: 2019-06-10 | End: 2019-06-10

## 2019-06-10 RX ORDER — METOCLOPRAMIDE HYDROCHLORIDE 5 MG/ML
10 INJECTION INTRAMUSCULAR; INTRAVENOUS EVERY 6 HOURS
Status: DISCONTINUED | OUTPATIENT
Start: 2019-06-10 | End: 2019-06-10

## 2019-06-10 RX ORDER — SODIUM CHLORIDE, SODIUM LACTATE, POTASSIUM CHLORIDE, CALCIUM CHLORIDE 600; 310; 30; 20 MG/100ML; MG/100ML; MG/100ML; MG/100ML
1000 INJECTION, SOLUTION INTRAVENOUS ONCE
Status: COMPLETED | OUTPATIENT
Start: 2019-06-10 | End: 2019-06-10

## 2019-06-10 RX ORDER — POTASSIUM CHLORIDE 7.45 MG/ML
10 INJECTION INTRAVENOUS ONCE
Status: COMPLETED | OUTPATIENT
Start: 2019-06-10 | End: 2019-06-10

## 2019-06-10 RX ORDER — PROMETHAZINE HYDROCHLORIDE 25 MG/1
25 SUPPOSITORY RECTAL EVERY 6 HOURS PRN
Status: DISCONTINUED | OUTPATIENT
Start: 2019-06-10 | End: 2019-06-22 | Stop reason: HOSPADM

## 2019-06-10 RX ORDER — POTASSIUM CHLORIDE 7.45 MG/ML
10 INJECTION INTRAVENOUS ONCE
Status: DISCONTINUED | OUTPATIENT
Start: 2019-06-10 | End: 2019-06-10

## 2019-06-10 RX ADMIN — TAMSULOSIN HYDROCHLORIDE 0.4 MG: 0.4 CAPSULE ORAL at 11:20

## 2019-06-10 RX ADMIN — SODIUM CHLORIDE, SODIUM LACTATE, POTASSIUM CHLORIDE, AND CALCIUM CHLORIDE 1000 ML: .6; .31; .03; .02 INJECTION, SOLUTION INTRAVENOUS at 15:17

## 2019-06-10 RX ADMIN — PROCHLORPERAZINE EDISYLATE 10 MG: 5 INJECTION INTRAMUSCULAR; INTRAVENOUS at 16:23

## 2019-06-10 RX ADMIN — SODIUM CHLORIDE, POTASSIUM CHLORIDE, SODIUM LACTATE AND CALCIUM CHLORIDE 1000 ML: 600; 310; 30; 20 INJECTION, SOLUTION INTRAVENOUS at 10:30

## 2019-06-10 RX ADMIN — POTASSIUM CHLORIDE 10 MEQ: 7.46 INJECTION, SOLUTION INTRAVENOUS at 23:06

## 2019-06-10 RX ADMIN — METOPROLOL TARTRATE 12.5 MG: 25 TABLET ORAL at 06:10

## 2019-06-10 RX ADMIN — SODIUM CHLORIDE, SODIUM LACTATE, POTASSIUM CHLORIDE, CALCIUM CHLORIDE AND DEXTROSE MONOHYDRATE: 5; 600; 310; 30; 20 INJECTION, SOLUTION INTRAVENOUS at 15:05

## 2019-06-10 RX ADMIN — OMEPRAZOLE 20 MG: 20 CAPSULE, DELAYED RELEASE ORAL at 06:10

## 2019-06-10 RX ADMIN — SODIUM CHLORIDE 1000 ML: 9 INJECTION, SOLUTION INTRAVENOUS at 01:09

## 2019-06-10 RX ADMIN — POTASSIUM CHLORIDE 10 MEQ: 7.46 INJECTION, SOLUTION INTRAVENOUS at 04:24

## 2019-06-10 RX ADMIN — CLOPIDOGREL BISULFATE 75 MG: 75 TABLET ORAL at 06:10

## 2019-06-10 RX ADMIN — DEXTROSE AND SODIUM CHLORIDE: 10; .45 INJECTION, SOLUTION INTRAVENOUS at 19:50

## 2019-06-10 RX ADMIN — ONDANSETRON 4 MG: 4 TABLET, ORALLY DISINTEGRATING ORAL at 14:24

## 2019-06-10 RX ADMIN — POTASSIUM PHOSPHATE, MONOBASIC AND POTASSIUM PHOSPHATE, DIBASIC 15 MMOL: 224; 236 INJECTION, SOLUTION, CONCENTRATE INTRAVENOUS at 11:16

## 2019-06-10 RX ADMIN — MAGNESIUM SULFATE IN WATER 4 G: 40 INJECTION, SOLUTION INTRAVENOUS at 01:35

## 2019-06-10 RX ADMIN — SODIUM CHLORIDE 1000 ML: 9 INJECTION, SOLUTION INTRAVENOUS at 22:00

## 2019-06-10 RX ADMIN — DEXTROSE AND SODIUM CHLORIDE: 10; .45 INJECTION, SOLUTION INTRAVENOUS at 08:58

## 2019-06-10 RX ADMIN — HEPARIN SODIUM 4000 UNITS: 1000 INJECTION, SOLUTION INTRAVENOUS; SUBCUTANEOUS at 15:27

## 2019-06-10 RX ADMIN — DEXTROSE AND SODIUM CHLORIDE: 5; 900 INJECTION, SOLUTION INTRAVENOUS at 03:40

## 2019-06-10 RX ADMIN — CALCIUM GLUCONATE 1 G: 98 INJECTION, SOLUTION INTRAVENOUS at 09:00

## 2019-06-10 RX ADMIN — ACETAMINOPHEN 650 MG: 325 TABLET, FILM COATED ORAL at 18:16

## 2019-06-10 RX ADMIN — ATORVASTATIN CALCIUM 80 MG: 80 TABLET, FILM COATED ORAL at 18:16

## 2019-06-10 RX ADMIN — METOPROLOL TARTRATE 5 MG: 1 INJECTION, SOLUTION INTRAVENOUS at 06:41

## 2019-06-10 RX ADMIN — HEPARIN SODIUM 900 UNITS/HR: 5000 INJECTION, SOLUTION INTRAVENOUS at 15:26

## 2019-06-10 RX ADMIN — ASPIRIN 81 MG: 81 TABLET ORAL at 06:10

## 2019-06-10 RX ADMIN — POTASSIUM CHLORIDE 10 MEQ: 7.46 INJECTION, SOLUTION INTRAVENOUS at 18:31

## 2019-06-10 RX ADMIN — SODIUM CHLORIDE, POTASSIUM CHLORIDE, SODIUM LACTATE AND CALCIUM CHLORIDE 1000 ML: 600; 310; 30; 20 INJECTION, SOLUTION INTRAVENOUS at 15:17

## 2019-06-10 RX ADMIN — PANTOPRAZOLE SODIUM 40 MG: 40 INJECTION, POWDER, LYOPHILIZED, FOR SOLUTION INTRAVENOUS at 20:07

## 2019-06-10 RX ADMIN — ONDANSETRON 4 MG: 2 INJECTION INTRAMUSCULAR; INTRAVENOUS at 21:57

## 2019-06-10 RX ADMIN — CYANOCOBALAMIN TAB 500 MCG 1000 MCG: 500 TAB at 11:20

## 2019-06-10 RX ADMIN — POTASSIUM CHLORIDE 10 MEQ: 7.46 INJECTION, SOLUTION INTRAVENOUS at 05:16

## 2019-06-10 RX ADMIN — ONDANSETRON 4 MG: 2 INJECTION INTRAMUSCULAR; INTRAVENOUS at 00:26

## 2019-06-10 RX ADMIN — METOPROLOL TARTRATE 12.5 MG: 25 TABLET ORAL at 06:39

## 2019-06-10 RX ADMIN — PROCHLORPERAZINE EDISYLATE 10 MG: 5 INJECTION INTRAMUSCULAR; INTRAVENOUS at 01:47

## 2019-06-10 RX ADMIN — ENOXAPARIN SODIUM 40 MG: 100 INJECTION SUBCUTANEOUS at 06:10

## 2019-06-10 RX ADMIN — CYANOCOBALAMIN 1000 MCG: 1000 INJECTION INTRAMUSCULAR; SUBCUTANEOUS at 11:31

## 2019-06-10 RX ADMIN — SODIUM CHLORIDE 3 UNITS/HR: 9 INJECTION, SOLUTION INTRAVENOUS at 01:27

## 2019-06-10 ASSESSMENT — ENCOUNTER SYMPTOMS
BRUISES/BLEEDS EASILY: 0
CONSTITUTIONAL NEGATIVE: 1
SPEECH CHANGE: 0
PALPITATIONS: 0
DEPRESSION: 0
BLOOD IN STOOL: 0
DIZZINESS: 0
NECK PAIN: 0
HEARTBURN: 0
NEUROLOGICAL NEGATIVE: 1
POLYDIPSIA: 0
TINGLING: 0
WEIGHT LOSS: 1
STRIDOR: 0
CHILLS: 0
PSYCHIATRIC NEGATIVE: 1
EYES NEGATIVE: 1
ABDOMINAL PAIN: 1
FEVER: 0
SHORTNESS OF BREATH: 1
NERVOUS/ANXIOUS: 0
HEADACHES: 0
SORE THROAT: 0
RESPIRATORY NEGATIVE: 1
DIARRHEA: 1
MYALGIAS: 0
SEIZURES: 0
MUSCULOSKELETAL NEGATIVE: 1
VOMITING: 1
WHEEZING: 0
SHORTNESS OF BREATH: 0
VOMITING: 0
HEMOPTYSIS: 0
SINUS PAIN: 0
DOUBLE VISION: 0
COUGH: 0
FOCAL WEAKNESS: 0
DIAPHORESIS: 0
SENSORY CHANGE: 0
BLURRED VISION: 0
SPUTUM PRODUCTION: 0
NAUSEA: 1
PHOTOPHOBIA: 0
BACK PAIN: 0
GASTROINTESTINAL NEGATIVE: 1
WEAKNESS: 1
WEIGHT LOSS: 0
CARDIOVASCULAR NEGATIVE: 1

## 2019-06-10 ASSESSMENT — COGNITIVE AND FUNCTIONAL STATUS - GENERAL
SUGGESTED CMS G CODE MODIFIER MOBILITY: CH
SUGGESTED CMS G CODE MODIFIER DAILY ACTIVITY: CH
DAILY ACTIVITIY SCORE: 24
MOBILITY SCORE: 24

## 2019-06-10 ASSESSMENT — PATIENT HEALTH QUESTIONNAIRE - PHQ9
1. LITTLE INTEREST OR PLEASURE IN DOING THINGS: NOT AT ALL
SUM OF ALL RESPONSES TO PHQ9 QUESTIONS 1 AND 2: 0
2. FEELING DOWN, DEPRESSED, IRRITABLE, OR HOPELESS: NOT AT ALL

## 2019-06-10 NOTE — ASSESSMENT & PLAN NOTE
Fluid resuscitation for dka  Diuresis with lasix, change to po  Keep net negative  Significant post resuscitaiton diuresis  Tolerated  Remove mcnair

## 2019-06-10 NOTE — ED PROVIDER NOTES
ED Provider Note     Scribed for Reina Restrepo D.O. by Benito Perez. 6/9/2019, 9:02 PM.     Primary care provider: Prashanth Ocasio  Means of arrival: Ambulance          History obtained from: patient   History limited by: none    CHIEF COMPLAINT  Chief Complaint   Patient presents with   • Abnormal Labs     transfer from [facility] for rising trop   • Nausea/Vomiting/Diarrhea       HPI  Jarret Bright is a 68 y.o. male who presents to the emergency Department with diarrhea and vomiting, onset last night. The patient is also complaining of associated abdominal cramping and back pain. He received Fentanyl, Morphine, and Dilaudid en route to the hospital for pain. He states that he was able to take his nightly heart and diabetes medication, but has not been able to keep anything else down. He also notes that he was nauseated, but it has been relieved by medication given en route. He has a history of non-insulin dependant diabetes. He denies any chest pain, or previous history of CHF. He has surgical history of pacemaker placement.     REVIEW OF SYSTEMS  Pertinent positives include diarrhea, vomiting, abdominal cramping, and back pain. Pertinent negatives include no chest pain.   See HPI for further details. All other systems are negative.    PAST MEDICAL HISTORY  Past Medical History:   Diagnosis Date   • CAD (coronary artery disease)    • Diabetes (HCC)     insulin dependent.   • Hypertension        FAMILY HISTORY  Family History   Problem Relation Age of Onset   • Heart Attack Mother 60       SOCIAL HISTORY  Social History   Substance Use Topics   • Smoking status: Former Smoker     Years: 30.00     Types: Cigarettes, Cigars     Quit date: 1/1/2018   • Smokeless tobacco: Never Used   • Alcohol use No      History   Drug Use No       SURGICAL HISTORY  Past Surgical History:   Procedure Laterality Date   • ZZZ CARDIAC CATH  03/23/2018    3 vessel disease, needs CABG when stable from other problems.       CURRENT  "MEDICATIONS  No current facility-administered medications for this encounter.     Current Outpatient Prescriptions:   •  clopidogrel (PLAVIX) 75 MG Tab, Take 1 Tab by mouth every day., Disp: 30 Tab, Rfl: 11  •  atorvastatin (LIPITOR) 40 MG Tab, Take 1 Tab by mouth every evening., Disp: 30 Tab, Rfl: 11  •  lisinopril (PRINIVIL) 10 MG Tab, Take  by mouth., Disp: , Rfl: 0  •  metoprolol (LOPRESSOR) 25 MG Tab, Take 0.5 Tabs by mouth 2 times a day., Disp: 90 Tab, Rfl: 3  •  tamsulosin (FLOMAX) 0.4 MG capsule, Take 1 Cap by mouth ONE-HALF HOUR AFTER BREAKFAST., Disp: 30 Cap, Rfl: 1  •  aspirin EC (ECOTRIN) 81 MG Tablet Delayed Response, Take 81 mg by mouth every bedtime., Disp: , Rfl:   •  insulin glargine (LANTUS) 100 UNIT/ML Solution, Inject 24 Units as instructed every evening., Disp: , Rfl:     ALLERGIES  Allergies   Allergen Reactions   • Tea Tree Oil Rash     Unspecified       PHYSICAL EXAM  VITAL SIGNS: Ht 1.702 m (5' 7\")   Wt 56.2 kg (124 lb)   BMI 19.42 kg/m²     Constitutional: Patient is a thin elderly male in mild distress from his nausea, vomiting.  HENT: Normocephalic, atraumatic. Nose normal with no drainage. Oropharynx dry.  Eyes: PERRL, EOMI, Conjunctiva without erythema.  Neck: Supple . Normal range of motion in flexion, extension and lateral rotation. No tenderness along the bony prominences or paraspinal muscles.  Lymphatic: No lymphadenopathy noted.   Cardiovascular: Tachycardic heart rate and Regular rhythm. No murmur.  Thorax & Lungs: Clear and equal breath sounds with good excursion. No respiratory distress, no rhonchi, wheezing or rales. No chest tenderness  Abdomen: General abdominal tenderness with no rebound or guarding. Bowel sounds normal in all four quadrants.  Skin: Warm, Dry, No erythema, No rashes.   Back: No cervical, thoracic, or lumbosacral tenderness.    Extremities: Left BKA. Peripheral pulses 4/4 No edema, No tenderness  Musculoskeletal: Normal range of motion in all major joints. " No tenderness to palpation or major deformities noted.   Neurologic: Alert & oriented x 3, Normal motor function, Normal sensory function, No lateralizing or focal deficits noted. DTR's 4/4 bilaterally.  Psychiatric: Affect normal, Judgment normal, Mood normal.     DIAGNOSTICS/PROCEDURES    LABS  Results for orders placed or performed during the hospital encounter of 06/09/19   URINALYSIS   Result Value Ref Range    Color Yellow     Character Clear     Specific Gravity 1.036 <1.035    Ph 5.0 5.0 - 8.0    Glucose 500 (A) Negative mg/dL    Ketones >=160 Negative mg/dL    Protein 30 (A) Negative mg/dL    Bilirubin Negative Negative    Urobilinogen, Urine 0.2 Negative    Nitrite Negative Negative    Leukocyte Esterase Negative Negative    Occult Blood Moderate (A) Negative    Micro Urine Req Microscopic    Magnesium   Result Value Ref Range    Magnesium 1.6 1.5 - 2.5 mg/dL   BETA-HYDROXYBUTYRIC ACID   Result Value Ref Range    beta-Hydroxybutyric Acid >8.00 (H) 0.02 - 0.27 mmol/L   ABG - LAB   Result Value Ref Range    Ph 7.19 (LL) 7.40 - 7.50    Pco2 21.4 (L) 26.0 - 37.0 mmHg    Po2 82.2 64.0 - 87.0 mmHg    O2 Saturation 93.2 93.0 - 99.0 %    Hco3 8 (L) 17 - 25 mmol/L    Base Excess -19 (L) -4 - 3 mmol/L    Body Temp see below Centigrade   DIAGNOSTIC ALCOHOL   Result Value Ref Range    Diagnostic Alcohol 0.01 (H) 0.00 g/dL   URINE DRUG SCREEN   Result Value Ref Range    Amphetamines Urine Negative Negative    Barbiturates Negative Negative    Benzodiazepines Negative Negative    Cocaine Metabolite Negative Negative    Methadone Negative Negative    Opiates Positive (A) Negative    Oxycodone Negative Negative    Phencyclidine -Pcp Negative Negative    Propoxyphene Negative Negative    Cannabinoid Metab Negative Negative   CRP QUANTITIVE (NON-CARDIAC)   Result Value Ref Range    Stat C-Reactive Protein 0.14 0.00 - 0.75 mg/dL   WESTERGREN SED RATE   Result Value Ref Range    Sed Rate Westergren 1 0 - 20 mm/hour   LACTIC  ACID   Result Value Ref Range    Lactic Acid 1.6 0.5 - 2.0 mmol/L   TROPONIN   Result Value Ref Range    Troponin I 1.58 (H) 0.00 - 0.04 ng/mL   CBC WITH DIFFERENTIAL   Result Value Ref Range    WBC 18.3 (H) 4.8 - 10.8 K/uL    RBC 3.04 (L) 4.70 - 6.10 M/uL    Hemoglobin 10.3 (L) 14.0 - 18.0 g/dL    Hematocrit 31.6 (L) 42.0 - 52.0 %    .9 (H) 81.4 - 97.8 fL    MCH 33.9 (H) 27.0 - 33.0 pg    MCHC 32.6 (L) 33.7 - 35.3 g/dL    RDW 48.6 35.9 - 50.0 fL    Platelet Count 198 164 - 446 K/uL    MPV 8.8 (L) 9.0 - 12.9 fL    Neutrophils-Polys 87.40 (H) 44.00 - 72.00 %    Lymphocytes 7.60 (L) 22.00 - 41.00 %    Monocytes 4.20 0.00 - 13.40 %    Eosinophils 0.10 0.00 - 6.90 %    Basophils 0.20 0.00 - 1.80 %    Immature Granulocytes 0.50 0.00 - 0.90 %    Nucleated RBC 0.00 /100 WBC    Neutrophils (Absolute) 15.96 (H) 1.82 - 7.42 K/uL    Lymphs (Absolute) 1.39 1.00 - 4.80 K/uL    Monos (Absolute) 0.76 0.00 - 0.85 K/uL    Eos (Absolute) 0.01 0.00 - 0.51 K/uL    Baso (Absolute) 0.03 0.00 - 0.12 K/uL    Immature Granulocytes (abs) 0.10 0.00 - 0.11 K/uL    NRBC (Absolute) 0.00 K/uL   Comp Metabolic Panel   Result Value Ref Range    Sodium 141 135 - 145 mmol/L    Potassium 4.6 3.6 - 5.5 mmol/L    Chloride 108 96 - 112 mmol/L    Co2 9 (LL) 20 - 33 mmol/L    Anion Gap 24.0 (H) 0.0 - 11.9    Glucose 296 (H) 65 - 99 mg/dL    Bun 19 8 - 22 mg/dL    Creatinine 0.99 0.50 - 1.40 mg/dL    Calcium 8.2 (L) 8.5 - 10.5 mg/dL    AST(SGOT) 19 12 - 45 U/L    ALT(SGPT) 10 2 - 50 U/L    Alkaline Phosphatase 43 30 - 99 U/L    Total Bilirubin 0.5 0.1 - 1.5 mg/dL    Albumin 3.8 3.2 - 4.9 g/dL    Total Protein 5.9 (L) 6.0 - 8.2 g/dL    Globulin 2.1 1.9 - 3.5 g/dL    A-G Ratio 1.8 g/dL   PREALBUMIN   Result Value Ref Range    Pre-Albumin 28.0 18.0 - 38.0 mg/dL   URINE MICROSCOPIC (W/UA)   Result Value Ref Range    WBC 0-2 (A) /hpf    RBC 20-50 (A) /hpf    Bacteria Negative None /hpf    Epithelial Cells Negative /hpf    Hyaline Cast 0-2 /lpf   ESTIMATED  GFR   Result Value Ref Range    GFR If African American >60 >60 mL/min/1.73 m 2    GFR If Non African American >60 >60 mL/min/1.73 m 2   CREATINE KINASE   Result Value Ref Range    CPK Total 115 0 - 154 U/L   PHOSPHORUS   Result Value Ref Range    Phosphorus 3.8 2.5 - 4.5 mg/dL   EKG (NOW)   Result Value Ref Range    Report       [facility]    Test Date:  2019  Pt Name:    ESTHER DE GUZMAN                 Department: ER  MRN:        [id number]                      Room:       RD 09  Gender:     Male                         Technician: [id number]  :        1950                   Requested By:ER TRIAGE PROTOCOL  Order #:    [id number]                    Reading MD:    Measurements  Intervals                                Axis  Rate:       122                          P:          -86  TX:         164                          QRS:        73  QRSD:       76                           T:          238  QT:         268  QTc:        382    Interpretive Statements  SINUS OR ECTOPIC ATRIAL TACHYCARDIA  REPOL ABNRM SUGGESTS ISCHEMIA, DIFFUSE LEADS  Compared to ECG 07/10/2018 00:53:45  Early repolarization now present  Possible ischemia now present  Sinus rhythm no longer present  First degree AV block no longer present        Labs reviewed by me    EKG  Results for orders placed or performed during the hospital encounter of 19   EKG (NOW)   Result Value Ref Range    Report       [facility] Emergency Dept.    Test Date:  2019  Pt Name:    ESTHER DE GUZMAN                 Department: ER  MRN:        [id number]                      Room:       RD 09  Gender:     Male                         Technician: [id number]  :        1950                   Requested By:ER TRIAGE PROTOCOL  Order #:    [id number]                    Reading MD:    Measurements  Intervals                                Axis  Rate:       122                          P:          -86  TX:         164                           QRS:        73  QRSD:       76                           T:          238  QT:         268  QTc:        382    Interpretive Statements  SINUS OR ECTOPIC ATRIAL TACHYCARDIA  REPOL ABNRM SUGGESTS ISCHEMIA, DIFFUSE LEADS  Compared to ECG 07/10/2018 00:53:45  Early repolarization now present  Possible ischemia now present  Sinus rhythm no longer present  First degree AV block no longer present        COURSE & MEDICAL DECISION MAKING  Pertinent Labs & Imaging studies reviewed. (See chart for details) all of the patient's prehospital labs were reviewed including his twelve-lead EKG which showed ischemic changes with ST depression in the lateral leads.  There is no acute ST elevation.  His labs did reveal a troponin of 0.3 which was elevated from the first 1 that was done prehospital.  Patient is currently requesting pain medication.  He will be admitted into the hospital for further evaluation and treatment.  He is stable upon my leaving.    9:02 PM - Patient seen and evaluated at bedside. Differential diagnoses include, but are not limited to unstable angina, non-STEMI, noncardiac chest pain    9:34 PM I discussed the patient's case and the above findings with Dr. Latham ([facility] internal medicine) who agrees to admit the patient.      DISPOSITION:  Patient will be admitted to Dr. Latham in guarded condition.     FINAL IMPRESSION  Nausea with vomiting  Diarrhea  Elevated troponin  Back pain-chronic     Benito GAINES (Scribe), am scribing for, and in the presence of, Reina Restrepo D.O..    Electronically signed by: Benito Perez ([place]), 6/9/2019    Reina GAINES D.O. personally performed the services described in this documentation, as scribed by Benito Perez in my presence, and it is both accurate and complete.  C  The note accurately reflects work and decisions made by me.  Reina Restrepo  6/10/2019  2:29 AM

## 2019-06-10 NOTE — PROGRESS NOTES
"   [facility] Progress Note      Admit Date: 6/9/2019    Resident(s): Yoshi Valero  Attending: TERE STRONG/ Dr. Taylor    Date & Time:   6/10/2019   11:56 AM       Patient ID:    Name:             Jarret Bright   YOB: 1950  Age:                 68 y.o.  male   MRN:               [id number]    HPI:  \"68 year old male with past medical history of CAD, DM, HTN, hyperlipidemia was brought into [facility] by EMS for nausea, vomiting abdominal pain. Prior to arrival at [facility] he apparently got IV fliuds and zofran.      Patient says for the past 25 hours he has been having episodic, crampy abdominal pain, diffusely throughout his abdomen, not associated with fevers/chills, no aggravating or relieving factors. Associated with diarrhea, non-bloody, non-bilious. He says he has not been able to keep anything down, even fluids.      At [facility], per review of documents, he was tachycardiac, hemodynamically stable and afebrile.      Labs from [facility]:  - WBC 15.2, Hb 12.1, .4, platelets 254,   - sodium 140, potassium 4.8, choloride 103, bicarb 11  - BUN 24, cr 1.3  - glucose 218.   - Anion gap 30.8  - CPK 79  - Troponin 0.187 --> 0.300  - BNP: 1030  - U/A: ketones 4+, hemoglobin trace, protein 1+, few bacteria, 0-2 WBCs  - Lipase 25  - Lactic 1.9     EKG: diffuse ST depressions in inferior, precordial lateral leads and anterior septal leads     CT: mild thick walled appearnce to the entire colon which may be due to the fact that it is decompressed. Mild colitis is in the differential. There is a thick walled appearnce to the distal esophagus and esophagitis is suspected. The urinary bladder is markedly distended. There is a small non-obstructing right renal stone     He was resuscitated with 2 L of NS, then started on IV fluids at rate of 200 cc/hr. He was given IV pain medications and antiemetics but continued to have intermittent abdominal pain. He was only able to urinate 50 cc of clear yellow " "urine therefore mcnair was placed and 700 cc of clear urine returned. Given his trending troponins (apparently was asymptomatic at [facility]) and early DKA, he was subsequently transferred to [facility]\"    Consultants:  Cardiology    PMA: Dr. Taylor    Interval Events:  - RAIN, patient resting comfortably in bed. Denies any pain. However, patient was a little bothered with being asked questions this morning.     - Continue current medical management and treatment.     - Cardiology does not feel this is ACS    Review of Systems   Constitutional: Negative.    HENT: Negative.    Eyes: Negative.    Respiratory: Negative.    Cardiovascular: Negative.    Gastrointestinal: Negative.    Genitourinary: Negative.    Musculoskeletal: Negative.    Skin: Negative.    Neurological: Negative.    Psychiatric/Behavioral: Negative.        PHYSICAL EXAM  Vitals:    06/10/19 0600 06/10/19 0610 06/10/19 0639 06/10/19 0700   BP:       Pulse: (!) 111 (!) 113 (!) 117 96   Resp: 20   18   Temp:       TempSrc:       SpO2: 92%   93%   Weight:       Height:         Body mass index is 19.42 kg/m².  /69   Pulse 96   Temp 36.7 °C (98 °F) (Temporal)   Resp 18   Ht 1.702 m (5' 7\")   Wt 56.2 kg (124 lb)   SpO2 93%   BMI 19.42 kg/m²   O2 therapy: Pulse Oximetry: 93 %, O2 (LPM): 0, O2 Delivery: None (Room Air)    Physical Exam   Constitutional: He is oriented to person, place, and time and well-developed, well-nourished, and in no distress. He appears unhealthy.   HENT:   Head: Normocephalic and atraumatic.   Eyes: EOM are normal.   Neck: Neck supple.   Cardiovascular: Regular rhythm.  Tachycardia present.    Pulmonary/Chest: Effort normal and breath sounds normal. No respiratory distress.   Abdominal: Soft. Bowel sounds are normal. He exhibits no distension. There is no tenderness.   Musculoskeletal: He exhibits no edema.   Left BKA   Neurological: He is alert and oriented to person, place, and time. GCS score is 15.   Skin:   Skin tenting noted "   Psychiatric: Mood, memory, affect and judgment normal.       Respiratory:     Respiration: 18, Pulse Oximetry: 93 %    Chest Tube Drains:    Recent Labs      19   2357   XISDN84Y  7.19*   PZDTMO379X  21.4*   WTXCH262S  82.2   [id number]  93.2   ARTHCO3  8*   ARTBE  -19*       HemoDynamics:  Pulse: 96, Heart Rate (Monitored): 97 Blood Pressure : 131/69, NIBP: 102/43      Neuro:      Fluids:        Intake/Output Summary (Last 24 hours) at 06/10/19 0936  Last data filed at 06/10/19 040   Gross per 24 hour   Intake          1171.02 ml   Output                0 ml   Net          1171.02 ml       Weight: 56.2 kg (124 lb)  Body mass index is 19.42 kg/m².    Recent Labs      06/09/19   2234  06/10/19   0250  06/10/19   0724   SODIUM  141  141  139   POTASSIUM  4.6  4.3  4.5   CHLORIDE  108  111  115*   CO2  9*  10*  13*   BUN     CREATININE  0.99  1.01  0.96   MAGNESIUM  1.6   --   2.7*   PHOSPHORUS  3.8   --   2.0*   CALCIUM  8.2*  7.9*  7.6*       GI/Nutrition:  Recent Labs      06/09/19   2234  06/10/19   0250  06/10/19   0724   ALTSGPT  10   --    --    ASTSGOT  19   --    --    ALKPHOSPHAT  43   --    --    TBILIRUBIN  0.5   --    --    PREALBUMIN  28.0   --    --    GLUCOSE  296*  278*  145*       Heme:  Recent Labs      06/09/19   2234  06/10/19   0250   RBC  3.04*   --    HEMOGLOBIN  10.3*   --    HEMATOCRIT  31.6*   --    PLATELETCT  198   --    IRON   --   64   FERRITIN   --   434.7*   TOTIRONBC   --   249*       Infectious Disease:  Monitored Temp 2  Av.6 °C (99.7 °F)  Min: 37.6 °C (99.7 °F)  Max: 37.6 °C (99.7 °F)  Temp  Av.6 °C (97.9 °F)  Min: 36.6 °C (97.8 °F)  Max: 36.7 °C (98 °F)  Recent Labs      06/09/19   2234   WBC  18.3*   NEUTSPOLYS  87.40*   LYMPHOCYTES  7.60*   MONOCYTES  4.20   EOSINOPHILS  0.10   BASOPHILS  0.20   ASTSGOT  19   ALTSGPT  10   ALKPHOSPHAT  43   TBILIRUBIN  0.5       Meds:  • MD ALERT-PHARMACY TO CONSULT  1 Each     • Adult DKA potassium(K+) replacement  scale  1 Each     • NS   Stopped (06/10/19 0339)   • D5 NS   Stopped (06/10/19 0858)   • dextrose 10% and 0.45% NaCl   100 mL/hr at 06/10/19 0858   • insulin infusion (for DKA ONLY)  3 Units/hr 1 Units/hr (06/10/19 1057)   • MD Alert...Adult ICU Electrolyte Replacement per Pharmacy       • prochlorperazine  10 mg     • metoclopramide  5 mg     • promethazine  25 mg     • metoprolol  25 mg     • potassium phosphate ivpb  15 mmol 15 mmol (06/10/19 1116)   • cyanocobalamin  1,000 mcg     • enoxaparin  40 mg     • acetaminophen  650 mg     • enalaprilat  1.25 mg     • ondansetron  4 mg     • ondansetron  4 mg     • clopidogrel  75 mg     • tamsulosin  0.4 mg     • omeprazole  20 mg     • morphine injection  2-4 mg     • atorvastatin  80 mg     • aspirin EC  81 mg     • nitroglycerin  0.4 mg            Imaging:  EC-ECHOCARDIOGRAM COMPLETE W/O CONT   Final Result      DX-CHEST-PORTABLE (1 VIEW)   Final Result         1.  No acute cardiopulmonary disease.      CT-FOREIGN FILM CAT SCAN   Final Result          Assessment and Plan:      * Diabetic ketoacidosis without coma associated with type 2 diabetes mellitus (HCC)- (present on admission)   Assessment & Plan    Euglycemic DKA in patient with type 2 Diabetes Mellitus. Patient is on Insulin + oral antihyperglycemic as outpatient. Patient mentioned to me that he does not check his glucose levels at home. Patient presented with nausea, vomiting, and abdominal pain. Patient presented with wide anion gap metabolic aciodosis.  Plan  - DKA protocol  - NPO   - Trend BMP q4 hours. Correct electrolyte abnormalities according  - Holding patient's outpatient DM regimen  - Check HbA1c in AM  - Symptomatic management with anti-emetics     High anion gap metabolic acidosis   Assessment & Plan    In the setting of DKA  Plan  - Treatment per DKA protocol  - IV fluids  - NPO     NSTEMI (non-ST elevated myocardial infarction) (HCC)- (present on admission)   Assessment & Plan    Type II MI,  secondary to acute state. Cardiology on board, adjusted home meds.   Plan  - Aspirin, statin, beta-blockers  - Trend troponins until down trending     Leukocytosis- (present on admission)   Assessment & Plan    WBC 18.3. Possible secondary to stress and Type II infarct.   Plan  - Continue to monitor with CBC     Colitis   Assessment & Plan    Per CT report from [place]: CT: mild thick walled appearnce to the entire colon which may be due to the fact that it is decompressed. Mild colitis is in the differential. Leukocytosis, but negative lactic acid elevation. No recent hospitalizations or antibiotic use. Possible related to DKA episode  Plan  - Serial abdominal exams  - Monitor closely     Urine retention- (present on admission)   Assessment & Plan    Acute urinary retention. Mcnair was placed in outside facility however patient was having significant discomfort with mcnair therefore removed here.   Plan  - Continue flomax  - Serial bladder scans; if patient is unable to void, will need to replace mcnair     Type 2 diabetes mellitus with hyperglycemia, with long-term current use of insulin ([facility])- (present on admission)   Assessment & Plan    On oral and insulin at home per med rec.  Plan  - Treatment per DKA protocol  - HbA1c pending  - Can consider diabetes educator consult once patient has improved     Essential hypertension- (present on admission)   Assessment & Plan    On lisinopril/HCTZ 10/12/5 mg daily per med rec  Plan  - Holding for now in the setting of fluid resuscitation  - Resume once more stable     Macrocytic anemia- (present on admission)   Assessment & Plan    Vitamin B12 deficient, folate wnl.  Plan  -Cyanocobalamin administration         DISPO: In patient for DKA    CODE STATUS: Full Code    Quality Measures:  Mcnair Catheter: In place  DVT Prophylaxis: Enoxaparin  Ulcer Prophylaxis: Home omeprazole  Antibiotics: None  Lines: PIV    This note was created using voice recognition software. There may be  unintended errors in spelling, grammar or content.

## 2019-06-10 NOTE — CARE PLAN
Problem: Nutritional:  Goal: Achieve adequate nutritional intake  Advance diet as feasible and patient will consume >50% of meals  Outcome: NOT MET

## 2019-06-10 NOTE — PROGRESS NOTES
Lab called with a critical value of CO2 of 9 and a troponin of 1.58.  MD was updated to lab values.  MD informed this RN that the PT will be moving to the ICU due to DKA and ABG was drawn.  Lab called again with a critical value of pH of 7.19.  MD was notified.  Pt is being transford to [facility] ICU.  Will give report to nurse taking over care.

## 2019-06-10 NOTE — ASSESSMENT & PLAN NOTE
CT: thick walled appearance colon which may be due to the fact that it is decompressed. Mild colitis is in the differential.  Possible related to DKA episode  Ileal mass per imaging. Resection indicated per surgery, cardiology clearance requested.  -High risk surgery per cardiology and MPI showing mixed sized area of ischemia with lateral wall akinesis  -Plavix discontinued per Cardiology recs, Aspirin started  - pt wishes to not have surgery, wishes to go home - being discharged

## 2019-06-10 NOTE — ASSESSMENT & PLAN NOTE
Colitis, mild on ct at [place]  Unlikely significant infection as asymptomatic, abdominal pain improved of which was likely due to gastroparesis  Reviewed ct imaging

## 2019-06-10 NOTE — DIETARY
"Nutrition services: Day 1 of admit.  Jarret Bright is a 68 y.o. male with admitting DX of NSTEMI and DKA type 2.   Pt noted with poor PO intake on nutrition admit screen.  Pt appears thin yet nourished.  RD was able to visit pt at bedside to discuss appetite and PO intake PTA.  Pt reports no changes in appetite, and that he is hungry currently.  He denies n/v, abdominal pain, diarrhea, or constipation.  He denies any wt changes however unable to provide UBW.  Pt is looking forward to being able to eat.  Pt with no further needs or questions at this time.  RD continues to monitor for diet advancement and toleration.     Assessment:  Height: 170.2 cm (5' 7\")  Weight: 56.2 kg (124 lb) - via bed scale.   Body mass index is 19.42 kg/m²., BMI classification: Underweight.   Diet/Intake: NPO x today.     Evaluation:   1. Pt noted high anion gap metabolic acidosis, colitis, urine retention, and essential HTN.   2. Per ED MD note, pt presented to ED with diarrhea and vomiting, onset last night.  Also noted with associated abdominal cramping and back pain.    3. No wt changes per pt report.   4. Labs: Glucose: 145, Phosphorus: 2.0, Magnesium: 2.7, A1C: 6.9.  5. Meds: Adult DKA replacement scale, Vitamin B12, Electrolyte replacement, Prilosec, Potassium phosphates, Dextrose 10% and o.45% NaCl infusion @ 100 mL/hr, Insulin drip.    Malnutrition Risk: No risk identified at this time.     Recommendations/Plan:  1. Advance diet as medically feasible.   2. Monitor weight.    RD following.            "

## 2019-06-10 NOTE — ASSESSMENT & PLAN NOTE
Aggressive fluid resuscitation  dka protocol  Transitioned from insluin drip overnight  Sq insulin and ssi  Stable glucos, keep < 180-

## 2019-06-10 NOTE — PROGRESS NOTES
Bedside report received in ED. Pt brought up to floor and placed on monitor, monitor room notified. .  Patient A&O x 4.  POC discussed with patient. Patient oriented to room.  Patient verbalized understanding.  Call light and belongings with in reach.  Bed locked and in lowest position, alarm and fall precautions in place.  All needs are met at this time.

## 2019-06-10 NOTE — ASSESSMENT & PLAN NOTE
On lisinopril/HCTZ 10/12/5 mg daily per med rec. Lisinopril restarted at 5.  -continue lisinopril at 10 mg

## 2019-06-10 NOTE — ED NOTES
Med rec updated and complete. Allergies reviewed. Met with pt at bedside and dicussed current medications and last doses taken.  Pt denies oral antibiotic use in last 30 days at home.  Pt uses two pharmacies . [facility] here in  [facility]  [facility].

## 2019-06-10 NOTE — ASSESSMENT & PLAN NOTE
On  insulin at home per med rec, patient currently on 10 units glargine and insulin sliding scale with hypoglycemia protocol.  Medication compliance questionable  Brittle diabetes with widely fluctuating blood sugars, evaluated by diabetes educator  Plan  -pt to be discharged on long acting Insulin/ Glargine and Humalog TID AC SSI

## 2019-06-10 NOTE — PROGRESS NOTES
Alia from Lab called with critical result of Troponin at 0350. Critical lab result read back to Alia.   Dr. Marcial notified of critical lab result at 0352.  Critical lab result read back by Dr. Marcial.

## 2019-06-10 NOTE — ASSESSMENT & PLAN NOTE
Resolved.  A1C 6.9.  It is unclear whether patient is compliant with medications on outpatient basis.  -continue 10 units insulin glargine nightly  -continue insulin sliding scale with hypoglycemia protocol

## 2019-06-10 NOTE — CARE PLAN
Problem: Cardiac:  Goal: Ability to maintain an adequate cardiac output will improve  Outcome: PROGRESSING AS EXPECTED  BNP noted 485. Monitoring on tele.   Intervention: Obtain electrocardiogram  Completed.   Intervention: Monitor electrolytes  Replacing per protocol      Problem: Fluid Volume:  Goal: Will maintain adequate fluid volume  Outcome: PROGRESSING AS EXPECTED  Watching UOP  Intervention: Record intake and output  See flow sheet.

## 2019-06-10 NOTE — PROGRESS NOTES
Cardiology to bedside. Updated on EKG changes. Orders given and entered. No intervention aside from medical management per cardiology at this time.

## 2019-06-10 NOTE — PROGRESS NOTES
Notified by primary team that his troponin has increased to 30 this afternoon.  The patient denies any chest discomfort and shortness of breath.  His nausea improved earlier but has returned.  He remained stable hemodynamically without any significant arrhythmia.  Repeat EKG in fact show improvement of ST changes.  I did review images of his prior cardiac in the morning.  Angiographically I believe he is a probably a candidate for coronary bypass grafting but he has multiple co-morbidities and somewhat frail therefore he may clinically not be a good candidate.    We will tentatively plan for cardiac catheterization in the morning.  Agree with start IV heparin, continue aspirin and beta-blocker as blood pressure allows. Hold clopidogrel for now.

## 2019-06-10 NOTE — ED TRIAGE NOTES
Jarret Bright  68 y.o.  Chief Complaint   Patient presents with   • Abnormal Labs     transfer from [facility] for rising trop, first .18, second .3   • Nausea/Vomiting/Diarrhea     onset last night around 0000.  couldn't hold anything down, this was presenting complaint to [facility]     Pt BIB EMS transfer from [facility] in [place], [place].  Pt presented there with n/v/d and was found to have rising trops and was sent here for ACS.  Patient has hx pacemaker, heart cath, CAD, HTN and DM.  Patient also has left BKA.    PTA pt received reglan, zofran and promethazine for vomiting, morphine and dilaudid for pain and 2L NS at previous hospital.  Received fentanyl in route, PIV placed at previous hospital.  Reed in place, patient reports he had urinary retention PTA.  Previous hospital glucose 218.  Pt abdominal pain 1/10 at this time.  Patient appears drowsy and does not request further pain/nausea mgmt.  Changed into gown.  Chart up for ERP.

## 2019-06-10 NOTE — H&P
Internal Medicine Admitting History and Physical    Note Author: Anayeli Marcial M.D.       Name Jarret Bright     1950   Age/Sex 68 y.o. male   MRN [id number]   Code Status Full Code     After 5PM or if no immediate response to page, please call for cross-coverage  Attending/Team: Dr. Wise/TERE Zavaleta See Patient List for primary contact information  Call [phone] to page    1st Call - [facility]        Chief Complaint:   Nausea, vomiting, abdominal pain for 25 hours    HPI:  68 year old male with past medical history of CAD, DM, HTN, hyperlipidemia was brought into [facility] by EMS for nausea, vomiting abdominal pain. Prior to arrival at [facility] he apparently got IV fliuds and zofran.     Patient says for the past 25 hours he has been having episodic, crampy abdominal pain, diffusely throughout his abdomen, not associated with fevers/chills, no aggravating or relieving factors. Associated with diarrhea, non-bloody, non-bilious. He says he has not been able to keep anything down, even fluids.     At [facility], per review of documents, he was tachycardiac, hemodynamically stable and afebrile.     Labs from [facility]:  - WBC 15.2, Hb 12.1, .4, platelets 254,   - sodium 140, potassium 4.8, choloride 103, bicarb 11  - BUN 24, cr 1.3  - glucose 218.   - Anion gap 30.8  - CPK 79  - Troponin 0.187 --> 0.300  - BNP: 1030  - U/A: ketones 4+, hemoglobin trace, protein 1+, few bacteria, 0-2 WBCs  - Lipase 25  - Lactic 1.9    EKG: diffuse ST depressions in inferior, precordial lateral leads and anterior septal leads    CT: mild thick walled appearnce to the entire colon which may be due to the fact that it is decompressed. Mild colitis is in the differential. There is a thick walled appearnce to the distal esophagus and esophagitis is suspected. The urinary bladder is markedly distended. There is a small non-obstructing right renal stone    He was resuscitated with 2 L of NS, then started on IV fluids at  rate of 200 cc/hr. He was given IV pain medications and antiemetics but continued to have intermittent abdominal pain. He was only able to urinate 50 cc of clear yellow urine therefore mcnair was placed and 700 cc of clear urine returned. Given his trending troponins (apparently was asymptomatic at [facility]) and early DKA, he was subsequently transferred to [facility]    Review of Systems   Constitutional: Positive for malaise/fatigue and weight loss. Negative for chills and fever.   HENT: Negative for nosebleeds and sore throat.    Eyes: Negative for blurred vision, double vision and photophobia.   Respiratory: Positive for shortness of breath. Negative for cough, hemoptysis and sputum production.    Cardiovascular: Negative for chest pain, palpitations and leg swelling.   Gastrointestinal: Positive for abdominal pain, diarrhea, nausea and vomiting. Negative for blood in stool.   Genitourinary: Negative for dysuria, hematuria and urgency.        Acute urinary retention   Musculoskeletal: Negative for joint pain and myalgias.   Skin: Negative for itching and rash.   Neurological: Positive for weakness. Negative for focal weakness, seizures and headaches.             Past Medical History (Chronic medical problem, known complications and current treatment)    - Diabetes Mellitus: On Glipizide 2.5 SR daily, metformin 1000 mg BID, insulin lantus 24 units at bedtime  - Hypertension: On lisinopril/HCTZ 10/12/5 mg daily  - GERD: Protonix 40 mg BID  - Hyperlipidemia: on statin  - H/o CVA: on aspirin and plavix  - CAD: on aspirin, plavix, statin, beta-blocker  - Urinary retention: on flomax    Past Surgical History:  Past Surgical History:   Procedure Laterality Date   • [facility] CARDIAC CATH  03/23/2018    3 vessel disease, needs CABG when stable from other problems.   • AMPUTATION, BELOW THE KNEE Left    • PACEMAKER INSERTION         Current Outpatient Medications:  Home Medications     Reviewed by Abhijit Virgen R.N. (Registered Nurse)  "on 06/10/19 at 0158  Med List Status: Complete   Medication Last Dose Status   atorvastatin (LIPITOR) 40 MG Tab 6/8/2019 Active   clopidogrel (PLAVIX) 75 MG Tab 6/9/2019 Active   glipiZIDE SR (GLUCOTROL) 2.5 MG TABLET SR 24 HR 6/9/2019 Active   insulin glargine (LANTUS) 100 UNIT/ML Solution 6/8/2019 Active   lisinopril-hydrochlorothiazide (PRINZIDE, ZESTORETIC) 10-12.5 MG per tablet 6/9/2019 Active   metformin (GLUCOPHAGE) 1000 MG tablet 6/9/2019 Active   metoprolol (LOPRESSOR) 25 MG Tab 6/9/2019 Active   pantoprazole (PROTONIX) 40 MG Tablet Delayed Response 6/9/2019 Active   simvastatin (ZOCOR) 40 MG Tab 6/9/2019 Active   tamsulosin (FLOMAX) 0.4 MG capsule 6/9/2019 Active                Medication Allergy/Sensitivities:  Allergies   Allergen Reactions   • Tea Tree Oil Rash     Unspecified         Family History (mandatory)   Family History   Problem Relation Age of Onset   • Heart Attack Mother 60       Social History (mandatory)   Social History     Social History   • Marital status:      Spouse name: N/A   • Number of children: N/A   • Years of education: N/A     Occupational History   • Not on file.     Social History Main Topics   • Smoking status: Former Smoker     Years: 30.00     Types: Cigarettes, Cigars     Quit date: 1/1/2018   • Smokeless tobacco: Never Used   • Alcohol use No   • Drug use: No   • Sexual activity: Not on file     Other Topics Concern   • Not on file     Social History Narrative   • No narrative on file     Living situation: Wife; in [place]  PCP : Prashanth Ocasio    Physical Exam     Vitals:    06/10/19 0045 06/10/19 0130 06/10/19 0145 06/10/19 0215   BP:       Pulse: (!) 126 (!) 123 (!) 126 (!) 126   Resp: 20 18 18 12   Temp:       TempSrc:       SpO2: 94% 96% 96% 96%   Weight:       Height:         Body mass index is 19.42 kg/m².  /69   Pulse (!) 126   Temp 36.7 °C (98 °F) (Temporal)   Resp 12   Ht 1.702 m (5' 7\")   Wt 56.2 kg (124 lb)   SpO2 96%   BMI 19.42 kg/m²   O2 " therapy: Pulse Oximetry: 96 %, O2 (LPM): 0, O2 Delivery: None (Room Air)    Physical Exam   Constitutional: He is oriented to person, place, and time.   Appears ill   HENT:   Head: Normocephalic and atraumatic.   Dry mucous membranes   Eyes: Conjunctivae are normal. No scleral icterus.   Neck: Normal range of motion. No tracheal deviation present.   Cardiovascular: Regular rhythm and normal heart sounds.    No murmur heard.  tachycardia   Pulmonary/Chest: Effort normal and breath sounds normal. No stridor. No respiratory distress. He has no wheezes. He has no rales.   Abdominal: Soft. He exhibits no distension. There is tenderness (diffusely tender). There is no rebound and no guarding.   Musculoskeletal: He exhibits no edema.   S/p left BKA   Neurological: He is alert and oriented to person, place, and time. He exhibits normal muscle tone. GCS score is 15.   Skin: Skin is warm and dry. No rash noted.   Nursing note and vitals reviewed.        Data Review   Old Records Request:   Completed  Current Records review/summary: Completed    Lab Data Review:  Recent Results (from the past 24 hour(s))   EKG (NOW)    Collection Time: 19  8:54 PM   Result Value Ref Range    Report       [facility]    Test Date:  2019  Pt Name:    ESTHER DE GUZMAN                 Department: ER  MRN:        [id number]                      Room:       [facility]  Gender:     Male                         Technician: [id number]  :        1950                   Requested By:ER TRIAGE PROTOCOL  Order #:    [id number]                    Reading MD:    Measurements  Intervals                                Axis  Rate:       122                          P:          -86  MN:         164                          QRS:        73  QRSD:       76                           T:          238  QT:         268  QTc:        382    Interpretive Statements  SINUS OR ECTOPIC ATRIAL TACHYCARDIA  REPOL ABNRM SUGGESTS ISCHEMIA,  DIFFUSE LEADS  Compared to ECG 07/10/2018 00:53:45  Early repolarization now present  Possible ischemia now present  Sinus rhythm no longer present  First degree AV block no longer present     URINALYSIS    Collection Time: 06/09/19 10:34 PM   Result Value Ref Range    Color Yellow     Character Clear     Specific Gravity 1.036 <1.035    Ph 5.0 5.0 - 8.0    Glucose 500 (A) Negative mg/dL    Ketones >=160 Negative mg/dL    Protein 30 (A) Negative mg/dL    Bilirubin Negative Negative    Urobilinogen, Urine 0.2 Negative    Nitrite Negative Negative    Leukocyte Esterase Negative Negative    Occult Blood Moderate (A) Negative    Micro Urine Req Microscopic    Magnesium    Collection Time: 06/09/19 10:34 PM   Result Value Ref Range    Magnesium 1.6 1.5 - 2.5 mg/dL   BETA-HYDROXYBUTYRIC ACID    Collection Time: 06/09/19 10:34 PM   Result Value Ref Range    beta-Hydroxybutyric Acid >8.00 (H) 0.02 - 0.27 mmol/L   DIAGNOSTIC ALCOHOL    Collection Time: 06/09/19 10:34 PM   Result Value Ref Range    Diagnostic Alcohol 0.01 (H) 0.00 g/dL   URINE DRUG SCREEN    Collection Time: 06/09/19 10:34 PM   Result Value Ref Range    Amphetamines Urine Negative Negative    Barbiturates Negative Negative    Benzodiazepines Negative Negative    Cocaine Metabolite Negative Negative    Methadone Negative Negative    Opiates Positive (A) Negative    Oxycodone Negative Negative    Phencyclidine -Pcp Negative Negative    Propoxyphene Negative Negative    Cannabinoid Metab Negative Negative   CRP QUANTITIVE (NON-CARDIAC)    Collection Time: 06/09/19 10:34 PM   Result Value Ref Range    Stat C-Reactive Protein 0.14 0.00 - 0.75 mg/dL   WESTERGREN SED RATE    Collection Time: 06/09/19 10:34 PM   Result Value Ref Range    Sed Rate Westergren 1 0 - 20 mm/hour   LACTIC ACID    Collection Time: 06/09/19 10:34 PM   Result Value Ref Range    Lactic Acid 1.6 0.5 - 2.0 mmol/L   TROPONIN    Collection Time: 06/09/19 10:34 PM   Result Value Ref Range     Troponin I 1.58 (H) 0.00 - 0.04 ng/mL   CBC WITH DIFFERENTIAL    Collection Time: 06/09/19 10:34 PM   Result Value Ref Range    WBC 18.3 (H) 4.8 - 10.8 K/uL    RBC 3.04 (L) 4.70 - 6.10 M/uL    Hemoglobin 10.3 (L) 14.0 - 18.0 g/dL    Hematocrit 31.6 (L) 42.0 - 52.0 %    .9 (H) 81.4 - 97.8 fL    MCH 33.9 (H) 27.0 - 33.0 pg    MCHC 32.6 (L) 33.7 - 35.3 g/dL    RDW 48.6 35.9 - 50.0 fL    Platelet Count 198 164 - 446 K/uL    MPV 8.8 (L) 9.0 - 12.9 fL    Neutrophils-Polys 87.40 (H) 44.00 - 72.00 %    Lymphocytes 7.60 (L) 22.00 - 41.00 %    Monocytes 4.20 0.00 - 13.40 %    Eosinophils 0.10 0.00 - 6.90 %    Basophils 0.20 0.00 - 1.80 %    Immature Granulocytes 0.50 0.00 - 0.90 %    Nucleated RBC 0.00 /100 WBC    Neutrophils (Absolute) 15.96 (H) 1.82 - 7.42 K/uL    Lymphs (Absolute) 1.39 1.00 - 4.80 K/uL    Monos (Absolute) 0.76 0.00 - 0.85 K/uL    Eos (Absolute) 0.01 0.00 - 0.51 K/uL    Baso (Absolute) 0.03 0.00 - 0.12 K/uL    Immature Granulocytes (abs) 0.10 0.00 - 0.11 K/uL    NRBC (Absolute) 0.00 K/uL   Comp Metabolic Panel    Collection Time: 06/09/19 10:34 PM   Result Value Ref Range    Sodium 141 135 - 145 mmol/L    Potassium 4.6 3.6 - 5.5 mmol/L    Chloride 108 96 - 112 mmol/L    Co2 9 (LL) 20 - 33 mmol/L    Anion Gap 24.0 (H) 0.0 - 11.9    Glucose 296 (H) 65 - 99 mg/dL    Bun 19 8 - 22 mg/dL    Creatinine 0.99 0.50 - 1.40 mg/dL    Calcium 8.2 (L) 8.5 - 10.5 mg/dL    AST(SGOT) 19 12 - 45 U/L    ALT(SGPT) 10 2 - 50 U/L    Alkaline Phosphatase 43 30 - 99 U/L    Total Bilirubin 0.5 0.1 - 1.5 mg/dL    Albumin 3.8 3.2 - 4.9 g/dL    Total Protein 5.9 (L) 6.0 - 8.2 g/dL    Globulin 2.1 1.9 - 3.5 g/dL    A-G Ratio 1.8 g/dL   PREALBUMIN    Collection Time: 06/09/19 10:34 PM   Result Value Ref Range    Pre-Albumin 28.0 18.0 - 38.0 mg/dL   URINE MICROSCOPIC (W/UA)    Collection Time: 06/09/19 10:34 PM   Result Value Ref Range    WBC 0-2 (A) /hpf    RBC 20-50 (A) /hpf    Bacteria Negative None /hpf    Epithelial Cells  Negative /hpf    Hyaline Cast 0-2 /lpf   ESTIMATED GFR    Collection Time: 06/09/19 10:34 PM   Result Value Ref Range    GFR If African American >60 >60 mL/min/1.73 m 2    GFR If Non African American >60 >60 mL/min/1.73 m 2   CREATINE KINASE    Collection Time: 06/09/19 10:34 PM   Result Value Ref Range    CPK Total 115 0 - 154 U/L   PHOSPHORUS    Collection Time: 06/09/19 10:34 PM   Result Value Ref Range    Phosphorus 3.8 2.5 - 4.5 mg/dL   BTYPE NATRIURETIC PEPTIDE    Collection Time: 06/09/19 10:36 PM   Result Value Ref Range    B Natriuretic Peptide 482 (H) 0 - 100 pg/mL   ABG - LAB    Collection Time: 06/09/19 11:57 PM   Result Value Ref Range    Ph 7.19 (LL) 7.40 - 7.50    Pco2 21.4 (L) 26.0 - 37.0 mmHg    Po2 82.2 64.0 - 87.0 mmHg    O2 Saturation 93.2 93.0 - 99.0 %    Hco3 8 (L) 17 - 25 mmol/L    Base Excess -19 (L) -4 - 3 mmol/L    Body Temp see below Centigrade       Imaging/Procedures Review:    Independant Imaging Review: Completed  CT-FOREIGN FILM CAT SCAN   Final Result      EC-ECHOCARDIOGRAM COMPLETE W/O CONT    (Results Pending)        EKG:   EKG Independent Review: Completed  QTc: 382, HR: 122, sinus tachycardia, ST depressions in V3-V6    Records reviewed and summarized in current documentation :  Yes         Assessment/Plan     * Diabetic ketoacidosis without coma associated with type 2 diabetes mellitus (HCC)- (present on admission)   Assessment & Plan    - Euglycemic DKA in patient with type 2 Diabetes Mellitus. Patient is on Insulin + oral antihyperglycemic as outpatient.   - With nausea, vomiting, abdominal pain  - ABG: Metabolic acidosis  - Anion gap: 24; Beta-hydroxyurea > 8  - Glucose 296  - Sodium 141, K 4.6, Cl 108, CO2 9. Mag 1.6; PO4 3.8    Plan  - DKA protocol  - NPO   - Trend BMP q4 hours. Correct electrolyte abnormalities according  - Holding patient's outpatient DM regimen  - Check HbA1c in AM  - Symptomatic management with anti-emetics     High anion gap metabolic acidosis    Assessment & Plan    In the setting of DKA    Plan  - Treatment per DKA protocol  - IV fluids  - NPO     NSTEMI (non-ST elevated myocardial infarction) (HCC)- (present on admission)   Assessment & Plan    - Elevated troponin in the setting of ST depressions on EKG in V3-V6  - H/o NSTEMI in March   - Cardiology has been consulted  - On aspirin/plavis as outpatient.    Plan  - Aspirin, statin, beta-blockers  - Trend troponins  - Awaiting cardiology recommendations  - Echocardiogram      Leukocytosis- (present on admission)   Assessment & Plan    WBC 18.3. Possibly contributed by nausea/vomiting. ? Colitis  - Lactic acid normal  - Hemodynamically stable    Plan  - Continue to monitor with CBC     Colitis   Assessment & Plan    - Per CT report from [facility]: CT: mild thick walled appearnce to the entire colon which may be due to the fact that it is decompressed. Mild colitis is in the differential  - Leukocytosis, but negative lactic acid elevation  - No recent hospitalizations or antibiotic use  - Possible related to DKA episode    Plan  - Serial abdominal exams  - Monitor closely     Urine retention- (present on admission)   Assessment & Plan    - acute urinary retention. Mcnair was placed in outside facility however patient was having significant discomfort with mcnair therefore removed here    Plan  - Continue flomax  - Serial bladder scans; if patient is unable to void, will need to replace mcnair     Type 2 diabetes mellitus with hyperglycemia, with long-term current use of insulin (HCC)- (present on admission)   Assessment & Plan    On oral and insulin at home per med rec    Plan  - Treatment per DKA protocol  - HbA1c in AM  - Can consider diabetes educator consult once patient has improved     Essential hypertension- (present on admission)   Assessment & Plan    On lisinopril/HCTZ 10/12/5 mg daily per med rec    Plan  - Holding for now in the setting of fluid resuscitation  - Resume once more stable     Macrocytic  anemia- (present on admission)   Assessment & Plan    Hb 10.3, Hct 31.6, .9    Plan  - Anemia work: B12, folate, iron panel         Anticipated Hospital stay:  >2 midnights      Quality Measures  Quality-Core Measures   Reviewed items::  Medications reviewed, Radiology images reviewed, EKG reviewed and Labs reviewed  Reed catheter::  Urinary Tract Retention or Urinary Tract Obstruction  DVT prophylaxis pharmacological::  Enoxaparin (Lovenox)  Ulcer Prophylaxis::  Yes    PCP: Prashanth Ocasio

## 2019-06-10 NOTE — ASSESSMENT & PLAN NOTE
Type 2 diabetes  DKA  Aggressive fluid resuscitation initially  Improved chemistry  Transitioned from insulin drip  Sq insulin   ssi  Post resuscitaiton diuresis adequate

## 2019-06-10 NOTE — PROGRESS NOTES
Patient was offered juice and didn't want it, but then I found out we have to run a bag of D10 instead of D50 so I insisted he drink juice. Juice drank

## 2019-06-10 NOTE — PROGRESS NOTES
[facility] MD called this RN back to update about cardiology. Per Dr. Marcial, continue to trend trops, however, cardiology does not recommend specific interventions. Will trend trops.

## 2019-06-10 NOTE — PROGRESS NOTES
Critical Care Progress Note    Date of admission  6/9/2019    Chief Complaint  68 y.o. male admitted 6/9/2019 with n/v and abdominal pain and diagnosed with DKA.  ALso NSTEMI with troponin elevation but no chest pain.      Hospital Course          Interval Problem Update  Reviewed last 24 hour events:  DKA therapy  Sinus tach  Reed placed overnight for retention  d10 at 100 ml/hr  Pending labs  On insulin drip 3 u/hr  lovenox px  Home ppi  co2 13  Trend trops until downhill  1liter LR    addendum  Hypoglycemia, reduced insulin drip dosing  Follow up sequential chemistry  Will transition once co2 on chem improved.  Additional liter bolus, co2 13 up to 16  Started on heparin drip, trop to 30, downtrended to 29  Ongoing n/v in diabetic patient  Cardiology updated and plan for heart cath    Slowly correcting, once co2 > 18 can change to insulin drip protocol as will be npo at midnight    Review of Systems  Review of Systems   Constitutional: Positive for malaise/fatigue. Negative for chills, diaphoresis, fever and weight loss.   HENT: Negative for congestion and sinus pain.    Eyes: Negative for blurred vision, double vision and photophobia.   Respiratory: Negative for cough, hemoptysis, sputum production, shortness of breath, wheezing and stridor.    Cardiovascular: Negative for chest pain, palpitations and leg swelling.   Gastrointestinal: Positive for abdominal pain. Negative for blood in stool, heartburn, melena and vomiting.   Genitourinary: Negative for dysuria and urgency.   Musculoskeletal: Negative for back pain, myalgias and neck pain.   Skin: Negative for itching and rash.   Neurological: Positive for weakness. Negative for dizziness, tingling, sensory change, speech change, focal weakness and headaches.   Endo/Heme/Allergies: Negative for polydipsia. Does not bruise/bleed easily.   Psychiatric/Behavioral: Negative for depression. The patient is not nervous/anxious.         Vital Signs for last 24 hours    Temp:  [36.6 °C (97.8 °F)-36.7 °C (98 °F)] 36.7 °C (98 °F)  Pulse:  [111-131] 117  Resp:  [12-21] 18  BP: (131)/(69) 131/69  SpO2:  [93 %-99 %] 97 %    Hemodynamic parameters for last 24 hours       Respiratory Information for the last 24 hours       Physical Exam   Physical Exam   Constitutional: He is oriented to person, place, and time. No distress.   lethargy   HENT:   Head: Normocephalic and atraumatic.   Right Ear: External ear normal.   Left Ear: External ear normal.   Mouth/Throat: No oropharyngeal exudate.   Eyes: Pupils are equal, round, and reactive to light. Conjunctivae and EOM are normal. Right eye exhibits no discharge. Left eye exhibits no discharge.   Neck: No JVD present. No tracheal deviation present.   Cardiovascular: Normal rate, regular rhythm and normal heart sounds.    No murmur heard.  Pulmonary/Chest: Effort normal and breath sounds normal. No stridor. No respiratory distress. He has no wheezes. He has no rales. He exhibits no tenderness.   Abdominal: He exhibits no mass. There is no tenderness. There is no rebound and no guarding.   Musculoskeletal: He exhibits no edema, tenderness or deformity.   Neurological: He is alert and oriented to person, place, and time. He displays normal reflexes. No cranial nerve deficit. Coordination normal.   Skin: Skin is warm. No rash noted. He is diaphoretic. No erythema.   Psychiatric:   fatigued   Nursing note and vitals reviewed.      Medications  Current Facility-Administered Medications   Medication Dose Route Frequency Provider Last Rate Last Dose   • MD ALERT-PHARMACY TO CONSULT FOR DKA MONITORING 1 Each  1 Each Other PRN Anayeli Marcial M.D.       • Adult DKA potassium(K+) replacement scale  1 Each Intravenous Q4HRS Anayeli Marcial M.D.   1 Each at 06/10/19 0341   • NS infusion   Intravenous Continuous Anayeli Marcial M.D.   Stopped at 06/10/19 0339   • D5 NS infusion   Intravenous Continuous Anayeli Marcial M.D. 100 mL/hr at 06/10/19 0340     •  dextrose 10% and 0.45% NaCl infusion   Intravenous Continuous Anayeli Marcial M.D.   Stopped at 06/10/19 0100   • insulin regular human (HUMULIN/NOVOLIN R) 62.5 Units in  mL Infusion for DKA  3 Units/hr Intravenous Continuous Anayeli Marcial M.D. 12 mL/hr at 06/10/19 0534 3 Units/hr at 06/10/19 0534   • MD Alert...ICU Electrolyte Replacement per Pharmacy   Other PHARMACY TO DOSE Anayeli Marcial M.D.       • prochlorperazine (COMPAZINE) injection 10 mg  10 mg Intravenous Q6HRS PRN Anayeli Marcial M.D.   10 mg at 06/10/19 0147   • metoclopramide (REGLAN) injection 5 mg  5 mg Intravenous Q6HRS PRN Anayeli Marcial M.D.       • promethazine (PHENERGAN) suppository 25 mg  25 mg Rectal Q6HRS PRN Anayeli Marcial M.D.       • metoprolol (LOPRESSOR) tablet 25 mg  25 mg Oral BID Dre Aguirre M.D.       • calcium GLUConate 1 g in  mL IVPB  1 g Intravenous Once Yoshi Valero M.D.       • enoxaparin (LOVENOX) inj 40 mg  40 mg Subcutaneous DAILY Christine Farmer M.D.   40 mg at 06/10/19 0610   • acetaminophen (TYLENOL) tablet 650 mg  650 mg Oral Q6HRS PRN Christine Farmer M.D.       • enalaprilat (VASOTEC) injection 1.25 mg  1.25 mg Intravenous Q6HRS PRN Christine Farmer M.D.       • ondansetron (ZOFRAN) syringe/vial injection 4 mg  4 mg Intravenous Q4HRS PRN Christine Farmer M.D.   4 mg at 06/10/19 0026   • ondansetron (ZOFRAN ODT) dispertab 4 mg  4 mg Oral Q4HRS PRN Christine Farmer M.D.       • clopidogrel (PLAVIX) tablet 75 mg  75 mg Oral DAILY Christine Farmer M.D.   75 mg at 06/10/19 0610   • tamsulosin (FLOMAX) capsule 0.4 mg  0.4 mg Oral AFTER BREAKFAST Anayeli Marcial M.D.       • omeprazole (PRILOSEC) capsule 20 mg  20 mg Oral DAILY Christine Farmer M.D.   20 mg at 06/10/19 0610   • morphine (pf) 4 mg/ml injection 2-4 mg  2-4 mg Intravenous Q5 MIN PRN Christine Farmer M.D.       • atorvastatin (LIPITOR) tablet 80 mg  80 mg Oral Q EVENING Christine Farmer M.D.   Stopped at 06/10/19 0109   •  aspirin EC (ECOTRIN) tablet 81 mg  81 mg Oral DAILY Christine Farmer M.D.   81 mg at 06/10/19 0610   • nitroglycerin (NITROSTAT) tablet 0.4 mg  0.4 mg Sublingual Q5 MIN PRN Christine Farmer M.D.           Fluids    Intake/Output Summary (Last 24 hours) at 06/10/19 0645  Last data filed at 06/10/19 0400   Gross per 24 hour   Intake          1171.02 ml   Output                0 ml   Net          1171.02 ml       Laboratory  Recent Labs      06/09/19   2357   DQZNF93R  7.19*   [id number]  21.4*   [id number]  82.2   UKAD2KQK  93.2   ARTHCO3  8*   ARTBE  -19*     Recent Labs      06/09/19   2234  06/09/19   2236  06/10/19   0250   CPKTOTAL  115   --    --    TROPONINI  1.58*   --   3.09*   BNPBTYPENAT   --   482*   --      Recent Labs      06/09/19   2234  06/10/19   0250   SODIUM  141  141   POTASSIUM  4.6  4.3   CHLORIDE  108  111   CO2  9*  10*   BUN  19 19   CREATININE  0.99  1.01   MAGNESIUM  1.6   --    PHOSPHORUS  3.8   --    CALCIUM  8.2*  7.9*     Recent Labs      06/09/19   2234  06/10/19   0250   ALTSGPT  10   --    ASTSGOT  19   --    ALKPHOSPHAT  43   --    TBILIRUBIN  0.5   --    PREALBUMIN  28.0   --    GLUCOSE  296*  278*     Recent Labs      06/09/19 2234   WBC  18.3*   NEUTSPOLYS  87.40*   LYMPHOCYTES  7.60*   MONOCYTES  4.20   EOSINOPHILS  0.10   BASOPHILS  0.20   ASTSGOT  19   ALTSGPT  10   ALKPHOSPHAT  43   TBILIRUBIN  0.5     Recent Labs      06/09/19   2234  06/10/19   0250   RBC  3.04*   --    HEMOGLOBIN  10.3*   --    HEMATOCRIT  31.6*   --    PLATELETCT  198   --    IRON   --   64   FERRITIN   --   434.7*   TOTIRONBC   --   249*       Imaging  X-Ray:  I have personally reviewed the images and compared with prior images.  EKG:  I have personally reviewed the images and compared with prior images.  CT:    Reviewed    Assessment/Plan  * Diabetic ketoacidosis without coma associated with type 2 diabetes mellitus (HCC)- (present on admission)   Assessment & Plan    Aggressive fluid  resuscitation  dka protocol     High anion gap metabolic acidosis   Assessment & Plan    Secondary to DKA     NSTEMI (non-ST elevated myocardial infarction) (HCC)- (present on admission)   Assessment & Plan    Likely demand ischemia  On statin, bb  No chest pain  D dimer elevated but no hypoxia or respiratory distress or swelling of legs, unlikely PE but no other clear source for d dimer elevation.  If remains tachycardic will need cta chest     Leukocytosis- (present on admission)   Assessment & Plan    Likely due to dka     Colitis   Assessment & Plan    Colitis, mild on ct at [facility]  Unlikely significant infection as asymptomatic, abdominal pain improved of which was likely due to gastroparesis  Reviewed ct imaging     Urine retention- (present on admission)   Assessment & Plan    Fluid resuscitation  Monitor closely for need for mcnair     Type 2 diabetes mellitus with hyperglycemia, with long-term current use of insulin ([facility])- (present on admission)   Assessment & Plan    Type 2 diabetes  DKA  Aggressive fluid resuscitation  dka protocol       Generalized abdominal pain   Assessment & Plan    Resolved overnight with fluid resuscitation  likley due to gastroparesis     Essential hypertension- (present on admission)   Assessment & Plan    Keep sbp < 160     Macrocytic anemia- (present on admission)   Assessment & Plan    Transfuse for hb < 7          VTE:  Lovenox  Ulcer: Not Indicated  Lines: None    I have performed a physical exam and reviewed and updated ROS and Plan today (6/10/2019). In review of yesterday's note (6/9/2019), there are no changes except as documented above.     Discussed patient condition and risk of morbidity and/or mortality with Family, RN, RT, Pharmacy, [employer] [facility] resident, Code status disscussed, Charge nurse / hot rounds and cardiology     The patient remains critically ill.  Critical care time = 94 minutes in directly providing and coordinating critical care and extensive data review.   No time overlap and excludes procedures.

## 2019-06-10 NOTE — PROGRESS NOTES
Received patient from [facility]. Attached to monitors. TERE caputo MD to bedside. OK to d/c c-diff r/o. Orders verified.

## 2019-06-10 NOTE — ED NOTES
Blood collected and sent.  Patient resting quietly in bed without distress, denies any complaints or needs at this time.  Call bell within reach.  Waiting for inpatient bed

## 2019-06-10 NOTE — CONSULTS
Cardiology Consult Note:    Dre Aguirre  Date & Time note created:    6/10/2019   6:53 AM     Referring MD:  Dr. Taylor    Patient ID:   Name:             Jarret Bright   YOB: 1950  Age:                 68 y.o.  male   MRN:               [id number]                                                             Reason for Consult:      Pos trop    History of Present Illness:    68-year-old male with past medical history of CAD with multivessel coronary disease turned down for bypass before due to poor targets as well as due to multiple medical comorbidities.  He was in his normal state of health when he developed nausea and abdominal pain.  He was seen in outside hospital diagnosis having DKA.  He was having unremitting vomiting which required multiple medications to resolve.  Today in the ER for an in the ICU he has ST segment changes and per report had chest pain but on exam and interview he denied chest pain.  He does have ST depression.  His troponins did turn positive at 1.5 and a milligram of 3.09.  He currently denies chest pain but does have ECG changes.  He is tachycardic.    Review of Systems:      Constitutional: Denies fevers, Denies weight changes  Eyes: Denies changes in vision, no eye pain  Ears/Nose/Throat/Mouth: Denies nasal congestion or sore throat   Cardiovascular: no chest pain, no palpitations   Respiratory: no shortness of breath , Denies cough  Gastrointestinal/Hepatic: Denies abdominal pain, nausea, vomiting, diarrhea, constipation or GI bleeding   Genitourinary: Denies dysuria or frequency  Musculoskeletal/Rheum: Denies  joint pain and swelling, no edema  Skin: Denies rash  Neurological: Denies headache, confusion, memory loss or focal weakness/parasthesias  Psychiatric: denies mood disorder   Endocrine: Asha thyroid problems  Heme/Oncology/Lymph Nodes: Denies enlarged lymph nodes, denies brusing or known bleeding disorder  All other systems were reviewed and are  negative (AMA/CMS criteria)                Past Medical History:   Past Medical History:   Diagnosis Date   • CAD (coronary artery disease)    • Diabetes ([facility])     insulin dependent.   • Hx of BKA (HCC)    • Hypertension    • Pacemaker      Active Hospital Problems    Diagnosis   • Diabetic ketoacidosis without coma associated with type 2 diabetes mellitus ([facility]) [E11.10]     Priority: High   • High anion gap metabolic acidosis [E87.2]     Priority: High   • NSTEMI (non-ST elevated myocardial infarction) ([facility]) [I21.4]     Priority: High   • Leukocytosis [D72.829]     Priority: Medium   • Colitis [K52.9]     Priority: Medium   • Urine retention [R33.9]     Priority: Medium   • Type 2 diabetes mellitus with hyperglycemia, with long-term current use of insulin ([facility]) [E11.65, Z79.4]     Priority: Medium   • Essential hypertension [I10]     Priority: Low   • Macrocytic anemia [D53.9]     Priority: Low       Past Surgical History:  Past Surgical History:   Procedure Laterality Date   • [facility] CARDIAC CATH  03/23/2018    3 vessel disease, needs CABG when stable from other problems.   • AMPUTATION, BELOW THE KNEE Left    • PACEMAKER INSERTION         Hospital Medications:  Current Facility-Administered Medications   Medication Dose   • MD ALERT-PHARMACY TO CONSULT FOR DKA MONITORING 1 Each  1 Each   • Adult DKA potassium(K+) replacement scale  1 Each   • NS infusion     • D5 NS infusion     • dextrose 10% and 0.45% NaCl infusion     • insulin regular human (HUMULIN/NOVOLIN R) 62.5 Units in  mL Infusion for DKA  3 Units/hr   • MD Alert...ICU Electrolyte Replacement per Pharmacy     • prochlorperazine (COMPAZINE) injection 10 mg  10 mg   • metoclopramide (REGLAN) injection 5 mg  5 mg   • promethazine (PHENERGAN) suppository 25 mg  25 mg   • metoprolol (LOPRESSOR) tablet 25 mg  25 mg   • calcium GLUConate 1 g in  mL IVPB  1 g   • enoxaparin (LOVENOX) inj 40 mg  40 mg   • acetaminophen (TYLENOL) tablet 650 mg  650 mg    • enalaprilat (VASOTEC) injection 1.25 mg  1.25 mg   • ondansetron (ZOFRAN) syringe/vial injection 4 mg  4 mg   • ondansetron (ZOFRAN ODT) dispertab 4 mg  4 mg   • clopidogrel (PLAVIX) tablet 75 mg  75 mg   • tamsulosin (FLOMAX) capsule 0.4 mg  0.4 mg   • omeprazole (PRILOSEC) capsule 20 mg  20 mg   • morphine (pf) 4 mg/ml injection 2-4 mg  2-4 mg   • atorvastatin (LIPITOR) tablet 80 mg  80 mg   • aspirin EC (ECOTRIN) tablet 81 mg  81 mg   • nitroglycerin (NITROSTAT) tablet 0.4 mg  0.4 mg         Current Outpatient Medications:  Prescriptions Prior to Admission   Medication Sig Dispense Refill Last Dose   • glipiZIDE SR (GLUCOTROL) 2.5 MG TABLET SR 24 HR Take 2.5 mg by mouth 2 Times a Day.   6/9/2019 at 0800   • lisinopril-hydrochlorothiazide (PRINZIDE, ZESTORETIC) 10-12.5 MG per tablet Take 1 Tab by mouth every day.   6/9/2019 at 0800   • metformin (GLUCOPHAGE) 1000 MG tablet Take 1,000 mg by mouth 2 times a day, with meals.   6/9/2019 at 0800   • pantoprazole (PROTONIX) 40 MG Tablet Delayed Response Take 40 mg by mouth 2 times a day.   6/9/2019 at 0800   • simvastatin (ZOCOR) 40 MG Tab Take 40 mg by mouth every day.   6/9/2019 at 0800   • clopidogrel (PLAVIX) 75 MG Tab Take 1 Tab by mouth every day. 30 Tab 11 6/9/2019 at 0800   • atorvastatin (LIPITOR) 40 MG Tab Take 1 Tab by mouth every evening. 30 Tab 11 6/8/2019 at 1900   • metoprolol (LOPRESSOR) 25 MG Tab Take 0.5 Tabs by mouth 2 times a day. 90 Tab 3 6/9/2019 at 0800   • tamsulosin (FLOMAX) 0.4 MG capsule Take 1 Cap by mouth ONE-HALF HOUR AFTER BREAKFAST. 30 Cap 1 6/9/2019 at 0800   • insulin glargine (LANTUS) 100 UNIT/ML Solution Inject 24 Units as instructed every evening.   6/8/2019 at 1900       Medication Allergy:  Allergies   Allergen Reactions   • Tea Tree Oil Rash     Unspecified       Family History:  Family History   Problem Relation Age of Onset   • Heart Attack Mother 60       Social History:  Social History     Social History   • Marital  "status:      Spouse name: N/A   • Number of children: N/A   • Years of education: N/A     Occupational History   • Not on file.     Social History Main Topics   • Smoking status: Former Smoker     Years: 30.00     Types: Cigarettes, Cigars     Quit date: 1/1/2018   • Smokeless tobacco: Never Used   • Alcohol use No   • Drug use: No   • Sexual activity: Not on file     Other Topics Concern   • Not on file     Social History Narrative   • No narrative on file         Physical Exam:  Vitals/ General Appearance:   Weight/BMI: Body mass index is 19.42 kg/m².  /69   Pulse (!) 117   Temp 36.7 °C (98 °F) (Temporal)   Resp 20   Ht 1.702 m (5' 7\")   Wt 56.2 kg (124 lb)   SpO2 92%   Vitals:    06/10/19 0430 06/10/19 0600 06/10/19 0610 06/10/19 0639   BP:       Pulse: (!) 121 (!) 111 (!) 113 (!) 117   Resp: 18 20     Temp:       TempSrc:       SpO2: 97% 92%     Weight:       Height:         Oxygen Therapy:  Pulse Oximetry: 92 %, O2 (LPM): 0, O2 Delivery: None (Room Air)    Constitutional:   Well developed, Well nourished, No acute distress  HENMT:  Normocephalic, Atraumatic, Oropharynx moist mucous membranes, No oral exudates, Nose normal.  No thyromegaly.  Eyes:  EOMI, Conjunctiva normal, No discharge.  Neck:  Normal range of motion, No cervical tenderness,  no JVD.  Cardiovascular:  Normal heart rate, Normal rhythm, No murmurs, No rubs, No gallops.   Extremitites with intact distal pulses, no cyanosis, or edema.  Lungs:  Normal breath sounds, breath sounds clear to auscultation bilaterally,  no crackles, no wheezing.   Abdomen: Bowel sounds normal, Soft, No tenderness, No guarding, No rebound, No masses, No hepatosplenomegaly.  Skin: Warm, Dry, No erythema, No rash, no induration.  Neurologic: Alert & oriented x 3, No focal deficits noted, cranial nerves II through X are grossly intact.  Psychiatric: Affect normal, Judgment normal, Mood normal.      MDM (Data Review):     Records reviewed and summarized in " current documentation    Lab Data Review:  Recent Results (from the past 24 hour(s))   EKG (NOW)    Collection Time: 19  8:54 PM   Result Value Ref Range    Report       [facility]    Test Date:  2019  Pt Name:    ESTHER DE GUZMAN                 Department: ER  MRN:        [id number]                      Room:       [facility]  Gender:     Male                         Technician: [id number]  :        1950                   Requested By:ER TRIAGE PROTOCOL  Order #:    [id number]                    Reading MD:    Measurements  Intervals                                Axis  Rate:       122                          P:          -86  IN:         164                          QRS:        73  QRSD:       76                           T:          238  QT:         268  QTc:        382    Interpretive Statements  SINUS OR ECTOPIC ATRIAL TACHYCARDIA  REPOL ABNRM SUGGESTS ISCHEMIA, DIFFUSE LEADS  Compared to ECG 07/10/2018 00:53:45  Early repolarization now present  Possible ischemia now present  Sinus rhythm no longer present  First degree AV block no longer present     URINALYSIS    Collection Time: 19 10:34 PM   Result Value Ref Range    Color Yellow     Character Clear     Specific Gravity 1.036 <1.035    Ph 5.0 5.0 - 8.0    Glucose 500 (A) Negative mg/dL    Ketones >=160 Negative mg/dL    Protein 30 (A) Negative mg/dL    Bilirubin Negative Negative    Urobilinogen, Urine 0.2 Negative    Nitrite Negative Negative    Leukocyte Esterase Negative Negative    Occult Blood Moderate (A) Negative    Micro Urine Req Microscopic    Magnesium    Collection Time: 19 10:34 PM   Result Value Ref Range    Magnesium 1.6 1.5 - 2.5 mg/dL   BETA-HYDROXYBUTYRIC ACID    Collection Time: 19 10:34 PM   Result Value Ref Range    beta-Hydroxybutyric Acid >8.00 (H) 0.02 - 0.27 mmol/L   DIAGNOSTIC ALCOHOL    Collection Time: 19 10:34 PM   Result Value Ref Range    Diagnostic Alcohol 0.01  (H) 0.00 g/dL   URINE DRUG SCREEN    Collection Time: 06/09/19 10:34 PM   Result Value Ref Range    Amphetamines Urine Negative Negative    Barbiturates Negative Negative    Benzodiazepines Negative Negative    Cocaine Metabolite Negative Negative    Methadone Negative Negative    Opiates Positive (A) Negative    Oxycodone Negative Negative    Phencyclidine -Pcp Negative Negative    Propoxyphene Negative Negative    Cannabinoid Metab Negative Negative   CRP QUANTITIVE (NON-CARDIAC)    Collection Time: 06/09/19 10:34 PM   Result Value Ref Range    Stat C-Reactive Protein 0.14 0.00 - 0.75 mg/dL   WESTERGREN SED RATE    Collection Time: 06/09/19 10:34 PM   Result Value Ref Range    Sed Rate Westergren 1 0 - 20 mm/hour   LACTIC ACID    Collection Time: 06/09/19 10:34 PM   Result Value Ref Range    Lactic Acid 1.6 0.5 - 2.0 mmol/L   TROPONIN    Collection Time: 06/09/19 10:34 PM   Result Value Ref Range    Troponin I 1.58 (H) 0.00 - 0.04 ng/mL   CBC WITH DIFFERENTIAL    Collection Time: 06/09/19 10:34 PM   Result Value Ref Range    WBC 18.3 (H) 4.8 - 10.8 K/uL    RBC 3.04 (L) 4.70 - 6.10 M/uL    Hemoglobin 10.3 (L) 14.0 - 18.0 g/dL    Hematocrit 31.6 (L) 42.0 - 52.0 %    .9 (H) 81.4 - 97.8 fL    MCH 33.9 (H) 27.0 - 33.0 pg    MCHC 32.6 (L) 33.7 - 35.3 g/dL    RDW 48.6 35.9 - 50.0 fL    Platelet Count 198 164 - 446 K/uL    MPV 8.8 (L) 9.0 - 12.9 fL    Neutrophils-Polys 87.40 (H) 44.00 - 72.00 %    Lymphocytes 7.60 (L) 22.00 - 41.00 %    Monocytes 4.20 0.00 - 13.40 %    Eosinophils 0.10 0.00 - 6.90 %    Basophils 0.20 0.00 - 1.80 %    Immature Granulocytes 0.50 0.00 - 0.90 %    Nucleated RBC 0.00 /100 WBC    Neutrophils (Absolute) 15.96 (H) 1.82 - 7.42 K/uL    Lymphs (Absolute) 1.39 1.00 - 4.80 K/uL    Monos (Absolute) 0.76 0.00 - 0.85 K/uL    Eos (Absolute) 0.01 0.00 - 0.51 K/uL    Baso (Absolute) 0.03 0.00 - 0.12 K/uL    Immature Granulocytes (abs) 0.10 0.00 - 0.11 K/uL    NRBC (Absolute) 0.00 K/uL   Comp  Metabolic Panel    Collection Time: 06/09/19 10:34 PM   Result Value Ref Range    Sodium 141 135 - 145 mmol/L    Potassium 4.6 3.6 - 5.5 mmol/L    Chloride 108 96 - 112 mmol/L    Co2 9 (LL) 20 - 33 mmol/L    Anion Gap 24.0 (H) 0.0 - 11.9    Glucose 296 (H) 65 - 99 mg/dL    Bun 19 8 - 22 mg/dL    Creatinine 0.99 0.50 - 1.40 mg/dL    Calcium 8.2 (L) 8.5 - 10.5 mg/dL    AST(SGOT) 19 12 - 45 U/L    ALT(SGPT) 10 2 - 50 U/L    Alkaline Phosphatase 43 30 - 99 U/L    Total Bilirubin 0.5 0.1 - 1.5 mg/dL    Albumin 3.8 3.2 - 4.9 g/dL    Total Protein 5.9 (L) 6.0 - 8.2 g/dL    Globulin 2.1 1.9 - 3.5 g/dL    A-G Ratio 1.8 g/dL   PREALBUMIN    Collection Time: 06/09/19 10:34 PM   Result Value Ref Range    Pre-Albumin 28.0 18.0 - 38.0 mg/dL   URINE MICROSCOPIC (W/UA)    Collection Time: 06/09/19 10:34 PM   Result Value Ref Range    WBC 0-2 (A) /hpf    RBC 20-50 (A) /hpf    Bacteria Negative None /hpf    Epithelial Cells Negative /hpf    Hyaline Cast 0-2 /lpf   ESTIMATED GFR    Collection Time: 06/09/19 10:34 PM   Result Value Ref Range    GFR If African American >60 >60 mL/min/1.73 m 2    GFR If Non African American >60 >60 mL/min/1.73 m 2   CREATINE KINASE    Collection Time: 06/09/19 10:34 PM   Result Value Ref Range    CPK Total 115 0 - 154 U/L   PHOSPHORUS    Collection Time: 06/09/19 10:34 PM   Result Value Ref Range    Phosphorus 3.8 2.5 - 4.5 mg/dL   BTYPE NATRIURETIC PEPTIDE    Collection Time: 06/09/19 10:36 PM   Result Value Ref Range    B Natriuretic Peptide 482 (H) 0 - 100 pg/mL   ABG - LAB    Collection Time: 06/09/19 11:57 PM   Result Value Ref Range    Ph 7.19 (LL) 7.40 - 7.50    Pco2 21.4 (L) 26.0 - 37.0 mmHg    Po2 82.2 64.0 - 87.0 mmHg    O2 Saturation 93.2 93.0 - 99.0 %    Hco3 8 (L) 17 - 25 mmol/L    Base Excess -19 (L) -4 - 3 mmol/L    Body Temp see below Centigrade   ACCU-CHEK GLUCOSE    Collection Time: 06/10/19  1:25 AM   Result Value Ref Range    Glucose - Accu-Ck 310 (H) 65 - 99 mg/dL   Troponin in four  (4) hours    Collection Time: 06/10/19  2:50 AM   Result Value Ref Range    Troponin I 3.09 (H) 0.00 - 0.04 ng/mL   FERRITIN    Collection Time: 06/10/19  2:50 AM   Result Value Ref Range    Ferritin 434.7 (H) 22.0 - 322.0 ng/mL   FOLATE    Collection Time: 06/10/19  2:50 AM   Result Value Ref Range    Folate -Folic Acid >23.8 >4.0 ng/mL   IRON/TOTAL IRON BIND    Collection Time: 06/10/19  2:50 AM   Result Value Ref Range    Iron 64 50 - 180 ug/dL    Total Iron Binding 249 (L) 250 - 450 ug/dL    % Saturation 26 15 - 55 %   VITAMIN B12    Collection Time: 06/10/19  2:50 AM   Result Value Ref Range    Vitamin B12 -True Cobalamin 203 (L) 211 - 911 pg/mL   Basic Metabolic Panel    Collection Time: 06/10/19  2:50 AM   Result Value Ref Range    Sodium 141 135 - 145 mmol/L    Potassium 4.3 3.6 - 5.5 mmol/L    Chloride 111 96 - 112 mmol/L    Co2 10 (L) 20 - 33 mmol/L    Glucose 278 (H) 65 - 99 mg/dL    Bun 19 8 - 22 mg/dL    Creatinine 1.01 0.50 - 1.40 mg/dL    Calcium 7.9 (L) 8.5 - 10.5 mg/dL    Anion Gap 20.0 (H) 0.0 - 11.9   D-DIMER    Collection Time: 06/10/19  2:50 AM   Result Value Ref Range    D-Dimer Screen 1.49 (H) 0.00 - 0.50 ug/mL (FEU)   ESTIMATED GFR    Collection Time: 06/10/19  2:50 AM   Result Value Ref Range    GFR If African American >60 >60 mL/min/1.73 m 2    GFR If Non African American >60 >60 mL/min/1.73 m 2   ACCU-CHEK GLUCOSE    Collection Time: 06/10/19  2:50 AM   Result Value Ref Range    Glucose - Accu-Ck 260 (H) 65 - 99 mg/dL   ACCU-CHEK GLUCOSE    Collection Time: 06/10/19  3:35 AM   Result Value Ref Range    Glucose - Accu-Ck 217 (H) 65 - 99 mg/dL   ACCU-CHEK GLUCOSE    Collection Time: 06/10/19  4:26 AM   Result Value Ref Range    Glucose - Accu-Ck 190 (H) 65 - 99 mg/dL   ACCU-CHEK GLUCOSE    Collection Time: 06/10/19  5:34 AM   Result Value Ref Range    Glucose - Accu-Ck 163 (H) 65 - 99 mg/dL   EKG in four (4) hours    Collection Time: 06/10/19  6:09 AM   Result Value Ref Range    Report        [facility]    Test Date:  2019-06-10  Pt Name:    ESTHER DE GUZMAN                 Department: 183  MRN:        [id number]                      Room:       T609  Gender:     Male                         Technician: [id number]  :        1950                   Requested By:DEEDEE KAMARA  Order #:    [id number]                    Reading MD:    Measurements  Intervals                                Axis  Rate:       115                          P:          -88  LA:         160                          QRS:        69  QRSD:       75                           T:          255  QT:         340  QTc:        471    Interpretive Statements  SINUS OR ECTOPIC ATRIAL TACHYCARDIA  REPOL ABNRM SUGGESTS ISCHEMIA, DIFFUSE LEADS  Compared to ECG 2019 20:54:42  No significant changes     ACCU-CHEK GLUCOSE    Collection Time: 06/10/19  6:23 AM   Result Value Ref Range    Glucose - Accu-Ck 154 (H) 65 - 99 mg/dL       Imaging/Procedures Review:    Chest Xray:  Reviewed    EKG:   Personally viewed by myself showing sinus tachycardia with inverted T waves globally.    ECHO:  Pending    MDM (Assessment and Plan):     Active Hospital Problems    Diagnosis   • Diabetic ketoacidosis without coma associated with type 2 diabetes mellitus (HCC) [E11.10]     Priority: High   • High anion gap metabolic acidosis [E87.2]     Priority: High   • NSTEMI (non-ST elevated myocardial infarction) (HCC) [I21.4]     Priority: High   • Leukocytosis [D72.829]     Priority: Medium   • Colitis [K52.9]     Priority: Medium   • Urine retention [R33.9]     Priority: Medium   • Type 2 diabetes mellitus with hyperglycemia, with long-term current use of insulin (HCC) [E11.65, Z79.4]     Priority: Medium   • Essential hypertension [I10]     Priority: Low   • Macrocytic anemia [D53.9]     Priority: Low     68-year-old male with what appears to be euglycemic DKA with global ST segment depressions in the setting of multivessel coronary disease turned down for for  bypass in the past.  Because he was turned down in the past he will also be turned down in the future, therefore he is not a candidate for cardiac catheterization.  Furthermore given his DKA he needs to get through this.  His tachycardia is contributing to his ST segment changes and therefore I have had his metoprolol increased to 25 twice daily and I will give her 5 mg now.  I do not feel that this represents true ACS but represents a type II infarct.  I would not treat this is ACS.    Thank for you allowing me to take part in your patient's care, please call should you have any questions or would like to discuss this patient.

## 2019-06-10 NOTE — ASSESSMENT & PLAN NOTE
Initially thought to have Type II MI, then troponins increased to 30. Cardiology called and plan was for cath, cath held due to possible bleeding. However, they note that patients EKG revealed improvement with out intervention, only treating DKA. Likely CAD exacerbated by DKA. Will correct DKA and monitor Cardiac fucntion.   Plan  -Statin, beta-blockers  -Patient started on clopidogrel on 6/13/2019  -Lisinopril to 10 mg  -Per MPI high risk surgery lateral wall akinesia and area of ischemia of variable size  -Follow up with Cardiology recommended

## 2019-06-10 NOTE — ASSESSMENT & PLAN NOTE
Likely demand ischemia  On statin, bb  No chest pain  D dimer elevated but no hypoxia or respiratory distress or swelling of legs, unlikely PE but no other clear source for d dimer elevation except for  colitis, repeat ddimer elevated  May need cta if neg us lower ext

## 2019-06-11 ENCOUNTER — APPOINTMENT (OUTPATIENT)
Dept: RADIOLOGY | Facility: MEDICAL CENTER | Age: 69
DRG: 637 | End: 2019-06-11
Attending: STUDENT IN AN ORGANIZED HEALTH CARE EDUCATION/TRAINING PROGRAM
Payer: MEDICARE

## 2019-06-11 PROBLEM — D62 ACUTE BLOOD LOSS ANEMIA: Status: ACTIVE | Noted: 2019-06-11

## 2019-06-11 PROBLEM — J81.0 ACUTE PULMONARY EDEMA (HCC): Status: ACTIVE | Noted: 2019-06-11

## 2019-06-11 PROBLEM — K92.2 GI BLEEDING: Status: ACTIVE | Noted: 2019-06-11

## 2019-06-11 LAB
ABO + RH BLD: NORMAL
ABO GROUP BLD: NORMAL
ANION GAP SERPL CALC-SCNC: 4 MMOL/L (ref 0–11.9)
ANION GAP SERPL CALC-SCNC: 5 MMOL/L (ref 0–11.9)
ANION GAP SERPL CALC-SCNC: 6 MMOL/L (ref 0–11.9)
ANION GAP SERPL CALC-SCNC: 7 MMOL/L (ref 0–11.9)
BARCODED ABORH UBTYP: 5100
BARCODED ABORH UBTYP: 5100
BARCODED PRD CODE UBPRD: NORMAL
BARCODED PRD CODE UBPRD: NORMAL
BARCODED UNIT NUM UBUNT: NORMAL
BARCODED UNIT NUM UBUNT: NORMAL
BASOPHILS # BLD AUTO: 0.3 % (ref 0–1.8)
BASOPHILS # BLD AUTO: 0.3 % (ref 0–1.8)
BASOPHILS # BLD: 0.04 K/UL (ref 0–0.12)
BASOPHILS # BLD: 0.04 K/UL (ref 0–0.12)
BLD GP AB SCN SERPL QL: NORMAL
BUN SERPL-MCNC: 19 MG/DL (ref 8–22)
BUN SERPL-MCNC: 20 MG/DL (ref 8–22)
BUN SERPL-MCNC: 21 MG/DL (ref 8–22)
BUN SERPL-MCNC: 22 MG/DL (ref 8–22)
BUN SERPL-MCNC: 22 MG/DL (ref 8–22)
BUN SERPL-MCNC: 26 MG/DL (ref 8–22)
CA-I SERPL-SCNC: 1.1 MMOL/L (ref 1.1–1.3)
CALCIUM SERPL-MCNC: 6.8 MG/DL (ref 8.5–10.5)
CALCIUM SERPL-MCNC: 7.1 MG/DL (ref 8.5–10.5)
CALCIUM SERPL-MCNC: 7.4 MG/DL (ref 8.5–10.5)
CALCIUM SERPL-MCNC: 7.5 MG/DL (ref 8.5–10.5)
CALCIUM SERPL-MCNC: 9 MG/DL (ref 8.5–10.5)
CALCIUM SERPL-MCNC: 9.5 MG/DL (ref 8.5–10.5)
CHLORIDE SERPL-SCNC: 108 MMOL/L (ref 96–112)
CHLORIDE SERPL-SCNC: 112 MMOL/L (ref 96–112)
CHLORIDE SERPL-SCNC: 114 MMOL/L (ref 96–112)
CHLORIDE SERPL-SCNC: 115 MMOL/L (ref 96–112)
CO2 SERPL-SCNC: 15 MMOL/L (ref 20–33)
CO2 SERPL-SCNC: 15 MMOL/L (ref 20–33)
CO2 SERPL-SCNC: 16 MMOL/L (ref 20–33)
CO2 SERPL-SCNC: 17 MMOL/L (ref 20–33)
CO2 SERPL-SCNC: 17 MMOL/L (ref 20–33)
CO2 SERPL-SCNC: 22 MMOL/L (ref 20–33)
COMPONENT R 8504R: NORMAL
COMPONENT R 8504R: NORMAL
CREAT SERPL-MCNC: 0.67 MG/DL (ref 0.5–1.4)
CREAT SERPL-MCNC: 0.72 MG/DL (ref 0.5–1.4)
CREAT SERPL-MCNC: 0.75 MG/DL (ref 0.5–1.4)
CREAT SERPL-MCNC: 0.81 MG/DL (ref 0.5–1.4)
CREAT SERPL-MCNC: 0.9 MG/DL (ref 0.5–1.4)
CREAT SERPL-MCNC: 0.95 MG/DL (ref 0.5–1.4)
EKG IMPRESSION: NORMAL
EOSINOPHIL # BLD AUTO: 0 K/UL (ref 0–0.51)
EOSINOPHIL # BLD AUTO: 0 K/UL (ref 0–0.51)
EOSINOPHIL NFR BLD: 0 % (ref 0–6.9)
EOSINOPHIL NFR BLD: 0 % (ref 0–6.9)
ERYTHROCYTE [DISTWIDTH] IN BLOOD BY AUTOMATED COUNT: 49.5 FL (ref 35.9–50)
ERYTHROCYTE [DISTWIDTH] IN BLOOD BY AUTOMATED COUNT: 51.8 FL (ref 35.9–50)
GLUCOSE BLD-MCNC: 151 MG/DL (ref 65–99)
GLUCOSE BLD-MCNC: 177 MG/DL (ref 65–99)
GLUCOSE BLD-MCNC: 180 MG/DL (ref 65–99)
GLUCOSE BLD-MCNC: 181 MG/DL (ref 65–99)
GLUCOSE BLD-MCNC: 181 MG/DL (ref 65–99)
GLUCOSE BLD-MCNC: 185 MG/DL (ref 65–99)
GLUCOSE BLD-MCNC: 188 MG/DL (ref 65–99)
GLUCOSE BLD-MCNC: 193 MG/DL (ref 65–99)
GLUCOSE BLD-MCNC: 197 MG/DL (ref 65–99)
GLUCOSE BLD-MCNC: 201 MG/DL (ref 65–99)
GLUCOSE BLD-MCNC: 206 MG/DL (ref 65–99)
GLUCOSE BLD-MCNC: 214 MG/DL (ref 65–99)
GLUCOSE BLD-MCNC: 215 MG/DL (ref 65–99)
GLUCOSE BLD-MCNC: 216 MG/DL (ref 65–99)
GLUCOSE BLD-MCNC: 218 MG/DL (ref 65–99)
GLUCOSE BLD-MCNC: 218 MG/DL (ref 65–99)
GLUCOSE BLD-MCNC: 220 MG/DL (ref 65–99)
GLUCOSE BLD-MCNC: 222 MG/DL (ref 65–99)
GLUCOSE BLD-MCNC: 231 MG/DL (ref 65–99)
GLUCOSE BLD-MCNC: 235 MG/DL (ref 65–99)
GLUCOSE BLD-MCNC: 284 MG/DL (ref 65–99)
GLUCOSE SERPL-MCNC: 196 MG/DL (ref 65–99)
GLUCOSE SERPL-MCNC: 214 MG/DL (ref 65–99)
GLUCOSE SERPL-MCNC: 216 MG/DL (ref 65–99)
GLUCOSE SERPL-MCNC: 250 MG/DL (ref 65–99)
GLUCOSE SERPL-MCNC: 256 MG/DL (ref 65–99)
GLUCOSE SERPL-MCNC: 338 MG/DL (ref 65–99)
HCT VFR BLD AUTO: 19.6 % (ref 42–52)
HCT VFR BLD AUTO: 22.9 % (ref 42–52)
HCT VFR BLD AUTO: 26.3 % (ref 42–52)
HGB BLD-MCNC: 6.4 G/DL (ref 14–18)
HGB BLD-MCNC: 7.5 G/DL (ref 14–18)
HGB BLD-MCNC: 8.8 G/DL (ref 14–18)
IMM GRANULOCYTES # BLD AUTO: 0.07 K/UL (ref 0–0.11)
IMM GRANULOCYTES # BLD AUTO: 0.08 K/UL (ref 0–0.11)
IMM GRANULOCYTES NFR BLD AUTO: 0.5 % (ref 0–0.9)
IMM GRANULOCYTES NFR BLD AUTO: 0.6 % (ref 0–0.9)
LYMPHOCYTES # BLD AUTO: 1.18 K/UL (ref 1–4.8)
LYMPHOCYTES # BLD AUTO: 1.47 K/UL (ref 1–4.8)
LYMPHOCYTES NFR BLD: 11.5 % (ref 22–41)
LYMPHOCYTES NFR BLD: 9.1 % (ref 22–41)
MAGNESIUM SERPL-MCNC: 1.4 MG/DL (ref 1.5–2.5)
MCH RBC QN AUTO: 31.9 PG (ref 27–33)
MCH RBC QN AUTO: 33.5 PG (ref 27–33)
MCHC RBC AUTO-ENTMCNC: 32.5 G/DL (ref 33.7–35.3)
MCHC RBC AUTO-ENTMCNC: 32.8 G/DL (ref 33.7–35.3)
MCV RBC AUTO: 103.1 FL (ref 81.4–97.8)
MCV RBC AUTO: 97.4 FL (ref 81.4–97.8)
MONOCYTES # BLD AUTO: 1.13 K/UL (ref 0–0.85)
MONOCYTES # BLD AUTO: 1.28 K/UL (ref 0–0.85)
MONOCYTES NFR BLD AUTO: 8.9 % (ref 0–13.4)
MONOCYTES NFR BLD AUTO: 9.9 % (ref 0–13.4)
NEUTROPHILS # BLD AUTO: 10.04 K/UL (ref 1.82–7.42)
NEUTROPHILS # BLD AUTO: 10.35 K/UL (ref 1.82–7.42)
NEUTROPHILS NFR BLD: 78.8 % (ref 44–72)
NEUTROPHILS NFR BLD: 80.1 % (ref 44–72)
NRBC # BLD AUTO: 0 K/UL
NRBC # BLD AUTO: 0 K/UL
NRBC BLD-RTO: 0 /100 WBC
NRBC BLD-RTO: 0 /100 WBC
PHOSPHATE SERPL-MCNC: 1.7 MG/DL (ref 2.5–4.5)
PLATELET # BLD AUTO: 122 K/UL (ref 164–446)
PLATELET # BLD AUTO: 130 K/UL (ref 164–446)
PMV BLD AUTO: 9.1 FL (ref 9–12.9)
PMV BLD AUTO: 9.2 FL (ref 9–12.9)
POTASSIUM SERPL-SCNC: 4.1 MMOL/L (ref 3.6–5.5)
POTASSIUM SERPL-SCNC: 4.3 MMOL/L (ref 3.6–5.5)
POTASSIUM SERPL-SCNC: 4.5 MMOL/L (ref 3.6–5.5)
POTASSIUM SERPL-SCNC: 4.5 MMOL/L (ref 3.6–5.5)
POTASSIUM SERPL-SCNC: 4.6 MMOL/L (ref 3.6–5.5)
POTASSIUM SERPL-SCNC: 5.7 MMOL/L (ref 3.6–5.5)
PRODUCT TYPE UPROD: NORMAL
PRODUCT TYPE UPROD: NORMAL
RBC # BLD AUTO: 1.91 M/UL (ref 4.7–6.1)
RBC # BLD AUTO: 2.35 M/UL (ref 4.7–6.1)
RH BLD: NORMAL
SODIUM SERPL-SCNC: 133 MMOL/L (ref 135–145)
SODIUM SERPL-SCNC: 134 MMOL/L (ref 135–145)
SODIUM SERPL-SCNC: 134 MMOL/L (ref 135–145)
SODIUM SERPL-SCNC: 135 MMOL/L (ref 135–145)
SODIUM SERPL-SCNC: 137 MMOL/L (ref 135–145)
SODIUM SERPL-SCNC: 138 MMOL/L (ref 135–145)
TROPONIN I SERPL-MCNC: 13.99 NG/ML (ref 0–0.04)
UNIT STATUS USTAT: NORMAL
UNIT STATUS USTAT: NORMAL
WBC # BLD AUTO: 12.8 K/UL (ref 4.8–10.8)
WBC # BLD AUTO: 12.9 K/UL (ref 4.8–10.8)

## 2019-06-11 PROCEDURE — 36600 WITHDRAWAL OF ARTERIAL BLOOD: CPT

## 2019-06-11 PROCEDURE — 700111 HCHG RX REV CODE 636 W/ 250 OVERRIDE (IP): Performed by: STUDENT IN AN ORGANIZED HEALTH CARE EDUCATION/TRAINING PROGRAM

## 2019-06-11 PROCEDURE — 82010 KETONE BODYS QUAN: CPT

## 2019-06-11 PROCEDURE — 86901 BLOOD TYPING SEROLOGIC RH(D): CPT

## 2019-06-11 PROCEDURE — P9016 RBC LEUKOCYTES REDUCED: HCPCS | Mod: 91

## 2019-06-11 PROCEDURE — 700102 HCHG RX REV CODE 250 W/ 637 OVERRIDE(OP): Performed by: STUDENT IN AN ORGANIZED HEALTH CARE EDUCATION/TRAINING PROGRAM

## 2019-06-11 PROCEDURE — 700105 HCHG RX REV CODE 258

## 2019-06-11 PROCEDURE — 85025 COMPLETE CBC W/AUTO DIFF WBC: CPT

## 2019-06-11 PROCEDURE — 85014 HEMATOCRIT: CPT

## 2019-06-11 PROCEDURE — 84484 ASSAY OF TROPONIN QUANT: CPT

## 2019-06-11 PROCEDURE — 93010 ELECTROCARDIOGRAM REPORT: CPT | Performed by: INTERNAL MEDICINE

## 2019-06-11 PROCEDURE — 94002 VENT MGMT INPAT INIT DAY: CPT

## 2019-06-11 PROCEDURE — 85018 HEMOGLOBIN: CPT

## 2019-06-11 PROCEDURE — 99292 CRITICAL CARE ADDL 30 MIN: CPT | Performed by: INTERNAL MEDICINE

## 2019-06-11 PROCEDURE — 71045 X-RAY EXAM CHEST 1 VIEW: CPT

## 2019-06-11 PROCEDURE — 30233N1 TRANSFUSION OF NONAUTOLOGOUS RED BLOOD CELLS INTO PERIPHERAL VEIN, PERCUTANEOUS APPROACH: ICD-10-PCS | Performed by: INTERNAL MEDICINE

## 2019-06-11 PROCEDURE — 99291 CRITICAL CARE FIRST HOUR: CPT | Performed by: INTERNAL MEDICINE

## 2019-06-11 PROCEDURE — 36430 TRANSFUSION BLD/BLD COMPNT: CPT

## 2019-06-11 PROCEDURE — 700105 HCHG RX REV CODE 258: Performed by: STUDENT IN AN ORGANIZED HEALTH CARE EDUCATION/TRAINING PROGRAM

## 2019-06-11 PROCEDURE — A9270 NON-COVERED ITEM OR SERVICE: HCPCS | Performed by: STUDENT IN AN ORGANIZED HEALTH CARE EDUCATION/TRAINING PROGRAM

## 2019-06-11 PROCEDURE — 84100 ASSAY OF PHOSPHORUS: CPT

## 2019-06-11 PROCEDURE — 82962 GLUCOSE BLOOD TEST: CPT | Mod: 91

## 2019-06-11 PROCEDURE — 86923 COMPATIBILITY TEST ELECTRIC: CPT

## 2019-06-11 PROCEDURE — 93005 ELECTROCARDIOGRAM TRACING: CPT | Performed by: INTERNAL MEDICINE

## 2019-06-11 PROCEDURE — 99232 SBSQ HOSP IP/OBS MODERATE 35: CPT | Performed by: INTERNAL MEDICINE

## 2019-06-11 PROCEDURE — 80048 BASIC METABOLIC PNL TOTAL CA: CPT | Mod: 91

## 2019-06-11 PROCEDURE — 700111 HCHG RX REV CODE 636 W/ 250 OVERRIDE (IP): Performed by: INTERNAL MEDICINE

## 2019-06-11 PROCEDURE — 82803 BLOOD GASES ANY COMBINATION: CPT

## 2019-06-11 PROCEDURE — 82330 ASSAY OF CALCIUM: CPT

## 2019-06-11 PROCEDURE — 5A09357 ASSISTANCE WITH RESPIRATORY VENTILATION, LESS THAN 24 CONSECUTIVE HOURS, CONTINUOUS POSITIVE AIRWAY PRESSURE: ICD-10-PCS | Performed by: INTERNAL MEDICINE

## 2019-06-11 PROCEDURE — 700105 HCHG RX REV CODE 258: Performed by: INTERNAL MEDICINE

## 2019-06-11 PROCEDURE — 86900 BLOOD TYPING SEROLOGIC ABO: CPT

## 2019-06-11 PROCEDURE — 770022 HCHG ROOM/CARE - ICU (200)

## 2019-06-11 PROCEDURE — 94640 AIRWAY INHALATION TREATMENT: CPT

## 2019-06-11 PROCEDURE — 700101 HCHG RX REV CODE 250: Performed by: STUDENT IN AN ORGANIZED HEALTH CARE EDUCATION/TRAINING PROGRAM

## 2019-06-11 PROCEDURE — 86850 RBC ANTIBODY SCREEN: CPT

## 2019-06-11 PROCEDURE — C9113 INJ PANTOPRAZOLE SODIUM, VIA: HCPCS | Performed by: STUDENT IN AN ORGANIZED HEALTH CARE EDUCATION/TRAINING PROGRAM

## 2019-06-11 PROCEDURE — 83735 ASSAY OF MAGNESIUM: CPT

## 2019-06-11 RX ORDER — FUROSEMIDE 10 MG/ML
40 INJECTION INTRAMUSCULAR; INTRAVENOUS
Status: COMPLETED | OUTPATIENT
Start: 2019-06-11 | End: 2019-06-12

## 2019-06-11 RX ORDER — SODIUM CHLORIDE 9 MG/ML
INJECTION, SOLUTION INTRAVENOUS
Status: DISCONTINUED
Start: 2019-06-11 | End: 2019-06-11

## 2019-06-11 RX ORDER — SODIUM CHLORIDE, SODIUM LACTATE, POTASSIUM CHLORIDE, CALCIUM CHLORIDE 600; 310; 30; 20 MG/100ML; MG/100ML; MG/100ML; MG/100ML
INJECTION, SOLUTION INTRAVENOUS
Status: COMPLETED
Start: 2019-06-11 | End: 2019-06-11

## 2019-06-11 RX ORDER — IPRATROPIUM BROMIDE AND ALBUTEROL SULFATE 2.5; .5 MG/3ML; MG/3ML
3 SOLUTION RESPIRATORY (INHALATION)
Status: DISCONTINUED | OUTPATIENT
Start: 2019-06-11 | End: 2019-06-12

## 2019-06-11 RX ORDER — POTASSIUM CHLORIDE 7.45 MG/ML
10 INJECTION INTRAVENOUS ONCE
Status: COMPLETED | OUTPATIENT
Start: 2019-06-11 | End: 2019-06-11

## 2019-06-11 RX ORDER — M-VIT,TX,IRON,MINS/CALC/FOLIC 27MG-0.4MG
1 TABLET ORAL DAILY
COMMUNITY
End: 2021-01-01

## 2019-06-11 RX ORDER — POTASSIUM CHLORIDE 7.45 MG/ML
10 INJECTION INTRAVENOUS
Status: COMPLETED | OUTPATIENT
Start: 2019-06-11 | End: 2019-06-11

## 2019-06-11 RX ORDER — SIMVASTATIN 10 MG
10 TABLET ORAL EVERY EVENING
COMMUNITY
End: 2021-01-01

## 2019-06-11 RX ORDER — SODIUM CHLORIDE, SODIUM LACTATE, POTASSIUM CHLORIDE, AND CALCIUM CHLORIDE .6; .31; .03; .02 G/100ML; G/100ML; G/100ML; G/100ML
1000 INJECTION, SOLUTION INTRAVENOUS ONCE
Status: COMPLETED | OUTPATIENT
Start: 2019-06-11 | End: 2019-06-11

## 2019-06-11 RX ORDER — FUROSEMIDE 10 MG/ML
20 INJECTION INTRAMUSCULAR; INTRAVENOUS ONCE
Status: COMPLETED | OUTPATIENT
Start: 2019-06-11 | End: 2019-06-11

## 2019-06-11 RX ORDER — DEXTROSE MONOHYDRATE 100 MG/ML
INJECTION, SOLUTION INTRAVENOUS CONTINUOUS
Status: DISPENSED | OUTPATIENT
Start: 2019-06-11 | End: 2019-06-12

## 2019-06-11 RX ORDER — FUROSEMIDE 10 MG/ML
40 INJECTION INTRAMUSCULAR; INTRAVENOUS ONCE
Status: COMPLETED | OUTPATIENT
Start: 2019-06-11 | End: 2019-06-11

## 2019-06-11 RX ORDER — OMEPRAZOLE 20 MG/1
20 CAPSULE, DELAYED RELEASE ORAL 2 TIMES DAILY
Status: DISCONTINUED | OUTPATIENT
Start: 2019-06-11 | End: 2019-06-12

## 2019-06-11 RX ORDER — SODIUM CHLORIDE 9 MG/ML
INJECTION, SOLUTION INTRAVENOUS
Status: COMPLETED
Start: 2019-06-11 | End: 2019-06-11

## 2019-06-11 RX ORDER — ONDANSETRON 2 MG/ML
4 INJECTION INTRAMUSCULAR; INTRAVENOUS EVERY 4 HOURS PRN
Status: DISCONTINUED | OUTPATIENT
Start: 2019-06-11 | End: 2019-06-22 | Stop reason: HOSPADM

## 2019-06-11 RX ORDER — IPRATROPIUM BROMIDE AND ALBUTEROL SULFATE 2.5; .5 MG/3ML; MG/3ML
3 SOLUTION RESPIRATORY (INHALATION)
Status: DISCONTINUED | OUTPATIENT
Start: 2019-06-11 | End: 2019-06-11

## 2019-06-11 RX ORDER — LORAZEPAM 2 MG/ML
0.5 INJECTION INTRAMUSCULAR EVERY 4 HOURS PRN
Status: DISCONTINUED | OUTPATIENT
Start: 2019-06-11 | End: 2019-06-22 | Stop reason: HOSPADM

## 2019-06-11 RX ORDER — PANTOPRAZOLE SODIUM 40 MG/1
40 TABLET, DELAYED RELEASE ORAL DAILY
Status: ON HOLD | COMMUNITY
End: 2019-06-22

## 2019-06-11 RX ORDER — FUROSEMIDE 40 MG/1
40 TABLET ORAL 2 TIMES DAILY
COMMUNITY

## 2019-06-11 RX ORDER — HYDROXYZINE HYDROCHLORIDE 10 MG/1
10 TABLET, FILM COATED ORAL 3 TIMES DAILY PRN
Status: DISCONTINUED | OUTPATIENT
Start: 2019-06-11 | End: 2019-06-22 | Stop reason: HOSPADM

## 2019-06-11 RX ORDER — GLIPIZIDE 5 MG/1
2.5 TABLET ORAL 2 TIMES DAILY WITH MEALS
Status: ON HOLD | COMMUNITY
End: 2019-06-22

## 2019-06-11 RX ORDER — POTASSIUM CHLORIDE 7.45 MG/ML
10 INJECTION INTRAVENOUS
Status: DISCONTINUED | OUTPATIENT
Start: 2019-06-12 | End: 2019-06-12

## 2019-06-11 RX ADMIN — PROCHLORPERAZINE EDISYLATE 10 MG: 5 INJECTION INTRAMUSCULAR; INTRAVENOUS at 06:17

## 2019-06-11 RX ADMIN — ONDANSETRON 4 MG: 2 INJECTION INTRAMUSCULAR; INTRAVENOUS at 10:28

## 2019-06-11 RX ADMIN — SODIUM CHLORIDE: 9 INJECTION, SOLUTION INTRAVENOUS at 03:30

## 2019-06-11 RX ADMIN — SODIUM CHLORIDE, POTASSIUM CHLORIDE, SODIUM LACTATE AND CALCIUM CHLORIDE 1000 ML: 600; 310; 30; 20 INJECTION, SOLUTION INTRAVENOUS at 00:56

## 2019-06-11 RX ADMIN — FUROSEMIDE 40 MG: 10 INJECTION, SOLUTION INTRAVENOUS at 16:47

## 2019-06-11 RX ADMIN — METOPROLOL TARTRATE 25 MG: 25 TABLET, FILM COATED ORAL at 11:44

## 2019-06-11 RX ADMIN — DEXTROSE AND SODIUM CHLORIDE: 10; .45 INJECTION, SOLUTION INTRAVENOUS at 06:11

## 2019-06-11 RX ADMIN — SODIUM CHLORIDE, SODIUM LACTATE, POTASSIUM CHLORIDE, CALCIUM CHLORIDE AND DEXTROSE MONOHYDRATE: 5; 600; 310; 30; 20 INJECTION, SOLUTION INTRAVENOUS at 07:15

## 2019-06-11 RX ADMIN — LORAZEPAM 0.5 MG: 2 INJECTION INTRAMUSCULAR; INTRAVENOUS at 17:41

## 2019-06-11 RX ADMIN — ACETAMINOPHEN 650 MG: 325 TABLET, FILM COATED ORAL at 21:56

## 2019-06-11 RX ADMIN — POTASSIUM CHLORIDE 10 MEQ: 7.46 INJECTION, SOLUTION INTRAVENOUS at 16:00

## 2019-06-11 RX ADMIN — ATORVASTATIN CALCIUM 80 MG: 80 TABLET, FILM COATED ORAL at 16:47

## 2019-06-11 RX ADMIN — OMEPRAZOLE 20 MG: 20 CAPSULE, DELAYED RELEASE ORAL at 17:00

## 2019-06-11 RX ADMIN — SODIUM CHLORIDE, SODIUM LACTATE, POTASSIUM CHLORIDE, AND CALCIUM CHLORIDE 1000 ML: .6; .31; .03; .02 INJECTION, SOLUTION INTRAVENOUS at 10:15

## 2019-06-11 RX ADMIN — POTASSIUM CHLORIDE 10 MEQ: 7.46 INJECTION, SOLUTION INTRAVENOUS at 01:49

## 2019-06-11 RX ADMIN — DEXTROSE MONOHYDRATE: 100 INJECTION, SOLUTION INTRAVENOUS at 19:57

## 2019-06-11 RX ADMIN — SODIUM CHLORIDE 2 UNITS/HR: 9 INJECTION, SOLUTION INTRAVENOUS at 19:54

## 2019-06-11 RX ADMIN — POTASSIUM CHLORIDE 10 MEQ: 7.46 INJECTION, SOLUTION INTRAVENOUS at 17:00

## 2019-06-11 RX ADMIN — ONDANSETRON 4 MG: 2 INJECTION INTRAMUSCULAR; INTRAVENOUS at 01:48

## 2019-06-11 RX ADMIN — IPRATROPIUM BROMIDE AND ALBUTEROL SULFATE 3 ML: .5; 3 SOLUTION RESPIRATORY (INHALATION) at 13:13

## 2019-06-11 RX ADMIN — FUROSEMIDE 40 MG: 10 INJECTION, SOLUTION INTRAVENOUS at 13:25

## 2019-06-11 RX ADMIN — IPRATROPIUM BROMIDE AND ALBUTEROL SULFATE 3 ML: .5; 3 SOLUTION RESPIRATORY (INHALATION) at 18:29

## 2019-06-11 RX ADMIN — CYANOCOBALAMIN TAB 500 MCG 1000 MCG: 500 TAB at 06:15

## 2019-06-11 RX ADMIN — PANTOPRAZOLE SODIUM 40 MG: 40 INJECTION, POWDER, LYOPHILIZED, FOR SOLUTION INTRAVENOUS at 06:09

## 2019-06-11 RX ADMIN — DEXTROSE MONOHYDRATE: 100 INJECTION, SOLUTION INTRAVENOUS at 22:19

## 2019-06-11 RX ADMIN — FUROSEMIDE 20 MG: 10 INJECTION, SOLUTION INTRAMUSCULAR; INTRAVENOUS at 19:50

## 2019-06-11 RX ADMIN — SODIUM CHLORIDE, POTASSIUM CHLORIDE, SODIUM LACTATE AND CALCIUM CHLORIDE 1000 ML: 600; 310; 30; 20 INJECTION, SOLUTION INTRAVENOUS at 10:15

## 2019-06-11 RX ADMIN — IPRATROPIUM BROMIDE AND ALBUTEROL SULFATE 3 ML: .5; 3 SOLUTION RESPIRATORY (INHALATION) at 16:53

## 2019-06-11 RX ADMIN — CALCIUM GLUCONATE 3 G: 98 INJECTION, SOLUTION INTRAVENOUS at 16:46

## 2019-06-11 RX ADMIN — POTASSIUM CHLORIDE 10 MEQ: 7.46 INJECTION, SOLUTION INTRAVENOUS at 08:30

## 2019-06-11 RX ADMIN — POTASSIUM CHLORIDE 10 MEQ: 7.46 INJECTION, SOLUTION INTRAVENOUS at 11:44

## 2019-06-11 RX ADMIN — PROCHLORPERAZINE EDISYLATE 10 MG: 5 INJECTION INTRAMUSCULAR; INTRAVENOUS at 00:56

## 2019-06-11 RX ADMIN — METOPROLOL TARTRATE 25 MG: 25 TABLET, FILM COATED ORAL at 16:47

## 2019-06-11 RX ADMIN — ONDANSETRON 4 MG: 2 INJECTION INTRAMUSCULAR; INTRAVENOUS at 06:12

## 2019-06-11 RX ADMIN — TAMSULOSIN HYDROCHLORIDE 0.4 MG: 0.4 CAPSULE ORAL at 08:29

## 2019-06-11 RX ADMIN — HYDROXYZINE HYDROCHLORIDE 10 MG: 10 TABLET, FILM COATED ORAL at 21:56

## 2019-06-11 RX ADMIN — POTASSIUM CHLORIDE 10 MEQ: 7.46 INJECTION, SOLUTION INTRAVENOUS at 23:36

## 2019-06-11 RX ADMIN — IPRATROPIUM BROMIDE AND ALBUTEROL SULFATE 3 ML: .5; 3 SOLUTION RESPIRATORY (INHALATION) at 22:12

## 2019-06-11 ASSESSMENT — ENCOUNTER SYMPTOMS
FEVER: 0
FOCAL WEAKNESS: 0
CARDIOVASCULAR NEGATIVE: 1
RESPIRATORY NEGATIVE: 1
PSYCHIATRIC NEGATIVE: 1
BLOOD IN STOOL: 0
PALPITATIONS: 0
BLURRED VISION: 0
CHILLS: 0
CONSTITUTIONAL NEGATIVE: 1
SENSORY CHANGE: 0
PHOTOPHOBIA: 0
SPUTUM PRODUCTION: 0
COUGH: 0
WEIGHT LOSS: 0
NEUROLOGICAL NEGATIVE: 1
VOMITING: 0
HEMOPTYSIS: 0
POLYDIPSIA: 0
DIAPHORESIS: 0
DOUBLE VISION: 0
NECK PAIN: 0
MYALGIAS: 0
TINGLING: 0
EYES NEGATIVE: 1
ABDOMINAL PAIN: 1
WHEEZING: 0
BRUISES/BLEEDS EASILY: 0
DEPRESSION: 0
MUSCULOSKELETAL NEGATIVE: 1
GASTROINTESTINAL NEGATIVE: 1
DIZZINESS: 0
HEARTBURN: 0
NERVOUS/ANXIOUS: 0
SINUS PAIN: 0
HEADACHES: 0
STRIDOR: 0
WEAKNESS: 1
BACK PAIN: 0
SHORTNESS OF BREATH: 1
SPEECH CHANGE: 0

## 2019-06-11 ASSESSMENT — PULMONARY FUNCTION TESTS: EPAP_CMH2O: 8

## 2019-06-11 NOTE — PROGRESS NOTES
Itzel from Lab called with critical result of APTT >240 at 2001. Critical lab result read back to REA Godinez RN.   Dr. Marcial notified of critical lab result at 2005.  Critical lab result read back by Dr. Marcial.

## 2019-06-11 NOTE — ASSESSMENT & PLAN NOTE
Due to pulmonary edema from fluid resuscitation  nstemi contributory  Diuresis aggressively with lasix  Change lasix to po  Improved  Keep net negative

## 2019-06-11 NOTE — PROGRESS NOTES
Critical Care Progress Note    Date of admission  6/9/2019    Chief Complaint  68 y.o. male admitted 6/9/2019 with n/v and abdominal pain and diagnosed with DKA.  ALso NSTEMI with troponin elevation but no chest pain.      Hospital Course          Interval Problem Update  Reviewed last 24 hour events:  Heparin drip for nstemi  Emesis overnight 20 ml  Hb < 7  Transfused 1 u prbc, goal > 8, transfuse additional unit  No edema  teg  Npo for possible cath  3 peripheral IV  d5lr at 100 ml/hr  Insulin drip at 1 /hr  No dvt px  On iv bid ppi  dka protocol  No respiratory  Lactate ringer 1 L IVF  ekg improved    Addendum  Reassessed with shortness of breath  Bedside ultrasound diffuse b lines, hypervolemic  Lasix 40 mg IV once  Continue fluids for DKA  No heart cath per cardiology until stabilizes hemoglobin  bipap if continues high work of breathing post lasix  duonebs  Multiple reassessments  Patient says not alcohol use at home     Review of Systems  Review of Systems   Constitutional: Positive for malaise/fatigue. Negative for chills, diaphoresis, fever and weight loss.   HENT: Negative for congestion and sinus pain.    Eyes: Negative for blurred vision, double vision and photophobia.   Respiratory: Positive for shortness of breath. Negative for cough, hemoptysis, sputum production, wheezing and stridor.    Cardiovascular: Negative for chest pain, palpitations and leg swelling.   Gastrointestinal: Positive for abdominal pain. Negative for blood in stool, heartburn, melena and vomiting.   Genitourinary: Negative for dysuria and urgency.   Musculoskeletal: Negative for back pain, myalgias and neck pain.   Skin: Negative for itching and rash.   Neurological: Positive for weakness. Negative for dizziness, tingling, sensory change, speech change, focal weakness and headaches.   Endo/Heme/Allergies: Negative for polydipsia. Does not bruise/bleed easily.   Psychiatric/Behavioral: Negative for depression. The patient is not  nervous/anxious.         Vital Signs for last 24 hours   Temp:  [37.8 °C (100 °F)] 37.8 °C (100 °F)  Pulse:  [] 111  Resp:  [13-25] 16  BP: (90)/(34) 90/34  SpO2:  [90 %-100 %] 93 %    Hemodynamic parameters for last 24 hours       Respiratory Information for the last 24 hours       Physical Exam   Physical Exam   Constitutional: He is oriented to person, place, and time. No distress.   lethargy   HENT:   Head: Normocephalic and atraumatic.   Right Ear: External ear normal.   Left Ear: External ear normal.   Mouth/Throat: No oropharyngeal exudate.   Eyes: Pupils are equal, round, and reactive to light. Conjunctivae and EOM are normal. Right eye exhibits no discharge. Left eye exhibits no discharge.   Neck: No JVD present. No tracheal deviation present.   Cardiovascular: Normal rate, regular rhythm and normal heart sounds.    No murmur heard.  Pulmonary/Chest: No stridor. He is in respiratory distress. He has wheezes. He has rales. He exhibits no tenderness.   High work of breathing   Abdominal: He exhibits no mass. There is no tenderness. There is no rebound and no guarding.   Musculoskeletal: He exhibits no edema, tenderness or deformity.   Neurological: He is alert and oriented to person, place, and time. He displays normal reflexes. No cranial nerve deficit. Coordination normal.   Skin: Skin is warm. No rash noted. He is diaphoretic. No erythema.   Psychiatric:   fatigued   Nursing note and vitals reviewed.      Medications  Current Facility-Administered Medications   Medication Dose Route Frequency Provider Last Rate Last Dose   • MD ALERT-PHARMACY TO CONSULT FOR DKA MONITORING 1 Each  1 Each Other PRN Anayeli Marcial M.D.       • Adult DKA potassium(K+) replacement scale  1 Each Intravenous Q4HRS Anayeli Marcial M.D.   1 Each at 06/11/19 0400   • dextrose 10% and 0.45% NaCl infusion   Intravenous Continuous Anayeli Marcial M.D. 100 mL/hr at 06/11/19 0611     • insulin regular human (HUMULIN/NOVOLIN R) 62.5  Units in  mL Infusion for DKA  3 Units/hr Intravenous Continuous Anayeli Marcial M.D. 4 mL/hr at 06/11/19 0600 1 Units/hr at 06/11/19 0600   • MD Alert...ICU Electrolyte Replacement per Pharmacy   Other PHARMACY TO DOSE Anayeli Marcial M.D.       • metoclopramide (REGLAN) injection 5 mg  5 mg Intravenous Q6HRS PRN Anayeli Marcial M.D.       • promethazine (PHENERGAN) suppository 25 mg  25 mg Rectal Q6HRS PRN Anayeli Marcial M.D.       • metoprolol (LOPRESSOR) tablet 25 mg  25 mg Oral BID Dre Aguirre M.D.   Stopped at 06/10/19 1800   • cyanocobalamin (VITAMIN B-12) tablet 1,000 mcg  1,000 mcg Oral DAILY Yoshi Valero M.D.   1,000 mcg at 06/11/19 0615   • benzocaine-menthol (CEPACOL) lozenge 1 Lozenge  1 Lozenge Mouth/Throat Q2HRS PRN Miller Taylor       • D5LR infusion   Intravenous Continuous Yoshi Valero M.D.   Stopped at 06/10/19 1950   • pantoprazole (PROTONIX) injection 40 mg  40 mg Intravenous BID Anayeli Marcial M.D.   40 mg at 06/11/19 0609   • ondansetron (ZOFRAN) syringe/vial injection 4 mg  4 mg Intravenous Q4HRS Anayeli Marcial M.D.   4 mg at 06/11/19 0612   • prochlorperazine (COMPAZINE) injection 10 mg  10 mg Intravenous Q6HRS Anayeli Marcial M.D.   10 mg at 06/11/19 0617   • acetaminophen (TYLENOL) tablet 650 mg  650 mg Oral Q6HRS PRN Christine Farmer M.D.   650 mg at 06/10/19 1816   • enalaprilat (VASOTEC) injection 1.25 mg  1.25 mg Intravenous Q6HRS PRN Christine Farmer M.D.       • ondansetron (ZOFRAN ODT) dispertab 4 mg  4 mg Oral Q4HRS PRN Christine Farmer M.D.   4 mg at 06/10/19 1424   • tamsulosin (FLOMAX) capsule 0.4 mg  0.4 mg Oral AFTER BREAKFAST Anayeli Marcial M.D.   0.4 mg at 06/10/19 1120   • morphine (pf) 4 mg/ml injection 2-4 mg  2-4 mg Intravenous Q5 MIN PRN Christine Farmer M.D.       • atorvastatin (LIPITOR) tablet 80 mg  80 mg Oral Q EVENING Christine Farmer M.D.   80 mg at 06/10/19 1816   • nitroglycerin (NITROSTAT) tablet 0.4 mg  0.4 mg Sublingual Q5 MIN PRN  Christine Farmer M.D.           Fluids    Intake/Output Summary (Last 24 hours) at 06/11/19 0656  Last data filed at 06/11/19 0600   Gross per 24 hour   Intake          8847.05 ml   Output              615 ml   Net          8232.05 ml       Laboratory  Recent Labs      06/09/19   2357   CTLBQ09V  7.19*   [id number]  21.4*   [id number]  82.2   OMEI8VLG  93.2   ARTHCO3  8*   ARTBE  -19*     Recent Labs      06/09/19   2234  06/09/19   2236   06/10/19   1630  06/10/19   2115  06/11/19   0035   CPKTOTAL  115   --    --    --    --    --    TROPONINI  1.58*   --    < >  29.99*  20.96*  13.99*   BNPBTYPENAT   --   482*   --    --    --    --     < > = values in this interval not displayed.     Recent Labs      06/09/19   2234   06/10/19   0724  06/10/19   1320   06/10/19   2115  06/11/19   0035  06/11/19   0545   SODIUM  141   < >  139  137   < >  135  134*  133*   POTASSIUM  4.6   < >  4.5  4.8   < >  4.5  4.5  4.5   CHLORIDE  108   < >  115*  114*   < >  111  114*  112   CO2  9*   < >  13*  16*   < >  15*  15*  16*   BUN  19   < >  21  24*   < >  27*  26*  22   CREATININE  0.99   < >  0.96  0.94   < >  0.93  0.90  0.81   MAGNESIUM  1.6   --   2.7*  2.4   --    --    --    --    PHOSPHORUS  3.8   --   2.0*  2.7   --    --    --    --    CALCIUM  8.2*   < >  7.6*  7.6*   < >  7.7*  7.1*  7.5*    < > = values in this interval not displayed.     Recent Labs      06/09/19   2234   06/10/19   2115  06/11/19   0035  06/11/19   0545   ALTSGPT  10   --    --    --    --    ASTSGOT  19   --    --    --    --    ALKPHOSPHAT  43   --    --    --    --    TBILIRUBIN  0.5   --    --    --    --    PREALBUMIN  28.0   --    --    --    --    GLUCOSE  296*   < >  224*  216*  256*    < > = values in this interval not displayed.     Recent Labs      06/09/19   2234  06/10/19   1905  06/11/19   0035  06/11/19   0545   WBC  18.3*  14.6*  12.9*  12.8*   NEUTSPOLYS  87.40*  82.40*  80.10*  78.80*   LYMPHOCYTES  7.60*  7.30*  9.10*  11.50*    MONOCYTES  4.20  9.50  9.90  8.90   EOSINOPHILS  0.10  0.00  0.00  0.00   BASOPHILS  0.20  0.10  0.30  0.30   ASTSGOT  19   --    --    --    ALTSGPT  10   --    --    --    ALKPHOSPHAT  43   --    --    --    TBILIRUBIN  0.5   --    --    --      Recent Labs      06/10/19   0250  06/10/19   1520  06/10/19   1905  06/11/19   0035  06/11/19   0545   RBC   --    --   2.38*  1.91*  2.35*   HEMOGLOBIN   --    --   8.2*  6.4*  7.5*   HEMATOCRIT   --    --   24.3*  19.6*  22.9*   PLATELETCT   --    --   147*  130*  122*   APTT   --   35.0  >240.0*   --    --    IRON  64   --    --    --    --    FERRITIN  434.7*   --    --    --    --    TOTIRONBC  249*   --    --    --    --        Imaging  X-Ray:  I have personally reviewed the images and compared with prior images.  EKG:  I have personally reviewed the images and compared with prior images.  CT:    Reviewed    Assessment/Plan  * Diabetic ketoacidosis without coma associated with type 2 diabetes mellitus (HCC)- (present on admission)   Assessment & Plan    Aggressive fluid resuscitation  dka protocol     Acute blood loss anemia   Assessment & Plan    Less than 7  Transfused 1 uprbc overnight, give additional unit  20 ml hematemasis  Esophagitis on ct imaging  ppi oral ok for now  Goal greater than 8 given nstemi     High anion gap metabolic acidosis   Assessment & Plan    Secondary to DKA     NSTEMI (non-ST elevated myocardial infarction) (HCC)- (present on admission)   Assessment & Plan    Likely demand ischemia  On statin, bb  No chest pain  D dimer elevated but no hypoxia or respiratory distress or swelling of legs, unlikely PE but no other clear source for d dimer elevation except for leg colitis, repeat in 1 week       Leukocytosis- (present on admission)   Assessment & Plan    Likely due to dka     Colitis   Assessment & Plan    Colitis, mild [facility]  Unlikely significant infection as asymptomatic, abdominal pain improved of which was likely due to  gastroparesis  Reviewed ct imaging     Urine retention- (present on admission)   Assessment & Plan    Fluid resuscitation for dka  Diuresis, mcnair now in place       Type 2 diabetes mellitus with hyperglycemia, with long-term current use of insulin (HCC)- (present on admission)   Assessment & Plan    Type 2 diabetes  DKA  Aggressive fluid resuscitation initially  dka protocol  Now transitioning to post resuscitation diuresis     Acute respiratory failure with hypoxia (HCC)- (present on admission)   Assessment & Plan    Due to pulmonary edema from fluid resuscitation  nstemi contributory  Diuresis  bipap prn for work of breathing  Low tolerance for intubation     Acute pulmonary edema (HCC)   Assessment & Plan    Due to volume overload from dka  Diuresis  Continued on fluids for dka protocol  exac by nstemi       GI bleeding   Assessment & Plan    hematemasis while on heparin drip  Esophagitis ct imaging  ppi bid  Stopped heparin drip  Heart cath on hold     Generalized abdominal pain   Assessment & Plan    Resolved overnight with fluid resuscitation  likley due to gastroparesis     Essential hypertension- (present on admission)   Assessment & Plan    Keep sbp < 160     Macrocytic anemia- (present on admission)   Assessment & Plan    Transfuse for hb < 7          VTE:  Lovenox  Ulcer: Not Indicated  Lines: None    I have performed a physical exam and reviewed and updated ROS and Plan today (6/11/2019). In review of yesterday's note (6/10/2019), there are no changes except as documented above.     Discussed patient condition and risk of morbidity and/or mortality with Family, RN, RT, Pharmacy, [employer] [facility] resident, Code status disscussed, Charge nurse / hot rounds and cardiology     The patient remains critically ill.  Continued dka therapy and on insulin drip with sequential chemistry labs.  NSTEMI with troponin to 30 and holding heart cath due to inability to tolerate heparin drip with hematemasis/gi bleeding and  significant drop in hemoglobin to less than 7 requiring 2 u prbc.  Respiratory failure with hypoxia to 80s and high work of breathing with pulmoanry edema requiring diuresis and bipap.  Critical care time = 110 minutes in directly providing and coordinating critical care and extensive data review.  No time overlap and excludes procedures.

## 2019-06-11 NOTE — PROGRESS NOTES
Reason for consultation /admission : NSTEMI    Denies CP or SOB  Started on IV UFH yesterday  Hb dropped last night from 10 to 6+  UFH and DAPT d/c'ed  Reportedly had hematemesis but relatively small amount  Being transfused, last Hb 7.5  Hemodynamically stable    Review of systems;    General: No fever, chills, no weakness  HENT: No sore throat, no neck pain  Eyes: No blurred vision or double vision  Heart: No palpitation, no PND or orthopnea, no claudication, no leg swelling  Lung: No productive cough, no hemoptysis  Abdomen: No abdominal pain, no blood in stool so far  : No dysuria, no frequency or hesitancy, no hematuria  Musculoskeletal: No myalgia, no back pain, some joint pain  Hematology: No easy bruising  Skin: No rash or itching  Neurological: No headache, no new focal weakness or numbness  Psychological: Denies depression, anxiety or insomnia  All other review of systems are negative      Temp:  [37.2 °C (99 °F)-37.8 °C (100 °F)] 37.2 °C (99 °F)  Pulse:  [] 104  Resp:  [13-25] 19  BP: ()/(34-63) 132/60  SpO2:  [90 %-100 %] 97 %  GENERAL not in acute distress, not dyspnic at rest, thin  Head atraumatic, normocephalic  Eyes EOMI  ENT neck supple, no JVD, no carotid bruits or thyromegaly  Lung good expansion, distant sound, no rales or wheezing  Heart RRR, normal rate, no sig murmur, gallop or rub  Abd soft, no tenderness, mass or bruits  Ext no edema  Skin no ecchymosis or petechiae, pale  Musculoskeletal no deformity, s/p Lt BKA  Neuro grossly intact  Psych normal mood, normal affect    Monitor reviewed by me showed no significant ventricular or atrial dysrhythmias.    Labs: Cr 0.8, K+ 4.5  Tn peaked at 31, trending down    ECHO yesterday;  Compared to the images of the prior study done 3/24/2018, no change.  Normal left ventricular systolic function.   Mild mitral regurgitation.   Normal right ventricular size and systolic function. Pacer/ICD wire   seen in right ventricle.      EKG this AM  s-tach (107) mild diffuse ST depression    Assessment and plans    1.  Non-ST segment elevation myocardial infarction with known triple-vessel coronary artery disease  Echocardiography showed normal LV systolic function and no apparent wall motion abnormality.  Myocardial infarction is likely type II MI.  Hemodynamically stable without recurrent chest pain or significant arrhythmia.  IV had initially plan to proceed with cardiac catheterization today.  Unfortunately he developed GI bleeding overnight.  We will cancel cardiac in the future for now.  Agree with discontinue heparin and antiplatelet therapy for now.  Continue beta-blocker as blood pressure allows.  We will reconsider catheterization in the future once there is no signs of further bleeding especially if he develops ischemia or significant ventricular arrhythmia.     2.  Acute blood loss anemia  We will try to keep hematocrit at least 25.  Hold antiplatelet and anticoagulation as mentioned above.    3.  Diabetic ketoacidosis resolved.      Will follow    Please note that this dictation was created using voice recognition software. I have worked with consultants from the vendor as well as technical experts from [facility] to optimize the interface. I have made every reasonable attempt to correct obvious errors, but I expect that there are errors of grammar and possibly content I did not discover before finalizing the note

## 2019-06-11 NOTE — PROGRESS NOTES
Santiago from Lab called with critical result of Hgb = 6.4 at 0117. Critical lab result read back to REA Godinez RN.   Dr. Marcial notified of critical lab result at 0121.  Critical lab result read back by Dr. Marcial.

## 2019-06-11 NOTE — PROGRESS NOTES
notified of pt c/o wheeze again; changed frequency of duoneb to Q4H. Pt states his breathing is better than earlier; all VSS at this time.   MD also notified of critical Ca 6.8 - no changes/new orders made.

## 2019-06-11 NOTE — ASSESSMENT & PLAN NOTE
Less than 7  Transfused 1 uprbc overnight, give additional unit  20 ml hematemasis  Esophagitis on ct imaging  ppi oral ok for now  Goal greater than 8 given nstemi  Daily cbc  Cath on hold until tolerates ac  Hb stable overnight, over 8  Continue to monitor

## 2019-06-11 NOTE — PROGRESS NOTES
Dr Marcial returned call, RN to hold heparin, wait for lab draws, she will add IV PPI, anti emetics are going to be scheduled.

## 2019-06-11 NOTE — PROGRESS NOTES
"   [facility] ICU Progress Note      Admit Date: 6/9/2019    Resident(s): Yoshi Valero  Attending: TERE STRONG/ Dr. Taylor    Date & Time:   6/11/2019   11:23 AM       Patient ID:    Name:             Jarret Bright   YOB: 1950  Age:                 68 y.o.  male   MRN:               [id number]    HPI:  \"68 year old male with past medical history of CAD, DM, HTN, hyperlipidemia was brought into [facility] by EMS for nausea, vomiting abdominal pain. Prior to arrival at [facility] he apparently got IV fliuds and zofran.      Patient says for the past 25 hours he has been having episodic, crampy abdominal pain, diffusely throughout his abdomen, not associated with fevers/chills, no aggravating or relieving factors. Associated with diarrhea, non-bloody, non-bilious. He says he has not been able to keep anything down, even fluids.      At [facility], per review of documents, he was tachycardiac, hemodynamically stable and afebrile.      Labs from [facility]:  - WBC 15.2, Hb 12.1, .4, platelets 254,   - sodium 140, potassium 4.8, choloride 103, bicarb 11  - BUN 24, cr 1.3  - glucose 218.   - Anion gap 30.8  - CPK 79  - Troponin 0.187 --> 0.300  - BNP: 1030  - U/A: ketones 4+, hemoglobin trace, protein 1+, few bacteria, 0-2 WBCs  - Lipase 25  - Lactic 1.9     EKG: diffuse ST depressions in inferior, precordial lateral leads and anterior septal leads     CT: mild thick walled appearnce to the entire colon which may be due to the fact that it is decompressed. Mild colitis is in the differential. There is a thick walled appearnce to the distal esophagus and esophagitis is suspected. The urinary bladder is markedly distended. There is a small non-obstructing right renal stone     He was resuscitated with 2 L of NS, then started on IV fluids at rate of 200 cc/hr. He was given IV pain medications and antiemetics but continued to have intermittent abdominal pain. He was only able to urinate 50 cc of clear yellow " "urine therefore mcnair was placed and 700 cc of clear urine returned. Given his trending troponins (apparently was asymptomatic at [place]) and early DKA, he was subsequently transferred to [facility]\"    Consultants:  Cardiology    PMA: Dr. Taylor    Interval Events:  - Patient had drop in Hgb, no bleeding aside from 20 mL hematemesis. Transfused two units.    -Cardiology holding off cath at the moment, agree with holding asa, clopidogrel, and heparin until bleeding source is identified.     -ST depressions likely from acute state due to DKA    Review of Systems   Constitutional: Negative.    HENT: Negative.    Eyes: Negative.    Respiratory: Negative.    Cardiovascular: Negative.    Gastrointestinal: Negative.    Genitourinary: Negative.    Musculoskeletal: Negative.    Skin: Negative.    Neurological: Negative.    Psychiatric/Behavioral: Negative.        PHYSICAL EXAM  Vitals:    06/11/19 0915 06/11/19 1000 06/11/19 1030 06/11/19 1100   BP: 132/65  145/65    Pulse: (!) 112 (!) 104 (!) 112 (!) 112   Resp: (!) 21 20 (!) 26 (!) 21   Temp:   37.7 °C (99.9 °F)    TempSrc:   Mcnair    SpO2: 95% 95% 94% 94%   Weight:       Height:         Body mass index is 21.72 kg/m².  /65   Pulse (!) 112   Temp 37.7 °C (99.9 °F) (Mcnair)   Resp (!) 21   Ht 1.702 m (5' 7\")   Wt 62.9 kg (138 lb 10.7 oz)   SpO2 94%   BMI 21.72 kg/m²   O2 therapy: Pulse Oximetry: 94 %, O2 (LPM): 2, O2 Delivery: Silicone Nasal Cannula    Physical Exam   Constitutional: He is oriented to person, place, and time and well-developed, well-nourished, and in no distress. He appears unhealthy.   HENT:   Head: Normocephalic and atraumatic.   Eyes: EOM are normal.   Neck: Neck supple.   Cardiovascular: Regular rhythm.  Tachycardia present.    Pulmonary/Chest: Effort normal and breath sounds normal. No respiratory distress.   Abdominal: Soft. Bowel sounds are normal. He exhibits no distension. There is no tenderness.   Musculoskeletal: He exhibits no edema. "   Left BKA   Neurological: He is alert and oriented to person, place, and time. GCS score is 15.   Skin:   Skin tenting noted   Psychiatric: Mood, memory, affect and judgment normal.       Respiratory:     Respiration: (!) 21, Pulse Oximetry: 94 %    Chest Tube Drains:    Recent Labs      06/09/19   2357   KQVRA46D  7.19*   [id number]  21.4*   [id number]  82.2   [id number]  93.2   ARTHCO3  8*   ARTBE  -19*       HemoDynamics:  Pulse: (!) 112, Heart Rate (Monitored): (!) 112 Blood Pressure : 145/65, NIBP: 158/73      Neuro:      Fluids:    Date 06/11/19 0700 - 06/12/19 0659   Shift 4416-0402 9470-7512 7311-2287 24 Hour Total   I  N  T  A  K  E   P.O. 0   0      P.O. 0   0    I.V. 442   442      Insulin Volume 17   17      Volume (mL) (dextrose 10% and 0.45% NaCl infusion) 125   125      Volume (mL) (D5LR infusion) 300   300    Blood 350   350      Volume (RELEASE RED BLOOD CELLS) 350   350    Other 0   0      Medications (PO/Enteral Liquids) 0   0    IV Piggyback 1100   1100      Volume (mL) (lactated ringer BOLUS infusion) 1000   1000      Volume (mL) (potassium chloride (KCL) ivpb 10 mEq) 100   100    Shift Total 1892   1892   O  U  T  P  U  T   Urine 315   315      Output (mL) (Urethral Catheter Latex;Temperature probe 16 Fr.) 315   315    Stool          Number of Times Stooled 0 x   0 x    Shift Total 315   315   NET 1577   1577        Intake/Output Summary (Last 24 hours) at 06/10/19 0936  Last data filed at 06/10/19 0400   Gross per 24 hour   Intake          1171.02 ml   Output                0 ml   Net          1171.02 ml       Weight: 62.9 kg (138 lb 10.7 oz)  Body mass index is 21.72 kg/m².    Recent Labs      06/09/19   2234   06/10/19   0724  06/10/19   1320   06/11/19   0035  06/11/19   0545  06/11/19   1035   SODIUM  141   < >  139  137   < >  134*  133*  135   POTASSIUM  4.6   < >  4.5  4.8   < >  4.5  4.5  4.6   CHLORIDE  108   < >  115*  114*   < >  114*  112  114*   CO2  9*   < >  13*  16*   < >  15*   16*  17*   BUN  19   < >  21  24*   < >  26*  22  22   CREATININE  0.99   < >  0.96  0.94   < >  0.90  0.81  0.75   MAGNESIUM  1.6   --   2.7*  2.4   --    --    --    --    PHOSPHORUS  3.8   --   2.0*  2.7   --    --    --    --    CALCIUM  8.2*   < >  7.6*  7.6*   < >  7.1*  7.5*  7.4*    < > = values in this interval not displayed.       GI/Nutrition:  Recent Labs      19   2234   19   0035  19   0545  19   1035   ALTSGPT  10   --    --    --    --    ASTSGOT  19   --    --    --    --    ALKPHOSPHAT  43   --    --    --    --    TBILIRUBIN  0.5   --    --    --    --    PREALBUMIN  28.0   --    --    --    --    GLUCOSE  296*   < >  216*  256*  250*    < > = values in this interval not displayed.       Heme:  Recent Labs      06/10/19   0250  06/10/19   1520  06/10/19   1905  19   0035  19   0545  19   1035   RBC   --    --   2.38*  1.91*  2.35*   --    HEMOGLOBIN   --    --   8.2*  6.4*  7.5*  8.8*   HEMATOCRIT   --    --   24.3*  19.6*  22.9*  26.3*   PLATELETCT   --    --   147*  130*  122*   --    APTT   --   35.0  >240.0*   --    --    --    IRON  64   --    --    --    --    --    FERRITIN  434.7*   --    --    --    --    --    TOTIRONBC  249*   --    --    --    --    --        Infectious Disease:  Monitored Temp 2  Av °C (100.4 °F)  Min: 37 °C (98.6 °F)  Max: 38.8 °C (101.8 °F)  Temp  Av.5 °C (99.5 °F)  Min: 37.2 °C (99 °F)  Max: 37.8 °C (100 °F)  Recent Labs      19   2234  06/10/19   1905  19   0035  19   0545   WBC  18.3*  14.6*  12.9*  12.8*   NEUTSPOLYS  87.40*  82.40*  80.10*  78.80*   LYMPHOCYTES  7.60*  7.30*  9.10*  11.50*   MONOCYTES  4.20  9.50  9.90  8.90   EOSINOPHILS  0.10  0.00  0.00  0.00   BASOPHILS  0.20  0.10  0.30  0.30   ASTSGOT  19   --    --    --    ALTSGPT  10   --    --    --    ALKPHOSPHAT  43   --    --    --    TBILIRUBIN  0.5   --    --    --        Meds:  • NS       • omeprazole  20 mg     • metoprolol   25 mg     • MD ALERT-PHARMACY TO CONSULT  1 Each     • Adult DKA potassium(K+) replacement scale  1 Each     • dextrose 10% and 0.45% NaCl   Stopped (06/11/19 0715)   • insulin infusion (for DKA ONLY)  3 Units/hr 1 Units/hr (06/11/19 0707)   • MD Alert...Adult ICU Electrolyte Replacement per Pharmacy       • metoclopramide  5 mg     • promethazine  25 mg     • cyanocobalamin  1,000 mcg     • benzocaine-menthol  1 Lozenge     • D5LR   100 mL/hr at 06/11/19 0715   • ondansetron  4 mg     • prochlorperazine  10 mg     • acetaminophen  650 mg     • enalaprilat  1.25 mg     • ondansetron  4 mg     • tamsulosin  0.4 mg     • morphine injection  2-4 mg     • atorvastatin  80 mg     • nitroglycerin  0.4 mg            Imaging:  EC-ECHOCARDIOGRAM COMPLETE W/O CONT   Final Result      DX-CHEST-PORTABLE (1 VIEW)   Final Result         1.  No acute cardiopulmonary disease.      CT-FOREIGN FILM CAT SCAN   Final Result      CL-LEFT HEART CATHETERIZATION WITH POSSIBLE INTERVENTION    (Results Pending)       Assessment and Plan:      * Diabetic ketoacidosis without coma associated with type 2 diabetes mellitus (HCC)- (present on admission)   Assessment & Plan    Euglycemic DKA in patient with type 2 Diabetes Mellitus. Patient is on Insulin + oral antihyperglycemic as outpatient. Patient mentioned to me that he does not check his glucose levels at home. Patient presented with nausea, vomiting, and abdominal pain. Patient presented with wide anion gap metabolic aciodosis. Due to transfusion and volume resuscitation, patient still acidotic. A1C 6.9  Plan  - DKA protocol  - NPO   - Trend BMP q4 hours. Correct electrolyte abnormalities according  - Holding patient's outpatient DM regimen  - Symptomatic management with anti-emetics     Acute blood loss anemia   Assessment & Plan    Patient was noted to have a drop in Hgb from ~10 to ~7, patient was given 1 unit of PRBC's during the night and another this morning after it was noted that  the patients HGB was still at 7.5 this AM. However, patient does not have any clear source of bleeding besides an episode of hematemesis of 20 mL. No melena nor BRBPR, no abdominal distension noted on exam.   Plan  -Will monitor H&H  -Will monitor for signs of bleeding     High anion gap metabolic acidosis   Assessment & Plan    In the setting of DKA  Plan  - Treatment per DKA protocol  - IV fluids  - NPO     NSTEMI (non-ST elevated myocardial infarction) (HCC)- (present on admission)   Assessment & Plan    Initially thought to have Type II MI, then troponins increased to 30. Cardiology called and plan was for cath today, cath held due to possible bleeding. However, they note that patients EKG yesterday revealed improvement with out intervention, only treating DKA. Likely CAD exacerbated by DKA. Will correct DKA and monitor Cardiac fucntion.   Plan  - Statin, beta-blockers  - Will resume antiplatelet therapy once bleeding is ruled out     Leukocytosis- (present on admission)   Assessment & Plan    WBC 18.3. Possible secondary to stress and NSTEMI  Plan  - Continue to monitor with CBC     Colitis   Assessment & Plan    Per CT report from [place]: CT: mild thick walled appearnce to the entire colon which may be due to the fact that it is decompressed. Mild colitis is in the differential. Leukocytosis, but negative lactic acid elevation. No recent hospitalizations or antibiotic use. Possible related to DKA episode  Plan  - Serial abdominal exams  - Monitor closely     Urine retention- (present on admission)   Assessment & Plan    Acute urinary retention. Mcnair was placed in outside facility however patient was having significant discomfort with mcnair therefore removed here.   Plan  - Continue flomax  - Serial bladder scans; if patient is unable to void, will need to replace mcnair     Type 2 diabetes mellitus with hyperglycemia, with long-term current use of insulin ([facility])- (present on admission)   Assessment & Plan    On  oral and insulin at home per med rec.  Plan  - Treatment per DKA protocol  - HbA1c pending  - Can consider diabetes educator consult once patient has improved     GI bleeding   Assessment & Plan    Concern for GI bleed, however, patient not having melenic stools nor bright red blood per rectum. Patient has not had a bowel movement in over two days. Blood is cathartic and thus would stimulate BM's.   Plan  -Holding Asa, clopidogrel, and heparin until bleed is ruled out     Essential hypertension- (present on admission)   Assessment & Plan    On lisinopril/HCTZ 10/12/5 mg daily per med rec  Plan  - Holding for now in the setting of fluid resuscitation  - Resume once more stable     Macrocytic anemia- (present on admission)   Assessment & Plan    Vitamin B12 deficient, folate wnl.  Plan  -Cyanocobalamin administration         DISPO: In patient for DKA    CODE STATUS: Full Code    Quality Measures:  Reed Catheter: In place  DVT Prophylaxis: SCD's, due to possible bleeding  Ulcer Prophylaxis: Omeprazole 20 mg BID  Antibiotics: None  Lines: PIV    This note was created using voice recognition software. There may be unintended errors in spelling, grammar or content.

## 2019-06-11 NOTE — PROGRESS NOTES
BMP sent an hour ago, lab ran a troponin that I didn't want but did not run the labs that I collected, they will find the sample and run now

## 2019-06-11 NOTE — PROGRESS NOTES
ICU CROSS COVERAGE    Patient developed hematemesis; H&H showed a drop in Hb from 10 to 8.3.  Stopped heparin, aspirin and clopidogrel. Starting IV PPI and 1 L fluid bolus.     Notified cardiology regarding holding heparin in the setting of GI bleed

## 2019-06-11 NOTE — ASSESSMENT & PLAN NOTE
Likely secondary to ileal mass/polyp, visualized as filling defect on barium follow-through  Monitoring H&H, transfuse for Hb<8 as concomitant severe cardiac disease  Patient advised to seek urgent medical attention if bleeding recurs, counseled on recognizing signs of anemia

## 2019-06-11 NOTE — PROGRESS NOTES
Pt c/o sudden SOB, sats 87-90 on 2LNC. Pt requesting mask - placed on oxymask at 4L.  notified of complaints and expiratory wheezes. Xray ordered, RT notified for treatment.

## 2019-06-11 NOTE — ASSESSMENT & PLAN NOTE
Ileal lesion per capsule endoscopy, small bowel follow through  Currently Hb stable, no dark stools  Pt counseled on possibility of recurrence

## 2019-06-11 NOTE — ASSESSMENT & PLAN NOTE
Due to volume overload from dka  Diuresis  Continued on fluids for dka protocol  exac by nstemi  Diuresis adequate  Continue net neg goal, adequate currently  On po lasix

## 2019-06-11 NOTE — PROGRESS NOTES
Cardiology at bedside to discuss POC. No cath or intervention planned for today due to pt bleeding.

## 2019-06-11 NOTE — ASSESSMENT & PLAN NOTE
hematemasis while on heparin drip  Esophagitis ct imaging  ppi bid  Stopped heparin drip  Heart cath on hold to make sure stable  Daily cbc  No egd per gi as high risk  Capsule endoscopy  No further bleeding since 6/12 afternoon/evening

## 2019-06-12 ENCOUNTER — APPOINTMENT (OUTPATIENT)
Dept: RADIOLOGY | Facility: MEDICAL CENTER | Age: 69
DRG: 637 | End: 2019-06-12
Attending: INTERNAL MEDICINE
Payer: MEDICARE

## 2019-06-12 ENCOUNTER — APPOINTMENT (OUTPATIENT)
Dept: CARDIOLOGY | Facility: MEDICAL CENTER | Age: 69
DRG: 637 | End: 2019-06-12
Attending: INTERNAL MEDICINE
Payer: MEDICARE

## 2019-06-12 PROBLEM — R79.89 POSITIVE D DIMER: Status: ACTIVE | Noted: 2019-06-12

## 2019-06-12 LAB
ACTION RANGE TRIGGERED IACRT: NO
ANION GAP SERPL CALC-SCNC: 8 MMOL/L (ref 0–11.9)
ANION GAP SERPL CALC-SCNC: 8 MMOL/L (ref 0–11.9)
B-OH-BUTYR SERPL-MCNC: 0.05 MMOL/L (ref 0.02–0.27)
BASE EXCESS BLDA CALC-SCNC: -8 MMOL/L (ref -4–3)
BASOPHILS # BLD AUTO: 0.4 % (ref 0–1.8)
BASOPHILS # BLD: 0.05 K/UL (ref 0–0.12)
BODY TEMPERATURE: ABNORMAL DEGREES
BUN SERPL-MCNC: 18 MG/DL (ref 8–22)
BUN SERPL-MCNC: 19 MG/DL (ref 8–22)
CALCIUM SERPL-MCNC: 8.2 MG/DL (ref 8.5–10.5)
CALCIUM SERPL-MCNC: 8.7 MG/DL (ref 8.5–10.5)
CHLORIDE SERPL-SCNC: 105 MMOL/L (ref 96–112)
CHLORIDE SERPL-SCNC: 109 MMOL/L (ref 96–112)
CO2 BLDA-SCNC: 16 MMOL/L (ref 20–33)
CO2 SERPL-SCNC: 21 MMOL/L (ref 20–33)
CO2 SERPL-SCNC: 23 MMOL/L (ref 20–33)
CREAT SERPL-MCNC: 0.86 MG/DL (ref 0.5–1.4)
CREAT SERPL-MCNC: 0.88 MG/DL (ref 0.5–1.4)
EOSINOPHIL # BLD AUTO: 0.04 K/UL (ref 0–0.51)
EOSINOPHIL NFR BLD: 0.3 % (ref 0–6.9)
ERYTHROCYTE [DISTWIDTH] IN BLOOD BY AUTOMATED COUNT: 51.2 FL (ref 35.9–50)
ERYTHROCYTE [DISTWIDTH] IN BLOOD BY AUTOMATED COUNT: 52.3 FL (ref 35.9–50)
GLUCOSE BLD-MCNC: 119 MG/DL (ref 65–99)
GLUCOSE BLD-MCNC: 124 MG/DL (ref 65–99)
GLUCOSE BLD-MCNC: 166 MG/DL (ref 65–99)
GLUCOSE BLD-MCNC: 216 MG/DL (ref 65–99)
GLUCOSE BLD-MCNC: 265 MG/DL (ref 65–99)
GLUCOSE SERPL-MCNC: 149 MG/DL (ref 65–99)
GLUCOSE SERPL-MCNC: 245 MG/DL (ref 65–99)
HCO3 BLDA-SCNC: 15.7 MMOL/L (ref 17–25)
HCT VFR BLD AUTO: 25.5 % (ref 42–52)
HCT VFR BLD AUTO: 28.7 % (ref 42–52)
HEMOCCULT STL QL: POSITIVE
HGB BLD-MCNC: 8.9 G/DL (ref 14–18)
HGB BLD-MCNC: 9.5 G/DL (ref 14–18)
HOROWITZ INDEX BLDA+IHG-RTO: 255 MM[HG]
IMM GRANULOCYTES # BLD AUTO: 0.07 K/UL (ref 0–0.11)
IMM GRANULOCYTES NFR BLD AUTO: 0.6 % (ref 0–0.9)
INST. QUALIFIED PATIENT IIQPT: YES
LYMPHOCYTES # BLD AUTO: 2.31 K/UL (ref 1–4.8)
LYMPHOCYTES NFR BLD: 19.8 % (ref 22–41)
MAGNESIUM SERPL-MCNC: 1.6 MG/DL (ref 1.5–2.5)
MAGNESIUM SERPL-MCNC: 2.3 MG/DL (ref 1.5–2.5)
MCH RBC QN AUTO: 31.4 PG (ref 27–33)
MCH RBC QN AUTO: 33.2 PG (ref 27–33)
MCHC RBC AUTO-ENTMCNC: 33.1 G/DL (ref 33.7–35.3)
MCHC RBC AUTO-ENTMCNC: 34.9 G/DL (ref 33.7–35.3)
MCV RBC AUTO: 94.7 FL (ref 81.4–97.8)
MCV RBC AUTO: 95.1 FL (ref 81.4–97.8)
MONOCYTES # BLD AUTO: 1.05 K/UL (ref 0–0.85)
MONOCYTES NFR BLD AUTO: 9 % (ref 0–13.4)
NEUTROPHILS # BLD AUTO: 8.12 K/UL (ref 1.82–7.42)
NEUTROPHILS NFR BLD: 69.9 % (ref 44–72)
NRBC # BLD AUTO: 0 K/UL
NRBC BLD-RTO: 0 /100 WBC
O2/TOTAL GAS SETTING VFR VENT: 38 %
PCO2 BLDA: 24.4 MMHG (ref 26–37)
PCO2 TEMP ADJ BLDA: 24.4 MMHG (ref 26–37)
PH BLDA: 7.42 [PH] (ref 7.4–7.5)
PH TEMP ADJ BLDA: 7.42 [PH] (ref 7.4–7.5)
PHOSPHATE SERPL-MCNC: 2.6 MG/DL (ref 2.5–4.5)
PHOSPHATE SERPL-MCNC: 3.8 MG/DL (ref 2.5–4.5)
PLATELET # BLD AUTO: 115 K/UL (ref 164–446)
PLATELET # BLD AUTO: 131 K/UL (ref 164–446)
PMV BLD AUTO: 9.1 FL (ref 9–12.9)
PMV BLD AUTO: 9.3 FL (ref 9–12.9)
PO2 BLDA: 97 MMHG (ref 64–87)
PO2 TEMP ADJ BLDA: 97 MMHG (ref 64–87)
POTASSIUM SERPL-SCNC: 3.7 MMOL/L (ref 3.6–5.5)
POTASSIUM SERPL-SCNC: 4 MMOL/L (ref 3.6–5.5)
RBC # BLD AUTO: 2.68 M/UL (ref 4.7–6.1)
RBC # BLD AUTO: 3.03 M/UL (ref 4.7–6.1)
SAO2 % BLDA: 98 % (ref 93–99)
SODIUM SERPL-SCNC: 136 MMOL/L (ref 135–145)
SODIUM SERPL-SCNC: 138 MMOL/L (ref 135–145)
SPECIMEN DRAWN FROM PATIENT: ABNORMAL
WBC # BLD AUTO: 11.6 K/UL (ref 4.8–10.8)
WBC # BLD AUTO: 8.9 K/UL (ref 4.8–10.8)

## 2019-06-12 PROCEDURE — 80048 BASIC METABOLIC PNL TOTAL CA: CPT

## 2019-06-12 PROCEDURE — 85027 COMPLETE CBC AUTOMATED: CPT

## 2019-06-12 PROCEDURE — 94640 AIRWAY INHALATION TREATMENT: CPT

## 2019-06-12 PROCEDURE — 700102 HCHG RX REV CODE 250 W/ 637 OVERRIDE(OP): Performed by: STUDENT IN AN ORGANIZED HEALTH CARE EDUCATION/TRAINING PROGRAM

## 2019-06-12 PROCEDURE — 99233 SBSQ HOSP IP/OBS HIGH 50: CPT | Performed by: INTERNAL MEDICINE

## 2019-06-12 PROCEDURE — 700111 HCHG RX REV CODE 636 W/ 250 OVERRIDE (IP): Performed by: INTERNAL MEDICINE

## 2019-06-12 PROCEDURE — C9113 INJ PANTOPRAZOLE SODIUM, VIA: HCPCS | Performed by: INTERNAL MEDICINE

## 2019-06-12 PROCEDURE — 700111 HCHG RX REV CODE 636 W/ 250 OVERRIDE (IP): Performed by: STUDENT IN AN ORGANIZED HEALTH CARE EDUCATION/TRAINING PROGRAM

## 2019-06-12 PROCEDURE — 99291 CRITICAL CARE FIRST HOUR: CPT | Performed by: INTERNAL MEDICINE

## 2019-06-12 PROCEDURE — 700105 HCHG RX REV CODE 258: Performed by: STUDENT IN AN ORGANIZED HEALTH CARE EDUCATION/TRAINING PROGRAM

## 2019-06-12 PROCEDURE — 84100 ASSAY OF PHOSPHORUS: CPT

## 2019-06-12 PROCEDURE — A9270 NON-COVERED ITEM OR SERVICE: HCPCS | Performed by: STUDENT IN AN ORGANIZED HEALTH CARE EDUCATION/TRAINING PROGRAM

## 2019-06-12 PROCEDURE — 700101 HCHG RX REV CODE 250: Performed by: INTERNAL MEDICINE

## 2019-06-12 PROCEDURE — 83735 ASSAY OF MAGNESIUM: CPT

## 2019-06-12 PROCEDURE — 700101 HCHG RX REV CODE 250: Performed by: STUDENT IN AN ORGANIZED HEALTH CARE EDUCATION/TRAINING PROGRAM

## 2019-06-12 PROCEDURE — 770022 HCHG ROOM/CARE - ICU (200)

## 2019-06-12 PROCEDURE — 82272 OCCULT BLD FECES 1-3 TESTS: CPT

## 2019-06-12 PROCEDURE — 82962 GLUCOSE BLOOD TEST: CPT

## 2019-06-12 PROCEDURE — 85025 COMPLETE CBC W/AUTO DIFF WBC: CPT

## 2019-06-12 RX ORDER — CLOPIDOGREL BISULFATE 75 MG/1
75 TABLET ORAL DAILY
Status: DISCONTINUED | OUTPATIENT
Start: 2019-06-13 | End: 2019-06-21

## 2019-06-12 RX ORDER — ASPIRIN 81 MG/1
81 TABLET, CHEWABLE ORAL DAILY
Status: DISCONTINUED | OUTPATIENT
Start: 2019-06-12 | End: 2019-06-12

## 2019-06-12 RX ORDER — INSULIN GLARGINE 100 [IU]/ML
10 INJECTION, SOLUTION SUBCUTANEOUS EVERY EVENING
Status: DISCONTINUED | OUTPATIENT
Start: 2019-06-12 | End: 2019-06-22 | Stop reason: HOSPADM

## 2019-06-12 RX ORDER — FUROSEMIDE 20 MG/1
20 TABLET ORAL
Status: DISCONTINUED | OUTPATIENT
Start: 2019-06-13 | End: 2019-06-13

## 2019-06-12 RX ORDER — IPRATROPIUM BROMIDE AND ALBUTEROL SULFATE 2.5; .5 MG/3ML; MG/3ML
3 SOLUTION RESPIRATORY (INHALATION)
Status: DISCONTINUED | OUTPATIENT
Start: 2019-06-12 | End: 2019-06-12

## 2019-06-12 RX ORDER — MAGNESIUM SULFATE HEPTAHYDRATE 40 MG/ML
4 INJECTION, SOLUTION INTRAVENOUS ONCE
Status: COMPLETED | OUTPATIENT
Start: 2019-06-12 | End: 2019-06-12

## 2019-06-12 RX ORDER — PANTOPRAZOLE SODIUM 40 MG/10ML
40 INJECTION, POWDER, LYOPHILIZED, FOR SOLUTION INTRAVENOUS 2 TIMES DAILY
Status: DISCONTINUED | OUTPATIENT
Start: 2019-06-12 | End: 2019-06-15

## 2019-06-12 RX ORDER — IPRATROPIUM BROMIDE AND ALBUTEROL SULFATE 2.5; .5 MG/3ML; MG/3ML
3 SOLUTION RESPIRATORY (INHALATION)
Status: DISCONTINUED | OUTPATIENT
Start: 2019-06-13 | End: 2019-06-18

## 2019-06-12 RX ADMIN — FUROSEMIDE 40 MG: 10 INJECTION, SOLUTION INTRAVENOUS at 04:56

## 2019-06-12 RX ADMIN — ATORVASTATIN CALCIUM 80 MG: 80 TABLET, FILM COATED ORAL at 17:13

## 2019-06-12 RX ADMIN — IPRATROPIUM BROMIDE AND ALBUTEROL SULFATE 3 ML: .5; 3 SOLUTION RESPIRATORY (INHALATION) at 01:41

## 2019-06-12 RX ADMIN — PANTOPRAZOLE SODIUM 40 MG: 40 INJECTION, POWDER, FOR SOLUTION INTRAVENOUS at 21:37

## 2019-06-12 RX ADMIN — IPRATROPIUM BROMIDE AND ALBUTEROL SULFATE 3 ML: .5; 3 SOLUTION RESPIRATORY (INHALATION) at 07:57

## 2019-06-12 RX ADMIN — OMEPRAZOLE 20 MG: 20 CAPSULE, DELAYED RELEASE ORAL at 17:14

## 2019-06-12 RX ADMIN — METOPROLOL TARTRATE 25 MG: 25 TABLET, FILM COATED ORAL at 17:13

## 2019-06-12 RX ADMIN — OMEPRAZOLE 20 MG: 20 CAPSULE, DELAYED RELEASE ORAL at 04:56

## 2019-06-12 RX ADMIN — ACETAMINOPHEN 650 MG: 325 TABLET, FILM COATED ORAL at 04:56

## 2019-06-12 RX ADMIN — POTASSIUM PHOSPHATE, MONOBASIC AND POTASSIUM PHOSPHATE, DIBASIC 30 MMOL: 224; 236 INJECTION, SOLUTION, CONCENTRATE INTRAVENOUS at 00:41

## 2019-06-12 RX ADMIN — INSULIN GLARGINE 10 UNITS: 100 INJECTION, SOLUTION SUBCUTANEOUS at 00:49

## 2019-06-12 RX ADMIN — CYANOCOBALAMIN TAB 500 MCG 1000 MCG: 500 TAB at 04:56

## 2019-06-12 RX ADMIN — TAMSULOSIN HYDROCHLORIDE 0.4 MG: 0.4 CAPSULE ORAL at 09:35

## 2019-06-12 RX ADMIN — DEXTROSE MONOHYDRATE: 100 INJECTION, SOLUTION INTRAVENOUS at 00:47

## 2019-06-12 RX ADMIN — METOPROLOL TARTRATE 25 MG: 25 TABLET, FILM COATED ORAL at 04:56

## 2019-06-12 RX ADMIN — INSULIN HUMAN 3 UNITS: 100 INJECTION, SOLUTION PARENTERAL at 19:55

## 2019-06-12 RX ADMIN — ACETAMINOPHEN 650 MG: 325 TABLET, FILM COATED ORAL at 16:21

## 2019-06-12 RX ADMIN — INSULIN HUMAN 5 UNITS: 100 INJECTION, SOLUTION PARENTERAL at 11:16

## 2019-06-12 RX ADMIN — INSULIN GLARGINE 10 UNITS: 100 INJECTION, SOLUTION SUBCUTANEOUS at 17:09

## 2019-06-12 RX ADMIN — MAGNESIUM SULFATE HEPTAHYDRATE 4 G: 40 INJECTION, SOLUTION INTRAVENOUS at 00:41

## 2019-06-12 RX ADMIN — IPRATROPIUM BROMIDE AND ALBUTEROL SULFATE 3 ML: .5; 3 SOLUTION RESPIRATORY (INHALATION) at 16:57

## 2019-06-12 ASSESSMENT — ENCOUNTER SYMPTOMS
SINUS PAIN: 0
WHEEZING: 0
STRIDOR: 0
FEVER: 0
EYES NEGATIVE: 1
PHOTOPHOBIA: 0
HEADACHES: 0
MYALGIAS: 0
PSYCHIATRIC NEGATIVE: 1
ABDOMINAL PAIN: 0
SPEECH CHANGE: 0
SPUTUM PRODUCTION: 0
BLURRED VISION: 0
RESPIRATORY NEGATIVE: 1
DIAPHORESIS: 0
BACK PAIN: 0
NERVOUS/ANXIOUS: 0
CARDIOVASCULAR NEGATIVE: 1
VOMITING: 0
NECK PAIN: 0
POLYDIPSIA: 0
NEUROLOGICAL NEGATIVE: 1
DIZZINESS: 0
FOCAL WEAKNESS: 0
HEARTBURN: 0
MUSCULOSKELETAL NEGATIVE: 1
PALPITATIONS: 0
WEIGHT LOSS: 0
DOUBLE VISION: 0
BLOOD IN STOOL: 0
COUGH: 0
CONSTITUTIONAL NEGATIVE: 1
GASTROINTESTINAL NEGATIVE: 1
SHORTNESS OF BREATH: 0
WEAKNESS: 0
DEPRESSION: 0
SENSORY CHANGE: 0
HEMOPTYSIS: 0
BRUISES/BLEEDS EASILY: 0
TINGLING: 0
CHILLS: 0

## 2019-06-12 ASSESSMENT — PATIENT HEALTH QUESTIONNAIRE - PHQ9
2. FEELING DOWN, DEPRESSED, IRRITABLE, OR HOPELESS: NOT AT ALL
1. LITTLE INTEREST OR PLEASURE IN DOING THINGS: NOT AT ALL
SUM OF ALL RESPONSES TO PHQ9 QUESTIONS 1 AND 2: 0

## 2019-06-12 NOTE — ASSESSMENT & PLAN NOTE
Patient was noted to have pulmonary edema on PE and imaging.  Likely secondary to necessary intravenous volume resuscitation.  Patient was diuresed and respiratory status improved. Lasix discontinued.  -monitor

## 2019-06-12 NOTE — PROGRESS NOTES
"   [facility] Progress Note      Admit Date: 6/9/2019    Resident(s): Yoshi Valero  Attending: TERE STRONG/ Dr. Taylor    Date & Time:   6/12/2019   10:29 AM       Patient ID:    Name:             Jarret Bright   YOB: 1950  Age:                 68 y.o.  male   MRN:               [id number]    HPI:  \"68 year old male with past medical history of CAD, DM, HTN, hyperlipidemia was brought into [facility] by EMS for nausea, vomiting abdominal pain. Prior to arrival at [facility] he apparently got IV fliuds and zofran.      Patient says for the past 25 hours he has been having episodic, crampy abdominal pain, diffusely throughout his abdomen, not associated with fevers/chills, no aggravating or relieving factors. Associated with diarrhea, non-bloody, non-bilious. He says he has not been able to keep anything down, even fluids.      At [facility], per review of documents, he was tachycardiac, hemodynamically stable and afebrile.      Labs from [facility]:  - WBC 15.2, Hb 12.1, .4, platelets 254,   - sodium 140, potassium 4.8, choloride 103, bicarb 11  - BUN 24, cr 1.3  - glucose 218.   - Anion gap 30.8  - CPK 79  - Troponin 0.187 --> 0.300  - BNP: 1030  - U/A: ketones 4+, hemoglobin trace, protein 1+, few bacteria, 0-2 WBCs  - Lipase 25  - Lactic 1.9     EKG: diffuse ST depressions in inferior, precordial lateral leads and anterior septal leads     CT: mild thick walled appearnce to the entire colon which may be due to the fact that it is decompressed. Mild colitis is in the differential. There is a thick walled appearnce to the distal esophagus and esophagitis is suspected. The urinary bladder is markedly distended. There is a small non-obstructing right renal stone     He was resuscitated with 2 L of NS, then started on IV fluids at rate of 200 cc/hr. He was given IV pain medications and antiemetics but continued to have intermittent abdominal pain. He was only able to urinate 50 cc of clear yellow " "urine therefore mcnair was placed and 700 cc of clear urine returned. Given his trending troponins (apparently was asymptomatic at [facility]) and early DKA, he was subsequently transferred to [facility]"    Consultants:  Cardiology    PMA: Dr. Taylor    Interval Events:  -Patient was in respiratory distress yesterday, noted to have pulmonary edema on physical exam and imaging.    -Patient was diuresed and BiPAP was added PRN.    -Patient respiratory status improved overnight.  Patient stable for transfer to floor    Review of Systems   Constitutional: Negative.    HENT: Negative.    Eyes: Negative.    Respiratory: Negative.    Cardiovascular: Negative.    Gastrointestinal: Negative.    Genitourinary: Negative.    Musculoskeletal: Negative.    Skin: Negative.    Neurological: Negative.    Psychiatric/Behavioral: Negative.        PHYSICAL EXAM  Vitals:    06/12/19 0300 06/12/19 0400 06/12/19 0500 06/12/19 0600   BP:       Pulse: 94 (!) 106 (!) 110 77   Resp: 14 (!) 21 (!) 21 16   Temp:       TempSrc:       SpO2: 97% 97% 96% 100%   Weight:  62.8 kg (138 lb 7.2 oz)     Height:         Body mass index is 21.68 kg/m².  /65   Pulse 77   Temp 37.7 °C (99.9 °F) (Mcnair)   Resp 16   Ht 1.702 m (5' 7\")   Wt 62.8 kg (138 lb 7.2 oz)   SpO2 100%   BMI 21.68 kg/m²   O2 therapy: Pulse Oximetry: 100 %, O2 (LPM): 3, O2 Delivery: Silicone Nasal Cannula    Physical Exam   Constitutional: He is oriented to person, place, and time and well-developed, well-nourished, and in no distress. He appears unhealthy.   HENT:   Head: Normocephalic and atraumatic.   Eyes: EOM are normal.   Neck: Neck supple.   Cardiovascular: Normal rate and regular rhythm.    Pulmonary/Chest: Effort normal and breath sounds normal. No respiratory distress.   Abdominal: Soft. Bowel sounds are normal. He exhibits no distension. There is no tenderness.   Musculoskeletal: He exhibits no edema.   Left BKA   Neurological: He is alert and oriented to person, place, and " time. GCS score is 15.   Psychiatric: Mood, memory, affect and judgment normal.       Respiratory:     Respiration: 16, Pulse Oximetry: 100 %, O2 Daily Delivery Respiratory : Silicone Nasal Cannula    Chest Tube Drains:    Recent Labs      06/09/19 2357 06/11/19 1942   [id number]  7.19*   --    [id number]  21.4*   --    [id number]  82.2   --    SHHO8PXT  93.2   --    ARTHCO3  8*   --    ARTBE  -19*   --    ISTATAPH   --   7.418   ISTATAPCO2   --   24.4*   ISTATAPO2   --   97*   ISTATATCO2   --   16*   [id number]   --   98   ISTATARTHCO3   --   15.7*   ISTATARTBE   --   -8*   ISTATTEMP   --   37.0 C   ISTATFIO2   --   38   ISTATSPEC   --   Arterial   ISTATAPHTC   --   7.418   [id number]   --   97*       HemoDynamics:  Pulse: 77, Heart Rate (Monitored): 76 Blood Pressure : 145/65, NIBP: 123/44      Neuro:      Fluids:        Intake/Output Summary (Last 24 hours) at 06/10/19 0936  Last data filed at 06/10/19 0400   Gross per 24 hour   Intake          1171.02 ml   Output                0 ml   Net          1171.02 ml       Weight: 62.8 kg (138 lb 7.2 oz)  Body mass index is 21.68 kg/m².    Recent Labs      06/10/19   1320   06/11/19   1801 06/11/19 2207 06/12/19   0510   SODIUM  137   < >  134*  137  138   POTASSIUM  4.8   < >  5.7*  4.3  4.0   CHLORIDE  114*   < >  114*  108  109   CO2  16*   < >  15*  22  21   BUN  24*   < >  20  21  19   CREATININE  0.94   < >  0.72  0.95  0.86   MAGNESIUM  2.4   --    --   1.4*  2.3   PHOSPHORUS  2.7   --    --   1.7*  3.8   CALCIUM  7.6*   < >  9.5  9.0  8.7    < > = values in this interval not displayed.       GI/Nutrition:  Recent Labs      06/09/19   2234   06/11/19   1801  06/11/19 2207 06/12/19   0510   ALTSGPT  10   --    --    --    --    ASTSGOT  19   --    --    --    --    ALKPHOSPHAT  43   --    --    --    --    TBILIRUBIN  0.5   --    --    --    --    PREALBUMIN  28.0   --    --    --    --    GLUCOSE  296*   < >  196*  214*  149*    < > = values in this  interval not displayed.       Heme:  Recent Labs      06/10/19   0250  06/10/19   1520  06/10/19   1905  19   0035  19   0545  19   1035  19   0510   RBC   --    --   2.38*  1.91*  2.35*   --   3.03*   HEMOGLOBIN   --    --   8.2*  6.4*  7.5*  8.8*  9.5*   HEMATOCRIT   --    --   24.3*  19.6*  22.9*  26.3*  28.7*   PLATELETCT   --    --   147*  130*  122*   --   131*   APTT   --   35.0  >240.0*   --    --    --    --    IRON  64   --    --    --    --    --    --    FERRITIN  434.7*   --    --    --    --    --    --    TOTIRONBC  249*   --    --    --    --    --    --        Infectious Disease:  Monitored Temp 2  Av °C (100.4 °F)  Min: 37.5 °C (99.5 °F)  Max: 38.5 °C (101.3 °F)  Temp  Av.7 °C (99.9 °F)  Min: 37.7 °C (99.9 °F)  Max: 37.7 °C (99.9 °F)  Recent Labs      19   2234   19   0035  19   0545  19   0510   WBC  18.3*   < >  12.9*  12.8*  11.6*   NEUTSPOLYS  87.40*   < >  80.10*  78.80*  69.90   LYMPHOCYTES  7.60*   < >  9.10*  11.50*  19.80*   MONOCYTES  4.20   < >  9.90  8.90  9.00   EOSINOPHILS  0.10   < >  0.00  0.00  0.30   BASOPHILS  0.20   < >  0.30  0.30  0.40   ASTSGOT  19   --    --    --    --    ALTSGPT  10   --    --    --    --    ALKPHOSPHAT  43   --    --    --    --    TBILIRUBIN  0.5   --    --    --    --     < > = values in this interval not displayed.       Meds:  • insulin glargine  10 Units     • insulin regular  2-9 Units      And   • glucose  16 g      And   • dextrose 10% bolus  250 mL     • aspirin  81 mg     • omeprazole  20 mg     • metoprolol  25 mg     • ondansetron  4 mg     • prochlorperazine  10 mg     • ipratropium-albuterol  3 mL     • LORazepam  0.5 mg     • hydrOXYzine HCl  10 mg     • MD Alert...Adult ICU Electrolyte Replacement per Pharmacy       • metoclopramide  5 mg     • promethazine  25 mg     • cyanocobalamin  1,000 mcg     • benzocaine-menthol  1 Lozenge     • acetaminophen  650 mg     • enalaprilat  1.25  mg     • ondansetron  4 mg     • tamsulosin  0.4 mg     • morphine injection  2-4 mg     • atorvastatin  80 mg     • nitroglycerin  0.4 mg            Imaging:  DX-CHEST-LIMITED (1 VIEW)   Final Result      Perihilar and bibasilar opacities likely represent pulmonary edema. Superimposed infection is not excluded.            EC-ECHOCARDIOGRAM COMPLETE W/O CONT   Final Result      DX-CHEST-PORTABLE (1 VIEW)   Final Result         1.  No acute cardiopulmonary disease.      CT-FOREIGN FILM CAT SCAN   Final Result      CL-LEFT HEART CATHETERIZATION WITH POSSIBLE INTERVENTION    (Results Pending)       Assessment and Plan:      * Diabetic ketoacidosis without coma associated with type 2 diabetes mellitus (HCC)- (present on admission)   Assessment & Plan    Resolved.  A1C 6.9.  It is unclear whether patient is compliant with medications on outpatient basis.  Plan  -10 units insulin glargine nightly  -Insulin sliding scale with hypoglycemia protocol     Acute blood loss anemia   Assessment & Plan    Patient was noted to have a drop in Hgb from ~10 to ~7, patient was given 1 unit of PRBC's during the night and another this morning after it was noted that the patients HGB was still at 7.5 this AM. However, patient does not have any clear source of bleeding besides an episode of hematemesis of 20 mL. No melena nor BRBPR, no abdominal distension noted on exam. Could likely be dilutional due to IVF resuscitation  Plan  -Will monitor H&H  -Will monitor for signs of bleeding     High anion gap metabolic acidosis   Assessment & Plan    In the setting of DKA  Plan  - Treatment per DKA protocol  - IV fluids  - NPO     NSTEMI (non-ST elevated myocardial infarction) (HCC)- (present on admission)   Assessment & Plan    Initially thought to have Type II MI, then troponins increased to 30. Cardiology called and plan was for cath today, cath held due to possible bleeding. However, they note that patients EKG yesterday revealed improvement  with out intervention, only treating DKA. Likely CAD exacerbated by DKA. Will correct DKA and monitor Cardiac fucntion.   Plan  - Statin, beta-blockers  - Will resume antiplatelet therapy once bleeding is ruled out.  Patient was started on aspirin on 6/12, will likely start clopidogrel tomorrow.     Leukocytosis- (present on admission)   Assessment & Plan    Improved. Possible secondary to stress and NSTEMI  Plan  - Continue to monitor with CBC     Colitis   Assessment & Plan    Per [facility] from [facility]: CT: mild thick walled appearnce to the entire colon which may be due to the fact that it is decompressed. Mild colitis is in the differential. Leukocytosis, but negative lactic acid elevation. No recent hospitalizations or antibiotic use. Possible related to DKA episode  Plan  - Serial abdominal exams  - Monitor closely     Urine retention- (present on admission)   Assessment & Plan    Acute urinary retention. Mcnair was placed in outside facility however patient was having significant discomfort with mcnair therefore removed here.   Plan  - Continue flomax  - Serial bladder scans; if patient is unable to void, will need to replace mcnair     Type 2 diabetes mellitus with hyperglycemia, with long-term current use of insulin (HCC)- (present on admission)   Assessment & Plan    On oral and insulin at home per med rec.patient currently on 10 units glargine and insulin sliding scale with hypoglycemia protocol.  Medication compliance questionable  Plan  - Can consider diabetes educator consult once patient has improved     Acute respiratory failure with hypoxia (HCC)- (present on admission)   Assessment & Plan    Notified by nursing staff the patient was having increased work of breathing, patient was noted to have acute pulmonary edema and hypoxia.  Patient received intravenous diuresis, patient was also placed on BiPAP PRN.  Patient did not require BiPAP but did diurese approximately 5 L since yesterday.  Patient's  respiratory status much improved.  Plan  -Continue to monitor     Acute pulmonary edema (HCC)   Assessment & Plan    Patient was noted to have pulmonary edema on PE and imaging.  Likely secondary to necessary intravenous volume resuscitation.  Plan  -Patient was diuresed with 3 40 mg doses of Lasix, patient's respiratory status improved.  -Continue to monitor for fluid overload and diuresis as necessary     GI bleeding   Assessment & Plan    Concern for GI bleed, however, patient not having melenic stools nor bright red blood per rectum. Patient has not had a bowel movement in over two days. Blood is cathartic and thus would stimulate BM's.   Plan  -Holding Asa, clopidogrel, and heparin until bleed is ruled out     Essential hypertension- (present on admission)   Assessment & Plan    On lisinopril/HCTZ 10/12/5 mg daily per med rec  Plan  - Holding for now in the setting of fluid resuscitation  - Resume once more stable     Macrocytic anemia- (present on admission)   Assessment & Plan    Vitamin B12 deficient, folate wnl.  Plan  -Cyanocobalamin administration         DISPO: In patient for DKA    CODE STATUS: Full Code    Quality Measures:  Reed Catheter: In place  DVT Prophylaxis: SCD's, due to possible bleeding  Ulcer Prophylaxis: Omeprazole 20 mg BID  Antibiotics: None  Lines: PIV    This note was created using voice recognition software. There may be unintended errors in spelling, grammar or content.

## 2019-06-12 NOTE — ASSESSMENT & PLAN NOTE
Notified by nursing staff the patient was having increased work of breathing in ICU, acute pulmonary edema and hypoxia.  Patient received intravenous diuresis, patient was also placed on BiPAP PRN. Patient's respiratory status much improved.  Currently saturating well on room air  Plan  -Continue to monitor, off supplemental oxygen

## 2019-06-12 NOTE — PROGRESS NOTES
Reason for consultation /admission : NSTEMI, known 3 V CAD    Resting  Was c/o SOB yesterday  Diuresed over 7+ liters yesterday  Denies CP or SOB this AM    Review of systems;    General: No fever, chills, + weakness  HENT: No sore throat, no neck pain  Eyes: No blurred vision or double vision  Heart: No palpitation, no PND or orthopnea, no claudication, no leg swelling  Lung: c/o wheezing and SOB, no hemoptysis  Abdomen: No abdominal pain, no fuurther vomiting,r hematemesis or blood in stool  : No dysuria, no hematuria  Musculoskeletal: No myalgia, some joint pain  Hematology: No easy bruising  Skin: No rash or itching  Neurological: No headache, no new focal weakness or numbness  Psychological: Denies depression, anxiety or insomnia  All other review of systems are negative      Temp:  [37.2 °C (99 °F)-37.7 °C (99.9 °F)] 37.7 °C (99.9 °F)  Pulse:  [] 77  Resp:  [13-33] 16  BP: (132-145)/(60-65) 145/65  SpO2:  [90 %-100 %] 100 %  GENERAL thin, pale, not in acute distress, mild dyspnic at rest  Head atraumatic, normocephalic  Eyes EOMI  ENT neck supple, no JVD, no carotid bruits or thyromegaly  Lung good expansion, distant sound, no rales or wheezing  Heart RRR, normal rate, no murmur, no gallop or rub  Abd soft, no tenderness, mass or bruits  Ext no edema  Skin no ecchymosis or petechiae  Musculoskeletal no deformity  Neuro grossly intact  Psych normal mood, normal affect    Monitor reviewed by me showed no significant ventricular or atrial dysrhythmias.    Labs: Cr 0.86, K+ 4    Hb 9.5, plt 131      Assessment and plans    1. NSTEMI likely type II  Hemodynamically stable, no sig arrhythmias or angina  With GI bleeding, would hold off on invasive procedure for now  Continue betablocker and statin  Consider re challenge with clopidogrel if no sign of further GI bleed    2. Acute blood loss anemia  Primary team felt due to esophagitis, currently on PPI  Hb stable  Will discuss with primary team about timing  on restarting antiplatelet    3. Pulmonary edema improved  CXR suggested pulmonary edema, diurese  Acute diastolic HF/volume overloaded  Watch I&O. Low dose furosemide    May transfer to floor  Will follow    Please note that this dictation was created using voice recognition software. I have worked with consultants from the vendor as well as technical experts from [facility] to optimize the interface. I have made every reasonable attempt to correct obvious errors, but I expect that there are errors of grammar and possibly content I did not discover before finalizing the note

## 2019-06-12 NOTE — FLOWSHEET NOTE
06/12/19 0757   Events/Summary/Plan   Events/Summary/Plan SVN tx   Interdisciplinary Plan of Care-Goals (Indications)   Bronchodilator Indications Strong Subjective / Objective Improvement   Interdisciplinary Plan of Care-Outcomes    Bronchodilator Outcome Diminished Wheezing and Volume of Air Movement Increased;Improvement in Airflow (peak flow, PFT)   Education   Education Yes - Pt. / Family has been Instructed in use of Respiratory Equipment   RT Assessment of Delivered Medications   Evaluation of Medication Delivery Daily Yes-- Pt /Family has been Instructed in use of Respiratory Medications and Adverse Reactions   SVN Group   #SVN Performed Yes   Given By: Mouthpiece   Date SVN Last Changed 06/11/19   Date SVN Next Change Due (Q 7 Days) 06/18/19   Breath Sounds   RUL Breath Sounds Clear   RML Breath Sounds Clear   RLL Breath Sounds Diminished   CARLO Breath Sounds Clear   LLL Breath Sounds Diminished   Oxygen   O2 (LPM) 3   O2 Daily Delivery Respiratory  Silicone Nasal Cannula

## 2019-06-12 NOTE — PROGRESS NOTES
Critical Care Progress Note    Date of admission  6/9/2019    Chief Complaint  68 y.o. male admitted 6/9/2019 with n/v and abdominal pain and diagnosed with DKA.  ALso NSTEMI with troponin elevation but no chest pain.      Hospital Course          Interval Problem Update  Reviewed last 24 hour events:  Off insulin overnight   hr  sbp adequate  Limited use of bipap  Extensive diuresis  Reed in place  No cath for now  Change to qid duonebs  Bmp evening  Po lasix  Keep net negative  Continue sq insulin  OK for telemetry    Addendum  Large GI bleed this evening  Started ppi drip with bolus initially  Large bloody bm  Ng tube to intermittent suction if patient ok with it.    NPO with sips with meds  Currently hemodynamically stable  Cbc now and q 6 hours  If hemodynamically unstable or continued bleeding will need emergent GI consult     Review of Systems  Review of Systems   Constitutional: Positive for malaise/fatigue. Negative for chills, diaphoresis, fever and weight loss.   HENT: Negative for congestion and sinus pain.    Eyes: Negative for blurred vision, double vision and photophobia.   Respiratory: Negative for cough, hemoptysis, sputum production, shortness of breath, wheezing and stridor.    Cardiovascular: Negative for chest pain, palpitations and leg swelling.   Gastrointestinal: Negative for abdominal pain, blood in stool, heartburn, melena and vomiting.   Genitourinary: Negative for dysuria and urgency.   Musculoskeletal: Negative for back pain, myalgias and neck pain.   Skin: Negative for itching and rash.   Neurological: Negative for dizziness, tingling, sensory change, speech change, focal weakness, weakness and headaches.   Endo/Heme/Allergies: Negative for polydipsia. Does not bruise/bleed easily.   Psychiatric/Behavioral: Negative for depression. The patient is not nervous/anxious.         Vital Signs for last 24 hours   Temp:  [37.2 °C (99 °F)-37.7 °C (99.9 °F)] 37.7 °C (99.9 °F)  Pulse:   [] 77  Resp:  [13-33] 16  BP: (132-145)/(60-65) 145/65  SpO2:  [90 %-100 %] 100 %    Hemodynamic parameters for last 24 hours       Respiratory Information for the last 24 hours       Physical Exam   Physical Exam   Constitutional: He is oriented to person, place, and time. No distress.   lethargy   HENT:   Head: Normocephalic and atraumatic.   Right Ear: External ear normal.   Left Ear: External ear normal.   Mouth/Throat: No oropharyngeal exudate.   Eyes: Pupils are equal, round, and reactive to light. Conjunctivae and EOM are normal. Right eye exhibits no discharge. Left eye exhibits no discharge.   Neck: No JVD present. No tracheal deviation present.   Cardiovascular: Normal rate, regular rhythm and normal heart sounds.    No murmur heard.  Pulmonary/Chest: No stridor. He is in respiratory distress. He has no wheezes. He has rales. He exhibits no tenderness.   improving   Abdominal: He exhibits no mass. There is no tenderness. There is no rebound and no guarding.   Musculoskeletal: He exhibits no edema, tenderness or deformity.   Neurological: He is alert and oriented to person, place, and time. He displays normal reflexes. No cranial nerve deficit. Coordination normal.   Skin: Skin is warm. No rash noted. He is diaphoretic. No erythema.   Psychiatric:   fatigued   Nursing note and vitals reviewed.      Medications  Current Facility-Administered Medications   Medication Dose Route Frequency Provider Last Rate Last Dose   • insulin glargine (LANTUS) injection 10 Units  10 Units Subcutaneous Q EVENING Anayeli Marcial M.D.   10 Units at 06/12/19 0049   • insulin regular (HUMULIN R) injection 2-9 Units  2-9 Units Subcutaneous 4X/DAY ACHS Anayeli Marcial M.D.   Stopped at 06/12/19 0700    And   • glucose 4 g chewable tablet 16 g  16 g Oral Q15 MIN PRN Anayeli Marcial M.D.        And   • DEXTROSE 10% BOLUS 250 mL  250 mL Intravenous Q15 MIN PRN Anayeli Marcial M.D.       • omeprazole (PRILOSEC) capsule 20 mg  20 mg  Oral BID Yoshi Valero M.D.   20 mg at 06/12/19 0456   • metoprolol (LOPRESSOR) tablet 25 mg  25 mg Oral BID Yoshi Valero M.D.   25 mg at 06/12/19 0456   • ondansetron (ZOFRAN) syringe/vial injection 4 mg  4 mg Intravenous Q4HRS PRN Miller Taylor       • prochlorperazine (COMPAZINE) injection 10 mg  10 mg Intravenous Q6HRS PRN Miller Taylor       • ipratropium-albuterol (DUONEB) nebulizer solution  3 mL Nebulization Q4HRS (RT) Yoshi Valero M.D.   3 mL at 06/12/19 0141   • LORazepam (ATIVAN) injection 0.5 mg  0.5 mg Intravenous Q4HRS PRN Miller Taylor   0.5 mg at 06/11/19 1741   • hydrOXYzine HCl (ATARAX) tablet 10 mg  10 mg Oral TID PRN Anayeli Marcial M.D.   10 mg at 06/11/19 2156   • MD Alert...ICU Electrolyte Replacement per Pharmacy   Other PHARMACY TO DOSE Anayeli Marcial M.D.       • metoclopramide (REGLAN) injection 5 mg  5 mg Intravenous Q6HRS PRN Anayeli Marcial M.D.       • promethazine (PHENERGAN) suppository 25 mg  25 mg Rectal Q6HRS PRN Anayeli Marcial M.D.       • cyanocobalamin (VITAMIN B-12) tablet 1,000 mcg  1,000 mcg Oral DAILY Yoshi Valero M.D.   1,000 mcg at 06/12/19 0456   • benzocaine-menthol (CEPACOL) lozenge 1 Lozenge  1 Lozenge Mouth/Throat Q2HRS PRN Miller Taylor       • acetaminophen (TYLENOL) tablet 650 mg  650 mg Oral Q6HRS PRN Christine Farmer M.D.   650 mg at 06/12/19 0456   • enalaprilat (VASOTEC) injection 1.25 mg  1.25 mg Intravenous Q6HRS PRN Christine Farmer M.D.       • ondansetron (ZOFRAN ODT) dispertab 4 mg  4 mg Oral Q4HRS PRN Christine Farmer M.D.   4 mg at 06/10/19 1424   • tamsulosin (FLOMAX) capsule 0.4 mg  0.4 mg Oral AFTER BREAKFAST Anayeli Marcial M.D.   0.4 mg at 06/11/19 0829   • morphine (pf) 4 mg/ml injection 2-4 mg  2-4 mg Intravenous Q5 MIN PRN Christine Farmer M.D.       • atorvastatin (LIPITOR) tablet 80 mg  80 mg Oral Q EVENING Christine Farmer M.D.   80 mg at 06/11/19 1647   • nitroglycerin (NITROSTAT) tablet 0.4 mg  0.4 mg Sublingual Q5 MIN  UMM Farmer M.D.           Fluids    Intake/Output Summary (Last 24 hours) at 06/12/19 0705  Last data filed at 06/12/19 0641   Gross per 24 hour   Intake          4627.25 ml   Output             7525 ml   Net         -2897.75 ml       Laboratory  Recent Labs      06/09/19   2357  06/11/19   1942   RCIMY11S  7.19*   --    [id number]  21.4*   --    [id number]  82.2   --    HNIT4PCM  93.2   --    ARTHCO3  8*   --    ARTBE  -19*   --    ISTATAPH   --   7.418   ISTATAPCO2   --   24.4*   ISTATAPO2   --   97*   ISTATATCO2   --   16*   [id number]   --   98   ISTATARTHCO3   --   15.7*   ISTATARTBE   --   -8*   ISTATTEMP   --   37.0 C   ISTATFIO2   --   38   ISTATSPEC   --   Arterial   ISTATAPHTC   --   7.418   [id number]   --   97*     Recent Labs      06/09/19 2234 06/09/19   2236   06/10/19   1630  06/10/19   2115  06/11/19   0035   CPKTOTAL  115   --    --    --    --    --    TROPONINI  1.58*   --    < >  29.99*  20.96*  13.99*   BNPBTYPENAT   --   482*   --    --    --    --     < > = values in this interval not displayed.     Recent Labs      06/10/19   1320   06/11/19   1801  06/11/19   2207  06/12/19   0510   SODIUM  137   < >  134*  137  138   POTASSIUM  4.8   < >  5.7*  4.3  4.0   CHLORIDE  114*   < >  114*  108  109   CO2  16*   < >  15*  22  21   BUN  24*   < >  20  21  19   CREATININE  0.94   < >  0.72  0.95  0.86   MAGNESIUM  2.4   --    --   1.4*  2.3   PHOSPHORUS  2.7   --    --   1.7*  3.8   CALCIUM  7.6*   < >  9.5  9.0  8.7    < > = values in this interval not displayed.     Recent Labs      06/09/19 2234 06/11/19   1801  06/11/19   2207  06/12/19   0510   ALTSGPT  10   --    --    --    --    ASTSGOT  19   --    --    --    --    ALKPHOSPHAT  43   --    --    --    --    TBILIRUBIN  0.5   --    --    --    --    PREALBUMIN  28.0   --    --    --    --    GLUCOSE  296*   < >  196*  214*  149*    < > = values in this interval not displayed.     Recent Labs      06/09/19 2234    06/11/19   0035  06/11/19   0545  06/12/19   0510   WBC  18.3*   < >  12.9*  12.8*  11.6*   NEUTSPOLYS  87.40*   < >  80.10*  78.80*  69.90   LYMPHOCYTES  7.60*   < >  9.10*  11.50*  19.80*   MONOCYTES  4.20   < >  9.90  8.90  9.00   EOSINOPHILS  0.10   < >  0.00  0.00  0.30   BASOPHILS  0.20   < >  0.30  0.30  0.40   ASTSGOT  19   --    --    --    --    ALTSGPT  10   --    --    --    --    ALKPHOSPHAT  43   --    --    --    --    TBILIRUBIN  0.5   --    --    --    --     < > = values in this interval not displayed.     Recent Labs      06/10/19   0250  06/10/19   1520  06/10/19   1905  06/11/19   0035  06/11/19   0545  06/11/19   1035  06/12/19   0510   RBC   --    --   2.38*  1.91*  2.35*   --   3.03*   HEMOGLOBIN   --    --   8.2*  6.4*  7.5*  8.8*  9.5*   HEMATOCRIT   --    --   24.3*  19.6*  22.9*  26.3*  28.7*   PLATELETCT   --    --   147*  130*  122*   --   131*   APTT   --   35.0  >240.0*   --    --    --    --    IRON  64   --    --    --    --    --    --    FERRITIN  434.7*   --    --    --    --    --    --    TOTIRONBC  249*   --    --    --    --    --    --        Imaging  X-Ray:  I have personally reviewed the images and compared with prior images. and My impression is: improved edema  EKG:  I have personally reviewed the images and compared with prior images. and My impression is: reviewed per myself, no significant ischemic changes   CT:    Reviewed    Assessment/Plan  * Diabetic ketoacidosis without coma associated with type 2 diabetes mellitus (HCC)- (present on admission)   Assessment & Plan    Aggressive fluid resuscitation  dka protocol  Transitioned from insluin drip overnight  Sq insulin and ssi     Acute blood loss anemia   Assessment & Plan    Less than 7  Transfused 1 uprbc overnight, give additional unit  20 ml hematemasis  Esophagitis on ct imaging  ppi oral ok for now  Goal greater than 8 given nstemi  Daily cbc  Cath on hold until tolerates ac     High anion gap metabolic  acidosis   Assessment & Plan    Secondary to DKA  improved     NSTEMI (non-ST elevated myocardial infarction) (HCC)- (present on admission)   Assessment & Plan    Likely demand ischemia  On statin, bb  No chest pain  D dimer elevated but no hypoxia or respiratory distress or swelling of legs, unlikely PE but no other clear source for d dimer elevation except for  colitis, repeat in 1 week from prior       Leukocytosis- (present on admission)   Assessment & Plan    Likely due to dka     Colitis   Assessment & Plan    Colitis, mild on ct at [facility]  Unlikely significant infection as asymptomatic, abdominal pain improved of which was likely due to gastroparesis  Reviewed ct imaging     Urine retention- (present on admission)   Assessment & Plan    Fluid resuscitation for dka  Diuresis with lasix, change to po  Keep net negative  Significant post resuscitaiton diuresis  Tolerated         Type 2 diabetes mellitus with hyperglycemia, with long-term current use of insulin ([facility])- (present on admission)   Assessment & Plan    Type 2 diabetes  DKA  Aggressive fluid resuscitation initially  Improved chemistry  Transitioned from insulin drip  Sq insulin   ssi     Acute respiratory failure with hypoxia ([facility])- (present on admission)   Assessment & Plan    Due to pulmonary edema from fluid resuscitation  nstemi contributory  Diuresis aggressively with lasix  Change lasix to po  improved     Positive D dimer   Assessment & Plan    ? Due to colitis  Unclear source  Us lower ext  D dimer repeat      Acute pulmonary edema (HCC)   Assessment & Plan    Due to volume overload from dka  Diuresis  Continued on fluids for dka protocol  exac by nstemi  Diuresis adequate  Continue net neg goal     GI bleeding   Assessment & Plan    hematemasis while on heparin drip  Esophagitis ct imaging  ppi bid  Stopped heparin drip  Heart cath on hold to make sure stable  Daily cbc     Generalized abdominal pain   Assessment & Plan    Resolved overnight  with fluid resuscitation  lilianley due to gastroparesis     Essential hypertension- (present on admission)   Assessment & Plan    Keep sbp < 160     Macrocytic anemia- (present on admission)   Assessment & Plan    Transfuse for hb < 7          VTE:  Contraindicated  Ulcer: Not Indicated  Lines: None    I have performed a physical exam and reviewed and updated ROS and Plan today (6/12/2019). In review of yesterday's note (6/11/2019), there are no changes except as documented above.     Discussed patient condition and risk of morbidity and/or mortality with Family, RN, RT, Pharmacy, , [employer] resident, Code status disscussed, Charge nurse / hot rounds, Patient and cardiology     Patient is critically ill. Large bloody bowel movement with GI bleeding.  PPI bolus and drip started.  Continued ICU level of care.  If untreated there is a high chance of deterioration and eventually death. Likely upper gi source with recent hematemasis, high risk for aspiration with hematemasis and possible intubation for mechanical ventilation.  The critical that has been undertaken is medically complex. There has been no overlap in critical care time. Critical Care Time not including procedures: 37 minutes

## 2019-06-12 NOTE — PROGRESS NOTES
ICU transfer note     Attending: Dr. Taylor  Resident: Dr. Yoshi Valero  Date of admission: 6/9/2019  Date of transferring out from ICU:  06/12/19    Primary diagnosis:   - DKA  - NSTEMI  - ANEMIA     HPI and ICU Course Summary (Brief Narrative):    68-year-old male with a past medical history of known CAD (not a candidate for CABG due to multiple comorbidities), diabetes mellitus type 2, hypertension, and hyperlipidemia.  Patient was admitted to the intensive care unit for DKA, patient was started on insulin drip and intravenous fluid resuscitation.      On initial presenting work-up patient was noted to have ST segment depressions in the inferior, precordial, and anterior leads; troponins were 3.09.  Cardiology was consulted and mentioned that this was more likely a type II MI.  Therefore they would continue to monitor.  Subsequent troponins increased to 30.99 and cardiology was contacted at which point they had plan to perform a heart cath on patient.  Patient was started on heparin drip.    During the night before heart catheterization, patient's hemoglobin dropped from 10 to 6.4, concern about possible GI bleed in the setting of patient having one episode of hematemesis 20 mL.  However, patient had not had a bowel movement for the past 3 days and physical exam at bedside did not reveal the acute abdomen, distended abdomen, tenderness, or any other physical abnormality.  Therefore, it was subsequently thought that patient was initially hemoconcentrated and after aggressive intravenous fluid resuscitation patient hemodiluted to use 6.4.  Patient received 2 L of PRBCs.    Due to concern of bleeding, heart cath was held and was later noted that patient's ST depressions, after improvement of patient's metabolic acidosis, improved.  Therefore, it was thought that patient's ST depressions were secondary to patient's acute state due to DKA.  Anticoagulation  and heparin was held, clopidogrel is to be restarted on 6/13/2019.    On 6/11/2019 patient became short of breath and was noted to have increased work of breathing, physical exam and imaging noted pulmonary edema and possible fluid overload.  BiPAP PRN was ordered.  Patient was started on Lasix diuresis and was continued on small dose of Lasix for maintenance.    Patient's respiratory status improved and patient was deemed medically stable for transfer to the floor.    Labs and imaging studies to be continued with their indications:  - CBC:  (Indication) anemia  - CMP or BMP: (Indication) monitor due to diuresis  - Magnesium: (Indication) monitor due to diuresis  - Monitor ins and outs strictly    Things to follow:   -Follow-up on medication compliance    -Follow-up on restarting cardiac meds    -Follow-up on volume overload resolution and improvement, follow ins and outs or daily weights.    -Monitor for GI bleed

## 2019-06-12 NOTE — PROGRESS NOTES
Finger stick glucose stable x4, anion gap 7and CO2 22. [employer] MD Marcial paged for updates. Pt to transition off insulin gtt. DKA protocol discontinued and ACHS sliding scale insulin ordered.

## 2019-06-12 NOTE — CARE PLAN
Problem: Safety  Goal: Will remain free from injury  Outcome: PROGRESSING AS EXPECTED  Fall precautions in place. Bed in lowest position. Non-skid socks in place. Personal possessions within reach. Mobility sign on door. Bed-alarm on. Call light within reach. Pt educated regarding fall prevention and states understanding.    Problem: Metabolic:  Goal: Ability to maintain appropriate glucose levels will improve  Outcome: PROGRESSING AS EXPECTED  DKA protocol in place. Appropriate IV fluids infusing

## 2019-06-13 ENCOUNTER — APPOINTMENT (OUTPATIENT)
Dept: RADIOLOGY | Facility: MEDICAL CENTER | Age: 69
DRG: 637 | End: 2019-06-13
Attending: INTERNAL MEDICINE
Payer: MEDICARE

## 2019-06-13 ENCOUNTER — APPOINTMENT (OUTPATIENT)
Dept: RADIOLOGY | Facility: MEDICAL CENTER | Age: 69
DRG: 637 | End: 2019-06-13
Attending: STUDENT IN AN ORGANIZED HEALTH CARE EDUCATION/TRAINING PROGRAM
Payer: MEDICARE

## 2019-06-13 PROBLEM — E87.29 HIGH ANION GAP METABOLIC ACIDOSIS: Status: RESOLVED | Noted: 2019-06-09 | Resolved: 2019-06-13

## 2019-06-13 LAB
ANION GAP SERPL CALC-SCNC: 9 MMOL/L (ref 0–11.9)
BASOPHILS # BLD AUTO: 0.4 % (ref 0–1.8)
BASOPHILS # BLD: 0.03 K/UL (ref 0–0.12)
BUN SERPL-MCNC: 15 MG/DL (ref 8–22)
CALCIUM SERPL-MCNC: 8.1 MG/DL (ref 8.5–10.5)
CHLORIDE SERPL-SCNC: 106 MMOL/L (ref 96–112)
CO2 SERPL-SCNC: 24 MMOL/L (ref 20–33)
CREAT SERPL-MCNC: 0.74 MG/DL (ref 0.5–1.4)
D DIMER PPP IA.FEU-MCNC: 1.96 UG/ML (FEU) (ref 0–0.5)
EOSINOPHIL # BLD AUTO: 0.15 K/UL (ref 0–0.51)
EOSINOPHIL NFR BLD: 1.9 % (ref 0–6.9)
ERYTHROCYTE [DISTWIDTH] IN BLOOD BY AUTOMATED COUNT: 50.7 FL (ref 35.9–50)
ERYTHROCYTE [DISTWIDTH] IN BLOOD BY AUTOMATED COUNT: 51.8 FL (ref 35.9–50)
ERYTHROCYTE [DISTWIDTH] IN BLOOD BY AUTOMATED COUNT: 52 FL (ref 35.9–50)
GLUCOSE BLD-MCNC: 104 MG/DL (ref 65–99)
GLUCOSE BLD-MCNC: 156 MG/DL (ref 65–99)
GLUCOSE BLD-MCNC: 94 MG/DL (ref 65–99)
GLUCOSE SERPL-MCNC: 101 MG/DL (ref 65–99)
HCT VFR BLD AUTO: 26.5 % (ref 42–52)
HCT VFR BLD AUTO: 26.6 % (ref 42–52)
HCT VFR BLD AUTO: 27.5 % (ref 42–52)
HGB BLD-MCNC: 8.9 G/DL (ref 14–18)
IMM GRANULOCYTES # BLD AUTO: 0.03 K/UL (ref 0–0.11)
IMM GRANULOCYTES NFR BLD AUTO: 0.4 % (ref 0–0.9)
LYMPHOCYTES # BLD AUTO: 1.4 K/UL (ref 1–4.8)
LYMPHOCYTES NFR BLD: 17.7 % (ref 22–41)
MAGNESIUM SERPL-MCNC: 1.7 MG/DL (ref 1.5–2.5)
MCH RBC QN AUTO: 32 PG (ref 27–33)
MCH RBC QN AUTO: 32.1 PG (ref 27–33)
MCH RBC QN AUTO: 33 PG (ref 27–33)
MCHC RBC AUTO-ENTMCNC: 32.4 G/DL (ref 33.7–35.3)
MCHC RBC AUTO-ENTMCNC: 33.5 G/DL (ref 33.7–35.3)
MCHC RBC AUTO-ENTMCNC: 33.6 G/DL (ref 33.7–35.3)
MCV RBC AUTO: 96 FL (ref 81.4–97.8)
MCV RBC AUTO: 98.1 FL (ref 81.4–97.8)
MCV RBC AUTO: 98.9 FL (ref 81.4–97.8)
MONOCYTES # BLD AUTO: 0.57 K/UL (ref 0–0.85)
MONOCYTES NFR BLD AUTO: 7.2 % (ref 0–13.4)
NEUTROPHILS # BLD AUTO: 5.75 K/UL (ref 1.82–7.42)
NEUTROPHILS NFR BLD: 72.4 % (ref 44–72)
NRBC # BLD AUTO: 0 K/UL
NRBC BLD-RTO: 0 /100 WBC
PHOSPHATE SERPL-MCNC: 3.4 MG/DL (ref 2.5–4.5)
PLATELET # BLD AUTO: 119 K/UL (ref 164–446)
PLATELET # BLD AUTO: 119 K/UL (ref 164–446)
PLATELET # BLD AUTO: 137 K/UL (ref 164–446)
PMV BLD AUTO: 9.2 FL (ref 9–12.9)
PMV BLD AUTO: 9.2 FL (ref 9–12.9)
PMV BLD AUTO: 9.3 FL (ref 9–12.9)
POTASSIUM SERPL-SCNC: 3.9 MMOL/L (ref 3.6–5.5)
RBC # BLD AUTO: 2.7 M/UL (ref 4.7–6.1)
RBC # BLD AUTO: 2.77 M/UL (ref 4.7–6.1)
RBC # BLD AUTO: 2.78 M/UL (ref 4.7–6.1)
SODIUM SERPL-SCNC: 139 MMOL/L (ref 135–145)
WBC # BLD AUTO: 7.9 K/UL (ref 4.8–10.8)
WBC # BLD AUTO: 8 K/UL (ref 4.8–10.8)
WBC # BLD AUTO: 8.1 K/UL (ref 4.8–10.8)

## 2019-06-13 PROCEDURE — 700102 HCHG RX REV CODE 250 W/ 637 OVERRIDE(OP): Performed by: STUDENT IN AN ORGANIZED HEALTH CARE EDUCATION/TRAINING PROGRAM

## 2019-06-13 PROCEDURE — 84100 ASSAY OF PHOSPHORUS: CPT

## 2019-06-13 PROCEDURE — 99232 SBSQ HOSP IP/OBS MODERATE 35: CPT | Performed by: INTERNAL MEDICINE

## 2019-06-13 PROCEDURE — 82962 GLUCOSE BLOOD TEST: CPT

## 2019-06-13 PROCEDURE — 85027 COMPLETE CBC AUTOMATED: CPT

## 2019-06-13 PROCEDURE — C9113 INJ PANTOPRAZOLE SODIUM, VIA: HCPCS | Performed by: INTERNAL MEDICINE

## 2019-06-13 PROCEDURE — A9270 NON-COVERED ITEM OR SERVICE: HCPCS | Performed by: INTERNAL MEDICINE

## 2019-06-13 PROCEDURE — 99233 SBSQ HOSP IP/OBS HIGH 50: CPT | Performed by: INTERNAL MEDICINE

## 2019-06-13 PROCEDURE — 51798 US URINE CAPACITY MEASURE: CPT

## 2019-06-13 PROCEDURE — 76937 US GUIDE VASCULAR ACCESS: CPT

## 2019-06-13 PROCEDURE — 94640 AIRWAY INHALATION TREATMENT: CPT

## 2019-06-13 PROCEDURE — 93970 EXTREMITY STUDY: CPT

## 2019-06-13 PROCEDURE — 94760 N-INVAS EAR/PLS OXIMETRY 1: CPT

## 2019-06-13 PROCEDURE — A9270 NON-COVERED ITEM OR SERVICE: HCPCS | Performed by: STUDENT IN AN ORGANIZED HEALTH CARE EDUCATION/TRAINING PROGRAM

## 2019-06-13 PROCEDURE — 85379 FIBRIN DEGRADATION QUANT: CPT

## 2019-06-13 PROCEDURE — 770020 HCHG ROOM/CARE - TELE (206)

## 2019-06-13 PROCEDURE — 80048 BASIC METABOLIC PNL TOTAL CA: CPT

## 2019-06-13 PROCEDURE — 700111 HCHG RX REV CODE 636 W/ 250 OVERRIDE (IP): Performed by: STUDENT IN AN ORGANIZED HEALTH CARE EDUCATION/TRAINING PROGRAM

## 2019-06-13 PROCEDURE — 700101 HCHG RX REV CODE 250: Performed by: INTERNAL MEDICINE

## 2019-06-13 PROCEDURE — 700102 HCHG RX REV CODE 250 W/ 637 OVERRIDE(OP): Performed by: INTERNAL MEDICINE

## 2019-06-13 PROCEDURE — 05HY33Z INSERTION OF INFUSION DEVICE INTO UPPER VEIN, PERCUTANEOUS APPROACH: ICD-10-PCS | Performed by: INTERNAL MEDICINE

## 2019-06-13 PROCEDURE — 85025 COMPLETE CBC W/AUTO DIFF WBC: CPT

## 2019-06-13 PROCEDURE — 83735 ASSAY OF MAGNESIUM: CPT

## 2019-06-13 PROCEDURE — 93970 EXTREMITY STUDY: CPT | Mod: 26 | Performed by: INTERNAL MEDICINE

## 2019-06-13 PROCEDURE — 700111 HCHG RX REV CODE 636 W/ 250 OVERRIDE (IP): Performed by: INTERNAL MEDICINE

## 2019-06-13 RX ORDER — MAGNESIUM SULFATE HEPTAHYDRATE 40 MG/ML
2 INJECTION, SOLUTION INTRAVENOUS ONCE
Status: COMPLETED | OUTPATIENT
Start: 2019-06-13 | End: 2019-06-13

## 2019-06-13 RX ORDER — POTASSIUM CHLORIDE 20 MEQ/1
40 TABLET, EXTENDED RELEASE ORAL ONCE
Status: COMPLETED | OUTPATIENT
Start: 2019-06-13 | End: 2019-06-13

## 2019-06-13 RX ORDER — SODIUM CHLORIDE 9 MG/ML
INJECTION, SOLUTION INTRAVENOUS
Status: ACTIVE
Start: 2019-06-13 | End: 2019-06-13

## 2019-06-13 RX ORDER — HEPARIN SODIUM 5000 [USP'U]/ML
5000 INJECTION, SOLUTION INTRAVENOUS; SUBCUTANEOUS EVERY 8 HOURS
Status: DISCONTINUED | OUTPATIENT
Start: 2019-06-13 | End: 2019-06-13

## 2019-06-13 RX ADMIN — PANTOPRAZOLE SODIUM 40 MG: 40 INJECTION, POWDER, FOR SOLUTION INTRAVENOUS at 04:56

## 2019-06-13 RX ADMIN — METOPROLOL TARTRATE 25 MG: 25 TABLET, FILM COATED ORAL at 16:59

## 2019-06-13 RX ADMIN — TAMSULOSIN HYDROCHLORIDE 0.4 MG: 0.4 CAPSULE ORAL at 09:06

## 2019-06-13 RX ADMIN — IPRATROPIUM BROMIDE AND ALBUTEROL SULFATE 3 ML: .5; 3 SOLUTION RESPIRATORY (INHALATION) at 01:55

## 2019-06-13 RX ADMIN — IPRATROPIUM BROMIDE AND ALBUTEROL SULFATE 3 ML: .5; 3 SOLUTION RESPIRATORY (INHALATION) at 14:40

## 2019-06-13 RX ADMIN — PANTOPRAZOLE SODIUM 40 MG: 40 INJECTION, POWDER, FOR SOLUTION INTRAVENOUS at 17:00

## 2019-06-13 RX ADMIN — METOCLOPRAMIDE 5 MG: 5 INJECTION, SOLUTION INTRAMUSCULAR; INTRAVENOUS at 23:29

## 2019-06-13 RX ADMIN — ATORVASTATIN CALCIUM 80 MG: 80 TABLET, FILM COATED ORAL at 16:59

## 2019-06-13 RX ADMIN — IPRATROPIUM BROMIDE AND ALBUTEROL SULFATE 3 ML: .5; 3 SOLUTION RESPIRATORY (INHALATION) at 07:58

## 2019-06-13 RX ADMIN — METOPROLOL TARTRATE 25 MG: 25 TABLET, FILM COATED ORAL at 04:56

## 2019-06-13 RX ADMIN — ACETAMINOPHEN 650 MG: 325 TABLET, FILM COATED ORAL at 08:01

## 2019-06-13 RX ADMIN — MAGNESIUM SULFATE IN WATER 2 G: 40 INJECTION, SOLUTION INTRAVENOUS at 09:06

## 2019-06-13 RX ADMIN — FUROSEMIDE 20 MG: 20 TABLET ORAL at 04:56

## 2019-06-13 RX ADMIN — CYANOCOBALAMIN TAB 500 MCG 1000 MCG: 500 TAB at 04:56

## 2019-06-13 RX ADMIN — INSULIN HUMAN 2 UNITS: 100 INJECTION, SOLUTION PARENTERAL at 20:38

## 2019-06-13 RX ADMIN — IPRATROPIUM BROMIDE AND ALBUTEROL SULFATE 3 ML: .5; 3 SOLUTION RESPIRATORY (INHALATION) at 21:41

## 2019-06-13 RX ADMIN — INSULIN GLARGINE 10 UNITS: 100 INJECTION, SOLUTION SUBCUTANEOUS at 17:14

## 2019-06-13 RX ADMIN — ACETAMINOPHEN 650 MG: 325 TABLET, FILM COATED ORAL at 16:59

## 2019-06-13 RX ADMIN — POTASSIUM CHLORIDE 40 MEQ: 1500 TABLET, EXTENDED RELEASE ORAL at 09:06

## 2019-06-13 RX ADMIN — CLOPIDOGREL BISULFATE 75 MG: 75 TABLET ORAL at 04:56

## 2019-06-13 ASSESSMENT — ENCOUNTER SYMPTOMS
NEUROLOGICAL NEGATIVE: 1
CONSTITUTIONAL NEGATIVE: 1
NECK PAIN: 0
SHORTNESS OF BREATH: 0
VOMITING: 0
TINGLING: 0
HEADACHES: 0
WEAKNESS: 1
DIAPHORESIS: 0
RESPIRATORY NEGATIVE: 1
PSYCHIATRIC NEGATIVE: 1
DOUBLE VISION: 0
SINUS PAIN: 0
SPEECH CHANGE: 0
FEVER: 0
HEMOPTYSIS: 0
PALPITATIONS: 0
DEPRESSION: 0
CARDIOVASCULAR NEGATIVE: 1
EYES NEGATIVE: 1
WEIGHT LOSS: 0
COUGH: 0
WHEEZING: 0
STRIDOR: 0
GASTROINTESTINAL NEGATIVE: 1
HEARTBURN: 0
SENSORY CHANGE: 0
NERVOUS/ANXIOUS: 0
BLURRED VISION: 0
DIZZINESS: 0
BACK PAIN: 0
MYALGIAS: 0
BLOOD IN STOOL: 1
FOCAL WEAKNESS: 0
PHOTOPHOBIA: 0
POLYDIPSIA: 0
SPUTUM PRODUCTION: 0
CHILLS: 0
MUSCULOSKELETAL NEGATIVE: 1
BRUISES/BLEEDS EASILY: 0
ABDOMINAL PAIN: 0

## 2019-06-13 NOTE — FLOWSHEET NOTE
06/12/19 1658   Events/Summary/Plan   Events/Summary/Plan SVN given   Interdisciplinary Plan of Care-Goals (Indications)   Bronchodilator Indications Strong Subjective / Objective Improvement   Interdisciplinary Plan of Care-Outcomes    Bronchodilator Outcome Diminished Wheezing and Volume of Air Movement Increased   Education   Education Yes - Pt. / Family has been Instructed in use of Respiratory Equipment   RT Assessment of Delivered Medications   Evaluation of Medication Delivery Daily Yes-- Pt /Family has been Instructed in use of Respiratory Medications and Adverse Reactions   SVN Group   #SVN Performed Yes   Given By: Mouthpiece   Chest Exam   Work Of Breathing / Effort Mild   Breath Sounds   RUL Breath Sounds Clear   RML Breath Sounds Clear   RLL Breath Sounds Diminished   CAROL Breath Sounds Clear   LLL Breath Sounds Diminished   Oximetry   Continuous Oximetry Yes   Oxygen   O2 (LPM) 2.5   O2 Daily Delivery Respiratory  Silicone Nasal Cannula

## 2019-06-13 NOTE — PROCEDURES
Vascular Access Team    Date of Insertion: 6/13/19  Arm Circumference: n/a  Line Length: 10cm  Line Size: 18g  Vein Occupancy %: 35  Reason for Midline: Access  Labs: WBC 8.0, , BUN 15, Cr 0.74, GFR >60, INR n/a    Orders confirmed, vessel patency confirmed with ultrasound. Risks and benefits of procedure explained to patient and education regarding line associated bloodstream infections provided. Questions answered.     PowerGlide Midline placed in LUE per MD order with ultrasound guidance. 18g, 10 cm line placed in basilic vein after 1 attempt(s).  Catheter inserted with good blood return. Secured with 0cm external from insertion site.  Flushed without resistance with 10 mL 0.9% normal saline. Midline secured with Biopatch and Tegaderm.     Midline placement is confirmed by nurse using ultrasound and ability to flush and draw blood. Midline is appropriate for use at this time.  No X-ray is needed for placement confirmation. Pt tolerated procedure well.  Patient condition relayed to unit RN or ordering physician via this post procedure note in the EMR.    Ultrasound images uploaded to PACS and viewable in the EMR - yes  Ultrasound imaged printed and placed in paper chart - no      BARD PowerGlide Midline ref # [id number], Lot # [id number]

## 2019-06-13 NOTE — CARE PLAN
Problem: Safety  Goal: Will remain free from injury  Outcome: PROGRESSING AS EXPECTED  Fall precautions in place. Bed in lowest position. Non-skid socks in place. Personal possessions within reach. Mobility sign on door. Bed-alarm on. Call light within reach. Pt educated regarding fall prevention and states understanding.    Problem: Venous Thromboembolism (VTW)/Deep Vein Thrombosis (DVT) Prevention:  Goal: Patient will participate in Venous Thrombosis (VTE)/Deep Vein Thrombosis (DVT)Prevention Measures  Outcome: PROGRESSING AS EXPECTED  Suspected GI Bleed. BID IV protonix. Occult blood stool +. Q6hr CBC

## 2019-06-13 NOTE — PROGRESS NOTES
"   [facility] Progress Note      Admit Date: 6/9/2019    Resident(s): Yoshi Valero  Attending: TERE STRONG/ Dr. Taylor    Date & Time:   6/13/2019   2:40 PM       Patient ID:    Name:             Jarret Bright   YOB: 1950  Age:                 68 y.o.  male   MRN:               [id number]    HPI:  \"68 year old male with past medical history of CAD, DM, HTN, hyperlipidemia was brought into [facility] by EMS for nausea, vomiting abdominal pain. Prior to arrival at [facility] he apparently got IV fliuds and zofran.      Patient says for the past 25 hours he has been having episodic, crampy abdominal pain, diffusely throughout his abdomen, not associated with fevers/chills, no aggravating or relieving factors. Associated with diarrhea, non-bloody, non-bilious. He says he has not been able to keep anything down, even fluids.      At [facility], per review of documents, he was tachycardiac, hemodynamically stable and afebrile.      Labs from [facility]:  - WBC 15.2, Hb 12.1, .4, platelets 254,   - sodium 140, potassium 4.8, choloride 103, bicarb 11  - BUN 24, cr 1.3  - glucose 218.   - Anion gap 30.8  - CPK 79  - Troponin 0.187 --> 0.300  - BNP: 1030  - U/A: ketones 4+, hemoglobin trace, protein 1+, few bacteria, 0-2 WBCs  - Lipase 25  - Lactic 1.9     EKG: diffuse ST depressions in inferior, precordial lateral leads and anterior septal leads     CT: mild thick walled appearnce to the entire colon which may be due to the fact that it is decompressed. Mild colitis is in the differential. There is a thick walled appearnce to the distal esophagus and esophagitis is suspected. The urinary bladder is markedly distended. There is a small non-obstructing right renal stone     He was resuscitated with 2 L of NS, then started on IV fluids at rate of 200 cc/hr. He was given IV pain medications and antiemetics but continued to have intermittent abdominal pain. He was only able to urinate 50 cc of clear yellow " "urine therefore mcnair was placed and 700 cc of clear urine returned. Given his trending troponins (apparently was asymptomatic at [facility]) and early DKA, he was subsequently transferred to [facility]\"    Consultants:  Cardiology    PMA: Dr. Taylor    Interval Events:  -Nursing staff reported large melenic stool overnight.  Patient's hemoglobin stable.  Gastroenterology was consulted.  Patient high risk for EGD and colonoscopy, [facility]  Patient will be set up to have pill endoscopy.    -Cardiology holding off procedures pending workup for GI bleed.     Review of Systems   Constitutional: Negative.    HENT: Negative.    Eyes: Negative.    Respiratory: Negative.    Cardiovascular: Negative.    Gastrointestinal: Negative.    Genitourinary: Negative.    Musculoskeletal: Negative.    Skin: Negative.    Neurological: Negative.    Psychiatric/Behavioral: Negative.        PHYSICAL EXAM  Vitals:    06/13/19 0600 06/13/19 0700 06/13/19 0758 06/13/19 0800   BP:       Pulse: 82 88 79 81   Resp: 20 (!) 23 (!) 21 (!) 10   Temp:    37.9 °C (100.2 °F)   TempSrc:    Mcnair   SpO2: 97% 95% 97% 100%   Weight:       Height:         Body mass index is 20.58 kg/m².  /65   Pulse 81   Temp 37.9 °C (100.2 °F) (Mcnair)   Resp (!) 10   Ht 1.702 m (5' 7\")   Wt 59.6 kg (131 lb 6.3 oz)   SpO2 100%   BMI 20.58 kg/m²   O2 therapy: Pulse Oximetry: 100 %, O2 (LPM): 0, O2 Delivery: Silicone Nasal Cannula    Physical Exam   Constitutional: He is oriented to person, place, and time and well-developed, well-nourished, and in no distress. He appears unhealthy.   HENT:   Head: Normocephalic and atraumatic.   Eyes: EOM are normal.   Neck: Neck supple.   Cardiovascular: Normal rate and regular rhythm.    Pulmonary/Chest: Effort normal and breath sounds normal. No respiratory distress.   Abdominal: Soft. Bowel sounds are normal. He exhibits no distension. There is no " tenderness.   Musculoskeletal: He exhibits no edema.   Left BKA   Neurological: He is alert and oriented to person, place, and time. GCS score is 15.   Psychiatric: Mood, memory, affect and judgment normal.       Respiratory:     Respiration: (!) 10, Pulse Oximetry: 100 %, O2 Daily Delivery Respiratory : Silicone Nasal Cannula    Chest Tube Drains:    Recent Labs      19   1942   ISTATAPH  7.418   ISTATAPCO2  24.4*   ISTATAPO2  97*   ISTATATCO2  16*   MKPBMET4YLS  98   ISTATARTHCO3  15.7*   ISTATARTBE  -8*   ISTATTEMP  37.0 C   ISTATFIO2  38   ISTATSPEC  Arterial   ISTATAPHTC  7.418   NNAUHWVI5EN  97*       HemoDynamics:  Pulse: 81, Heart Rate (Monitored): 81 NIBP: 140/59      Neuro:      Fluids:        Intake/Output Summary (Last 24 hours) at 06/10/19 0936  Last data filed at 06/10/19 0400   Gross per 24 hour   Intake          1171.02 ml   Output                0 ml   Net          1171.02 ml       Weight: 59.6 kg (131 lb 6.3 oz)  Body mass index is 20.58 kg/m².    Recent Labs      19   0510  19   0308   SODIUM  138  136  139   POTASSIUM  4.0  3.7  3.9   CHLORIDE  109  105  106   CO2  21  23  24   BUN  19  18  15   CREATININE  0.86  0.88  0.74   MAGNESIUM  2.3  1.6  1.7   PHOSPHORUS  3.8  2.6  3.4   CALCIUM  8.7  8.2*  8.1*       GI/Nutrition:  Recent Labs      19   0510  19   0308   GLUCOSE  149*  245*  101*       Heme:  Recent Labs      06/10/19   1520  06/10/19   1905   19   2025  19   0308  19   1130   RBC   --   2.38*   < >  2.68*  2.77*  2.70*   HEMOGLOBIN   --   8.2*   < >  8.9*  8.9*  8.9*   HEMATOCRIT   --   24.3*   < >  25.5*  26.6*  26.5*   PLATELETCT   --   147*   < >  115*  119*  119*   APTT  35.0  >240.0*   --    --    --    --     < > = values in this interval not displayed.       Infectious Disease:  Monitored Temp 2  Av.4 °C (99.3 °F)  Min: 33.2 °C (91.8 °F)  Max: 38 °C (100.4 °F)  Temp  Av.2 °C (99 °F)  Min:  36.6 °C (97.8 °F)  Max: 37.9 °C (100.2 °F)  Recent Labs      06/11/19   0545  06/12/19   0510  06/12/19 2025  06/13/19   0308  06/13/19   1130   WBC  12.8*  11.6*  8.9  7.9  8.0   NEUTSPOLYS  78.80*  69.90   --   72.40*   --    LYMPHOCYTES  11.50*  19.80*   --   17.70*   --    MONOCYTES  8.90  9.00   --   7.20   --    EOSINOPHILS  0.00  0.30   --   1.90   --    BASOPHILS  0.30  0.40   --   0.40   --        Meds:  • NS       • insulin glargine  10 Units     • insulin regular  2-9 Units      And   • glucose  16 g      And   • dextrose 10% bolus  250 mL     • clopidogrel  75 mg     • ipratropium-albuterol  3 mL     • pantoprazole  40 mg     • metoprolol  25 mg     • ondansetron  4 mg     • prochlorperazine  10 mg     • LORazepam  0.5 mg     • hydrOXYzine HCl  10 mg     • MD Alert...Adult ICU Electrolyte Replacement per Pharmacy       • metoclopramide  5 mg     • promethazine  25 mg     • cyanocobalamin  1,000 mcg     • benzocaine-menthol  1 Lozenge     • acetaminophen  650 mg     • enalaprilat  1.25 mg     • ondansetron  4 mg     • tamsulosin  0.4 mg     • morphine injection  2-4 mg     • atorvastatin  80 mg     • nitroglycerin  0.4 mg            Imaging:  US-EXTREMITY VENOUS LOWER BILAT   Final Result      DX-CHEST-LIMITED (1 VIEW)   Final Result      Perihilar and bibasilar opacities likely represent pulmonary edema. Superimposed infection is not excluded.            EC-ECHOCARDIOGRAM COMPLETE W/O CONT   Final Result      DX-CHEST-PORTABLE (1 VIEW)   Final Result         1.  No acute cardiopulmonary disease.      CT-FOREIGN FILM CAT SCAN   Final Result          Assessment and Plan:      * Diabetic ketoacidosis without coma associated with type 2 diabetes mellitus (HCC)- (present on admission)   Assessment & Plan    Resolved.  A1C 6.9.  It is unclear whether patient is compliant with medications on outpatient basis.  Plan  -10 units insulin glargine nightly  -Insulin sliding scale with hypoglycemia protocol      Acute blood loss anemia   Assessment & Plan    Patient was noted to have a drop in Hgb from ~10 to ~7, patient was given 1 unit of PRBC's during the night and another this morning after it was noted that the patients HGB was still at 7.5 this AM. However, patient does not have any clear source of bleeding besides an episode of hematemesis of 20 mL. No melena nor BRBPR, no abdominal distension noted on exam. Could likely be dilutional due to IVF resuscitation  Plan  -Will monitor H&H  -Will monitor for signs of bleeding     NSTEMI (non-ST elevated myocardial infarction) (HCC)- (present on admission)   Assessment & Plan    Initially thought to have Type II MI, then troponins increased to 30. Cardiology called and plan was for cath, cath held due to possible bleeding. However, they note that patients EKG revealed improvement with out intervention, only treating DKA. Likely CAD exacerbated by DKA. Will correct DKA and monitor Cardiac fucntion.   Plan  -Statin, beta-blockers  -Patient started on clopidogrel on 6/13/2019     Leukocytosis- (present on admission)   Assessment & Plan    Improved. Possible secondary to stress and NSTEMI  Plan  - Continue to monitor with CBC     Colitis   Assessment & Plan    Per CT report from [place]: CT: mild thick walled appearnce to the entire colon which may be due to the fact that it is decompressed. Mild colitis is in the differential. Leukocytosis, but negative lactic acid elevation. No recent hospitalizations or antibiotic use. Possible related to DKA episode  Plan  - Serial abdominal exams  - Monitor closely     Urine retention- (present on admission)   Assessment & Plan    Acute urinary retention. Mcnair was placed in outside facility however patient was having significant discomfort with mcnair therefore removed here.   Plan  - Continue flomax  - Serial bladder scans; if patient is unable to void, will need to replace mcnair     Type 2 diabetes mellitus with hyperglycemia, with  long-term current use of insulin (HCC)- (present on admission)   Assessment & Plan    On oral and insulin at home per med rec.patient currently on 10 units glargine and insulin sliding scale with hypoglycemia protocol.  Medication compliance questionable  Plan  - Can consider diabetes educator consult once patient has improved     Acute respiratory failure with hypoxia (HCC)- (present on admission)   Assessment & Plan    Notified by nursing staff the patient was having increased work of breathing, patient was noted to have acute pulmonary edema and hypoxia.  Patient received intravenous diuresis, patient was also placed on BiPAP PRN.  Patient did not require BiPAP but did diurese approximately 5 L since yesterday.  Patient's respiratory status much improved.  Plan  -Continue to monitor     Positive D dimer   Assessment & Plan    Patient was noted to have a positive d-dimer.  Ultrasound lower extremities did not reveal any thrombi.  Patient currently has SCDs.  D-dimer could be elevated due to age and patient's cardiac issues.  Plan  -Continue to monitor     Acute pulmonary edema (HCC)   Assessment & Plan    Patient was noted to have pulmonary edema on PE and imaging.  Likely secondary to necessary intravenous volume resuscitation.  Plan  -Patient was diuresed and respiratory status improved.  -Patient removed from low-dose diuresis, per cardiology will that patient diuresed by himself.     GI bleeding   Assessment & Plan    Patient had melenic stool last night/this morning.  Gastroenterology was consulted, they recommend pill endoscopy and [facility]; patient mentioned that he had a recent scoping done.  However, if unable to obtain records patient is high risk for EGD and colonoscopy.  Plan  -Holding aspirin and heparin until bleeding is ruled out  -[facility] management [facility]     Essential hypertension- (present on admission)   Assessment  & Plan    On lisinopril/HCTZ 10/12/5 mg daily per med rec  Plan  - Holding for now in the setting of fluid resuscitation  - Resume once more stable     Macrocytic anemia- (present on admission)   Assessment & Plan    Vitamin B12 deficient, folate wnl.  Plan  -Cyanocobalamin administration         DISPO: In patient for DKA    CODE STATUS: Full Code    Quality Measures:  Reed Catheter: In place  DVT Prophylaxis: SCD's, due to possible bleeding  Ulcer Prophylaxis: Pantoprazole 40 mg twice daily  Antibiotics: None  Lines: PIV    This note was created using voice recognition software. There may be unintended errors in spelling, grammar or content.

## 2019-06-13 NOTE — CONSULTS
[facility]    DATE OF SERVICE:  06/13/2019    TIME:  10:40 a.m.    REASON FOR CONSULTATION:  Hemoccult positive stools.    HISTORY OF PRESENT ILLNESS:  A 68-year-old  male with multivessel   coronary artery disease and recent non-ST elevation myocardial infarction as   well as diabetic ketoacidosis, presents with Hemoccult positive stools.    Patient was hospitalized at [facility] since 06/10 with non-ST elevation   myocardial infarction, diabetic ketoacidosis, high anion gap metabolic   acidosis, leukocytosis, urinary retention, macrocytic anemia.  He was found to   have an elevated troponin that bumped from 06/09/2019 at 2234 of 1.58 and   then increased to 3.9 on 06/10/2019 at 0250 and then peaked at 30.99 on   06/10/2019 at 1320.  During his evaluation, he was noted to have anemia, which   nadired at 6.7 and 19.6 respectively on 06/11.  He did receive 2 units of   packed red blood cells.  Due to his downtrending anemia, he had a stool   Hemoccult that was checked that was positive.  However, he did not have any   bertin GI bleeding.  He has, however, had intermittent melena for several   years.  He is a  and has been seen at the [facility] and reportedly has had multiple EGDs and colonoscopies, which were   nondiagnostic for a source of his GI bleeding in the past.  However, we do not   have these records to confirm his history.  On CT scan, he was noted to have   some possible colitis, but patient denied any diarrhea or hematochezia   symptoms.  Otherwise, he is fairly asymptomatic from GI standpoint and denies   dysphagia, nausea, vomiting, or abdominal pain.  He denies further modifying   factors, associated symptoms, or timing issues.    PAST MEDICAL HISTORY:  Chronic overt, occult GI bleeding, multivessel coronary   artery disease with recent myocardial infarction, poorly controlled diabetes,   recent diabetic ketoacidosis, history of stroke, urinary retention,    hyperlipidemia, GERD, hypertension, benign prostate hypertrophy.    PAST SURGICAL HISTORY:  Cardiac catheterization, 3-vessel disease, recommended   CABG when stable from other medical problems; left below-knee amputation,   cardiac pacemaker.    SOCIAL HISTORY:  , ex-smoker, 30 pack-year history.  Denied alcohol or   illicit drug use.    FAMILY HISTORY:  Mother had myocardial infarction.  Denied family history of   colorectal cancer or gastric cancer.    ALLERGIES:  TEA TREE OIL.    MEDICATIONS:  Magnesium sulfate, furosemide, Plavix, DuoNeb, insulin, Protonix   40 mg IV b.i.d., metoprolol, Zofran, Compazine, Ativan, Reglan, Phenergan,   B12, Cepacol, Flomax, Lipitor, Tylenol, Nitrostat.    REVIEW OF SYSTEMS:  As per HPI, past medical history, past surgical history   sections.  Also, positive for history of chest pain, dizziness, urinary   retention, polyuria, malaise, weight loss, shortness of breath, and weakness.    Otherwise, remaining 10 systems negative including constitutional, head,   ears, eyes, nose, throat, pulmonary, cardiovascular, GI, genitorectal,   hematological, rheumatological, psychiatric, neurological, dermatological,   oncological, musculoskeletal.    PHYSICAL EXAMINATION:  VITAL SIGNS:  Temperature 100, respirations 18, pulse 80s-90s, blood pressure   145/60, weight 59.6 kilograms, BMI of 20.5.  GENERAL:  Well-developed male, chronically ill appearing, appears older than   stated age, alert and oriented.  HEENT:  Atraumatic, normocephalic.  Eyes:  Extraocular movements intact,   nonicteric sclerae.  Nose:  No erythema or discharge.  Mouth:  No erythema or   discharge, no thrush noted.  NECK:  Supple.  No lymphadenopathy or JVD.  PULMONARY:  Scattered crackles.  CARDIOVASCULAR:  Regular rate and rhythm without murmur, rub, or gallop.  ABDOMEN:  Nondistended, nontender.  Positive bowel sounds.  No appreciable   hepatosplenomegaly, ascites, ecchymosis or rebound.  DERMATOLOGIC:  No  spider angiomas, no palmar erythema.  NEUROLOGICAL:  No focal deficits appreciated.  No asterixis.  MUSCULOSKELETAL:  Moving all extremities.  Strength bilaterally equal   throughout.  PSYCHIATRIC:  Appropriate mood, affect, interaction, and insight.    LABORATORY DATA:  WBC 7.9, hemoglobin 8.9, hematocrit 26.6, platelet count   119.  Sodium 139, potassium 3.9, chloride 106, bicarbonate 24, BUN 15,   creatinine 0.74, glucose 101, calcium 8.1.  LFTs normal from 06/09.    Phosphorus and magnesium normal.  INR 1.96.  Blood type O positive.  B12   deficient 203 and ferritin elevated at 434, but normal folate on 06/10.  TSH   was normal on 03/24/2018.    IMAGING:  As per HPI.  EKG 06/11/2019, sinus tachycardic, low voltage,   nonspecific repolarization abnormality diffusely.  Echocardiogram 06/10/2019,   LVEF 60% with mild mitral regurgitation, pacemaker wire noted in the right   ventricle.    IMPRESSION:  A 68-year-old  male who presents with chronic occult   overt GI bleeding with history of melena, anemia from chronic blood loss, with   complicated recent non-ST elevation myocardial infarction with history of   catheterization and documentation of anticipated need for coronary artery   bypass graft since 03/2019, also with diabetic ketoacidosis and history of   stroke.    Notably, he reports that at the [facility], he has had multiple EGDs   and colonoscopies that were nondiagnostic for prior GI bleeding and thus, I do   not feel that repeat endoscopic evaluation would be fruitful and as well he   is currently risk prohibitive with his acute myocardial infarction and   unaddressed coronary artery disease with no recent coronary intervention with   persistent chest pain as well as congestive heart failure evident by pulmonary   edema on chest x-ray.  In addition, he does not seem to have overt GI   bleeding and does not appear to be exsanguinating.  However, he does report a   history of  melena.    Instead, I believe that his best highest yield for diagnosis and most safe GI   procedure would be patency capsule x-rays to confirm passage and then capsule   endoscopy to further evaluate his chronic occult overt GI bleeding.    His multiple comorbidities of multivessel coronary artery disease, diabetic   ketoacidosis, acute diastolic congestive heart failure with pulmonary edema   seen on chest x-ray, anemia of chronic blood loss, history of stroke,   hypertension, and as per above increased risk for adverse outcomes and   complexity of medical decision making, he is too high risk for endoscopic   evaluation at this time.  A capsule endoscopy would be reasonable, patency   capsule confirms passage.    PROBLEMS:  1.  Occult overt GI bleeding.  2.  History of melena.  3.  Anemia from chronic blood loss.  4.  Non-ST elevation myocardial infarction.  5.  Multivessel coronary artery disease.  6.  Diabetic ketoacidosis.    RECOMMENDATIONS:  1.  Avoid NSAIDs.  2.  Continue with IV Protonix.  3.  Ordered patency capsule on x-ray to confirm passage.  If x-rays confirm   passage, then we will need subsequent capsule endoscopy.  4.  Risk prohibitive with his recent myocardial infarction, multivessel   coronary artery disease that has documented need for CABG, poorly controlled   diabetes with diabetic ketoacidosis and as per above with increased risk for   adverse outcomes and complexity of medical decision making.    Dear Dr. Miller Taylor,    Thank you for asking me to evaluate the patient in consultation.  Please refer   to my consult note as per above for details of recommendations.  Please feel   free to call us with any questions.       ____________________________________     MD SOUTH ARRIOLA / LORNA    DD:  06/13/2019 10:55:24  DT:  06/13/2019 14:56:38    D#:  [id number]  Job#:  [id number]    cc: MILLER TAYLOR MD

## 2019-06-13 NOTE — DISCHARGE PLANNING
Hospital Care Management Discharge Planning     Anticipated Discharge Disposition:   · TBD     Action:   · This RN CM spoke to Kellie,  at the [facility] ([phone]).  · This RN CM requested most recent EGD and Colonoscopy results.   · Kellie states that his most recent EGD was on 5/14 and pt has had multiple colonoscopies over the past two years.  · Kellie is going to fax over the most recent colonoscopy and EGD results.     Barriers to Discharge:   · Medical Clearance.     Plan:   · [facility].   · F/U with medical team when results are received.    · Continue to provide support services and assistance with discharge planning as needed.

## 2019-06-13 NOTE — PROGRESS NOTES
ICU transfer note     Attending: Dr. Taylor  Resident: Dr. Yoshi Valero  Date of admission: 6/9/2019  Date of transferring out from ICU:  06/12/19    Primary diagnosis:   - DKA  - NSTEMI  - GI bleed  - ANEMIA     HPI and ICU Course Summary (Brief Narrative):    68-year-old male with a past medical history of known CAD (not a candidate for CABG due to multiple comorbidities), diabetes mellitus type 2, hypertension, and hyperlipidemia.  Patient was admitted to the intensive care unit for DKA, patient was started on insulin drip and intravenous fluid resuscitation.      On initial presenting work-up patient was noted to have ST segment depressions in the inferior, precordial, and anterior leads; troponins were 3.09.  Cardiology was consulted and mentioned that this was more likely a type II MI.  Therefore they would continue to monitor.  Subsequent troponins increased to 30.99 and cardiology was contacted at which point they had plan to perform a heart cath on patient.  Patient was started on heparin drip.    During the night before heart catheterization, patient's hemoglobin dropped from 10 to 6.4, concern about possible GI bleed in the setting of patient having one episode of hematemesis 20 mL.  However, patient had not had a bowel movement for the past 3 days and physical exam at bedside did not reveal the acute abdomen, distended abdomen, tenderness, or any other physical abnormality.  Therefore, it was subsequently thought that patient was initially hemoconcentrated and after aggressive intravenous fluid resuscitation patient hemodiluted to use 6.4.  Patient received 2 L of PRBCs.    Due to concern of bleeding, heart cath was held and was later noted that patient's ST depressions, after improvement of patient's metabolic acidosis, improved.  Therefore, it was thought that patient's ST depressions were secondary to patient's acute state due to DKA.   Anticoagulation and heparin was held, clopidogrel is to be restarted on 6/13/2019.    On 6/11/2019 patient became short of breath and was noted to have increased work of breathing, physical exam and imaging noted pulmonary edema and possible fluid overload.  BiPAP PRN was ordered.  Patient was started on Lasix diuresis and was continued on small dose of Lasix for maintenance.    On 6/12/2019 patient had melenic stool.  Hemoglobins were stable but transfer to floor was canceled and gastroenterology was consulted on the following day.  GI reports that patient is high risk for EGD and/or colonoscopy.  They recommend pill endoscopy and obtaining [facility].  [facility] records were obtained by case management INR currently in patient's chart, records are not able to be uploaded to [facility].    Cardiology states that patient is to start antiplatelet therapy as tolerated, patient was started on clopidogrel.  Holding aspirin.    Patient was noted to have elevated d-dimer, lower extremity ultrasound negative for DVT.  This could be elevated due to patient's multiple comorbidities, including NSTEMI, coronary artery disease, and age.  Patient's vital signs are within normal limits, patient not tachycardic nor hypoxic.      Labs and imaging studies to be continued with their indications:  - CBC:  (Indication) anemia/GI bleed  - CMP or BMP: (Indication) monitor due to diuresis  - Magnesium: (Indication) monitor due to diuresis  - Monitor ins and outs strictly    Things to follow:   -Follow-up on medication compliance    -Follow-up on restarting cardiac meds    -Follow-up on volume overload resolution and improvement, follow ins and outs or daily weights.    -Monitor for GI bleed    -Follow-up with cardiology and gastroenterology recommendations.

## 2019-06-13 NOTE — PROGRESS NOTES
Reason for consultation /admission : NSTEMI    Denies CP or SOB  2,600 cc urine output  BP mostly high normal  Some more dark stool this AM  GI consult note read    Review of systems;    General: No fever, chills,   HENT: No sore throat, no neck pain  Eyes: No blurred vision or double vision  Heart: No palpitation, no PND or orthopnea, no leg swelling  Lung: No productive cough, no hemoptysis  Abdomen: No abdominal pain, no hematemesis, + dark stool  : No dysuria, no frequency or hesitancy, no hematuria  Musculoskeletal: No myalgia, no back pain, some joint pain  Hematology: No easy bruising  Skin: No rash or itching  Neurological: No headache, no new focal weakness or numbness  Psychological: Denies depression, anxiety or insomnia  All other review of systems are negative      Temp:  [36 °C (96.8 °F)-37.9 °C (100.2 °F)] 37.9 °C (100.2 °F)  Pulse:  [] 81  Resp:  [10-29] 10  SpO2:  [89 %-100 %] 100 %  GENERAL not in acute distress, not dyspnic at rest  Head atraumatic, normocephalic  Eyes EOMI  ENT neck supple, no JVD, no carotid bruits or thyromegaly  Lung good expansion, distant sound, no rales or wheezing  Heart RRR, normal rate, no murmur, gallop or rub  Abd soft, no tenderness, mass or bruits  Ext no edema  Skin no ecchymosis or petechiae  Musculoskeletal no deformity  Neuro grossly intact  Psych normal mood, normal affect    Monitor reviewed by me showed no significant ventricular or atrial dysrhythmias.    Labs: Hb stable 8.9, Cr 0.74, K+ 3.9      Assessment and plans    1. NSTEMI likely type II  Hemodynamically stable, no sig arrhythmias or angina  With GI bleeding, would hold off on invasive procedure for now unless develop refractory CP or become unstable  Continue betablocker and statin  Clopidogrel started this AM     2. Acute blood loss anemia  Hb stable but with dark stool  GI evaluation in process  I believe he should be able to tolerate conscious sedation if endoscopy from cardiac  standpoint       3. Pulmonary edema, acute diastolic HF resolved/improved  Acute diastolic HF/volume overloaded  Watch I&O. Try without diuretic    Will follow    Please note that this dictation was created using voice recognition software. I have worked with consultants from the vendor as well as technical experts from [facility] to optimize the interface. I have made every reasonable attempt to correct obvious errors, but I expect that there are errors of grammar and possibly content I did not discover before finalizing the note

## 2019-06-13 NOTE — PROGRESS NOTES
OG to suction requested by Dr Taylor for potential GI Bleed. Pt updated on plan. Refuses OG at this time. Dr Taylor notified.

## 2019-06-13 NOTE — CONSULTS
Diabetes Education:  Patient with existing type 2 diabetes and DKA, HbA1c = 6.9%.  At home, patient takes metformin 1000 mg twice daily and glipizide 2.5 mg twice daily.  He had been on Lantus in the past, but transitioned to pills approximately 1 year ago.  FSBG  since admission.  BMI= 19.42.  I recommend checking C-Peptide to assess patient's endogenous insulin production.

## 2019-06-13 NOTE — ASSESSMENT & PLAN NOTE
? Due to colitis  Unclear source  Us lower ext negative  D dimer repeat elevated  May need cta chest though poor candidate for ac and would need ivc filter

## 2019-06-13 NOTE — ASSESSMENT & PLAN NOTE
Patient was noted to have a positive d-dimer.  Ultrasound lower extremities did not reveal any thrombi.  Patient currently has SCDs.  D-dimer could be elevated due to age and patient's cardiac issues.  Plan  -Continue to monitor

## 2019-06-13 NOTE — PROGRESS NOTES
RN working to get a midline IV as patient has lost several ultrasound IVs, RN called down to IR/PICC line nurse to try and make sure we get the correct order as most say IR placement but we just need to get bedside placement

## 2019-06-13 NOTE — FLOWSHEET NOTE
06/13/19 1441   Interdisciplinary Plan of Care-Goals (Indications)   Bronchodilator Indications Strong Subjective / Objective Improvement   Interdisciplinary Plan of Care-Outcomes    Bronchodilator Outcome Patient at Stable Baseline   SVN Group   #SVN Performed Yes   Given By: Mouthpiece   Respiratory WDL   Respiratory (WDL) X   Chest Exam   Respiration 13   Pulse 90   Heart Rate (Monitored) 90   Breath Sounds   Pre/Post Intervention Pre Intervention Assessment   RUL Breath Sounds Diminished   RML Breath Sounds Diminished   RLL Breath Sounds Diminished   CAROL Breath Sounds Diminished   LLL Breath Sounds Diminished   Oximetry   Continuous Oximetry Yes   O2 Alarms Set & Reviewed Yes   Oxygen   Pulse Oximetry 98 %   O2 (LPM) 3   O2 Daily Delivery Respiratory  Silicone Nasal Cannula

## 2019-06-13 NOTE — CARE PLAN
Problem: Nutritional:  Goal: Achieve adequate nutritional intake  Advance diet as feasible and patient will consume >50% of meals   Outcome: NOT MET  Admit day 4.  Pt NPO with suspected GIB.  Pt had a diabetic diet ordered 6/12, but no meals recorded in ADLs.  Inadequate nutrition >3 days.  Recommend resume diet as clinically appropriate.  Consider nutrition support as needed.

## 2019-06-13 NOTE — CONSULTS
[facility] dictated [id number]. Too high-risk for endoscopic evaluation and low-yield with h/o multiple EGDs & colonoscopies at [facility] that were reportedly negative.  Thus ordered patency capsule and will need subsequent capsule endoscopy if passes.

## 2019-06-14 ENCOUNTER — APPOINTMENT (OUTPATIENT)
Dept: RADIOLOGY | Facility: MEDICAL CENTER | Age: 69
DRG: 637 | End: 2019-06-14
Attending: INTERNAL MEDICINE
Payer: MEDICARE

## 2019-06-14 LAB
ANION GAP SERPL CALC-SCNC: 8 MMOL/L (ref 0–11.9)
BASOPHILS # BLD AUTO: 0.2 % (ref 0–1.8)
BASOPHILS # BLD: 0.02 K/UL (ref 0–0.12)
BUN SERPL-MCNC: 13 MG/DL (ref 8–22)
CALCIUM SERPL-MCNC: 8.1 MG/DL (ref 8.5–10.5)
CHLORIDE SERPL-SCNC: 108 MMOL/L (ref 96–112)
CO2 SERPL-SCNC: 24 MMOL/L (ref 20–33)
CREAT SERPL-MCNC: 0.73 MG/DL (ref 0.5–1.4)
EKG IMPRESSION: NORMAL
EOSINOPHIL # BLD AUTO: 0.09 K/UL (ref 0–0.51)
EOSINOPHIL NFR BLD: 1.1 % (ref 0–6.9)
ERYTHROCYTE [DISTWIDTH] IN BLOOD BY AUTOMATED COUNT: 51 FL (ref 35.9–50)
GLUCOSE BLD-MCNC: 132 MG/DL (ref 65–99)
GLUCOSE BLD-MCNC: 191 MG/DL (ref 65–99)
GLUCOSE BLD-MCNC: 274 MG/DL (ref 65–99)
GLUCOSE BLD-MCNC: 59 MG/DL (ref 65–99)
GLUCOSE BLD-MCNC: 70 MG/DL (ref 65–99)
GLUCOSE BLD-MCNC: 75 MG/DL (ref 65–99)
GLUCOSE BLD-MCNC: 97 MG/DL (ref 65–99)
GLUCOSE SERPL-MCNC: 71 MG/DL (ref 65–99)
HCT VFR BLD AUTO: 26.2 % (ref 42–52)
HCT VFR BLD AUTO: 26.6 % (ref 42–52)
HGB BLD-MCNC: 8.3 G/DL (ref 14–18)
HGB BLD-MCNC: 8.7 G/DL (ref 14–18)
IMM GRANULOCYTES # BLD AUTO: 0.02 K/UL (ref 0–0.11)
IMM GRANULOCYTES NFR BLD AUTO: 0.2 % (ref 0–0.9)
LYMPHOCYTES # BLD AUTO: 1.56 K/UL (ref 1–4.8)
LYMPHOCYTES NFR BLD: 18.3 % (ref 22–41)
MAGNESIUM SERPL-MCNC: 1.9 MG/DL (ref 1.5–2.5)
MCH RBC QN AUTO: 32.3 PG (ref 27–33)
MCHC RBC AUTO-ENTMCNC: 32.7 G/DL (ref 33.7–35.3)
MCV RBC AUTO: 98.9 FL (ref 81.4–97.8)
MONOCYTES # BLD AUTO: 0.81 K/UL (ref 0–0.85)
MONOCYTES NFR BLD AUTO: 9.5 % (ref 0–13.4)
NEUTROPHILS # BLD AUTO: 6.04 K/UL (ref 1.82–7.42)
NEUTROPHILS NFR BLD: 70.7 % (ref 44–72)
NRBC # BLD AUTO: 0 K/UL
NRBC BLD-RTO: 0 /100 WBC
PLATELET # BLD AUTO: 125 K/UL (ref 164–446)
PMV BLD AUTO: 9.1 FL (ref 9–12.9)
POTASSIUM SERPL-SCNC: 3.6 MMOL/L (ref 3.6–5.5)
RBC # BLD AUTO: 2.69 M/UL (ref 4.7–6.1)
SODIUM SERPL-SCNC: 140 MMOL/L (ref 135–145)
WBC # BLD AUTO: 8.5 K/UL (ref 4.8–10.8)

## 2019-06-14 PROCEDURE — 700102 HCHG RX REV CODE 250 W/ 637 OVERRIDE(OP): Performed by: STUDENT IN AN ORGANIZED HEALTH CARE EDUCATION/TRAINING PROGRAM

## 2019-06-14 PROCEDURE — A9270 NON-COVERED ITEM OR SERVICE: HCPCS | Performed by: STUDENT IN AN ORGANIZED HEALTH CARE EDUCATION/TRAINING PROGRAM

## 2019-06-14 PROCEDURE — 99232 SBSQ HOSP IP/OBS MODERATE 35: CPT | Mod: GC | Performed by: INTERNAL MEDICINE

## 2019-06-14 PROCEDURE — 85025 COMPLETE CBC W/AUTO DIFF WBC: CPT

## 2019-06-14 PROCEDURE — 94760 N-INVAS EAR/PLS OXIMETRY 1: CPT

## 2019-06-14 PROCEDURE — 770020 HCHG ROOM/CARE - TELE (206)

## 2019-06-14 PROCEDURE — 80048 BASIC METABOLIC PNL TOTAL CA: CPT

## 2019-06-14 PROCEDURE — C9113 INJ PANTOPRAZOLE SODIUM, VIA: HCPCS | Performed by: INTERNAL MEDICINE

## 2019-06-14 PROCEDURE — 700101 HCHG RX REV CODE 250: Performed by: INTERNAL MEDICINE

## 2019-06-14 PROCEDURE — 94640 AIRWAY INHALATION TREATMENT: CPT

## 2019-06-14 PROCEDURE — 83735 ASSAY OF MAGNESIUM: CPT

## 2019-06-14 PROCEDURE — 82962 GLUCOSE BLOOD TEST: CPT | Mod: 91

## 2019-06-14 PROCEDURE — 700111 HCHG RX REV CODE 636 W/ 250 OVERRIDE (IP): Performed by: INTERNAL MEDICINE

## 2019-06-14 PROCEDURE — 93005 ELECTROCARDIOGRAM TRACING: CPT | Performed by: INTERNAL MEDICINE

## 2019-06-14 PROCEDURE — 93010 ELECTROCARDIOGRAM REPORT: CPT | Performed by: INTERNAL MEDICINE

## 2019-06-14 PROCEDURE — 85018 HEMOGLOBIN: CPT

## 2019-06-14 PROCEDURE — 74018 RADEX ABDOMEN 1 VIEW: CPT

## 2019-06-14 PROCEDURE — 85014 HEMATOCRIT: CPT

## 2019-06-14 PROCEDURE — 36415 COLL VENOUS BLD VENIPUNCTURE: CPT

## 2019-06-14 RX ADMIN — IPRATROPIUM BROMIDE AND ALBUTEROL SULFATE 3 ML: .5; 3 SOLUTION RESPIRATORY (INHALATION) at 04:05

## 2019-06-14 RX ADMIN — CYANOCOBALAMIN TAB 500 MCG 1000 MCG: 500 TAB at 05:29

## 2019-06-14 RX ADMIN — INSULIN HUMAN 5 UNITS: 100 INJECTION, SOLUTION PARENTERAL at 17:00

## 2019-06-14 RX ADMIN — TAMSULOSIN HYDROCHLORIDE 0.4 MG: 0.4 CAPSULE ORAL at 10:22

## 2019-06-14 RX ADMIN — METOPROLOL TARTRATE 25 MG: 25 TABLET, FILM COATED ORAL at 17:39

## 2019-06-14 RX ADMIN — INSULIN GLARGINE 10 UNITS: 100 INJECTION, SOLUTION SUBCUTANEOUS at 17:46

## 2019-06-14 RX ADMIN — PANTOPRAZOLE SODIUM 40 MG: 40 INJECTION, POWDER, FOR SOLUTION INTRAVENOUS at 17:41

## 2019-06-14 RX ADMIN — ACETAMINOPHEN 650 MG: 325 TABLET, FILM COATED ORAL at 05:30

## 2019-06-14 RX ADMIN — PANTOPRAZOLE SODIUM 40 MG: 40 INJECTION, POWDER, FOR SOLUTION INTRAVENOUS at 05:26

## 2019-06-14 RX ADMIN — CLOPIDOGREL BISULFATE 75 MG: 75 TABLET ORAL at 05:30

## 2019-06-14 RX ADMIN — METOPROLOL TARTRATE 25 MG: 25 TABLET, FILM COATED ORAL at 05:30

## 2019-06-14 RX ADMIN — ATORVASTATIN CALCIUM 80 MG: 80 TABLET, FILM COATED ORAL at 17:38

## 2019-06-14 RX ADMIN — IPRATROPIUM BROMIDE AND ALBUTEROL SULFATE 3 ML: .5; 3 SOLUTION RESPIRATORY (INHALATION) at 21:31

## 2019-06-14 RX ADMIN — IPRATROPIUM BROMIDE AND ALBUTEROL SULFATE 3 ML: .5; 3 SOLUTION RESPIRATORY (INHALATION) at 14:30

## 2019-06-14 ASSESSMENT — ENCOUNTER SYMPTOMS
COUGH: 0
DOUBLE VISION: 0
MUSCULOSKELETAL NEGATIVE: 1
FOCAL WEAKNESS: 0
MYALGIAS: 0
WEAKNESS: 1
CONSTIPATION: 0
ORTHOPNEA: 0
NAUSEA: 0
CHILLS: 0
BRUISES/BLEEDS EASILY: 0
PSYCHIATRIC NEGATIVE: 1
FEVER: 0
NERVOUS/ANXIOUS: 0
SORE THROAT: 0
BLOOD IN STOOL: 1
CARDIOVASCULAR NEGATIVE: 1
BLURRED VISION: 0
DIARRHEA: 0
NAUSEA: 1
FALLS: 0
INSOMNIA: 0
SPUTUM PRODUCTION: 0
BACK PAIN: 0
RESPIRATORY NEGATIVE: 1
HEADACHES: 0
BRUISES/BLEEDS EASILY: 1
SHORTNESS OF BREATH: 0
DIZZINESS: 0
DEPRESSION: 0
VOMITING: 0
ABDOMINAL PAIN: 0

## 2019-06-14 ASSESSMENT — LIFESTYLE VARIABLES: SUBSTANCE_ABUSE: 0

## 2019-06-14 NOTE — PROGRESS NOTES
Pt transferred to [id number]. All belongings with pt    Report to Tele 7 ABDIAS Kitchen, all questions answered

## 2019-06-14 NOTE — CARE PLAN
Problem: Bronchoconstriction:  Goal: Improve in air movement and diminished wheezing  Respiratory Therapy Update    Interdisciplinary Plan of Care-Goals (Indications)  Bronchodilator Indications: Strong Subjective / Objective Improvement (06/13/19 1441)  Interdisciplinary Plan of Care-Outcomes   Bronchodilator Outcome: Patient at Stable Baseline (06/13/19 1441)          #SVN Performed: Yes (06/14/19 0405)    Cough: Moist;Non Productive (06/13/19 2141)                        FiO2%: 40 % (06/11/19 1800)  O2 (LPM): 1 (06/14/19 0432)  O2 Daily Delivery Respiratory : Room Air with O2 Available (06/14/19 0405)    Breath Sounds  Pre/Post Intervention: Post Intervention Assessment (06/14/19 0405)  RUL Breath Sounds: Diminished (06/14/19 0405)  RML Breath Sounds: Diminished (06/14/19 0405)  RLL Breath Sounds: Diminished (06/14/19 0405)  CAROL Breath Sounds: Diminished (06/14/19 0405)  LLL Breath Sounds: Diminished (06/14/19 0405)    Therapy changed to   Events/Summary/Plan: SVN given (06/12/19 2123)

## 2019-06-14 NOTE — PROGRESS NOTES
Gastroenterology (GIC) Progress Note     Author: Donato Salazar   Date & Time Created: 6/14/2019 10:27 AM    Chief Complaint:  Chronic overt obscure GI bleeding    Interval History:  Patient had history of melena but none in the last few days.  He also denied hematochezia, abdominal pain or vomiting.  He did have some self-limited nausea of mild severity.  Of note, I spoke to patient's [facility] resident about patient to obtain further history.  Denied further modifying factors, associated symptoms or timing issues.    HISTORY OF PRESENT ILLNESS:  A 68-year-old  male with multivessel coronary artery disease and recent non-ST elevation myocardial infarction as well as diabetic ketoacidosis, presented with Hemoccult positive stools.    Patient was hospitalized at [facility] since 06/10 with non-ST elevation myocardial infarction, diabetic ketoacidosis, high anion gap metabolic acidosis, leukocytosis, urinary retention, macrocytic anemia.  He was found to have an elevated troponin that bumped from 06/09/2019 at 2234 of 1.58 and then increased to 3.9 on 06/10/2019 at 0250 and then peaked at 30.99 on 06/10/2019 at 1320.  During his evaluation, he was noted to have anemia, which nadired at 6.7 and 19.6 respectively on 06/11.  He received 2 units of packed red blood cells.  Due to his downtrending anemia, he had a stool Hemoccult that was checked that was positive.  However, he did not have any bertin GI bleeding.  He has, however, had intermittent melena for several years.  He is a  and was seen at the [facility] and reportedly has had multiple EGDs and colonoscopies, which were nondiagnostic for a source of his GI bleeding in the past.  On CT scan, he was noted to have some possible colitis, but patient denied any diarrhea or hematochezia symptoms.      Review of Systems:  Review of Systems   Respiratory: Negative.    Cardiovascular: Negative.    Gastrointestinal: Positive for nausea.    Genitourinary: Negative.    Musculoskeletal: Negative.    Skin: Negative.    Neurological: Positive for weakness.   Endo/Heme/Allergies: Bruises/bleeds easily.   Psychiatric/Behavioral: Negative.    All other systems reviewed and are negative.      Physical Exam:  Physical Exam   Constitutional: He is oriented to person, place, and time. He appears well-developed and well-nourished.   HENT:   Head: Normocephalic and atraumatic.   Mouth/Throat: No oropharyngeal exudate.   Eyes: Pupils are equal, round, and reactive to light. EOM are normal. No scleral icterus.   Neck: Normal range of motion. Neck supple. No JVD present. No tracheal deviation present.   Cardiovascular: Normal rate, regular rhythm and intact distal pulses.    Murmur heard.  Pulmonary/Chest: Breath sounds normal. No stridor. No respiratory distress. He has no wheezes.   Abdominal: Soft. Bowel sounds are normal. He exhibits no distension. There is no tenderness. There is no rebound and no guarding.   Musculoskeletal: Normal range of motion. He exhibits edema. He exhibits no tenderness.   Neurological: He is alert and oriented to person, place, and time. No cranial nerve deficit. Coordination normal.   Skin: Skin is warm and dry. No rash noted. No erythema. No pallor.   Psychiatric: He has a normal mood and affect. His behavior is normal. Judgment and thought content normal.   Nursing note and vitals reviewed.      Labs:  Recent Labs      06/11/19 1942   ISTATAPH  7.418   ISTATAPCO2  24.4*   ISTATAPO2  97*   ISTATATCO2  16*   [id number]  98   ISTATARTHCO3  15.7*   ISTATARTBE  -8*   ISTATTEMP  37.0 C   ISTATFIO2  38   ISTATSPEC  Arterial   ISTATAPHTC  7.418   [id number]  97*         Recent Labs      06/12/19   0510  06/12/19   2000  06/13/19   0308  06/14/19   0249   SODIUM  138  136  139  140   POTASSIUM  4.0  3.7  3.9  3.6   CHLORIDE  109  105  106  108   CO2  21  23  24  24   BUN  19  18  15  13   CREATININE  0.86  0.88  0.74  0.73   MAGNESIUM   2.3  1.6  1.7  1.9   PHOSPHORUS  3.8  2.6  3.4   --    CALCIUM  8.7  8.2*  8.1*  8.1*     Recent Labs      19   03019   0249   GLUCOSE  245*  101*  71     Recent Labs      19   11319   0249   RBC  2.70*  2.78*  2.69*   HEMOGLOBIN  8.9*  8.9*  8.7*   HEMATOCRIT  26.5*  27.5*  26.6*   PLATELETCT  119*  137*  125*     Recent Labs      19   0510   06/13/19   0308  06/13/19   1130  06/13/19   1717  06/14/19   0249   WBC  11.6*   < >  7.9  8.0  8.1  8.5   NEUTSPOLYS  69.90   --   72.40*   --    --   70.70   LYMPHOCYTES  19.80*   --   17.70*   --    --   18.30*   MONOCYTES  9.00   --   7.20   --    --   9.50   EOSINOPHILS  0.30   --   1.90   --    --   1.10   BASOPHILS  0.40   --   0.40   --    --   0.20    < > = values in this interval not displayed.     Hemodynamics:  Temp (24hrs), Av.8 °C (98.2 °F), Min:36.4 °C (97.5 °F), Max:37.4 °C (99.4 °F)  Temperature: 37.4 °C (99.4 °F), Monitored Temp: 37.5 °C (99.5 °F)  Pulse  Av.3  Min: 34  Max: 131Heart Rate (Monitored): 74  Blood Pressure : 112/56, NIBP: 126/64     Respiratory:    Respiration: 18, Pulse Oximetry: 98 %, O2 Daily Delivery Respiratory : Room Air with O2 Available     Given By:: Mouthpiece, Work Of Breathing / Effort: Mild  RUL Breath Sounds: Diminished, RML Breath Sounds: Diminished, RLL Breath Sounds: Diminished, CAROL Breath Sounds: Diminished, LLL Breath Sounds: Diminished  Fluids:    Intake/Output Summary (Last 24 hours) at 19 1027  Last data filed at 19 0852   Gross per 24 hour   Intake              240 ml   Output             1300 ml   Net            -1060 ml     Weight: 57.5 kg (126 lb 12.2 oz)  GI/Nutrition:  Orders Placed This Encounter   Procedures   • Diet Order Diabetic, Cardiac     Standing Status:   Standing     Number of Occurrences:   1     Order Specific Question:   Diet:     Answer:   Diabetic [3]     Order Specific Question:   Diet:     Answer:   Cardiac  [6]     Medical Decision Making, by Problem:  Active Hospital Problems    Diagnosis   • *Diabetic ketoacidosis without coma associated with type 2 diabetes mellitus ([facility]) [E11.10]   • Acute blood loss anemia [D62]   • NSTEMI (non-ST elevated myocardial infarction) ([facility]) [I21.4]   • Leukocytosis [D72.829]   • Colitis [K52.9]   • Urine retention [R33.9]   • Type 2 diabetes mellitus with hyperglycemia, with long-term current use of insulin ([facility]) [E11.65, Z79.4]   • Acute respiratory failure with hypoxia ([facility]) [J96.01]   • Essential hypertension [I10]   • Macrocytic anemia [D53.9]   • Positive D dimer [R79.89]   • GI bleeding [K92.2]   • Acute pulmonary edema ([facility]) [J81.0]   • Generalized abdominal pain [R10.84]     Problems:  1.  Occult overt GI bleeding.  2.  Fecal hemoccult-positive stools  3.  Anemia from chronic blood loss, transfusion requiring  4.  Non-ST elevation myocardial infarction.  5.  History of melena.  6.  Multivessel coronary artery disease.  7.  Diabetic ketoacidosis.  8.  Poorly controlled diabetes  9.  History of stroke  10.  Urinary retention  11.  Hyperlipidemia  12.  History of GERD  13.  Hypertension  14.  Benign prostate hypertrophy  15.  History of cardiac catheterization, 3-vessel disease, recommended CABG when stable from other medical problems but never pursued  16.  History of left below-knee amputation  17.  Cardiac pacemaker.  18.  Ex-smoker, 30 pack-year history  19.  Complex patient with significant comorbidities at increased risk for adverse outcomes     Recommendations:   1.  Avoid NSAIDs.  2.  Continue with IV Protonix.  3.  Patency capsule ordered, sooner available is Monday 6/17 and has been scheduled and then will need abdominal x-rays to confirm passage before pursuing capsule endoscopy.  4.  Risk prohibitive with his recent myocardial infarction, multivessel coronary artery disease that has documented need for CABG that was never pursued, poorly controlled diabetes with  diabetic ketoacidosis etc for endoscopic evaluation (e.g EGD/colonoscopsy)   5.  Please note that Dr. Cruz from [facility] will be back Monday 6/17 to see patient.    Quality-Core Measures   Reviewed items::  Medications reviewed, Labs reviewed and EKG reviewed

## 2019-06-14 NOTE — CARE PLAN
Problem: Safety  Goal: Will remain free from injury  Outcome: PROGRESSING AS EXPECTED  Patient's risk for injury and falls assessed. Appropriate safety precautions in place. Patient educated to utilize call light for needs. Patient verbalizes understanding.    Problem: Skin Integrity  Goal: Risk for impaired skin integrity will decrease  Outcome: PROGRESSING AS EXPECTED  Patient skin assessed. Risk factors that lead to skin breakdown/impairment identified. Interventions to prevent skin breakdown are in place.Will continue to monitor skin integrity.

## 2019-06-14 NOTE — CARE PLAN
Problem: Nutritional:  Goal: Achieve adequate nutritional intake  Advance diet as feasible and patient will consume >50% of meals   Outcome: PROGRESSING AS EXPECTED  Pt visited in room today to discuss diet education and check on his nutritional intake. Pt advanced to diabetic, cardiac diet today and PO intake has been >75% x1 meal documented so far. Pt declined to discuss diet education today. Handouts were provided. He states that his appetite is pretty good. He is interested in adding snacks with his meals. I requested that nutrition rep see pt today for menu and snack preferences. RD following.    Please continue to document meal records in ADL's to provide interdisciplinary communication across shifts. Thank you.

## 2019-06-14 NOTE — CARE PLAN
Problem: Bronchoconstriction:  Goal: Improve in air movement and diminished wheezing    Intervention: Implement inhaled treatments  DUO Q6

## 2019-06-14 NOTE — PROGRESS NOTES
Critical Care Progress Note    Date of admission  6/9/2019    Chief Complaint  68 y.o. male admitted 6/9/2019 with n/v and abdominal pain and diagnosed with DKA.  ALso NSTEMI with troponin elevation but no chest pain.      Hospital Course          Interval Problem Update  Reviewed last 24 hour events:  No further bleeding overnight  Hemoglobin stable  Off anticoagulation  GI to see patient, for now no intervention due to high risk  [facility]  Hemodynamics reviewed and adequate  No chest pain  Chemistry adequate       Review of Systems  Review of Systems   Constitutional: Positive for malaise/fatigue. Negative for chills, diaphoresis, fever and weight loss.   HENT: Negative for congestion and sinus pain.    Eyes: Negative for blurred vision, double vision and photophobia.   Respiratory: Negative for cough, hemoptysis, sputum production, shortness of breath, wheezing and stridor.    Cardiovascular: Negative for chest pain, palpitations and leg swelling.   Gastrointestinal: Positive for blood in stool. Negative for abdominal pain, heartburn, melena and vomiting.   Genitourinary: Negative for dysuria and urgency.   Musculoskeletal: Negative for back pain, myalgias and neck pain.   Skin: Negative for itching and rash.   Neurological: Positive for weakness. Negative for dizziness, tingling, sensory change, speech change, focal weakness and headaches.   Endo/Heme/Allergies: Negative for polydipsia. Does not bruise/bleed easily.   Psychiatric/Behavioral: Negative for depression. The patient is not nervous/anxious.         Vital Signs for last 24 hours   Temp:  [37.9 °C (100.2 °F)] 37.9 °C (100.2 °F)  Pulse:  [34-96] 96  Resp:  [10-23] 12  SpO2:  [90 %-100 %] 99 %    Hemodynamic parameters for last 24 hours       Respiratory Information for the last 24 hours       Physical Exam   Physical Exam   Constitutional: He is oriented to person, place, and time. No distress.   lethargy   HENT:   Head: Normocephalic  and atraumatic.   Right Ear: External ear normal.   Left Ear: External ear normal.   Mouth/Throat: No oropharyngeal exudate.   Eyes: Pupils are equal, round, and reactive to light. Conjunctivae and EOM are normal. Right eye exhibits no discharge. Left eye exhibits no discharge.   Neck: No JVD present. No tracheal deviation present.   Cardiovascular: Normal rate, regular rhythm and normal heart sounds.    No murmur heard.  Pulmonary/Chest: No stridor. No respiratory distress. He has no wheezes. He has no rales. He exhibits no tenderness.   improving   Abdominal: He exhibits no distension and no mass. There is no tenderness. There is no rebound and no guarding.   Musculoskeletal: He exhibits no edema, tenderness or deformity.   Neurological: He is alert and oriented to person, place, and time. He displays normal reflexes. No cranial nerve deficit. Coordination normal.   Skin: Skin is warm. No rash noted. He is not diaphoretic. No erythema.   Psychiatric:   fatigued   Nursing note and vitals reviewed.      Medications  Current Facility-Administered Medications   Medication Dose Route Frequency Provider Last Rate Last Dose   • SODIUM CHLORIDE 0.9 % IV SOLN            • insulin glargine (LANTUS) injection 10 Units  10 Units Subcutaneous Q EVENING Anayeli Marcial M.D.   10 Units at 06/13/19 1714   • insulin regular (HUMULIN R) injection 2-9 Units  2-9 Units Subcutaneous 4X/DAY ACHJACKI Marcial M.D.   Stopped at 06/13/19 0700    And   • glucose 4 g chewable tablet 16 g  16 g Oral Q15 MIN PRN Anayeli Marcial M.D.        And   • DEXTROSE 10% BOLUS 250 mL  250 mL Intravenous Q15 MIN PRN Anayeli Marcial M.D.       • clopidogrel (PLAVIX) tablet 75 mg  75 mg Oral DAILY Yoshi Valero M.D.   75 mg at 06/13/19 0456   • ipratropium-albuterol (DUONEB) nebulizer solution  3 mL Nebulization Q6HRS (RT) Miller K Claudia   3 mL at 06/13/19 1440   • pantoprazole (PROTONIX) injection 40 mg  40 mg Intravenous BID Miller K Claudia   40 mg at  06/13/19 1700   • metoprolol (LOPRESSOR) tablet 25 mg  25 mg Oral BID Yoshi Valero M.D.   25 mg at 06/13/19 1659   • ondansetron (ZOFRAN) syringe/vial injection 4 mg  4 mg Intravenous Q4HRS PRN Miller Taylor       • prochlorperazine (COMPAZINE) injection 10 mg  10 mg Intravenous Q6HRS PRN Miller Taylor       • LORazepam (ATIVAN) injection 0.5 mg  0.5 mg Intravenous Q4HRS PRN Miller BLACKMON Claudia   0.5 mg at 06/11/19 1741   • hydrOXYzine HCl (ATARAX) tablet 10 mg  10 mg Oral TID PRN Anayeli Marcial M.D.   10 mg at 06/11/19 2156   • MD Alert...ICU Electrolyte Replacement per Pharmacy   Other PHARMACY TO DOSE Anayeli Marcial M.D.       • metoclopramide (REGLAN) injection 5 mg  5 mg Intravenous Q6HRS PRN Anayeli Marcial M.D.       • promethazine (PHENERGAN) suppository 25 mg  25 mg Rectal Q6HRS PRN Anayeli Marcial M.D.       • cyanocobalamin (VITAMIN B-12) tablet 1,000 mcg  1,000 mcg Oral DAILY Yoshi Valero M.D.   1,000 mcg at 06/13/19 0456   • benzocaine-menthol (CEPACOL) lozenge 1 Lozenge  1 Lozenge Mouth/Throat Q2HRS PRN Miller Taylor       • acetaminophen (TYLENOL) tablet 650 mg  650 mg Oral Q6HRS PRN Christine Farmer M.D.   650 mg at 06/13/19 1659   • enalaprilat (VASOTEC) injection 1.25 mg  1.25 mg Intravenous Q6HRS PRN Christine Farmer M.D.       • ondansetron (ZOFRAN ODT) dispertab 4 mg  4 mg Oral Q4HRS PRN Christine Farmer M.D.   4 mg at 06/10/19 1424   • tamsulosin (FLOMAX) capsule 0.4 mg  0.4 mg Oral AFTER BREAKFAST Anayeli Marcial M.D.   0.4 mg at 06/13/19 0906   • morphine (pf) 4 mg/ml injection 2-4 mg  2-4 mg Intravenous Q5 MIN PRN Christine Farmer M.D.       • atorvastatin (LIPITOR) tablet 80 mg  80 mg Oral Q EVENING Christine Farmer M.D.   80 mg at 06/13/19 1659   • nitroglycerin (NITROSTAT) tablet 0.4 mg  0.4 mg Sublingual Q5 MIN PRN Christine Farmer M.D.           Fluids    Intake/Output Summary (Last 24 hours) at 06/13/19 1808  Last data filed at 06/13/19 0600   Gross per 24 hour   Intake                 0 ml   Output              400 ml   Net             -400 ml       Laboratory  Recent Labs      06/11/19 1942   ISTATAPH  7.418   ISTATAPCO2  24.4*   ISTATAPO2  97*   ISTATATCO2  16*   FGDNEOD8QWW  98   ISTATARTHCO3  15.7*   ISTATARTBE  -8*   ISTATTEMP  37.0 C   ISTATFIO2  38   ISTATSPEC  Arterial   ISTATAPHTC  7.418   IGFCCYDL8QB  97*     Recent Labs      06/10/19   2115  06/11/19   0035   TROPONINI  20.96*  13.99*     Recent Labs      06/12/19   0510  06/12/19 2000 06/13/19   0308   SODIUM  138  136  139   POTASSIUM  4.0  3.7  3.9   CHLORIDE  109  105  106   CO2  21  23  24   BUN  19  18  15   CREATININE  0.86  0.88  0.74   MAGNESIUM  2.3  1.6  1.7   PHOSPHORUS  3.8  2.6  3.4   CALCIUM  8.7  8.2*  8.1*     Recent Labs      06/12/19   0510  06/12/19   2000  06/13/19   0308   GLUCOSE  149*  245*  101*     Recent Labs      06/11/19   0545  06/12/19   0510   06/13/19   0308  06/13/19   1130  06/13/19   1717   WBC  12.8*  11.6*   < >  7.9  8.0  8.1   NEUTSPOLYS  78.80*  69.90   --   72.40*   --    --    LYMPHOCYTES  11.50*  19.80*   --   17.70*   --    --    MONOCYTES  8.90  9.00   --   7.20   --    --    EOSINOPHILS  0.00  0.30   --   1.90   --    --    BASOPHILS  0.30  0.40   --   0.40   --    --     < > = values in this interval not displayed.     Recent Labs      06/10/19   1905 06/13/19 0308 06/13/19 1130 06/13/19   1717   RBC  2.38*   < >  2.77*  2.70*  2.78*   HEMOGLOBIN  8.2*   < >  8.9*  8.9*  8.9*   HEMATOCRIT  24.3*   < >  26.6*  26.5*  27.5*   PLATELETCT  147*   < >  119*  119*  137*   APTT  >240.0*   --    --    --    --     < > = values in this interval not displayed.       Imaging  X-Ray:  I have personally reviewed the images and compared with prior images. and My impression is: edema, improved, no consolidations  EKG:  I have personally reviewed the images and compared with prior images. and My impression is: lateral st depressions, minimal  CT:    Reviewed    Assessment/Plan  *  Diabetic ketoacidosis without coma associated with type 2 diabetes mellitus (HCC)- (present on admission)   Assessment & Plan    Aggressive fluid resuscitation  dka protocol  Transitioned from insluin drip overnight  Sq insulin and ssi  Stable glucos, keep < 180-     Acute blood loss anemia   Assessment & Plan    Less than 7  Transfused 1 uprbc overnight, give additional unit  20 ml hematemasis  Esophagitis on ct imaging  ppi oral ok for now  Goal greater than 8 given nstemi  Daily cbc  Cath on hold until tolerates ac  Hb stable overnight, over 8  Continue to monitor     NSTEMI (non-ST elevated myocardial infarction) (HCC)- (present on admission)   Assessment & Plan    Likely demand ischemia  On statin, bb  No chest pain  D dimer elevated but no hypoxia or respiratory distress or swelling of legs, unlikely PE but no other clear source for d dimer elevation except for  colitis, repeat ddimer elevated  May need cta if neg us lower ext       Leukocytosis- (present on admission)   Assessment & Plan    Likely due to dka     Colitis   Assessment & Plan    Colitis, mild on ct at [facility]  Unlikely significant infection as asymptomatic, abdominal pain improved of which was likely due to gastroparesis  Reviewed ct imaging     Urine retention- (present on admission)   Assessment & Plan    Fluid resuscitation for dka  Diuresis with lasix, change to po  Keep net negative  Significant post resuscitaiton diuresis  Tolerated  Remove mcnair         Type 2 diabetes mellitus with hyperglycemia, with long-term current use of insulin (HCC)- (present on admission)   Assessment & Plan    Type 2 diabetes  DKA  Aggressive fluid resuscitation initially  Improved chemistry  Transitioned from insulin drip  Sq insulin   ssi  Post resuscitaiton diuresis adequate     Acute respiratory failure with hypoxia (HCC)- (present on admission)   Assessment & Plan    Due to pulmonary edema from fluid resuscitation  nstemi contributory  Diuresis aggressively  with lasix  Change lasix to po  Improved  Keep net negative     Positive D dimer   Assessment & Plan    ? Due to colitis  Unclear source  Us lower ext negative  D dimer repeat elevated  May need cta chest though poor candidate for ac and would need ivc filter  May be secondary to NSTEMI given overall clinial condition and the fact that now on 1-2 L nc without significant hypoxia.  Elevated d dimers in nstemi portrays a worse prognosis/severity of cad.     Acute pulmonary edema (HCC)   Assessment & Plan    Due to volume overload from dka  Diuresis  Continued on fluids for dka protocol  exac by nstemi  Diuresis adequate  Continue net neg goal, adequate currently  On po lasix     GI bleeding   Assessment & Plan    hematemasis while on heparin drip  Esophagitis ct imaging  ppi bid  Stopped heparin drip  Heart cath on hold to make sure stable  Daily cbc  No egd per gi as high risk  Capsule endoscopy  No further bleeding since 6/12 afternoon/evening     Generalized abdominal pain   Assessment & Plan    Resolved overnight with fluid resuscitation  likley due to gastroparesis     Essential hypertension- (present on admission)   Assessment & Plan    Keep sbp < 160     Macrocytic anemia- (present on admission)   Assessment & Plan    Transfuse for hb < 7          VTE:  Contraindicated  Ulcer: Not Indicated  Lines: None    I have performed a physical exam and reviewed and updated ROS and Plan today (6/13/2019). In review of yesterday's note (6/12/2019), there are no changes except as documented above.     Discussed patient condition and risk of morbidity and/or mortality with Family, RN, RT, Pharmacy, , [employer] Gold resident, Code status disscussed, Charge nurse / hot rounds, Patient and cardiology and GI

## 2019-06-14 NOTE — PROGRESS NOTES
2 RN skin check complete With ABDIAS Jeong.   Devices in place Silicone nasal cannula, LLE prosthetic.  Skin assessed under devices yes.  Confirmed pressure ulcers found on NA.  New potential pressure ulcers noted on NA.  The following interventions in place Pillows for positioning.    General bruising to both BUE and BLE  Elbows red and blanching  Sacrum red and blanching, mepilex in place  Round skin anomoly middle back, PT states that he has had this for years.

## 2019-06-14 NOTE — PROGRESS NOTES
Internal Medicine Interval Note  Note Author: Telly Keller M.D.     Name Jarret Bright     1950   Age/Sex 68 y.o. male   MRN [id number]   Code Status Full Code     After 5PM or if no immediate response to page, please call for cross-coverage  Attending/Team: Dr Shields/ Ramiro See Patient List for primary contact information  Call [phone] to page    1st Call - Day Intern (R1):   Dr Keller 2nd Call - Day Sr. Resident (R2/R3):   Dr Smiley         Reason for interval visit  (Principal Problem)   NSTEMI  GI bleed, likely upper  Pulmonary edema  DKA, resolved  Urinary retention      Interval Problem Daily Status Update  (24 hours, problem oriented, brief subjective history, new lab/imaging data pertinent to that problem)   Patient seen and examined at bedside, stable, NAD, no acute events overnight on telemetry.  Had a dark stool last night.  No chest pain, no abdominal tenderness essentially normal physical examination.  Hypoglycemia to 70s this a.m., diet started at breakfast time as no gastrointestinal intervention/procedure until Monday a.m. when he will get a pill endoscopy per Dr. Salazar.  Troponins no longer been measured since downtrend.  H&H stable, 8.7 this a.m., continue to monitor daily.  Plavix, metoprolol, Lipitor active, not on aspirin.  Per cardiology catheterization to take place after source of GI bleed has been delineated.    [facility]  distal [facility] ulcer, mild gastritis, no mass.  EGD 3/19 healed distal esophageal ulcer.  Recent gastric emptying study negative for any lesions, normal study.          Review of Systems   Constitutional: Negative for chills and fever.   HENT: Negative for congestion and sore throat.    Eyes: Negative for blurred vision and double vision.   Respiratory: Negative for cough, sputum production and shortness of breath.    Cardiovascular: Negative for chest pain, orthopnea and leg swelling.   Gastrointestinal: Positive for blood in stool  and melena. Negative for abdominal pain, constipation, diarrhea, nausea and vomiting.   Genitourinary: Negative for dysuria, frequency and urgency.   Musculoskeletal: Negative for back pain, falls, joint pain and myalgias.   Skin: Negative for itching and rash.   Neurological: Negative for dizziness, focal weakness and headaches.   Endo/Heme/Allergies: Negative for environmental allergies. Does not bruise/bleed easily.   Psychiatric/Behavioral: Negative for depression and substance abuse. The patient is not nervous/anxious and does not have insomnia.        Disposition/Barriers to discharge:   Capsule endoscopy 6/17/2019  Cardiac catheterization to follow    Consultants/Specialty  Cardiology  Gastroenterology  Pulmonology  PCP: Prashanth Ocasio      Quality Measures  Quality-Core Measures   Reviewed items::  EKG reviewed, Labs reviewed, Medications reviewed and Radiology images reviewed  Reed catheter::  No Reed  DVT prophylaxis pharmacological::  Contraindicated - High bleeding risk  DVT prophylaxis - mechanical:  SCDs  Ulcer Prophylaxis::  Not indicated          Physical Exam       Vitals:    06/14/19 0815 06/14/19 0820 06/14/19 1148 06/14/19 1430   BP:  112/56 140/64    Pulse:  89 87 (!) 101   Resp:  18 17 16   Temp:  37.4 °C (99.4 °F) 36.8 °C (98.2 °F)    TempSrc:  Temporal Temporal    SpO2: 95% 98% 99% 90%   Weight:       Height:         Body mass index is 19.85 kg/m². Weight: 57.5 kg (126 lb 12.2 oz)  Oxygen Therapy:  Pulse Oximetry: 90 %, O2 (LPM): 1, O2 Delivery: Silicone Nasal Cannula    Physical Exam   Constitutional: He is oriented to person, place, and time and well-developed, well-nourished, and in no distress. No distress.   HENT:   Head: Normocephalic and atraumatic.   Right Ear: External ear normal.   Left Ear: External ear normal.   Nose: Nose normal.   Mouth/Throat: Oropharynx is clear and moist. No oropharyngeal exudate.   Eyes: Conjunctivae and EOM are normal. Right eye exhibits no discharge. Left  eye exhibits no discharge. No scleral icterus.   Neck: Normal range of motion. Neck supple. No JVD present.   Cardiovascular: Normal rate, regular rhythm and normal heart sounds.    No murmur heard.  Pulmonary/Chest: Effort normal and breath sounds normal. No respiratory distress. He has no rales.   Abdominal: Soft. Bowel sounds are normal. He exhibits no distension. There is no tenderness.   Genitourinary:   Genitourinary Comments: Deferred   Musculoskeletal: Normal range of motion. He exhibits no edema or tenderness.   Right BKA   Lymphadenopathy:     He has no cervical adenopathy.   Neurological: He is alert and oriented to person, place, and time. He exhibits normal muscle tone. GCS score is 15.   Skin: Skin is warm and dry. No rash noted. He is not diaphoretic. No erythema.   Psychiatric: Mood, memory, affect and judgment normal.   Vitals reviewed.            Assessment/Plan     * Diabetic ketoacidosis without coma associated with type 2 diabetes mellitus (HCC)- (present on admission)   Assessment & Plan    Resolved.  A1C 6.9.  It is unclear whether patient is compliant with medications on outpatient basis.  Plan  -10 units insulin glargine nightly  -Insulin sliding scale with hypoglycemia protocol     Acute blood loss anemia   Assessment & Plan    Likely secondary to upper GI bleed/small intestinal bleed, history of esophageal ulcer, significant heavy alcohol consumption history  Monitoring H&H, transfuse for Hb<8 as concomitant severe cardiac disease     NSTEMI (non-ST elevated myocardial infarction) (HCC)- (present on admission)   Assessment & Plan    Initially thought to have Type II MI, then troponins increased to 30. Cardiology called and plan was for cath, cath held due to possible bleeding. However, they note that patients EKG revealed improvement with out intervention, only treating DKA. Likely CAD exacerbated by DKA. Will correct DKA and monitor Cardiac fucntion.   Plan  -Statin,  beta-blockers  -Patient started on clopidogrel on 6/13/2019     Leukocytosis- (present on admission)   Assessment & Plan    Improved. Possible secondary to stress and NSTEMI  Plan  - Continue to monitor with CBC     Colitis   Assessment & Plan    Per CT report from [facility]: CT: mild thick walled appearnce to the entire colon which may be due to the fact that it is decompressed. Mild colitis is in the differential. Leukocytosis, but negative lactic acid elevation. No recent hospitalizations or antibiotic use. Possible related to DKA episode  Plan  - Serial abdominal exams  - Monitor closely     Urine retention- (present on admission)   Assessment & Plan    Acute urinary retention. Mcnair was placed in outside facility however patient was having significant discomfort with mcnair therefore removed here.   Plan  - Continue flomax  - Serial bladder scans; if patient is unable to void, will need to replace mcnair     Type 2 diabetes mellitus with hyperglycemia, with long-term current use of insulin (HCC)- (present on admission)   Assessment & Plan    On oral and insulin at home per med rec.patient currently on 10 units glargine and insulin sliding scale with hypoglycemia protocol.  Medication compliance questionable  Plan  - Can consider diabetes educator consult once patient has improved     Acute respiratory failure with hypoxia (HCC)- (present on admission)   Assessment & Plan    Notified by nursing staff the patient was having increased work of breathing, patient was noted to have acute pulmonary edema and hypoxia.  Patient received intravenous diuresis, patient was also placed on BiPAP PRN.  Patient did not require BiPAP but did diurese significantly.  Patient's respiratory status much improved.  Currently supplemental oxygen requirement of 0-1 LPM  Plan  -Continue to monitor     Positive D dimer   Assessment & Plan    Patient was noted to have a positive d-dimer.  Ultrasound lower extremities did not reveal any  thrombi.  Patient currently has SCDs.  D-dimer could be elevated due to age and patient's cardiac issues.  Plan  -Continue to monitor     Acute pulmonary edema (HCC)   Assessment & Plan    Patient was noted to have pulmonary edema on PE and imaging.  Likely secondary to necessary intravenous volume resuscitation.  Plan  -Patient was diuresed and respiratory status improved.  -Lasix discontinued     GI bleeding   Assessment & Plan    Patient initially had hematemesis 20 mL, followed by melenic stools with drop in Hb from 10 to <7, was transfused 2 PRBCs, monitoring H&H  Has had several EGDs in the past showing active and healed esophageal ulcers as well as a gastric emptying study, most recent EGD 3/19, [facility]  Significant alcohol consumption history but has not consumed alcohol for several years, no history of esophageal varices  Takes several Tylenol a day for his headaches, also takes aspirin  Plan  N.p.o. from midnight of 6/16 for capsule endoscopy on 6/17 a.m. per gastro with cardiac catheterization to follow when patient is more stable hematologically     Essential hypertension- (present on admission)   Assessment & Plan    On lisinopril/HCTZ 10/12/5 mg daily per med rec  Plan  - Holding for now in the setting of fluid resuscitation  - Resume once more stable     Macrocytic anemia- (present on admission)   Assessment & Plan    Vitamin B12 deficient, folate wnl.  Plan  -Cyanocobalamin administration

## 2019-06-14 NOTE — PROGRESS NOTES
Handoff report received from [facility] ABDIAS Thomas. Assumed patient care. PT is sitting up in bed, AAOx4, no complaints of pain or discomfort. Tele box is on , rate and rhythm verified. POC discussed with PT and all questions addressed. Safety precautions in place. Call light and personal belongings within reach. Educated to call for assistance if needed.

## 2019-06-14 NOTE — RESPIRATORY CARE
COPD EDUCATION by COPD CLINICAL EDUCATOR  6/14/2019 at 6:18 AM by Annette Steinberg     Patient reviewed by COPD education team. Patient does not have an order for Respiratory Care Protocol therefore does not qualify for the COPD program.

## 2019-06-15 LAB
ABO GROUP BLD: NORMAL
BARCODED ABORH UBTYP: 5100
BARCODED PRD CODE UBPRD: NORMAL
BARCODED UNIT NUM UBUNT: NORMAL
BASOPHILS # BLD AUTO: 0.1 % (ref 0–1.8)
BASOPHILS # BLD: 0.01 K/UL (ref 0–0.12)
BLD GP AB SCN SERPL QL: NORMAL
CFT BLD TEG: 6.7 MIN (ref 5–10)
CFT P HPASE BLD TEG: 6.1 MIN (ref 5–10)
CLOT ANGLE BLD TEG: 58.6 DEGREES (ref 53–72)
CLOT ANGLE P HPASE BLD TEG: 66.6 DEGREES (ref 53–72)
CLOT INIT P HPASE BLD TEG: 1.7 MIN (ref 1–3)
CLOT LYSIS 30M P MA LENFR BLD TEG: 0 % (ref 0–8)
CLOT LYSIS 30M P MA LENFR BLD TEG: 0.4 % (ref 0–8)
COMPONENT R 8504R: NORMAL
CT.EXTRINSIC BLD ROTEM: 2.2 MIN (ref 1–3)
EOSINOPHIL # BLD AUTO: 0.1 K/UL (ref 0–0.51)
EOSINOPHIL NFR BLD: 1.5 % (ref 0–6.9)
ERYTHROCYTE [DISTWIDTH] IN BLOOD BY AUTOMATED COUNT: 49.9 FL (ref 35.9–50)
GLUCOSE BLD-MCNC: 142 MG/DL (ref 65–99)
GLUCOSE BLD-MCNC: 218 MG/DL (ref 65–99)
GLUCOSE BLD-MCNC: 223 MG/DL (ref 65–99)
GLUCOSE BLD-MCNC: 270 MG/DL (ref 65–99)
HCT VFR BLD AUTO: 23.8 % (ref 42–52)
HCT VFR BLD AUTO: 28.4 % (ref 42–52)
HGB BLD-MCNC: 7.8 G/DL (ref 14–18)
HGB BLD-MCNC: 9.3 G/DL (ref 14–18)
IMM GRANULOCYTES # BLD AUTO: 0.02 K/UL (ref 0–0.11)
IMM GRANULOCYTES NFR BLD AUTO: 0.3 % (ref 0–0.9)
LYMPHOCYTES # BLD AUTO: 1.52 K/UL (ref 1–4.8)
LYMPHOCYTES NFR BLD: 22.8 % (ref 22–41)
MCF BLD TEG: 58 MM (ref 50–70)
MCF P HPASE BLD TEG: 61.1 MM (ref 50–70)
MCH RBC QN AUTO: 32.4 PG (ref 27–33)
MCHC RBC AUTO-ENTMCNC: 32.8 G/DL (ref 33.7–35.3)
MCV RBC AUTO: 98.8 FL (ref 81.4–97.8)
MONOCYTES # BLD AUTO: 0.84 K/UL (ref 0–0.85)
MONOCYTES NFR BLD AUTO: 12.6 % (ref 0–13.4)
NEUTROPHILS # BLD AUTO: 4.19 K/UL (ref 1.82–7.42)
NEUTROPHILS NFR BLD: 62.7 % (ref 44–72)
NRBC # BLD AUTO: 0 K/UL
NRBC BLD-RTO: 0 /100 WBC
PLATELET # BLD AUTO: 119 K/UL (ref 164–446)
PMV BLD AUTO: 9.5 FL (ref 9–12.9)
PRODUCT TYPE UPROD: NORMAL
RBC # BLD AUTO: 2.41 M/UL (ref 4.7–6.1)
RH BLD: NORMAL
TEG ALGORITHM TGALG: NORMAL
UNIT STATUS USTAT: NORMAL
WBC # BLD AUTO: 6.7 K/UL (ref 4.8–10.8)

## 2019-06-15 PROCEDURE — 700105 HCHG RX REV CODE 258

## 2019-06-15 PROCEDURE — 99232 SBSQ HOSP IP/OBS MODERATE 35: CPT | Performed by: INTERNAL MEDICINE

## 2019-06-15 PROCEDURE — 700102 HCHG RX REV CODE 250 W/ 637 OVERRIDE(OP): Performed by: STUDENT IN AN ORGANIZED HEALTH CARE EDUCATION/TRAINING PROGRAM

## 2019-06-15 PROCEDURE — 85384 FIBRINOGEN ACTIVITY: CPT

## 2019-06-15 PROCEDURE — 86901 BLOOD TYPING SEROLOGIC RH(D): CPT

## 2019-06-15 PROCEDURE — 99232 SBSQ HOSP IP/OBS MODERATE 35: CPT | Mod: GC | Performed by: INTERNAL MEDICINE

## 2019-06-15 PROCEDURE — A9270 NON-COVERED ITEM OR SERVICE: HCPCS | Performed by: STUDENT IN AN ORGANIZED HEALTH CARE EDUCATION/TRAINING PROGRAM

## 2019-06-15 PROCEDURE — 82962 GLUCOSE BLOOD TEST: CPT

## 2019-06-15 PROCEDURE — 85347 COAGULATION TIME ACTIVATED: CPT | Mod: 91

## 2019-06-15 PROCEDURE — 86923 COMPATIBILITY TEST ELECTRIC: CPT

## 2019-06-15 PROCEDURE — 700111 HCHG RX REV CODE 636 W/ 250 OVERRIDE (IP): Performed by: INTERNAL MEDICINE

## 2019-06-15 PROCEDURE — 36415 COLL VENOUS BLD VENIPUNCTURE: CPT

## 2019-06-15 PROCEDURE — 86900 BLOOD TYPING SEROLOGIC ABO: CPT

## 2019-06-15 PROCEDURE — 700101 HCHG RX REV CODE 250: Performed by: INTERNAL MEDICINE

## 2019-06-15 PROCEDURE — C9113 INJ PANTOPRAZOLE SODIUM, VIA: HCPCS | Performed by: INTERNAL MEDICINE

## 2019-06-15 PROCEDURE — 85025 COMPLETE CBC W/AUTO DIFF WBC: CPT

## 2019-06-15 PROCEDURE — 85576 BLOOD PLATELET AGGREGATION: CPT | Mod: 91

## 2019-06-15 PROCEDURE — 94760 N-INVAS EAR/PLS OXIMETRY 1: CPT

## 2019-06-15 PROCEDURE — 770020 HCHG ROOM/CARE - TELE (206)

## 2019-06-15 PROCEDURE — P9016 RBC LEUKOCYTES REDUCED: HCPCS

## 2019-06-15 PROCEDURE — 85014 HEMATOCRIT: CPT

## 2019-06-15 PROCEDURE — 85018 HEMOGLOBIN: CPT

## 2019-06-15 PROCEDURE — 36430 TRANSFUSION BLD/BLD COMPNT: CPT

## 2019-06-15 PROCEDURE — 94640 AIRWAY INHALATION TREATMENT: CPT

## 2019-06-15 PROCEDURE — 86850 RBC ANTIBODY SCREEN: CPT

## 2019-06-15 RX ORDER — OMEPRAZOLE 20 MG/1
40 CAPSULE, DELAYED RELEASE ORAL 2 TIMES DAILY
Status: DISCONTINUED | OUTPATIENT
Start: 2019-06-15 | End: 2019-06-22 | Stop reason: HOSPADM

## 2019-06-15 RX ORDER — SODIUM CHLORIDE 9 MG/ML
INJECTION, SOLUTION INTRAVENOUS
Status: COMPLETED
Start: 2019-06-15 | End: 2019-06-15

## 2019-06-15 RX ADMIN — INSULIN HUMAN 3 UNITS: 100 INJECTION, SOLUTION PARENTERAL at 20:48

## 2019-06-15 RX ADMIN — CLOPIDOGREL BISULFATE 75 MG: 75 TABLET ORAL at 06:10

## 2019-06-15 RX ADMIN — METOPROLOL TARTRATE 25 MG: 25 TABLET, FILM COATED ORAL at 06:10

## 2019-06-15 RX ADMIN — TAMSULOSIN HYDROCHLORIDE 0.4 MG: 0.4 CAPSULE ORAL at 08:30

## 2019-06-15 RX ADMIN — INSULIN HUMAN 3 UNITS: 100 INJECTION, SOLUTION PARENTERAL at 07:13

## 2019-06-15 RX ADMIN — PANTOPRAZOLE SODIUM 40 MG: 40 INJECTION, POWDER, FOR SOLUTION INTRAVENOUS at 06:08

## 2019-06-15 RX ADMIN — METOPROLOL TARTRATE 25 MG: 25 TABLET, FILM COATED ORAL at 17:24

## 2019-06-15 RX ADMIN — OMEPRAZOLE 40 MG: 20 CAPSULE, DELAYED RELEASE ORAL at 17:25

## 2019-06-15 RX ADMIN — ACETAMINOPHEN 650 MG: 325 TABLET, FILM COATED ORAL at 12:57

## 2019-06-15 RX ADMIN — IPRATROPIUM BROMIDE AND ALBUTEROL SULFATE 3 ML: .5; 3 SOLUTION RESPIRATORY (INHALATION) at 14:33

## 2019-06-15 RX ADMIN — INSULIN GLARGINE 10 UNITS: 100 INJECTION, SOLUTION SUBCUTANEOUS at 17:25

## 2019-06-15 RX ADMIN — IPRATROPIUM BROMIDE AND ALBUTEROL SULFATE 3 ML: .5; 3 SOLUTION RESPIRATORY (INHALATION) at 03:33

## 2019-06-15 RX ADMIN — ATORVASTATIN CALCIUM 80 MG: 80 TABLET, FILM COATED ORAL at 17:25

## 2019-06-15 RX ADMIN — INSULIN HUMAN 5 UNITS: 100 INJECTION, SOLUTION PARENTERAL at 11:57

## 2019-06-15 RX ADMIN — SODIUM CHLORIDE 250 ML: 9 INJECTION, SOLUTION INTRAVENOUS at 06:00

## 2019-06-15 RX ADMIN — IPRATROPIUM BROMIDE AND ALBUTEROL SULFATE 3 ML: .5; 3 SOLUTION RESPIRATORY (INHALATION) at 18:16

## 2019-06-15 RX ADMIN — ACETAMINOPHEN 650 MG: 325 TABLET, FILM COATED ORAL at 01:46

## 2019-06-15 RX ADMIN — CYANOCOBALAMIN TAB 500 MCG 1000 MCG: 500 TAB at 06:10

## 2019-06-15 ASSESSMENT — ENCOUNTER SYMPTOMS
COUGH: 0
BLOOD IN STOOL: 1
ABDOMINAL PAIN: 0
NERVOUS/ANXIOUS: 0
INSOMNIA: 0
DIARRHEA: 0
HEADACHES: 0
MYALGIAS: 0
FEVER: 0
DIZZINESS: 0
BLURRED VISION: 0
VOMITING: 0
ORTHOPNEA: 0
SPUTUM PRODUCTION: 0
NAUSEA: 0
SORE THROAT: 0
SHORTNESS OF BREATH: 0
DEPRESSION: 0
CHILLS: 0
CONSTIPATION: 0
BRUISES/BLEEDS EASILY: 0
DOUBLE VISION: 0
BACK PAIN: 0
FALLS: 0
FOCAL WEAKNESS: 0

## 2019-06-15 ASSESSMENT — LIFESTYLE VARIABLES: SUBSTANCE_ABUSE: 0

## 2019-06-15 NOTE — PROGRESS NOTES
Internal Medicine Interval Note  Note Author: Telly Keller M.D.     Name Jarret Bright     1950   Age/Sex 68 y.o. male   MRN [id number]   Code Status Full Code     After 5PM or if no immediate response to page, please call for cross-coverage  Attending/Team: Dr Shields/ Ramiro See Patient List for primary contact information  Call [phone] to page    1st Call - Day Intern (R1):   Dr Keller 2nd Call - Day Sr. Resident (R2/R3):   Dr Smiley         Reason for interval visit  (Principal Problem)   NSTEMI  GI bleed, likely upper  Pulmonary edema  DKA, resolved      Interval Problem Daily Status Update  (24 hours, problem oriented, brief subjective history, new lab/imaging data pertinent to that problem)     Patient seen and examined bedside, stable, NAD.  Yesterday p.m. Hb >8, subsequent Hb this a.m. 7.8, transfuse 1 unit PRBCs.  No chest tightness/pain/dyspnea/abdominal pain, did have dark stool yesterday no bertin blood, non-tarry.  Clinically stable, wife is his only relative and renal, she is not his official power of  but is next of kin in the event of him becoming incapacitated.  Capsule endoscopy Monday, 2019 morning, n.p.o. from previous night midnight.  Blood sugars with high variation including hypoglycemia ~70 and hyperglycemia to ~ 200.  Currently on Lantus 10 with SSI, caution with change in dose as propensity for hypoglycemia.    [facility]  distal [facility] ulcer, mild gastritis, no mass.  EGD 3/19 healed distal esophageal ulcer.  Recent gastric emptying study negative for any lesions, normal study.          Review of Systems   Constitutional: Negative for chills and fever.   HENT: Negative for congestion and sore throat.    Eyes: Negative for blurred vision and double vision.   Respiratory: Negative for cough, sputum production and shortness of breath.    Cardiovascular: Negative for chest pain, orthopnea and leg swelling.   Gastrointestinal: Positive for  blood in stool and melena. Negative for abdominal pain, constipation, diarrhea, nausea and vomiting.   Genitourinary: Negative for dysuria, frequency and urgency.   Musculoskeletal: Negative for back pain, falls, joint pain and myalgias.   Skin: Negative for itching and rash.   Neurological: Negative for dizziness, focal weakness and headaches.   Endo/Heme/Allergies: Negative for environmental allergies. Does not bruise/bleed easily.   Psychiatric/Behavioral: Negative for depression and substance abuse. The patient is not nervous/anxious and does not have insomnia.        Disposition/Barriers to discharge:   Capsule endoscopy 6/17/2019  Cardiac catheterization to follow    Consultants/Specialty  Cardiology  Gastroenterology  Pulmonology  PCP: Prashanth Ocasio      Quality Measures  Quality-Core Measures   Reviewed items::  EKG reviewed, Labs reviewed, Medications reviewed and Radiology images reviewed  Reed catheter::  No Reed  DVT prophylaxis pharmacological::  Contraindicated - High bleeding risk  DVT prophylaxis - mechanical:  SCDs  Ulcer Prophylaxis::  Not indicated          Physical Exam       Vitals:    06/15/19 0411 06/15/19 0601 06/15/19 0626 06/15/19 0800   BP: 137/73 113/53 137/67 143/73   Pulse: 90 (!) 103 98 100   Resp: 17 18 20 17   Temp: 36.4 °C (97.5 °F) 37.5 °C (99.5 °F) 37.4 °C (99.4 °F) 36.8 °C (98.3 °F)   TempSrc: Temporal Oral Temporal Temporal   SpO2: 100% 99% 96% 97%   Weight:       Height:         Body mass index is 19.54 kg/m². Weight: 56.6 kg (124 lb 12.5 oz)  Oxygen Therapy:  Pulse Oximetry: 97 %, O2 (LPM): 0, O2 Delivery: None (Room Air)    Physical Exam   Constitutional: He is oriented to person, place, and time and well-developed, well-nourished, and in no distress. No distress.   HENT:   Head: Normocephalic and atraumatic.   Right Ear: External ear normal.   Left Ear: External ear normal.   Nose: Nose normal.   Mouth/Throat: Oropharynx is clear and moist. No oropharyngeal exudate.   Eyes:  Conjunctivae and EOM are normal. Right eye exhibits no discharge. Left eye exhibits no discharge. No scleral icterus.   Neck: Normal range of motion. Neck supple. No JVD present.   Cardiovascular: Normal rate, regular rhythm and normal heart sounds.    No murmur heard.  Pulmonary/Chest: Effort normal and breath sounds normal. No respiratory distress. He has no rales.   Abdominal: Soft. Bowel sounds are normal. He exhibits no distension. There is no tenderness.   Genitourinary:   Genitourinary Comments: Deferred   Musculoskeletal: Normal range of motion. He exhibits no edema or tenderness.   Right BKA   Lymphadenopathy:     He has no cervical adenopathy.   Neurological: He is alert and oriented to person, place, and time. He exhibits normal muscle tone. GCS score is 15.   Skin: Skin is warm and dry. No rash noted. He is not diaphoretic. No erythema.   Psychiatric: Mood, memory, affect and judgment normal.   Vitals reviewed.      Is      Assessment/Plan     * Diabetic ketoacidosis without coma associated with type 2 diabetes mellitus (HCC)- (present on admission)   Assessment & Plan    Resolved.  A1C 6.9.  It is unclear whether patient is compliant with medications on outpatient basis.  Plan  -10 units insulin glargine nightly  -Insulin sliding scale with hypoglycemia protocol     Acute blood loss anemia   Assessment & Plan    Likely secondary to upper GI bleed/small intestinal bleed, history of esophageal ulcer, significant heavy alcohol consumption history  Monitoring H&H, transfuse for Hb<8 as concomitant severe cardiac disease     NSTEMI (non-ST elevated myocardial infarction) (HCC)- (present on admission)   Assessment & Plan    Initially thought to have Type II MI, then troponins increased to 30. Cardiology called and plan was for cath, cath held due to possible bleeding. However, they note that patients EKG revealed improvement with out intervention, only treating DKA. Likely CAD exacerbated by DKA. Will correct  DKA and monitor Cardiac fucntion.   Plan  -Statin, beta-blockers  -Patient started on clopidogrel on 6/13/2019     Leukocytosis- (present on admission)   Assessment & Plan    Improved. Possible secondary to stress and NSTEMI  Plan  - Continue to monitor with CBC     Colitis   Assessment & Plan    Per CT report from [facility]: CT: mild thick walled appearnce to the entire colon which may be due to the fact that it is decompressed. Mild colitis is in the differential. Leukocytosis, but negative lactic acid elevation. No recent hospitalizations or antibiotic use. Possible related to DKA episode  Plan  - Serial abdominal exams  - Monitor closely     Urine retention- (present on admission)   Assessment & Plan    Acute urinary retention. Mcnair was placed in outside facility however patient was having significant discomfort with mcnair therefore removed here.   Plan  - Continue flomax  - Serial bladder scans; if patient is unable to void, will need to replace mcnair     Type 2 diabetes mellitus with hyperglycemia, with long-term current use of insulin (HCC)- (present on admission)   Assessment & Plan    On oral and insulin at home per med rec.patient currently on 10 units glargine and insulin sliding scale with hypoglycemia protocol.  Medication compliance questionable  Monitor blood sugars with nocturnal hypoglycemia to 60s, hyperglycemia to 200s and daytime  Plan  - Can consider diabetes educator consult once patient has improved  -Continue glargine at 10 nightly, SSI, hypoglycemia protocol, he consistently finishes 80 - 100% of his meals     Acute respiratory failure with hypoxia (HCC)- (present on admission)   Assessment & Plan    Notified by nursing staff the patient was having increased work of breathing, patient was noted to have acute pulmonary edema and hypoxia.  Patient received intravenous diuresis, patient was also placed on BiPAP PRN.  Patient did not require BiPAP but did diurese significantly.  Patient's  respiratory status much improved.  Currently supplemental oxygen requirement of 0-1 LPM  Plan  -Continue to monitor     Positive D dimer   Assessment & Plan    Patient was noted to have a positive d-dimer.  Ultrasound lower extremities did not reveal any thrombi.  Patient currently has SCDs.  D-dimer could be elevated due to age and patient's cardiac issues.  Plan  -Continue to monitor     Acute pulmonary edema (HCC)   Assessment & Plan    Patient was noted to have pulmonary edema on PE and imaging.  Likely secondary to necessary intravenous volume resuscitation.  Plan  -Patient was diuresed and respiratory status improved.  -Lasix discontinued     GI bleeding   Assessment & Plan    Patient initially had hematemesis 20 mL, followed by melenic stools with drop in Hb from 10 to <7, was transfused 2 PRBCs, monitoring H&H  Has had several EGDs in the past showing active and healed esophageal ulcers as well as a gastric emptying study, most recent EGD 3/19, [facility]  Significant alcohol consumption history but has not consumed alcohol for several years, no history of esophageal varices  Takes several Tylenol a day for his headaches, also takes aspirin  Plan  N.p.o. from midnight of 6/16 for capsule endoscopy on 6/17 a.m. per gastro with cardiac catheterization to follow when patient is more stable hematologically  Transfuse as needed     Essential hypertension- (present on admission)   Assessment & Plan    On lisinopril/HCTZ 10/12/5 mg daily per med rec  Plan  - Holding for now in the setting of fluid resuscitation  - Resume once more stable     Macrocytic anemia- (present on admission)   Assessment & Plan    Vitamin B12 deficient, folate wnl.  Plan  -Cyanocobalamin administration

## 2019-06-15 NOTE — PROGRESS NOTES
2111 - Patient's BS 58, rechecked patient denies any symptoms other than slightly light headed. Patient talking on the phone at this time. 4 oz of orange juice given to patient.    2142 - BS rechecked and is now 70. 8oz of apple juice given per patient's request. Denies symptoms at this time.     2206 - BS 97 patient given gram crackers and peanut butter.    2319 - Now BS is 191. Patient now going to sleep.

## 2019-06-15 NOTE — PROGRESS NOTES
Assumed care of patient. Assessment complete. Patient denies pain at this time. Educated to use call light for assistance. Patient is a MODERATE FALL RISK according to Mil[facility] Rojas Fall Risk Assessment. Fall precautions in place. Tele box in place. Will continue to monitor with hourly rounding.

## 2019-06-15 NOTE — CARE PLAN
Problem: Infection  Goal: Will remain free from infection    Intervention: Assess signs and symptoms of infection  No s/s infection      Problem: Pain Management  Goal: Pain level will decrease to patient's comfort goal    Intervention: Follow pain managment plan developed in collaboration with patient and Interdisciplinary Team  Pt denies pain

## 2019-06-15 NOTE — PROGRESS NOTES
Received report and assumed care of patient. Patient is alert and oriented x4. Patient is in no signs of distress denies pain at this time. Patient was updated on the plan of care for the day. Call light within reach, bed in low position, 2 side rails up. Educated on fall risk, verbalizes understanding, has left prosthetic at bedside. Will continue to monitor.

## 2019-06-15 NOTE — DISCHARGE PLANNING
Care Transition Team Assessment    Information Source  Orientation : Oriented x 4  Information Given By: Patient  Informant's Name: Jarret  Who is responsible for making decisions for patient? : Patient         Elopement Risk  Legal Hold: No  Ambulatory or Self Mobile in Wheelchair: Yes  Disoriented: No  Psychiatric Symptoms: None  History of Wandering: No  Elopement this Admit: No  Vocalizing Wanting to Leave: No  Displays Behaviors, Body Language Wanting to Leave: No-Not at Risk for Elopement  Elopement Risk: Not at Risk for Elopement    Interdisciplinary Discharge Planning  Primary Care Physician:  (Prashanth Ocasio)  Lives with - Patient's Self Care Capacity: Spouse  Patient or legal guardian wants to designate a caregiver (see row info): No  Support Systems: Spouse / Significant Other  Housing / Facility:  (home in [place])  Able to Return to Previous ADL's: Yes  Mobility Issues: Yes  Prior Services: Home-Independent  Patient Expects to be Discharged to::  (home with spouse)  Assistance Needed: No  Durable Medical Equipment: Walker    Discharge Preparedness  What is your plan after discharge?: Home with help  What are your discharge supports?: Spouse  Prior Functional Level: Ambulatory    Functional Assesment  Prior Functional Level: Ambulatory    Finances  Financial Barriers to Discharge: No     Advance Directive  Advance Directive?: None  Advance Directive offered?: AD Booklet given    Domestic Abuse  Have you ever been the victim of abuse or violence?: No  Physical Abuse or Sexual Abuse: No  Verbal Abuse or Emotional Abuse: No  Possible Abuse Reported to:: Not Applicable    Psychological Assessment  History of Substance Abuse: None     Pt reports he plans on return home with spouse. Pt and pt's wife given advance directive packet to complete as requested. Pt is a  and was educated about the [place] assist with power of attorneys.

## 2019-06-16 LAB
GLUCOSE BLD-MCNC: 106 MG/DL (ref 65–99)
GLUCOSE BLD-MCNC: 182 MG/DL (ref 65–99)
GLUCOSE BLD-MCNC: 190 MG/DL (ref 65–99)
GLUCOSE BLD-MCNC: 206 MG/DL (ref 65–99)
HCT VFR BLD AUTO: 26.8 % (ref 42–52)
HCT VFR BLD AUTO: 31.5 % (ref 42–52)
HGB BLD-MCNC: 10.3 G/DL (ref 14–18)
HGB BLD-MCNC: 8.9 G/DL (ref 14–18)

## 2019-06-16 PROCEDURE — 700102 HCHG RX REV CODE 250 W/ 637 OVERRIDE(OP): Performed by: STUDENT IN AN ORGANIZED HEALTH CARE EDUCATION/TRAINING PROGRAM

## 2019-06-16 PROCEDURE — A9270 NON-COVERED ITEM OR SERVICE: HCPCS | Performed by: STUDENT IN AN ORGANIZED HEALTH CARE EDUCATION/TRAINING PROGRAM

## 2019-06-16 PROCEDURE — 36415 COLL VENOUS BLD VENIPUNCTURE: CPT

## 2019-06-16 PROCEDURE — 85018 HEMOGLOBIN: CPT | Mod: 91

## 2019-06-16 PROCEDURE — A9270 NON-COVERED ITEM OR SERVICE: HCPCS | Performed by: INTERNAL MEDICINE

## 2019-06-16 PROCEDURE — 85014 HEMATOCRIT: CPT

## 2019-06-16 PROCEDURE — 82962 GLUCOSE BLOOD TEST: CPT | Mod: 91

## 2019-06-16 PROCEDURE — 700102 HCHG RX REV CODE 250 W/ 637 OVERRIDE(OP): Performed by: INTERNAL MEDICINE

## 2019-06-16 PROCEDURE — 770020 HCHG ROOM/CARE - TELE (206)

## 2019-06-16 PROCEDURE — 99232 SBSQ HOSP IP/OBS MODERATE 35: CPT | Mod: GC | Performed by: INTERNAL MEDICINE

## 2019-06-16 RX ORDER — SIMETHICONE 40MG/0.6ML
40 SUSPENSION, DROPS(FINAL DOSAGE FORM)(ML) ORAL ONCE
Status: COMPLETED | OUTPATIENT
Start: 2019-06-16 | End: 2019-06-17

## 2019-06-16 RX ORDER — METOCLOPRAMIDE 10 MG/1
5 TABLET ORAL EVERY 6 HOURS PRN
Status: DISCONTINUED | OUTPATIENT
Start: 2019-06-16 | End: 2019-06-22 | Stop reason: HOSPADM

## 2019-06-16 RX ORDER — PEG-3350, SODIUM SULFATE, SODIUM CHLORIDE, POTASSIUM CHLORIDE, SODIUM ASCORBATE AND ASCORBIC ACID 7.5-2.691G
100 KIT ORAL 2 TIMES DAILY
Status: COMPLETED | OUTPATIENT
Start: 2019-06-16 | End: 2019-06-16

## 2019-06-16 RX ADMIN — METOPROLOL TARTRATE 25 MG: 25 TABLET, FILM COATED ORAL at 17:37

## 2019-06-16 RX ADMIN — ACETAMINOPHEN 650 MG: 325 TABLET, FILM COATED ORAL at 22:23

## 2019-06-16 RX ADMIN — POLYETHYLENE GLYCOL 3350, SODIUM SULFATE, SODIUM CHLORIDE, POTASSIUM CHLORIDE, ASCORBIC ACID, SODIUM ASCORBATE 100 G: KIT at 22:21

## 2019-06-16 RX ADMIN — OMEPRAZOLE 40 MG: 20 CAPSULE, DELAYED RELEASE ORAL at 05:45

## 2019-06-16 RX ADMIN — INSULIN HUMAN 3 UNITS: 100 INJECTION, SOLUTION PARENTERAL at 11:33

## 2019-06-16 RX ADMIN — CLOPIDOGREL BISULFATE 75 MG: 75 TABLET ORAL at 05:45

## 2019-06-16 RX ADMIN — POLYETHYLENE GLYCOL 3350, SODIUM SULFATE, SODIUM CHLORIDE, POTASSIUM CHLORIDE, ASCORBIC ACID, SODIUM ASCORBATE 100 G: KIT at 17:37

## 2019-06-16 RX ADMIN — TAMSULOSIN HYDROCHLORIDE 0.4 MG: 0.4 CAPSULE ORAL at 09:56

## 2019-06-16 RX ADMIN — METOPROLOL TARTRATE 25 MG: 25 TABLET, FILM COATED ORAL at 05:45

## 2019-06-16 RX ADMIN — CYANOCOBALAMIN TAB 500 MCG 1000 MCG: 500 TAB at 05:45

## 2019-06-16 RX ADMIN — ACETAMINOPHEN 650 MG: 325 TABLET, FILM COATED ORAL at 00:35

## 2019-06-16 RX ADMIN — ATORVASTATIN CALCIUM 80 MG: 80 TABLET, FILM COATED ORAL at 17:37

## 2019-06-16 RX ADMIN — ACETAMINOPHEN 650 MG: 325 TABLET, FILM COATED ORAL at 10:11

## 2019-06-16 RX ADMIN — OMEPRAZOLE 40 MG: 20 CAPSULE, DELAYED RELEASE ORAL at 17:37

## 2019-06-16 ASSESSMENT — ENCOUNTER SYMPTOMS
BRUISES/BLEEDS EASILY: 0
SHORTNESS OF BREATH: 0
NAUSEA: 0
PSYCHIATRIC NEGATIVE: 1
WEAKNESS: 1
HEADACHES: 0
NAUSEA: 1
FALLS: 0
MYALGIAS: 0
DIZZINESS: 0
FEVER: 0
BLOOD IN STOOL: 1
DEPRESSION: 0
DIARRHEA: 0
VOMITING: 0
BACK PAIN: 0
ORTHOPNEA: 0
FOCAL WEAKNESS: 0
BRUISES/BLEEDS EASILY: 1
CHILLS: 0
ABDOMINAL PAIN: 0
RESPIRATORY NEGATIVE: 1
SORE THROAT: 0
COUGH: 0
NERVOUS/ANXIOUS: 0
MUSCULOSKELETAL NEGATIVE: 1
DOUBLE VISION: 0
INSOMNIA: 0
BLURRED VISION: 0
SPUTUM PRODUCTION: 0
CARDIOVASCULAR NEGATIVE: 1
CONSTIPATION: 0

## 2019-06-16 ASSESSMENT — LIFESTYLE VARIABLES: SUBSTANCE_ABUSE: 0

## 2019-06-16 NOTE — PROGRESS NOTES
Reason for consultation /admission : NSTEMI    Denies CP or SON  No new cardiac complaints    Review of systems;    General: No fever, chills, no weakness  HENT: No sore throat, no neck pain  Eyes: No blurred vision or double vision  Heart: No palpitation, no PND or orthopnea, no leg swelling  Lung: No productive cough, no hemoptysis  Abdomen: No abdominal pain, no nausea vomiting diarrhea or blood in stool  : No dysuria, no frequency or hesitancy, no hematuria  Musculoskeletal: No myalgia, no back pain, some joint pain  Hematology: No easy bruising  Skin: No rash or itching  Neurological: No headache, no new focal weakness or numbness  Psychological: Denies depression, anxiety or insomnia  All other review of systems are negative      Temp:  [36.3 °C (97.3 °F)-37.5 °C (99.5 °F)] 36.3 °C (97.3 °F)  Pulse:  [] 96  Resp:  [16-20] 16  BP: (102-149)/(53-73) 103/53  SpO2:  [94 %-100 %] 98 %  GENERAL not in acute distress, not dyspnic at rest  Head atraumatic, normocephalic  Eyes EOMI  ENT neck supple, no JVD, no carotid bruits or thyromegaly  Lung good expansion, distant sound, no rales or wheezing  Heart RRR, normal rate, no murmur, gallop or rub  Abd soft, no tenderness, mass or bruits  Ext no edema  Skin no ecchymosis or petechiae  Musculoskeletal no deformity  Neuro grossly intact  Psych normal mood, normal affect    Monitor reviewed by me showed no significant ventricular or atrial dysrhythmias.    Labs: 7.8 ear;ier this AM but 9.3 this PM    Cr 0.7, K+ 3.6    Assessment and plans    1. NSTEMI likely type II  Hemodynamically stable, no sig arrhythmias or angina  With GI bleeding, would hold off on invasive procedure for now unless develop refractory CP or become unstable  Continue betablocker and statin  On clopidogrel  No obvious bleeding     2. Acute blood loss anemia  Hb somewhat fluctuating?  GI evaluation in process     3. Pulmonary edema, acute diastolic HF resolved/improved  Acute diastolic HF/volume  overloaded  Watch I&O. Try without diuretic    Not a good candidate for CABG  Not a good candidate for PCI due to GI bleeding at present until  evaluation complete  Med Rx from cardiology standpoint for now  Will sign off  Please re-notify for further assistance    Please note that this dictation was created using voice recognition software. I have worked with consultants from the vendor as well as technical experts from [facility] to optimize the interface. I have made every reasonable attempt to correct obvious errors, but I expect that there are errors of grammar and possibly content I did not discover before finalizing the note

## 2019-06-16 NOTE — PROGRESS NOTES
Received report from day shift RN.  Assumed care at 1900. Pt denies any discomfort at this time. Updated white board. Call light within reach. Bed locked and in lowest position.  Non skid socks on, Belongings within  Reach.  Will continue to monitor hourly.

## 2019-06-16 NOTE — CARE PLAN
"Problem: Bronchoconstriction:  Goal: Improve in air movement and diminished wheezing  Outcome: PROGRESSING AS EXPECTED      Respiratory Update    Treatment modality: SVN  Frequency:     Pt tolerating current treatments well with no adverse reactions.     Patient again refused treatment stating \"He does not know why he is taking them and feels clear.\"      "

## 2019-06-16 NOTE — PROGRESS NOTES
Gastroenterology ([facility]) Progress Note     Author: Salud Cruz   Date & Time Created: 6/16/2019 4:21 PM    Chief Complaint:  Chronic overt obscure GI bleeding    Interval History:  Patient had history of melena but none in the last few days.  He also denied hematochezia, abdominal pain or vomiting.  He did have some self-limited nausea of mild severity.  Of note, I spoke to patient's [facility] resident about patient to obtain further history.  Denied further modifying factors, associated symptoms or timing issues.    HISTORY OF PRESENT ILLNESS:  A 68-year-old  male with multivessel coronary artery disease and recent non-ST elevation myocardial infarction as well as diabetic ketoacidosis, presented with Hemoccult positive stools.    Patient was hospitalized at [facility] since 06/10 with non-ST elevation myocardial infarction, diabetic ketoacidosis, high anion gap metabolic acidosis, leukocytosis, urinary retention, macrocytic anemia.  He was found to have an elevated troponin that bumped from 06/09/2019 at 2234 of 1.58 and then increased to 3.9 on 06/10/2019 at 0250 and then peaked at 30.99 on 06/10/2019 at 1320.  During his evaluation, he was noted to have anemia, which nadired at 6.7 and 19.6 respectively on 06/11.  He received 2 units of packed red blood cells.  Due to his downtrending anemia, he had a stool Hemoccult that was checked that was positive.  However, he did not have any bertin GI bleeding.  He has, however, had intermittent melena for several years.  He is a  and was seen at the [facility] and reportedly has had multiple EGDs and colonoscopies, which were nondiagnostic for a source of his GI bleeding in the past.  On CT scan, he was noted to have some possible colitis, but patient denied any diarrhea or hematochezia symptoms.    ===  6/16/2019 Doing fine. Explained to the patient about VCE, including possible retained capsule requiring surgical removal. Pt agreed to  proceed. Will prep today.    Review of Systems:  Review of Systems   Respiratory: Negative.    Cardiovascular: Negative.    Gastrointestinal: Positive for nausea.   Genitourinary: Negative.    Musculoskeletal: Negative.    Skin: Negative.    Neurological: Positive for weakness.   Endo/Heme/Allergies: Bruises/bleeds easily.   Psychiatric/Behavioral: Negative.    All other systems reviewed and are negative.      Physical Exam:  Physical Exam   Constitutional: He is oriented to person, place, and time. He appears well-developed and well-nourished.   HENT:   Head: Normocephalic and atraumatic.   Mouth/Throat: No oropharyngeal exudate.   Eyes: Pupils are equal, round, and reactive to light. EOM are normal. No scleral icterus.   Neck: Normal range of motion. Neck supple. No JVD present. No tracheal deviation present.   Cardiovascular: Normal rate, regular rhythm and intact distal pulses.    Murmur heard.  Pulmonary/Chest: Breath sounds normal. No stridor. No respiratory distress. He has no wheezes.   Abdominal: Soft. Bowel sounds are normal. He exhibits no distension. There is no tenderness. There is no rebound and no guarding.   Musculoskeletal: Normal range of motion. He exhibits edema. He exhibits no tenderness.   Neurological: He is alert and oriented to person, place, and time. No cranial nerve deficit. Coordination normal.   Skin: Skin is warm and dry. No rash noted. No erythema. No pallor.   Psychiatric: He has a normal mood and affect. His behavior is normal. Judgment and thought content normal.   Nursing note and vitals reviewed.      Labs:          Recent Labs      06/14/19   0249   SODIUM  140   POTASSIUM  3.6   CHLORIDE  108   CO2  24   BUN  13   CREATININE  0.73   MAGNESIUM  1.9   CALCIUM  8.1*     Recent Labs      06/14/19   0249   GLUCOSE  71     Recent Labs      06/13/19   1717  06/14/19   0249   06/15/19   0401  06/15/19   1519  06/16/19   0245   RBC  2.78*  2.69*   --   2.41*   --    --    HEMOGLOBIN   8.9*  8.7*   < >  7.8*  9.3*  8.9*   HEMATOCRIT  27.5*  26.6*   < >  23.8*  28.4*  26.8*   PLATELETCT  137*  125*   --   119*   --    --     < > = values in this interval not displayed.     Recent Labs      19   1717  19   0249  06/15/19   0401   WBC  8.1  8.5  6.7   NEUTSPOLYS   --   70.70  62.70   LYMPHOCYTES   --   18.30*  22.80   MONOCYTES   --   9.50  12.60   EOSINOPHILS   --   1.10  1.50   BASOPHILS   --   0.20  0.10     Hemodynamics:  Temp (24hrs), Av.1 °C (98.7 °F), Min:36.6 °C (97.9 °F), Max:37.5 °C (99.5 °F)  Temperature: 36.7 °C (98.1 °F)  Pulse  Av.1  Min: 34  Max: 131   Blood Pressure : 142/74     Respiratory:    Respiration: 16, Pulse Oximetry: 93 %, O2 Daily Delivery Respiratory : Room Air with O2 Available     Given By:: Mouthpiece, Work Of Breathing / Effort: Mild  RUL Breath Sounds: Clear, RML Breath Sounds: Diminished, RLL Breath Sounds: Clear, CAROL Breath Sounds: Clear, LLL Breath Sounds: Diminished  Fluids:    Intake/Output Summary (Last 24 hours) at 19 1027  Last data filed at 19 0852   Gross per 24 hour   Intake              240 ml   Output             1300 ml   Net            -1060 ml     Weight: 56.5 kg (124 lb 9 oz)  GI/Nutrition:  Orders Placed This Encounter   Procedures   • Diet Order Clear Liquids - No Red Foods, Diabetic, Cardiac     Standing Status:   Standing     Number of Occurrences:   1     Order Specific Question:   Diet:     Answer:   Clear Liquids - No Red Foods [12]     Order Specific Question:   Diet:     Answer:   Diabetic [3]     Order Specific Question:   Diet:     Answer:   Cardiac [6]   • Diet NPO     Standing Status:   Standing     Number of Occurrences:   1     Order Specific Question:   Restrict to:     Answer:   Sips with Medications [3]     Medical Decision Making, by Problem:  Active Hospital Problems    Diagnosis   • *Diabetic ketoacidosis without coma associated with type 2 diabetes mellitus (HCC) [E11.10]   • Acute blood loss  anemia [D62]   • NSTEMI (non-ST elevated myocardial infarction) ([facility]) [I21.4]   • Leukocytosis [D72.829]   • Colitis [K52.9]   • Urine retention [R33.9]   • Type 2 diabetes mellitus with hyperglycemia, with long-term current use of insulin ([facility]) [E11.65, Z79.4]   • Acute respiratory failure with hypoxia ([facility]) [J96.01]   • Essential hypertension [I10]   • Macrocytic anemia [D53.9]   • Positive D dimer [R79.89]   • GI bleeding [K92.2]   • Acute pulmonary edema ([facility]) [J81.0]   • Generalized abdominal pain [R10.84]     Problems:  1.  Occult overt GI bleeding.  2.  Fecal hemoccult-positive stools  3.  Anemia from chronic blood loss, transfusion requiring  4.  Non-ST elevation myocardial infarction.  5.  History of melena.  6.  Multivessel coronary artery disease.  7.  Diabetic ketoacidosis.  8.  Poorly controlled diabetes  9.  History of stroke  10.  Urinary retention  11.  Hyperlipidemia  12.  History of GERD  13.  Hypertension  14.  Benign prostate hypertrophy  15.  History of cardiac catheterization, 3-vessel disease, recommended CABG when stable from other medical problems but never pursued  16.  History of left below-knee amputation  17.  Cardiac pacemaker.  18.  Ex-smoker, 30 pack-year history  19.  Complex patient with significant comorbidities at increased risk for adverse outcomes     Recommendations:   - Avoid NSAIDs.  - PPI  - Patency capsule not available.   - Risks prohibitive with his recent myocardial infarction, multivessel coronary artery disease that has documented need for CABG that was never pursued, poorly controlled diabetes with diabetic ketoacidosis etc for endoscopic evaluation (e.g EGD/colonoscopsy)   - Cl liq diet  - NPO after midnight  - Prep today for VCE tomorrow  - Risks, benefits, and alternatives were discussed with patient. Consenting persons were given an opportunity to ask questions and discuss other options. Risks including but not limited to failed or incomplete endoscopy,  ineffective therapy, perforation, infection, bleeding, missed lesion(s), cardiac and/or pulmonary event, aspiration, stroke, possible need for surgery, hospitalization possibly prolonged, discomfort, unsuccessful and/or incomplete procedure, possible need for repeat procedures and/or additional testings, damage to adjacent organs and/or vascular structures, medication reaction, disability, death, and other adverse events possibly life-threatening. Discussion was undertaken with Layman's terms. Consenting persons stated understanding and acceptance of these risks, and wished to proceed. Consent was given in clear state of mind. All questions were answered.    Quality-Core Measures   Reviewed items::  Medications reviewed, Labs reviewed and EKG reviewed

## 2019-06-16 NOTE — PROGRESS NOTES
Internal Medicine Interval Note  Note Author: Telly Keller M.D.     Name Jarret Bright     1950   Age/Sex 68 y.o. male   MRN [id number]   Code Status Full Code     After 5PM or if no immediate response to page, please call for cross-coverage  Attending/Team: Dr Shields/ Ramiro See Patient List for primary contact information  Call [phone] to page    1st Call - Day Intern (R1):   Dr Keller 2nd Call - Day Sr. Resident (R2/R3):   Dr Smiley         Reason for interval visit  (Principal Problem)   NSTEMI  GI bleed, likely upper  Pulmonary edema  DKA, resolved      Interval Problem Daily Status Update  (24 hours, problem oriented, brief subjective history, new lab/imaging data pertinent to that problem)     Patient seen and examined bedside, stable, did not require transfusion as Hb>8.  No complaints of chest pain/shortness of breath/palpitations.  Most recent bowel movement 2019, small quantity, no bertin blood or tarry consistency.  Normal appetite, blood sugars well controlled, no further episodes of hypoglycemia.  N.p.o. from midnight for procedure tomorrow, capsule endoscopy per GI.    [facility]GD  distal esophageal ulcer, mild gastritis, no mass.  EGD 3/19 healed distal esophageal ulcer.  Recent gastric emptying study negative for any lesions, normal study.          Review of Systems   Constitutional: Negative for chills and fever.   HENT: Negative for congestion and sore throat.    Eyes: Negative for blurred vision and double vision.   Respiratory: Negative for cough, sputum production and shortness of breath.    Cardiovascular: Negative for chest pain, orthopnea and leg swelling.   Gastrointestinal: Positive for blood in stool and melena. Negative for abdominal pain, constipation, diarrhea, nausea and vomiting.   Genitourinary: Negative for dysuria, frequency and urgency.   Musculoskeletal: Negative for back pain, falls, joint pain and myalgias.   Skin: Negative for itching  and rash.   Neurological: Negative for dizziness, focal weakness and headaches.   Endo/Heme/Allergies: Negative for environmental allergies. Does not bruise/bleed easily.   Psychiatric/Behavioral: Negative for depression and substance abuse. The patient is not nervous/anxious and does not have insomnia.        Disposition/Barriers to discharge:   Capsule endoscopy 6/17/2019  Cardiac catheterization to follow    Consultants/Specialty  Cardiology  Gastroenterology  Pulmonology  PCP: Prashanth Ocasio      Quality Measures  Quality-Core Measures   Reviewed items::  EKG reviewed, Labs reviewed, Medications reviewed and Radiology images reviewed  Reed catheter::  No Reed  DVT prophylaxis pharmacological::  Contraindicated - High bleeding risk  DVT prophylaxis - mechanical:  SCDs  Ulcer Prophylaxis::  Not indicated          Physical Exam       Vitals:    06/16/19 0009 06/16/19 0425 06/16/19 0800 06/16/19 1117   BP: 115/58 137/69 141/80 142/74   Pulse: 88 83 85 76   Resp: 18 16 16 16   Temp: 37.5 °C (99.5 °F) 37.2 °C (98.9 °F) 37.2 °C (99 °F) 36.7 °C (98.1 °F)   TempSrc: Tympanic Temporal Temporal Temporal   SpO2: 98% 94% 92% 93%   Weight: 56.5 kg (124 lb 9 oz)      Height:         Body mass index is 19.51 kg/m². Weight: 56.5 kg (124 lb 9 oz)  Oxygen Therapy:  Pulse Oximetry: 93 %, O2 (LPM): 0, O2 Delivery: None (Room Air)    Physical Exam   Constitutional: He is oriented to person, place, and time and well-developed, well-nourished, and in no distress. No distress.   HENT:   Head: Normocephalic and atraumatic.   Right Ear: External ear normal.   Left Ear: External ear normal.   Nose: Nose normal.   Mouth/Throat: Oropharynx is clear and moist. No oropharyngeal exudate.   Eyes: Conjunctivae and EOM are normal. Right eye exhibits no discharge. Left eye exhibits no discharge. No scleral icterus.   Neck: Normal range of motion. Neck supple. No JVD present.   Cardiovascular: Normal rate, regular rhythm and normal heart sounds.     No murmur heard.  Pulmonary/Chest: Effort normal and breath sounds normal. No respiratory distress. He has no rales.   Abdominal: Soft. Bowel sounds are normal. He exhibits no distension. There is no tenderness.   Genitourinary:   Genitourinary Comments: Deferred   Musculoskeletal: Normal range of motion. He exhibits no edema or tenderness.   Right BKA   Lymphadenopathy:     He has no cervical adenopathy.   Neurological: He is alert and oriented to person, place, and time. He exhibits normal muscle tone. GCS score is 15.   Skin: Skin is warm and dry. No rash noted. He is not diaphoretic. No erythema.   Psychiatric: Mood, memory, affect and judgment normal.   Vitals reviewed.      Assessment/Plan     * Diabetic ketoacidosis without coma associated with type 2 diabetes mellitus (HCC)- (present on admission)   Assessment & Plan    Resolved.  A1C 6.9.  It is unclear whether patient is compliant with medications on outpatient basis.  Plan  -10 units insulin glargine nightly  -Insulin sliding scale with hypoglycemia protocol     Acute blood loss anemia   Assessment & Plan    Likely secondary to upper GI bleed/small intestinal bleed, history of esophageal ulcer, significant heavy alcohol consumption history  Monitoring H&H, transfuse for Hb<8 as concomitant severe cardiac disease     NSTEMI (non-ST elevated myocardial infarction) (HCC)- (present on admission)   Assessment & Plan    Initially thought to have Type II MI, then troponins increased to 30. Cardiology called and plan was for cath, cath held due to possible bleeding. However, they note that patients EKG revealed improvement with out intervention, only treating DKA. Likely CAD exacerbated by DKA. Will correct DKA and monitor Cardiac fucntion.   Plan  -Statin, beta-blockers  -Patient started on clopidogrel on 6/13/2019     Leukocytosis- (present on admission)   Assessment & Plan    Improved. Possible secondary to stress and NSTEMI  Plan  - Continue to monitor with  CBC     Colitis   Assessment & Plan    CT: mild thick walled appearance to the entire colon which may be due to the fact that it is decompressed. Mild colitis is in the differential.  Possible related to DKA episode  Plan  - Monitor closely     Urine retention- (present on admission)   Assessment & Plan    Acute urinary retention. Mcnair was placed in outside facility however patient was having significant discomfort with mcnair therefore removed here.   Plan  - Continue flomax  - Serial bladder scans; if patient is unable to void, will need to replace mcnair     Type 2 diabetes mellitus with hyperglycemia, with long-term current use of insulin (HCC)- (present on admission)   Assessment & Plan    On oral and insulin at home per med rec.patient currently on 10 units glargine and insulin sliding scale with hypoglycemia protocol.  Medication compliance questionable  Monitor blood sugars with nocturnal hypoglycemia to 60s, hyperglycemia to 200s and daytime  Plan  - Can consider diabetes educator consult once patient has improved  -Continue glargine at 10 nightly, SSI, hypoglycemia protocol, he consistently finishes 80 - 100% of his meals     Acute respiratory failure with hypoxia (HCC)- (present on admission)   Assessment & Plan    Notified by nursing staff the patient was having increased work of breathing in ICU, acute pulmonary edema and hypoxia.  Patient received intravenous diuresis, patient was also placed on BiPAP PRN. Patient's respiratory status much improved.  Currently supplemental oxygen requirement of 0-1 LPM  Plan  -Continue to monitor     Positive D dimer   Assessment & Plan    Patient was noted to have a positive d-dimer.  Ultrasound lower extremities did not reveal any thrombi.  Patient currently has SCDs.  D-dimer could be elevated due to age and patient's cardiac issues.  Plan  -Continue to monitor     Acute pulmonary edema (HCC)   Assessment & Plan    Patient was noted to have pulmonary edema on PE and  imaging.  Likely secondary to necessary intravenous volume resuscitation.  Plan  -Patient was diuresed and respiratory status improved.  -Lasix discontinued     GI bleeding   Assessment & Plan    Patient initially had hematemesis 20 mL, followed by melenic stools with drop in Hb from 10 to <7, was transfused 2 PRBCs, monitoring H&H  Has had several EGDs in the past showing active and healed esophageal ulcers as well as a gastric emptying study, most recent EGD 3/19, [facility] with [facility]  Significant alcohol consumption history but has not consumed alcohol for several years, no history of esophageal varices  Takes several Tylenol a day for his headaches, also takes aspirin  Plan  N.p.o. from midnight of 6/16 for capsule endoscopy on 6/17 a.m. per gastro with cardiac catheterization to follow when patient is more stable hematologically  Transfuse as needed     Essential hypertension- (present on admission)   Assessment & Plan    On lisinopril/HCTZ 10/12/5 mg daily per med rec  Plan  - Holding for now in the setting of fluid resuscitation  - Resume once more stable     Macrocytic anemia- (present on admission)   Assessment & Plan    Vitamin B12 deficient, folate wnl.  Plan  -Cyanocobalamin administration

## 2019-06-17 LAB
BASOPHILS # BLD AUTO: 0.7 % (ref 0–1.8)
BASOPHILS # BLD: 0.04 K/UL (ref 0–0.12)
EOSINOPHIL # BLD AUTO: 0.18 K/UL (ref 0–0.51)
EOSINOPHIL NFR BLD: 3 % (ref 0–6.9)
ERYTHROCYTE [DISTWIDTH] IN BLOOD BY AUTOMATED COUNT: 48.6 FL (ref 35.9–50)
GLUCOSE BLD-MCNC: 118 MG/DL (ref 65–99)
GLUCOSE BLD-MCNC: 159 MG/DL (ref 65–99)
GLUCOSE BLD-MCNC: 183 MG/DL (ref 65–99)
HCT VFR BLD AUTO: 29.6 % (ref 42–52)
HGB BLD-MCNC: 9.7 G/DL (ref 14–18)
IMM GRANULOCYTES # BLD AUTO: 0.05 K/UL (ref 0–0.11)
IMM GRANULOCYTES NFR BLD AUTO: 0.8 % (ref 0–0.9)
INR PPP: 1.12 (ref 0.87–1.13)
LYMPHOCYTES # BLD AUTO: 1.75 K/UL (ref 1–4.8)
LYMPHOCYTES NFR BLD: 28.8 % (ref 22–41)
MCH RBC QN AUTO: 31.4 PG (ref 27–33)
MCHC RBC AUTO-ENTMCNC: 32.8 G/DL (ref 33.7–35.3)
MCV RBC AUTO: 95.8 FL (ref 81.4–97.8)
MONOCYTES # BLD AUTO: 0.9 K/UL (ref 0–0.85)
MONOCYTES NFR BLD AUTO: 14.8 % (ref 0–13.4)
NEUTROPHILS # BLD AUTO: 3.15 K/UL (ref 1.82–7.42)
NEUTROPHILS NFR BLD: 51.9 % (ref 44–72)
NRBC # BLD AUTO: 0 K/UL
NRBC BLD-RTO: 0 /100 WBC
PLATELET # BLD AUTO: 183 K/UL (ref 164–446)
PMV BLD AUTO: 9 FL (ref 9–12.9)
PROTHROMBIN TIME: 14.6 SEC (ref 12–14.6)
RBC # BLD AUTO: 3.09 M/UL (ref 4.7–6.1)
WBC # BLD AUTO: 6.1 K/UL (ref 4.8–10.8)

## 2019-06-17 PROCEDURE — 700102 HCHG RX REV CODE 250 W/ 637 OVERRIDE(OP): Performed by: INTERNAL MEDICINE

## 2019-06-17 PROCEDURE — 94760 N-INVAS EAR/PLS OXIMETRY 1: CPT

## 2019-06-17 PROCEDURE — 99233 SBSQ HOSP IP/OBS HIGH 50: CPT | Mod: GC | Performed by: INTERNAL MEDICINE

## 2019-06-17 PROCEDURE — A9270 NON-COVERED ITEM OR SERVICE: HCPCS | Performed by: STUDENT IN AN ORGANIZED HEALTH CARE EDUCATION/TRAINING PROGRAM

## 2019-06-17 PROCEDURE — 160048 HCHG OR STATISTICAL LEVEL 1-5: Performed by: INTERNAL MEDICINE

## 2019-06-17 PROCEDURE — 700102 HCHG RX REV CODE 250 W/ 637 OVERRIDE(OP): Performed by: STUDENT IN AN ORGANIZED HEALTH CARE EDUCATION/TRAINING PROGRAM

## 2019-06-17 PROCEDURE — 91110 GI TRC IMG INTRAL ESOPH-ILE: CPT | Performed by: INTERNAL MEDICINE

## 2019-06-17 PROCEDURE — 85610 PROTHROMBIN TIME: CPT

## 2019-06-17 PROCEDURE — A9270 NON-COVERED ITEM OR SERVICE: HCPCS | Performed by: INTERNAL MEDICINE

## 2019-06-17 PROCEDURE — 85025 COMPLETE CBC W/AUTO DIFF WBC: CPT

## 2019-06-17 PROCEDURE — 36415 COLL VENOUS BLD VENIPUNCTURE: CPT

## 2019-06-17 PROCEDURE — 770020 HCHG ROOM/CARE - TELE (206)

## 2019-06-17 PROCEDURE — 82962 GLUCOSE BLOOD TEST: CPT

## 2019-06-17 RX ORDER — LISINOPRIL 5 MG/1
5 TABLET ORAL
Status: DISCONTINUED | OUTPATIENT
Start: 2019-06-17 | End: 2019-06-21

## 2019-06-17 RX ADMIN — ACETAMINOPHEN 650 MG: 325 TABLET, FILM COATED ORAL at 22:59

## 2019-06-17 RX ADMIN — LISINOPRIL 5 MG: 5 TABLET ORAL at 10:10

## 2019-06-17 RX ADMIN — SIMETHICONE 40 MG: 63.3; 3.7 SOLUTION/ DROPS ORAL at 07:54

## 2019-06-17 RX ADMIN — TAMSULOSIN HYDROCHLORIDE 0.4 MG: 0.4 CAPSULE ORAL at 10:10

## 2019-06-17 RX ADMIN — OMEPRAZOLE 40 MG: 20 CAPSULE, DELAYED RELEASE ORAL at 05:37

## 2019-06-17 RX ADMIN — CLOPIDOGREL BISULFATE 75 MG: 75 TABLET ORAL at 05:37

## 2019-06-17 RX ADMIN — INSULIN GLARGINE 10 UNITS: 100 INJECTION, SOLUTION SUBCUTANEOUS at 17:47

## 2019-06-17 RX ADMIN — INSULIN HUMAN 2 UNITS: 100 INJECTION, SOLUTION PARENTERAL at 17:47

## 2019-06-17 RX ADMIN — OMEPRAZOLE 40 MG: 20 CAPSULE, DELAYED RELEASE ORAL at 17:46

## 2019-06-17 RX ADMIN — CYANOCOBALAMIN TAB 500 MCG 1000 MCG: 500 TAB at 05:37

## 2019-06-17 RX ADMIN — METOPROLOL TARTRATE 25 MG: 25 TABLET, FILM COATED ORAL at 17:46

## 2019-06-17 RX ADMIN — ACETAMINOPHEN 650 MG: 325 TABLET, FILM COATED ORAL at 15:49

## 2019-06-17 RX ADMIN — ATORVASTATIN CALCIUM 80 MG: 80 TABLET, FILM COATED ORAL at 17:46

## 2019-06-17 RX ADMIN — METOPROLOL TARTRATE 25 MG: 25 TABLET, FILM COATED ORAL at 05:37

## 2019-06-17 RX ADMIN — ACETAMINOPHEN 650 MG: 325 TABLET, FILM COATED ORAL at 05:37

## 2019-06-17 ASSESSMENT — ENCOUNTER SYMPTOMS
DEPRESSION: 0
BLOOD IN STOOL: 1
DIZZINESS: 0
HEADACHES: 0
FEVER: 0
BACK PAIN: 0
BRUISES/BLEEDS EASILY: 0
CHILLS: 0
MYALGIAS: 0
WEAKNESS: 1
FALLS: 0
DIARRHEA: 0
NAUSEA: 0
RESPIRATORY NEGATIVE: 1
ORTHOPNEA: 0
INSOMNIA: 0
BLURRED VISION: 0
COUGH: 0
MUSCULOSKELETAL NEGATIVE: 1
CARDIOVASCULAR NEGATIVE: 1
SORE THROAT: 0
SPUTUM PRODUCTION: 0
CONSTIPATION: 0
VOMITING: 0
ABDOMINAL PAIN: 0
DOUBLE VISION: 0
SHORTNESS OF BREATH: 0
BRUISES/BLEEDS EASILY: 1
NERVOUS/ANXIOUS: 0
NAUSEA: 1
FOCAL WEAKNESS: 0
PSYCHIATRIC NEGATIVE: 1

## 2019-06-17 ASSESSMENT — LIFESTYLE VARIABLES: SUBSTANCE_ABUSE: 0

## 2019-06-17 NOTE — PROGRESS NOTES
Internal Medicine Interval Note  Note Author: Telly Keller M.D.     Name Jarret Bright     1950   Age/Sex 68 y.o. male   MRN [id number]   Code Status Full Code     After 5PM or if no immediate response to page, please call for cross-coverage  Attending/Team: Dr Shields/ Ramiro See Patient List for primary contact information  Call [phone] to page    1st Call - Day Intern (R1):   Dr Keller 2nd Call - Day Sr. Resident (R2/R3):   Dr Smiley         Reason for interval visit  (Principal Problem)   NSTEMI  GI bleed, likely upper  Pulmonary edema  DKA, resolved      Interval Problem Daily Status Update  (24 hours, problem oriented, brief subjective history, new lab/imaging data pertinent to that problem)     Patient seen and examined bedside, stable, and no acute events overnight.  Hemoglobin stable, no indication for transfusion.  No further melenic/frankly bloody bowel movements.  Capsule endoscopy in progress, patient ingested capsule this a.m., no adverse reactions.  To follow protocol and get imaging of abdomen once capsule has passed/to confirm passage of capsule after 48 hours.  Cardiology on board, likely catheterization once GI bleed proved to be either stable or no longer an active source or source controlled.    [facility]  distal [facility] ulcer, mild gastritis, no mass.  EGD 3/19 healed distal esophageal ulcer.  Recent gastric emptying study negative for any lesions, normal study.          Review of Systems   Constitutional: Negative for chills and fever.   HENT: Negative for congestion and sore throat.    Eyes: Negative for blurred vision and double vision.   Respiratory: Negative for cough, sputum production and shortness of breath.    Cardiovascular: Negative for chest pain, orthopnea and leg swelling.   Gastrointestinal: Positive for blood in stool and melena. Negative for abdominal pain, constipation, diarrhea, nausea and vomiting.   Genitourinary: Negative for dysuria,  frequency and urgency.   Musculoskeletal: Negative for back pain, falls, joint pain and myalgias.   Skin: Negative for itching and rash.   Neurological: Negative for dizziness, focal weakness and headaches.   Endo/Heme/Allergies: Negative for environmental allergies. Does not bruise/bleed easily.   Psychiatric/Behavioral: Negative for depression and substance abuse. The patient is not nervous/anxious and does not have insomnia.        Disposition/Barriers to discharge:   Capsule endoscopy and follow-up studies  Cardiac catheterization to follow    Consultants/Specialty  Cardiology  Gastroenterology  Pulmonology  PCP: Prashanth Ocasio      Quality Measures  Quality-Core Measures   Reviewed items::  EKG reviewed, Labs reviewed, Medications reviewed and Radiology images reviewed  Reed catheter::  No Reed  DVT prophylaxis pharmacological::  Contraindicated - High bleeding risk  DVT prophylaxis - mechanical:  SCDs  Ulcer Prophylaxis::  Not indicated          Physical Exam       Vitals:    06/16/19 1930 06/17/19 0000 06/17/19 0400 06/17/19 0900   BP: 127/78 131/60 160/85 132/61   Pulse: 81 82 88 75   Resp: 16 18 18 17   Temp: 36.6 °C (97.9 °F) 36.8 °C (98.2 °F) 37 °C (98.6 °F) 36.7 °C (98 °F)   TempSrc: Temporal Temporal Temporal Temporal   SpO2: 98% 99% 95% 95%   Weight:       Height:         Body mass index is 19.51 kg/m².    Oxygen Therapy:  Pulse Oximetry: 95 %, O2 (LPM): 0, O2 Delivery: None (Room Air)    Physical Exam   Constitutional: He is oriented to person, place, and time and well-developed, well-nourished, and in no distress. No distress.   HENT:   Head: Normocephalic and atraumatic.   Right Ear: External ear normal.   Left Ear: External ear normal.   Nose: Nose normal.   Mouth/Throat: Oropharynx is clear and moist. No oropharyngeal exudate.   Eyes: Conjunctivae and EOM are normal. Right eye exhibits no discharge. Left eye exhibits no discharge. No scleral icterus.   Neck: Normal range of motion. Neck supple. No  JVD present.   Cardiovascular: Normal rate, regular rhythm and normal heart sounds.    No murmur heard.  Pulmonary/Chest: Effort normal and breath sounds normal. No respiratory distress. He has no rales.   Abdominal: Soft. Bowel sounds are normal. He exhibits no distension. There is no tenderness.   Genitourinary:   Genitourinary Comments: Deferred   Musculoskeletal: Normal range of motion. He exhibits no edema or tenderness.   Right BKA   Lymphadenopathy:     He has no cervical adenopathy.   Neurological: He is alert and oriented to person, place, and time. He exhibits normal muscle tone. GCS score is 15.   Skin: Skin is warm and dry. No rash noted. He is not diaphoretic. No erythema.   Psychiatric: Mood, memory, affect and judgment normal.   Vitals reviewed.      Assessment/Plan     * Diabetic ketoacidosis without coma associated with type 2 diabetes mellitus (HCC)- (present on admission)   Assessment & Plan    Resolved.  A1C 6.9.  It is unclear whether patient is compliant with medications on outpatient basis.  Plan  -10 units insulin glargine nightly  -Insulin sliding scale with hypoglycemia protocol     Acute blood loss anemia   Assessment & Plan    Likely secondary to upper GI bleed/small intestinal bleed, history of esophageal ulcer, significant heavy alcohol consumption history  Monitoring H&H, transfuse for Hb<8 as concomitant severe cardiac disease     NSTEMI (non-ST elevated myocardial infarction) (HCC)- (present on admission)   Assessment & Plan    Initially thought to have Type II MI, then troponins increased to 30. Cardiology called and plan was for cath, cath held due to possible bleeding. However, they note that patients EKG revealed improvement with out intervention, only treating DKA. Likely CAD exacerbated by DKA. Will correct DKA and monitor Cardiac fucntion.   Plan  -Statin, beta-blockers  -Patient started on clopidogrel on 6/13/2019  -Lisinopril started 5 mg     Leukocytosis- (present on  admission)   Assessment & Plan    Resolved     Colitis   Assessment & Plan    CT: thick walled appearance colon which may be due to the fact that it is decompressed. Mild colitis is in the differential.  Possible related to DKA episode  Plan  - Monitor closely     Urine retention- (present on admission)   Assessment & Plan    Acute urinary retention. Mcnair was placed in outside facility however patient was having significant discomfort with mcnair therefore removed here.   Plan  - Continue flomax  - Serial bladder scans; if patient is unable to void, will need to replace mcnair     Type 2 diabetes mellitus with hyperglycemia, with long-term current use of insulin (HCC)- (present on admission)   Assessment & Plan    On  insulin at home per med rec, patient currently on 10 units glargine and insulin sliding scale with hypoglycemia protocol.  Medication compliance questionable  Brittle diabetes with widely fluctuating blood sugars  Plan  -Can consider diabetes educator consult once patient has improved  -Continue glargine at 10 nightly, SSI, hypoglycemia protocol, he consistently finishes 80 - 100% of his meals     Acute respiratory failure with hypoxia (HCC)- (present on admission)   Assessment & Plan    Notified by nursing staff the patient was having increased work of breathing in ICU, acute pulmonary edema and hypoxia.  Patient received intravenous diuresis, patient was also placed on BiPAP PRN. Patient's respiratory status much improved.  Currently supplemental oxygen requirement of 0-1 LPM  Plan  -Continue to monitor     Positive D dimer   Assessment & Plan    Patient was noted to have a positive d-dimer.  Ultrasound lower extremities did not reveal any thrombi.  Patient currently has SCDs.  D-dimer could be elevated due to age and patient's cardiac issues.  Plan  -Continue to monitor     Acute pulmonary edema (HCC)   Assessment & Plan    Patient was noted to have pulmonary edema on PE and imaging.  Likely  secondary to necessary intravenous volume resuscitation.  Plan  -Patient was diuresed and respiratory status improved.  -Lasix discontinued     GI bleeding   Assessment & Plan    Patient initially had hematemesis 20 mL, followed by melenic stools with drop in Hb from 10 to <7, was transfused 2 PRBCs, monitoring H&H  Has had several EGDs in the past showing active and healed esophageal ulcers as well as a gastric emptying study, most recent EGD 3/19, [facility]  Significant alcohol consumption history but has not consumed alcohol for several years, no history of esophageal varices  Takes several Tylenol a day for his headaches, also takes aspirin  Plan  Capsule endoscopy in progress per gastro with cardiac catheterization to follow when patient is more stable hematologically  Transfuse as needed to maintain Hb>8     Essential hypertension- (present on admission)   Assessment & Plan    On lisinopril/HCTZ 10/12/5 mg daily per med rec  Plan  -Lisinopril restarted at 5         Macrocytic anemia- (present on admission)   Assessment & Plan    Likely secondary to B12 deficiency  Plan  B12 supplementation

## 2019-06-17 NOTE — DIETARY
Nutrition Services: Update   Day 8 of admit.  Jarret Bright is a 68 y.o. male with admitting DX of NSTEMI, DKA, type 2,   Occult GI bleeding    NPO for Endoscopy capsule procedure this am. Now on clear liquid diet. Diabetic diet expected to resume this afternoon. Good PO intake prior to NPO/Clears for procedure.    Pt is currently on clear diet. Wt 56.5 kg via bed scale - (admit weight 62.9 kg). Weight loss may be related to fluid with Golytely on MAR and 25+ BMs documented since admit.     Malnutrition Risk: Pt at risk r/t NPO/Clear liquid diet off/on since admit x8 days. Not enough criteria to diagnose at this time.    Recommendations/Plan:  1. Continue to advance diet to diabetic   2. Encourage intake of meals  3. Document intake of all meals as % taken in ADL's to provide interdisciplinary communication across all shifts.   4. Monitor weight.  5. Nutrition rep will continue to see patient for ongoing meal and snack preferences.  6. Obtain supplement order per RD as needed.    RD following

## 2019-06-17 NOTE — PROGRESS NOTES
Patient is awake, amp pill test was competed. Removed all  the sticky monitors from the chest area. ABDIAS holman. No questions at this time.

## 2019-06-17 NOTE — CARE PLAN
Problem: Nutritional:  Goal: Achieve adequate nutritional intake  Advance diet as feasible and patient will consume >50% of meals   Outcome: PROGRESSING AS EXPECTED  NPO for Endoscopy capsule procedure this am. Now on clear liquid diet. Diabetic diet expected to resume this afternoon. RD following.

## 2019-06-17 NOTE — PROGRESS NOTES
Gastroenterology (GIC) Progress Note     Author: Salud Cruz   Date & Time Created: 6/17/2019 8:53 AM    Chief Complaint:  Chronic overt obscure GI bleeding    Interval History:  Patient had history of melena but none in the last few days.  He also denied hematochezia, abdominal pain or vomiting.  He did have some self-limited nausea of mild severity.  Of note, I spoke to patient's [facility] resident about patient to obtain further history.  Denied further modifying factors, associated symptoms or timing issues.    HISTORY OF PRESENT ILLNESS:  A 68-year-old  male with multivessel coronary artery disease and recent non-ST elevation myocardial infarction as well as diabetic ketoacidosis, presented with Hemoccult positive stools.    Patient was hospitalized at [facility] since 06/10 with non-ST elevation myocardial infarction, diabetic ketoacidosis, high anion gap metabolic acidosis, leukocytosis, urinary retention, macrocytic anemia.  He was found to have an elevated troponin that bumped from 06/09/2019 at 2234 of 1.58 and then increased to 3.9 on 06/10/2019 at 0250 and then peaked at 30.99 on 06/10/2019 at 1320.  During his evaluation, he was noted to have anemia, which nadired at 6.7 and 19.6 respectively on 06/11.  He received 2 units of packed red blood cells.  Due to his downtrending anemia, he had a stool Hemoccult that was checked that was positive.  However, he did not have any bertin GI bleeding.  He has, however, had intermittent melena for several years.  He is a  and was seen at the [facility] and reportedly has had multiple EGDs and colonoscopies, which were nondiagnostic for a source of his GI bleeding in the past.  On CT scan, he was noted to have some possible colitis, but patient denied any diarrhea or hematochezia symptoms.    ===  6/16/2019 Doing fine. Explained to the patient about VCE, including possible retained capsule requiring surgical removal. Pt agreed to  proceed. Will prep today.  6/17/2019 Swallowed the VCE. No complaints. No evidence of GIB.    Review of Systems:  Review of Systems   Respiratory: Negative.    Cardiovascular: Negative.    Gastrointestinal: Positive for nausea.   Genitourinary: Negative.    Musculoskeletal: Negative.    Skin: Negative.    Neurological: Positive for weakness.   Endo/Heme/Allergies: Bruises/bleeds easily.   Psychiatric/Behavioral: Negative.    All other systems reviewed and are negative.      Physical Exam:  Physical Exam   Constitutional: He is oriented to person, place, and time. He appears well-developed and well-nourished.   HENT:   Head: Normocephalic and atraumatic.   Mouth/Throat: No oropharyngeal exudate.   Eyes: Pupils are equal, round, and reactive to light. EOM are normal. No scleral icterus.   Neck: Normal range of motion. Neck supple. No JVD present. No tracheal deviation present.   Cardiovascular: Normal rate, regular rhythm and intact distal pulses.    Murmur heard.  Pulmonary/Chest: Breath sounds normal. No stridor. No respiratory distress. He has no wheezes.   Abdominal: Soft. Bowel sounds are normal. He exhibits no distension. There is no tenderness. There is no rebound and no guarding.   Musculoskeletal: Normal range of motion. He exhibits edema. He exhibits no tenderness.   Neurological: He is alert and oriented to person, place, and time. No cranial nerve deficit. Coordination normal.   Skin: Skin is warm and dry. No rash noted. No erythema. No pallor.   Psychiatric: He has a normal mood and affect. His behavior is normal. Judgment and thought content normal.   Nursing note and vitals reviewed.      Labs:          No results for input(s): SODIUM, POTASSIUM, CHLORIDE, CO2, BUN, CREATININE, MAGNESIUM, PHOSPHORUS, CALCIUM in the last 72 hours.  No results for input(s): ALTSGPT, ASTSGOT, ALKPHOSPHAT, TBILIRUBIN, DBILIRUBIN, GAMMAGT, AMYLASE, LIPASE, ALB, PREALBUMIN, GLUCOSE in the last 72 hours.  Recent Labs       06/15/19   0401   19   0245  19   1702  19   0351   RBC  2.41*   --    --    --   3.09*   HEMOGLOBIN  7.8*   < >  8.9*  10.3*  9.7*   HEMATOCRIT  23.8*   < >  26.8*  31.5*  29.6*   PLATELETCT  119*   --    --    --   183   PROTHROMBTM   --    --    --    --   14.6   INR   --    --    --    --   1.12    < > = values in this interval not displayed.     Recent Labs      06/15/19   0401  19   0351   WBC  6.7  6.1   NEUTSPOLYS  62.70  51.90   LYMPHOCYTES  22.80  28.80   MONOCYTES  12.60  14.80*   EOSINOPHILS  1.50  3.00   BASOPHILS  0.10  0.70     Hemodynamics:  Temp (24hrs), Av.9 °C (98.4 °F), Min:36.6 °C (97.9 °F), Max:37.3 °C (99.1 °F)  Temperature: 37 °C (98.6 °F)  Pulse  Av.7  Min: 34  Max: 131   Blood Pressure : 160/85     Respiratory:    Respiration: 18, Pulse Oximetry: 95 %     Work Of Breathing / Effort: Mild  RUL Breath Sounds: Clear, RML Breath Sounds: Diminished, RLL Breath Sounds: Clear, CAROL Breath Sounds: Clear, LLL Breath Sounds: Diminished  Fluids:    Intake/Output Summary (Last 24 hours) at 19 1027  Last data filed at 19 0852   Gross per 24 hour   Intake              240 ml   Output             1300 ml   Net            -1060 ml        GI/Nutrition:  Orders Placed This Encounter   Procedures   • Diet NPO     Standing Status:   Standing     Number of Occurrences:   1     Order Specific Question:   Restrict to:     Answer:   Sips with Medications [3]     Medical Decision Making, by Problem:  Active Hospital Problems    Diagnosis   • *Diabetic ketoacidosis without coma associated with type 2 diabetes mellitus (HCC) [E11.10]   • Acute blood loss anemia [D62]   • NSTEMI (non-ST elevated myocardial infarction) (HCC) [I21.4]   • Leukocytosis [D72.829]   • Colitis [K52.9]   • Urine retention [R33.9]   • Type 2 diabetes mellitus with hyperglycemia, with long-term current use of insulin (HCC) [E11.65, Z79.4]   • Acute respiratory failure with hypoxia ([facility]) [J96.01]   •  Essential hypertension [I10]   • Macrocytic anemia [D53.9]   • Positive D dimer [R79.89]   • GI bleeding [K92.2]   • Acute pulmonary edema (HCC) [J81.0]   • Generalized abdominal pain [R10.84]     Problems:  1.  Occult overt GI bleeding.  2.  Fecal hemoccult-positive stools  3.  Anemia from chronic blood loss, transfusion requiring  4.  Non-ST elevation myocardial infarction.  5.  History of melena.  6.  Multivessel coronary artery disease.  7.  Diabetic ketoacidosis.  8.  Poorly controlled diabetes  9.  History of stroke  10.  Urinary retention  11.  Hyperlipidemia  12.  History of GERD  13.  Hypertension  14.  Benign prostate hypertrophy  15.  History of cardiac catheterization, 3-vessel disease, recommended CABG when stable from other medical problems but never pursued  16.  History of left below-knee amputation  17.  Cardiac pacemaker.  18.  Ex-smoker, 30 pack-year history  19.  Complex patient with significant comorbidities at increased risk for adverse outcomes     Recommendations:   - Avoid NSAIDs.  - PPI  - Risks prohibitive with his recent myocardial infarction, multivessel coronary artery disease that has documented need for CABG that was never pursued, poorly controlled diabetes with diabetic ketoacidosis etc for endoscopic evaluation (e.g EGD/colonoscopsy)   - Cl liq diet  - Pending VCE recording.     Quality-Core Measures   Reviewed items::  Medications reviewed, Labs reviewed and EKG reviewed

## 2019-06-17 NOTE — PROGRESS NOTES
Patient is alert, awake and oriented X 4. Denies any discomfort at this time. Patient finished his prep last night, NPO since 0537 this morning. Patient signed his consent for Endoscopy capsule procedure. Answered all his questions, informed about NPO for next 2 hours, can have snack after 2 hours and can have his previous diet started after 4 hours but no reds. No questions and concerns at this time. Patient swallowed cam pill at 0800 with water and simethicone. Will come back at 1600.

## 2019-06-17 NOTE — PROGRESS NOTES
Received bedside report from RN, pt care assumed, VSS, pt assessment complete. Pt AAOx4, pt doesn't c/o pain at this time. No signs of acute distress noted at this time. POC discussed with pt and verbalizes no questions. Pt NPO at this for capsule endoscopy. Pt denies any additional needs at this time. Bed in lowest position, pt educated on fall risk and verbalized understanding, call light within reach, hourly rounding initiated.

## 2019-06-18 LAB
BASOPHILS # BLD AUTO: 0.6 % (ref 0–1.8)
BASOPHILS # BLD: 0.04 K/UL (ref 0–0.12)
EOSINOPHIL # BLD AUTO: 0.13 K/UL (ref 0–0.51)
EOSINOPHIL NFR BLD: 1.9 % (ref 0–6.9)
ERYTHROCYTE [DISTWIDTH] IN BLOOD BY AUTOMATED COUNT: 47.8 FL (ref 35.9–50)
GLUCOSE BLD-MCNC: 161 MG/DL (ref 65–99)
GLUCOSE BLD-MCNC: 191 MG/DL (ref 65–99)
GLUCOSE BLD-MCNC: 196 MG/DL (ref 65–99)
GLUCOSE BLD-MCNC: 251 MG/DL (ref 65–99)
GLUCOSE BLD-MCNC: 87 MG/DL (ref 65–99)
HCT VFR BLD AUTO: 29.8 % (ref 42–52)
HGB BLD-MCNC: 9.7 G/DL (ref 14–18)
IMM GRANULOCYTES # BLD AUTO: 0.04 K/UL (ref 0–0.11)
IMM GRANULOCYTES NFR BLD AUTO: 0.6 % (ref 0–0.9)
LYMPHOCYTES # BLD AUTO: 1.76 K/UL (ref 1–4.8)
LYMPHOCYTES NFR BLD: 26.4 % (ref 22–41)
MCH RBC QN AUTO: 31.4 PG (ref 27–33)
MCHC RBC AUTO-ENTMCNC: 32.6 G/DL (ref 33.7–35.3)
MCV RBC AUTO: 96.4 FL (ref 81.4–97.8)
MONOCYTES # BLD AUTO: 0.88 K/UL (ref 0–0.85)
MONOCYTES NFR BLD AUTO: 13.2 % (ref 0–13.4)
NEUTROPHILS # BLD AUTO: 3.82 K/UL (ref 1.82–7.42)
NEUTROPHILS NFR BLD: 57.3 % (ref 44–72)
NRBC # BLD AUTO: 0 K/UL
NRBC BLD-RTO: 0 /100 WBC
PLATELET # BLD AUTO: 199 K/UL (ref 164–446)
PMV BLD AUTO: 8.8 FL (ref 9–12.9)
RBC # BLD AUTO: 3.09 M/UL (ref 4.7–6.1)
WBC # BLD AUTO: 6.7 K/UL (ref 4.8–10.8)

## 2019-06-18 PROCEDURE — 700102 HCHG RX REV CODE 250 W/ 637 OVERRIDE(OP): Performed by: STUDENT IN AN ORGANIZED HEALTH CARE EDUCATION/TRAINING PROGRAM

## 2019-06-18 PROCEDURE — A9270 NON-COVERED ITEM OR SERVICE: HCPCS | Performed by: STUDENT IN AN ORGANIZED HEALTH CARE EDUCATION/TRAINING PROGRAM

## 2019-06-18 PROCEDURE — 770020 HCHG ROOM/CARE - TELE (206)

## 2019-06-18 PROCEDURE — 99232 SBSQ HOSP IP/OBS MODERATE 35: CPT | Mod: GC | Performed by: INTERNAL MEDICINE

## 2019-06-18 PROCEDURE — 82962 GLUCOSE BLOOD TEST: CPT | Mod: 91

## 2019-06-18 PROCEDURE — 85025 COMPLETE CBC W/AUTO DIFF WBC: CPT

## 2019-06-18 RX ORDER — IPRATROPIUM BROMIDE AND ALBUTEROL SULFATE 2.5; .5 MG/3ML; MG/3ML
3 SOLUTION RESPIRATORY (INHALATION)
Status: DISCONTINUED | OUTPATIENT
Start: 2019-06-18 | End: 2019-06-22 | Stop reason: HOSPADM

## 2019-06-18 RX ADMIN — ATORVASTATIN CALCIUM 80 MG: 80 TABLET, FILM COATED ORAL at 16:49

## 2019-06-18 RX ADMIN — OMEPRAZOLE 40 MG: 20 CAPSULE, DELAYED RELEASE ORAL at 16:49

## 2019-06-18 RX ADMIN — CLOPIDOGREL BISULFATE 75 MG: 75 TABLET ORAL at 05:58

## 2019-06-18 RX ADMIN — ACETAMINOPHEN 650 MG: 325 TABLET, FILM COATED ORAL at 06:01

## 2019-06-18 RX ADMIN — CYANOCOBALAMIN TAB 500 MCG 1000 MCG: 500 TAB at 05:57

## 2019-06-18 RX ADMIN — METOPROLOL TARTRATE 25 MG: 25 TABLET, FILM COATED ORAL at 18:00

## 2019-06-18 RX ADMIN — Medication 16 G: at 23:26

## 2019-06-18 RX ADMIN — ACETAMINOPHEN 650 MG: 325 TABLET, FILM COATED ORAL at 19:58

## 2019-06-18 RX ADMIN — ACETAMINOPHEN 650 MG: 325 TABLET, FILM COATED ORAL at 13:37

## 2019-06-18 RX ADMIN — TAMSULOSIN HYDROCHLORIDE 0.4 MG: 0.4 CAPSULE ORAL at 09:34

## 2019-06-18 RX ADMIN — LISINOPRIL 5 MG: 5 TABLET ORAL at 05:58

## 2019-06-18 RX ADMIN — OMEPRAZOLE 40 MG: 20 CAPSULE, DELAYED RELEASE ORAL at 05:57

## 2019-06-18 RX ADMIN — METOPROLOL TARTRATE 25 MG: 25 TABLET, FILM COATED ORAL at 05:58

## 2019-06-18 RX ADMIN — Medication 16 G: at 23:47

## 2019-06-18 RX ADMIN — INSULIN HUMAN 5 UNITS: 100 INJECTION, SOLUTION PARENTERAL at 20:04

## 2019-06-18 ASSESSMENT — ENCOUNTER SYMPTOMS
DEPRESSION: 0
CARDIOVASCULAR NEGATIVE: 1
INSOMNIA: 0
RESPIRATORY NEGATIVE: 1
ABDOMINAL PAIN: 0
FOCAL WEAKNESS: 0
DOUBLE VISION: 0
MYALGIAS: 0
MUSCULOSKELETAL NEGATIVE: 1
PSYCHIATRIC NEGATIVE: 1
SHORTNESS OF BREATH: 0
BACK PAIN: 0
VOMITING: 0
NAUSEA: 0
CHILLS: 0
BRUISES/BLEEDS EASILY: 0
SORE THROAT: 0
SPUTUM PRODUCTION: 0
WEAKNESS: 1
NERVOUS/ANXIOUS: 0
NAUSEA: 1
CONSTIPATION: 0
BLURRED VISION: 0
COUGH: 0
HEADACHES: 0
FALLS: 0
DIARRHEA: 0
BRUISES/BLEEDS EASILY: 1
DIZZINESS: 0
FEVER: 0
ORTHOPNEA: 0
BLOOD IN STOOL: 1

## 2019-06-18 ASSESSMENT — LIFESTYLE VARIABLES: SUBSTANCE_ABUSE: 0

## 2019-06-18 NOTE — PROGRESS NOTES
Diabetes education: Met with Jarret, who states he had done insulin with syringes before but he now how some limitations with his hands and was not sure if he could draw up insulin again. Pt [facility] will call the [facility] tomorrow to assess if able to fax to [facility] or if pt needs to take them there. [facility] will be able to do insulin pens. Pt was previously on glipizide 2.5 mg bid and metformin 1000 mg bid. Pt was admitted with blood sugar of 296 and Hg a1c of 6.9%.  Pt is currently on Lantus 10 units pm ( with first dose taken today) and regular insulin sliding scale coverage ac and hs, with blood sugars of 159 ( refused insulin), 182 ( refused insulin), and 196 ( 2 units). Pt refused insulin out of concern of going low as he was with limited food secondary to a endoscopy.  Plan: CDE will call [facility] tomorrow, and follow up with pt to review insulin pens. Please call [phone] if needs change.

## 2019-06-18 NOTE — PROGRESS NOTES
Received bedside report from RN, pt care assumed, VSS, pt assessment complete. Pt AAOx4, pt doesn't c/o pain at this time. No signs of acute distress noted at this time. POC discussed with pt and verbalizes no questions. Pt denies any additional needs at this time. Bed in lowest position, pt educated on fall risk and verbalized understanding, call light within reach, hourly rounding initiated.

## 2019-06-18 NOTE — PROGRESS NOTES
Gastroenterology (GIC) Progress Note     Author: Salud Cruz   Date & Time Created: 6/18/2019 4:09 PM    Chief Complaint:  Chronic overt obscure GI bleeding    Interval History:  Patient had history of melena but none in the last few days.  He also denied hematochezia, abdominal pain or vomiting.  He did have some self-limited nausea of mild severity.  Of note, I spoke to patient's [facility] resident about patient to obtain further history.  Denied further modifying factors, associated symptoms or timing issues.    HISTORY OF PRESENT ILLNESS:  A 68-year-old  male with multivessel coronary artery disease and recent non-ST elevation myocardial infarction as well as diabetic ketoacidosis, presented with Hemoccult positive stools.    Patient was hospitalized at [facility] since 06/10 with non-ST elevation myocardial infarction, diabetic ketoacidosis, high anion gap metabolic acidosis, leukocytosis, urinary retention, macrocytic anemia.  He was found to have an elevated troponin that bumped from 06/09/2019 at 2234 of 1.58 and then increased to 3.9 on 06/10/2019 at 0250 and then peaked at 30.99 on 06/10/2019 at 1320.  During his evaluation, he was noted to have anemia, which nadired at 6.7 and 19.6 respectively on 06/11.  He received 2 units of packed red blood cells.  Due to his downtrending anemia, he had a stool Hemoccult that was checked that was positive.  However, he did not have any bertin GI bleeding.  He has, however, had intermittent melena for several years.  He is a  and was seen at the [facility] and reportedly has had multiple EGDs and colonoscopies, which were nondiagnostic for a source of his GI bleeding in the past.  On CT scan, he was noted to have some possible colitis, but patient denied any diarrhea or hematochezia symptoms.    ===  6/16/2019 Doing fine. Explained to the patient about VCE, including possible retained capsule requiring surgical removal. Pt agreed to  proceed. Will prep today.  6/17/2019 Swallowed the VCE. No complaints. No evidence of GIB.  6/18/2019 VCE showed possible terminal ileum mass, intraluminal, maybe 2 cm,  Non-bleeding    Review of Systems:  Review of Systems   Respiratory: Negative.    Cardiovascular: Negative.    Gastrointestinal: Positive for nausea.   Genitourinary: Negative.    Musculoskeletal: Negative.    Skin: Negative.    Neurological: Positive for weakness.   Endo/Heme/Allergies: Bruises/bleeds easily.   Psychiatric/Behavioral: Negative.    All other systems reviewed and are negative.      Physical Exam:  Physical Exam   Constitutional: He is oriented to person, place, and time. He appears well-developed and well-nourished.   HENT:   Head: Normocephalic and atraumatic.   Mouth/Throat: No oropharyngeal exudate.   Eyes: Pupils are equal, round, and reactive to light. EOM are normal. No scleral icterus.   Neck: Normal range of motion. Neck supple. No JVD present. No tracheal deviation present.   Cardiovascular: Normal rate, regular rhythm and intact distal pulses.    Murmur heard.  Pulmonary/Chest: Breath sounds normal. No stridor. No respiratory distress. He has no wheezes.   Abdominal: Soft. Bowel sounds are normal. He exhibits no distension. There is no tenderness. There is no rebound and no guarding.   Musculoskeletal: Normal range of motion. He exhibits edema. He exhibits no tenderness.   Neurological: He is alert and oriented to person, place, and time. No cranial nerve deficit. Coordination normal.   Skin: Skin is warm and dry. No rash noted. No erythema. No pallor.   Psychiatric: He has a normal mood and affect. His behavior is normal. Judgment and thought content normal.   Nursing note and vitals reviewed.      Labs:          No results for input(s): SODIUM, POTASSIUM, CHLORIDE, CO2, BUN, CREATININE, MAGNESIUM, PHOSPHORUS, CALCIUM in the last 72 hours.  No results for input(s): ALTSGPT, ASTSGOT, ALKPHOSPHAT, TBILIRUBIN, DBILIRUBIN,  GAMMAGT, AMYLASE, LIPASE, ALB, PREALBUMIN, GLUCOSE in the last 72 hours.  Recent Labs      19   1702  19   0351  19   0338   RBC   --   3.09*  3.09*   HEMOGLOBIN  10.3*  9.7*  9.7*   HEMATOCRIT  31.5*  29.6*  29.8*   PLATELETCT   --   183  199   PROTHROMBTM   --   14.6   --    INR   --   1.12   --      Recent Labs      19   0351  19   0338   WBC  6.1  6.7   NEUTSPOLYS  51.90  57.30   LYMPHOCYTES  28.80  26.40   MONOCYTES  14.80*  13.20   EOSINOPHILS  3.00  1.90   BASOPHILS  0.70  0.60     Hemodynamics:  Temp (24hrs), Av.6 °C (97.8 °F), Min:36.1 °C (97 °F), Max:37.2 °C (99 °F)  Temperature: 36.1 °C (97 °F)  Pulse  Av.4  Min: 34  Max: 131   Blood Pressure : 123/71     Respiratory:    Respiration: 18, Pulse Oximetry: 94 %, O2 Daily Delivery Respiratory : Room Air with O2 Available     Work Of Breathing / Effort: Mild  RUL Breath Sounds: Clear, RML Breath Sounds: Diminished, RLL Breath Sounds: Clear, CAROL Breath Sounds: Clear, LLL Breath Sounds: Diminished  Fluids:    Intake/Output Summary (Last 24 hours) at 19 1027  Last data filed at 19 0852   Gross per 24 hour   Intake              240 ml   Output             1300 ml   Net            -1060 ml     Weight: 57.5 kg (126 lb 12.2 oz)  GI/Nutrition:  Orders Placed This Encounter   Procedures   • Diet Order Clear Liquids - No Red Foods     Standing Status:   Standing     Number of Occurrences:   1     Order Specific Question:   Diet:     Answer:   Clear Liquids - No Red Foods [12]   • Diet NPO     Standing Status:   Standing     Number of Occurrences:   8     Order Specific Question:   Restrict to:     Answer:   Sips with Medications [3]     Medical Decision Making, by Problem:  Active Hospital Problems    Diagnosis   • *Diabetic ketoacidosis without coma associated with type 2 diabetes mellitus (HCC) [E11.10]   • Acute blood loss anemia [D62]   • NSTEMI (non-ST elevated myocardial infarction) ([facility]) [I21.4]   • Leukocytosis  [D72.829]   • Colitis [K52.9]   • Urine retention [R33.9]   • Type 2 diabetes mellitus with hyperglycemia, with long-term current use of insulin (HCC) [E11.65, Z79.4]   • Acute respiratory failure with hypoxia ([facility]) [J96.01]   • Essential hypertension [I10]   • Macrocytic anemia [D53.9]   • Positive D dimer [R79.89]   • GI bleeding [K92.2]   • Acute pulmonary edema (HCC) [J81.0]   • Generalized abdominal pain [R10.84]     Problems:  - Occult overt GI bleeding.  - Questionable terminal ileum mass, intraluminal  - Fecal hemoccult-positive stools  - Anemia from chronic blood loss, transfusion requiring  - Non-ST elevation myocardial infarction.  - History of melena.  - Multivessel coronary artery disease.  - Diabetic ketoacidosis.  - Poorly controlled diabetes  - History of stroke  - Urinary retention  - Hyperlipidemia  - History of GERD  - Hypertension  - Benign prostate hypertrophy  - History of cardiac catheterization, 3-vessel disease, recommended CABG when stable from other medical problems but never pursued  - History of left below-knee amputation  - Cardiac pacemaker.  - Ex-smoker, 30 pack-year history  - Complex patient with significant comorbidities at increased risk for adverse outcomes     Recommendations:   - Avoid NSAIDs.  - PPI  - Small bowel follow through tomorrow  - Cl liq diet  - Depending on the finding, may need to repeat colonoscopy with terminal ileum intubation as far as possible, or surgical exploration.      Quality-Core Measures   Reviewed items::  Medications reviewed, Labs reviewed and EKG reviewed

## 2019-06-18 NOTE — PROGRESS NOTES
Internal Medicine Interval Note  Note Author: Telly Keller M.D.     Name Jarret Bright     1950   Age/Sex 68 y.o. male   MRN [id number]   Code Status Full Code     After 5PM or if no immediate response to page, please call for cross-coverage  Attending/Team: Dr Duran/ Ramiro See Patient List for primary contact information  Call [phone] to page    1st Call - Day Intern (R1):   Dr Keller 2nd Call - Day Sr. Resident (R2/R3):   Dr Thomas         Reason for interval visit  (Principal Problem)   NSTEMI  GI bleed, likely upper  Pulmonary edema  DKA, resolved      Interval Problem Daily Status Update  (24 hours, problem oriented, brief subjective history, new lab/imaging data pertinent to that problem)     Patient seen and examined bedside, stable,  Per results of video capsule endoscopy, questionable terminal ileal lesion and fast transit, GI recommends small bowel follow-through and subsequently colonoscopy if unrevealing.  Hemoglobin stable, every 24 hour checks.  Cardiology signed off, patient too high risk to undergo cardiac catheterization/PCI.    [facility]  distal esophageal ulcer, mild gastritis, no mass.  EGD 3/19 healed distal esophageal ulcer.  Recent gastric emptying study negative for any lesions, normal study.          Review of Systems   Constitutional: Negative for chills and fever.   HENT: Negative for congestion and sore throat.    Eyes: Negative for blurred vision and double vision.   Respiratory: Negative for cough, sputum production and shortness of breath.    Cardiovascular: Negative for chest pain, orthopnea and leg swelling.   Gastrointestinal: Positive for blood in stool and melena. Negative for abdominal pain, constipation, diarrhea, nausea and vomiting.   Genitourinary: Negative for dysuria, frequency and urgency.   Musculoskeletal: Negative for back pain, falls, joint pain and myalgias.   Skin: Negative for itching and rash.   Neurological: Negative for dizziness,  focal weakness and headaches.   Endo/Heme/Allergies: Negative for environmental allergies. Does not bruise/bleed easily.   Psychiatric/Behavioral: Negative for depression and substance abuse. The patient is not nervous/anxious and does not have insomnia.        Disposition/Barriers to discharge:   Capsule endoscopy and follow-up studies    Consultants/Specialty  Cardiology  Gastroenterology  Pulmonology  PCP: Prashanth Ocasio      Quality Measures  Quality-Core Measures   Reviewed items::  EKG reviewed, Labs reviewed, Medications reviewed and Radiology images reviewed  Reed catheter::  No Reed  DVT prophylaxis pharmacological::  Contraindicated - High bleeding risk  DVT prophylaxis - mechanical:  SCDs  Ulcer Prophylaxis::  Not indicated          Physical Exam       Vitals:    06/18/19 0000 06/18/19 0400 06/18/19 0800 06/18/19 1200   BP: 146/68 141/61 121/50 123/71   Pulse: 77 74 75 86   Resp: 17 16 16 18   Temp: 36.7 °C (98 °F) 36.6 °C (97.9 °F) 36.2 °C (97.2 °F) 36.1 °C (97 °F)   TempSrc: Temporal Temporal Temporal Temporal   SpO2: 98% 97% 96% 94%   Weight:       Height:         Body mass index is 19.85 kg/m². Weight: 57.5 kg (126 lb 12.2 oz)  Oxygen Therapy:  Pulse Oximetry: 94 %, O2 (LPM): 0, O2 Delivery: None (Room Air)    Physical Exam   Constitutional: He is oriented to person, place, and time and well-developed, well-nourished, and in no distress. No distress.   HENT:   Head: Normocephalic and atraumatic.   Right Ear: External ear normal.   Left Ear: External ear normal.   Nose: Nose normal.   Mouth/Throat: Oropharynx is clear and moist. No oropharyngeal exudate.   Eyes: Conjunctivae and EOM are normal. Right eye exhibits no discharge. Left eye exhibits no discharge. No scleral icterus.   Neck: Normal range of motion. Neck supple. No JVD present.   Cardiovascular: Normal rate, regular rhythm and normal heart sounds.    No murmur heard.  Pulmonary/Chest: Effort normal and breath sounds normal. No respiratory  distress. He has no rales.   Abdominal: Soft. Bowel sounds are normal. He exhibits no distension. There is no tenderness.   Genitourinary:   Genitourinary Comments: Deferred   Musculoskeletal: Normal range of motion. He exhibits no edema or tenderness.   Right BKA   Lymphadenopathy:     He has no cervical adenopathy.   Neurological: He is alert and oriented to person, place, and time. He exhibits normal muscle tone. GCS score is 15.   Skin: Skin is warm and dry. No rash noted. He is not diaphoretic. No erythema.   Psychiatric: Mood, memory, affect and judgment normal.   Vitals reviewed.      Assessment/Plan     * Diabetic ketoacidosis without coma associated with type 2 diabetes mellitus (HCC)- (present on admission)   Assessment & Plan    Resolved.  A1C 6.9.  It is unclear whether patient is compliant with medications on outpatient basis.  Plan  -10 units insulin glargine nightly  -Insulin sliding scale with hypoglycemia protocol     Acute blood loss anemia   Assessment & Plan    Likely secondary to upper GI bleed/small intestinal bleed, history of esophageal ulcer, significant heavy alcohol consumption history  Monitoring H&H, transfuse for Hb<8 as concomitant severe cardiac disease     NSTEMI (non-ST elevated myocardial infarction) ([facility])- (present on admission)   Assessment & Plan    Initially thought to have Type II MI, then troponins increased to 30. Cardiology called and plan was for cath, cath held due to possible bleeding. However, they note that patients EKG revealed improvement with out intervention, only treating DKA. Likely CAD exacerbated by DKA. Will correct DKA and monitor Cardiac fucntion.   Plan  -Statin, beta-blockers  -Patient started on clopidogrel on 6/13/2019  -Lisinopril started 5 mg     Leukocytosis- (present on admission)   Assessment & Plan    Resolved     Colitis   Assessment & Plan    CT: thick walled appearance colon which may be due to the fact that it is decompressed. Mild colitis is  in the differential.  Possible related to DKA episode  Plan  - Monitor closely     Urine retention- (present on admission)   Assessment & Plan    Acute urinary retention. Mcnair was placed in outside facility however patient was having significant discomfort with mcnair therefore removed here.   Plan  - Continue flomax  - Serial bladder scans; if patient is unable to void, will need to replace mcnair     Type 2 diabetes mellitus with hyperglycemia, with long-term current use of insulin (HCC)- (present on admission)   Assessment & Plan    On  insulin at home per med rec, patient currently on 10 units glargine and insulin sliding scale with hypoglycemia protocol.  Medication compliance questionable  Brittle diabetes with widely fluctuating blood sugars, evaluated by diabetes educator  Plan  -Continue glargine at 10 nightly, SSI, hypoglycemia protocol, he consistently finishes 80 - 100% of his meals     Acute respiratory failure with hypoxia (HCC)- (present on admission)   Assessment & Plan    Notified by nursing staff the patient was having increased work of breathing in ICU, acute pulmonary edema and hypoxia.  Patient received intravenous diuresis, patient was also placed on BiPAP PRN. Patient's respiratory status much improved.  Currently supplemental oxygen requirement of 0-1 LPM  Plan  -Continue to monitor     Positive D dimer   Assessment & Plan    Patient was noted to have a positive d-dimer.  Ultrasound lower extremities did not reveal any thrombi.  Patient currently has SCDs.  D-dimer could be elevated due to age and patient's cardiac issues.  Plan  -Continue to monitor     Acute pulmonary edema (HCC)   Assessment & Plan    Patient was noted to have pulmonary edema on PE and imaging.  Likely secondary to necessary intravenous volume resuscitation.  Plan  -Patient was diuresed and respiratory status improved.  -Lasix discontinued     GI bleeding   Assessment & Plan    Patient initially had hematemesis 20 mL,  followed by melenic stools with drop in Hb from 10 to <7, was transfused 2 PRBCs, monitoring H&H  Has had several EGDs in the past showing active and healed esophageal ulcers as well as a gastric emptying study, most recent EGD 3/19, [facility]  Significant alcohol consumption history but has not consumed alcohol for several years, no history of esophageal varices  Takes several Tylenol a day for his headaches, also takes aspirin  Questionable terminal ileal lesion per capsule endoscopy  Plan  Small bowel follow-through in a.m. per GI recommendation  Transfuse as needed to maintain Hb>8     Essential hypertension- (present on admission)   Assessment & Plan    On lisinopril/HCTZ 10/12/5 mg daily per med rec  Plan  -Lisinopril restarted at 5         Macrocytic anemia- (present on admission)   Assessment & Plan    Likely secondary to B12 deficiency  Plan  B12 supplementation

## 2019-06-18 NOTE — OP REPORT
VCE report:    Summary:    Stomach: food debris in the stomach. Prolonged gastric emptying.     Small bowel:  - Suboptimal prep  - non-specific jejunitis and ileitis  - Questionable terminal ileum mass  - Accelerated small bowel transit, only 1 hour. Lesions may be missed.    No fresh blood not blood clot was seen in the entire exam.    Recommendation:   - SBFT to check terminal ileum lesion  - Consider colonoscopy with TI intubation

## 2019-06-18 NOTE — CARE PLAN
Problem: Communication  Goal: The ability to communicate needs accurately and effectively will improve  Outcome: PROGRESSING AS EXPECTED    Intervention: [place] patient and significant other/support system to call light to alert staff of needs   06/18/19 0026   OTHER   Oriented to: All of the Following : Location of Bathroom, Visiting Policy, Unit Routine, Call Light and Bedside Controls, Bedside Rail Policy, Smoking Policy, Rights and Responsibilities, Bedside Report, and Patient Education Notebook         Problem: Safety  Goal: Will remain free from falls  Outcome: PROGRESSING AS EXPECTED    Intervention: Assess risk factors for falls   06/09/19 2300 06/18/19 0026   OTHER   Fall Risk --  Risk to Fall - 0 - 1 point   Risk for Injury-Any positive answers results in the pt being at high risk for fall related injury --  Not Applicable   Mobility Status Assessment --  1-1 Healthcare Provider Required for Assistance with Ambulation & Transfer   History of fall 0 --    Pt Calls for Assistance --  Yes     Intervention: Implement fall precautions   06/18/19 0026   OTHER   Environmental Precautions Treaded Slipper Socks on Patient;Bed in Low Position;Communication Sign for Patients & Families;Mobility Assessed & Appropriate Sign Placed   Bedrails Bedrails Closest to Bathroom Down

## 2019-06-19 ENCOUNTER — APPOINTMENT (OUTPATIENT)
Dept: RADIOLOGY | Facility: MEDICAL CENTER | Age: 69
DRG: 637 | End: 2019-06-19
Attending: INTERNAL MEDICINE
Payer: MEDICARE

## 2019-06-19 LAB
BASOPHILS # BLD AUTO: 0.6 % (ref 0–1.8)
BASOPHILS # BLD: 0.04 K/UL (ref 0–0.12)
EOSINOPHIL # BLD AUTO: 0.04 K/UL (ref 0–0.51)
EOSINOPHIL NFR BLD: 0.6 % (ref 0–6.9)
ERYTHROCYTE [DISTWIDTH] IN BLOOD BY AUTOMATED COUNT: 48.4 FL (ref 35.9–50)
GLUCOSE BLD-MCNC: 115 MG/DL (ref 65–99)
GLUCOSE BLD-MCNC: 188 MG/DL (ref 65–99)
GLUCOSE BLD-MCNC: 221 MG/DL (ref 65–99)
GLUCOSE BLD-MCNC: 248 MG/DL (ref 65–99)
GLUCOSE BLD-MCNC: 248 MG/DL (ref 65–99)
GLUCOSE BLD-MCNC: 45 MG/DL (ref 65–99)
GLUCOSE BLD-MCNC: 55 MG/DL (ref 65–99)
GLUCOSE BLD-MCNC: 96 MG/DL (ref 65–99)
HCT VFR BLD AUTO: 30.8 % (ref 42–52)
HGB BLD-MCNC: 10.1 G/DL (ref 14–18)
IMM GRANULOCYTES # BLD AUTO: 0.04 K/UL (ref 0–0.11)
IMM GRANULOCYTES NFR BLD AUTO: 0.6 % (ref 0–0.9)
LYMPHOCYTES # BLD AUTO: 1.34 K/UL (ref 1–4.8)
LYMPHOCYTES NFR BLD: 20.9 % (ref 22–41)
MCH RBC QN AUTO: 31.9 PG (ref 27–33)
MCHC RBC AUTO-ENTMCNC: 32.8 G/DL (ref 33.7–35.3)
MCV RBC AUTO: 97.2 FL (ref 81.4–97.8)
MONOCYTES # BLD AUTO: 0.63 K/UL (ref 0–0.85)
MONOCYTES NFR BLD AUTO: 9.8 % (ref 0–13.4)
NEUTROPHILS # BLD AUTO: 4.32 K/UL (ref 1.82–7.42)
NEUTROPHILS NFR BLD: 67.5 % (ref 44–72)
NRBC # BLD AUTO: 0 K/UL
NRBC BLD-RTO: 0 /100 WBC
PLATELET # BLD AUTO: 218 K/UL (ref 164–446)
PMV BLD AUTO: 8.6 FL (ref 9–12.9)
RBC # BLD AUTO: 3.17 M/UL (ref 4.7–6.1)
WBC # BLD AUTO: 6.4 K/UL (ref 4.8–10.8)

## 2019-06-19 PROCEDURE — 770020 HCHG ROOM/CARE - TELE (206)

## 2019-06-19 PROCEDURE — 85025 COMPLETE CBC W/AUTO DIFF WBC: CPT

## 2019-06-19 PROCEDURE — 74245 DX-UPPER GI-SMALL BOWEL FOLLOW THRU: CPT

## 2019-06-19 PROCEDURE — A9270 NON-COVERED ITEM OR SERVICE: HCPCS | Performed by: STUDENT IN AN ORGANIZED HEALTH CARE EDUCATION/TRAINING PROGRAM

## 2019-06-19 PROCEDURE — 700102 HCHG RX REV CODE 250 W/ 637 OVERRIDE(OP): Performed by: STUDENT IN AN ORGANIZED HEALTH CARE EDUCATION/TRAINING PROGRAM

## 2019-06-19 PROCEDURE — 82962 GLUCOSE BLOOD TEST: CPT | Mod: 91

## 2019-06-19 PROCEDURE — 99232 SBSQ HOSP IP/OBS MODERATE 35: CPT | Mod: GC | Performed by: INTERNAL MEDICINE

## 2019-06-19 PROCEDURE — 700117 HCHG RX CONTRAST REV CODE 255: Performed by: INTERNAL MEDICINE

## 2019-06-19 RX ADMIN — CLOPIDOGREL BISULFATE 75 MG: 75 TABLET ORAL at 04:51

## 2019-06-19 RX ADMIN — ATORVASTATIN CALCIUM 80 MG: 80 TABLET, FILM COATED ORAL at 17:58

## 2019-06-19 RX ADMIN — METOPROLOL TARTRATE 25 MG: 25 TABLET, FILM COATED ORAL at 17:58

## 2019-06-19 RX ADMIN — IOHEXOL 200 ML: 350 INJECTION, SOLUTION INTRAVENOUS at 09:07

## 2019-06-19 RX ADMIN — TAMSULOSIN HYDROCHLORIDE 0.4 MG: 0.4 CAPSULE ORAL at 10:12

## 2019-06-19 RX ADMIN — OMEPRAZOLE 40 MG: 20 CAPSULE, DELAYED RELEASE ORAL at 17:58

## 2019-06-19 RX ADMIN — ACETAMINOPHEN 650 MG: 325 TABLET, FILM COATED ORAL at 04:51

## 2019-06-19 RX ADMIN — CYANOCOBALAMIN TAB 500 MCG 1000 MCG: 500 TAB at 04:51

## 2019-06-19 RX ADMIN — METOPROLOL TARTRATE 25 MG: 25 TABLET, FILM COATED ORAL at 04:51

## 2019-06-19 RX ADMIN — INSULIN GLARGINE 10 UNITS: 100 INJECTION, SOLUTION SUBCUTANEOUS at 19:11

## 2019-06-19 RX ADMIN — OMEPRAZOLE 40 MG: 20 CAPSULE, DELAYED RELEASE ORAL at 04:51

## 2019-06-19 RX ADMIN — ACETAMINOPHEN 650 MG: 325 TABLET, FILM COATED ORAL at 17:59

## 2019-06-19 ASSESSMENT — ENCOUNTER SYMPTOMS
DOUBLE VISION: 0
BRUISES/BLEEDS EASILY: 1
RESPIRATORY NEGATIVE: 1
NAUSEA: 1
BRUISES/BLEEDS EASILY: 0
BLURRED VISION: 0
COUGH: 0
FALLS: 0
ORTHOPNEA: 0
DIARRHEA: 0
BACK PAIN: 0
PSYCHIATRIC NEGATIVE: 1
BLOOD IN STOOL: 1
NERVOUS/ANXIOUS: 0
VOMITING: 0
SPUTUM PRODUCTION: 0
CONSTIPATION: 0
CHILLS: 0
SORE THROAT: 0
NAUSEA: 0
FOCAL WEAKNESS: 0
MYALGIAS: 0
INSOMNIA: 0
HEADACHES: 0
ABDOMINAL PAIN: 0
DEPRESSION: 0
CARDIOVASCULAR NEGATIVE: 1
SHORTNESS OF BREATH: 0
FEVER: 0
MUSCULOSKELETAL NEGATIVE: 1
WEAKNESS: 1
DIZZINESS: 0

## 2019-06-19 ASSESSMENT — LIFESTYLE VARIABLES: SUBSTANCE_ABUSE: 0

## 2019-06-19 NOTE — PROGRESS NOTES
Report received at bedside, patient care assumed. Tele box on. Patient sleeping in bed even unlabored breathing, no requests at this time. Fall precautions in place with bed in lowest position, treaded socks on, and call light within reach.

## 2019-06-19 NOTE — PROGRESS NOTES
Patient refused morning dose of insulin, BS at 248. NPO at this time and was hypoglycemic overnight. Education provided. Stated he wants to wait to recheck until he is able to eat.

## 2019-06-19 NOTE — PROGRESS NOTES
Internal Medicine Interval Note  Note Author: Telly Keller M.D.     Name Jarret Bright     1950   Age/Sex 68 y.o. male   MRN [id number]   Code Status Full Code     After 5PM or if no immediate response to page, please call for cross-coverage  Attending/Team: Dr Duran/ Ramiro See Patient List for primary contact information  Call [phone] to page    1st Call - Day Intern (R1):   Dr Keller 2nd Call - Day Sr. Resident (R2/R3):   Dr Thomas         Reason for interval visit  (Principal Problem)   NSTEMI  GI bleed, likely upper  Pulmonary edema  DKA, resolved      Interval Problem Daily Status Update  (24 hours, problem oriented, brief subjective history, new lab/imaging data pertinent to that problem)     Patient seen and examined at bedside this AM, stable, NAD, no acute events overnight. NPO for small bowel follow through per GI recs for suspected ileal mass. To be followed by colonoscopy with ileal intubation or surgical exploration if follow through study non revealing. Appreciate GI recs.  Hb stable, 10.1 this AM, CTM.    [facility]  distal [facility], mild gastritis, no mass.  EGD 3/19 healed distal esophageal ulcer.  Recent gastric emptying study negative for any lesions, normal study.          Review of Systems   Constitutional: Negative for chills and fever.   HENT: Negative for congestion and sore throat.    Eyes: Negative for blurred vision and double vision.   Respiratory: Negative for cough, sputum production and shortness of breath.    Cardiovascular: Negative for chest pain, orthopnea and leg swelling.   Gastrointestinal: Positive for blood in stool and melena. Negative for abdominal pain, constipation, diarrhea, nausea and vomiting.   Genitourinary: Negative for dysuria, frequency and urgency.   Musculoskeletal: Negative for back pain, falls, joint pain and myalgias.   Skin: Negative for itching and rash.   Neurological: Negative for dizziness, focal weakness and  headaches.   Endo/Heme/Allergies: Negative for environmental allergies. Does not bruise/bleed easily.   Psychiatric/Behavioral: Negative for depression and substance abuse. The patient is not nervous/anxious and does not have insomnia.        Disposition/Barriers to discharge:   Small bowel follow through study    Consultants/Specialty  Cardiology  Gastroenterology  Pulmonology  PCP: Prashanth Ocasio      Quality Measures  Quality-Core Measures   Reviewed items::  EKG reviewed, Labs reviewed, Medications reviewed and Radiology images reviewed  Reed catheter::  No Reed  DVT prophylaxis pharmacological::  Contraindicated - High bleeding risk  DVT prophylaxis - mechanical:  SCDs  Ulcer Prophylaxis::  Not indicated          Physical Exam       Vitals:    06/18/19 2045 06/18/19 2307 06/19/19 0100 06/19/19 0500   BP: (!) 164/77  133/63 129/57   Pulse: 79  71 94   Resp: 18 18 18   Temp: 36.6 °C (97.9 °F) 36.2 °C (97.1 °F) 36.4 °C (97.6 °F) 36.2 °C (97.2 °F)   TempSrc: Temporal Oral Temporal Temporal   SpO2: 96%  99% 97%   Weight:       Height:         Body mass index is 20.06 kg/m². Weight: 58.1 kg (128 lb 1.4 oz)  Oxygen Therapy:  Pulse Oximetry: 97 %, O2 (LPM): 0, O2 Delivery: None (Room Air)    Physical Exam   Constitutional: He is oriented to person, place, and time and well-developed, well-nourished, and in no distress. No distress.   HENT:   Head: Normocephalic and atraumatic.   Right Ear: External ear normal.   Left Ear: External ear normal.   Nose: Nose normal.   Mouth/Throat: Oropharynx is clear and moist. No oropharyngeal exudate.   Eyes: Conjunctivae and EOM are normal. Right eye exhibits no discharge. Left eye exhibits no discharge. No scleral icterus.   Neck: Normal range of motion. Neck supple. No JVD present.   Cardiovascular: Normal rate, regular rhythm and normal heart sounds.    No murmur heard.  Pulmonary/Chest: Effort normal and breath sounds normal. No respiratory distress. He has no rales.   Abdominal:  Soft. Bowel sounds are normal. He exhibits no distension. There is no tenderness.   Genitourinary:   Genitourinary Comments: Deferred   Musculoskeletal: Normal range of motion. He exhibits no edema or tenderness.   Right BKA   Lymphadenopathy:     He has no cervical adenopathy.   Neurological: He is alert and oriented to person, place, and time. He exhibits normal muscle tone. GCS score is 15.   Skin: Skin is warm and dry. No rash noted. He is not diaphoretic. No erythema.   Psychiatric: Mood, memory, affect and judgment normal.   Vitals reviewed.      Assessment/Plan     * Diabetic ketoacidosis without coma associated with type 2 diabetes mellitus (HCC)- (present on admission)   Assessment & Plan    Resolved.  A1C 6.9.  It is unclear whether patient is compliant with medications on outpatient basis.  Plan  -10 units insulin glargine nightly  -Insulin sliding scale with hypoglycemia protocol     Acute blood loss anemia   Assessment & Plan    Likely secondary to upper GI bleed/small intestinal bleed, history of esophageal ulcer, significant heavy alcohol consumption history  Monitoring H&H, transfuse for Hb<8 as concomitant severe cardiac disease     NSTEMI (non-ST elevated myocardial infarction) (HCC)- (present on admission)   Assessment & Plan    Initially thought to have Type II MI, then troponins increased to 30. Cardiology called and plan was for cath, cath held due to possible bleeding. However, they note that patients EKG revealed improvement with out intervention, only treating DKA. Likely CAD exacerbated by DKA. Will correct DKA and monitor Cardiac fucntion.   Plan  -Statin, beta-blockers  -Patient started on clopidogrel on 6/13/2019  -Lisinopril started 5 mg     Leukocytosis- (present on admission)   Assessment & Plan    Resolved     Colitis   Assessment & Plan    CT: thick walled appearance colon which may be due to the fact that it is decompressed. Mild colitis is in the differential.  Possible related to  DKA episode  Questionable ileal mass seen on capsule endoscopy  Plan  - Monitor closely  - small bowel follow through study, possibly colonoscopy with ileal intubation     Urine retention- (present on admission)   Assessment & Plan    Acute urinary retention. Mcnair was placed in outside facility however patient was having significant discomfort with mcnair therefore removed here.   Plan  - Continue flomax  - Serial bladder scans; if patient is unable to void, will need to replace mcnair     Type 2 diabetes mellitus with hyperglycemia, with long-term current use of insulin (HCC)- (present on admission)   Assessment & Plan    On  insulin at home per med rec, patient currently on 10 units glargine and insulin sliding scale with hypoglycemia protocol.  Medication compliance questionable  Brittle diabetes with widely fluctuating blood sugars, evaluated by diabetes educator  Plan  -Continue glargine at 10 nightly, SSI, hypoglycemia protocol, he consistently finishes 80 - 100% of his meals     Acute respiratory failure with hypoxia (HCC)- (present on admission)   Assessment & Plan    Notified by nursing staff the patient was having increased work of breathing in ICU, acute pulmonary edema and hypoxia.  Patient received intravenous diuresis, patient was also placed on BiPAP PRN. Patient's respiratory status much improved.  Currently saturating well on room air  Plan  -Continue to monitor     Positive D dimer   Assessment & Plan    Patient was noted to have a positive d-dimer.  Ultrasound lower extremities did not reveal any thrombi.  Patient currently has SCDs.  D-dimer could be elevated due to age and patient's cardiac issues.  Plan  -Continue to monitor     Acute pulmonary edema (HCC)   Assessment & Plan    Patient was noted to have pulmonary edema on PE and imaging.  Likely secondary to necessary intravenous volume resuscitation.  Plan  -Patient was diuresed and respiratory status improved.  -Lasix discontinued     GI  bleeding   Assessment & Plan    Patient initially had hematemesis 20 mL, followed by melenic stools with drop in Hb from 10 to <7, was transfused 2 PRBCs, monitoring H&H  Has had several EGDs in the past showing active and healed esophageal ulcers as well as a gastric emptying study, most recent EGD 3/19, [facility]  Significant alcohol consumption history but has not consumed alcohol for several years, no history of esophageal varices  Takes several Tylenol a day for his headaches, also takes aspirin  Questionable terminal ileal lesion per capsule endoscopy  Plan  Small bowel follow-through per GI recommendation  Transfuse as needed to maintain Hb>8     Essential hypertension- (present on admission)   Assessment & Plan    On lisinopril/HCTZ 10/12/5 mg daily per med rec  Plan  -Lisinopril restarted at 5         Macrocytic anemia- (present on admission)   Assessment & Plan    Likely secondary to B12 deficiency  Plan  B12 supplementation

## 2019-06-19 NOTE — PROGRESS NOTES
Diabetes education: [facility] does get his prescriptions there and would be able to get insulin pens. Pt states he has refused insulin doses as he has not been eating much.  Plan: CDE will need to instruct/review insulin pens before discharge. If pt is to go home on insulin he will need Lantus solostar pen, Humalog ( not regular) kwik pen with sliding scale written out and for ac only if going home on fast acting insulin, and insulin pen needles ( 4 mm ) all with dx code of E11.9). Please call [phone] if needs change.

## 2019-06-19 NOTE — PROGRESS NOTES
Report received at bedside from day shift RN, pt care assumed, tele box on. Pt aaox4, no signs of distress noted at this time. Patient sitting up in chair, reading the paper. POC discussed with pt and verbalizes no questions. Pt c/o of no pain. Pt denies any additional needs at this time. Bed in lowest position, pt educated on fall risk and verbalized understanding, call light within reach, bed alarm plugged in and on.

## 2019-06-19 NOTE — PROGRESS NOTES
Gastroenterology ([facility]) Progress Note     Author: Salud Cruz   Date & Time Created: 6/19/2019 2:31 PM    Chief Complaint:  Chronic overt obscure GI bleeding    Interval History:  Patient had history of melena but none in the last few days.  He also denied hematochezia, abdominal pain or vomiting.  He did have some self-limited nausea of mild severity.  Of note, I spoke to patient's [facility] resident about patient to obtain further history.  Denied further modifying factors, associated symptoms or timing issues.    HISTORY OF PRESENT ILLNESS:  A 68-year-old  male with multivessel coronary artery disease and recent non-ST elevation myocardial infarction as well as diabetic ketoacidosis, presented with Hemoccult positive stools.    Patient was hospitalized at [facility] since 06/10 with non-ST elevation myocardial infarction, diabetic ketoacidosis, high anion gap metabolic acidosis, leukocytosis, urinary retention, macrocytic anemia.  He was found to have an elevated troponin that bumped from 06/09/2019 at 2234 of 1.58 and then increased to 3.9 on 06/10/2019 at 0250 and then peaked at 30.99 on 06/10/2019 at 1320.  During his evaluation, he was noted to have anemia, which nadired at 6.7 and 19.6 respectively on 06/11.  He received 2 units of packed red blood cells.  Due to his downtrending anemia, he had a stool Hemoccult that was checked that was positive.  However, he did not have any bertin GI bleeding.  He has, however, had intermittent melena for several years.  He is a  and was seen at the [facility] and reportedly has had multiple EGDs and colonoscopies, which were nondiagnostic for a source of his GI bleeding in the past.  On CT scan, he was noted to have some possible colitis, but patient denied any diarrhea or hematochezia symptoms.    ===  6/16/2019 Doing fine. Explained to the patient about VCE, including possible retained capsule requiring surgical removal. Pt agreed to  proceed. Will prep today.  6/17/2019 Swallowed the VCE. No complaints. No evidence of GIB.  6/18/2019 VCE showed possible terminal ileum mass, intraluminal, maybe 2 cm,  Non-bleeding    Review of Systems:  Review of Systems   Respiratory: Negative.    Cardiovascular: Negative.    Gastrointestinal: Positive for nausea.   Genitourinary: Negative.    Musculoskeletal: Negative.    Skin: Negative.    Neurological: Positive for weakness.   Endo/Heme/Allergies: Bruises/bleeds easily.   Psychiatric/Behavioral: Negative.    All other systems reviewed and are negative.      Physical Exam:  Physical Exam   Constitutional: He is oriented to person, place, and time. He appears well-developed and well-nourished.   HENT:   Head: Normocephalic and atraumatic.   Mouth/Throat: No oropharyngeal exudate.   Eyes: Pupils are equal, round, and reactive to light. EOM are normal. No scleral icterus.   Neck: Normal range of motion. Neck supple. No JVD present. No tracheal deviation present.   Cardiovascular: Normal rate, regular rhythm and intact distal pulses.    Murmur heard.  Pulmonary/Chest: Breath sounds normal. No stridor. No respiratory distress. He has no wheezes.   Abdominal: Soft. Bowel sounds are normal. He exhibits no distension. There is no tenderness. There is no rebound and no guarding.   Musculoskeletal: Normal range of motion. He exhibits edema. He exhibits no tenderness.   Neurological: He is alert and oriented to person, place, and time. No cranial nerve deficit. Coordination normal.   Skin: Skin is warm and dry. No rash noted. No erythema. No pallor.   Psychiatric: He has a normal mood and affect. His behavior is normal. Judgment and thought content normal.   Nursing note and vitals reviewed.      Labs:          No results for input(s): SODIUM, POTASSIUM, CHLORIDE, CO2, BUN, CREATININE, MAGNESIUM, PHOSPHORUS, CALCIUM in the last 72 hours.  No results for input(s): ALTSGPT, ASTSGOT, ALKPHOSPHAT, TBILIRUBIN, DBILIRUBIN,  GAMMAGT, AMYLASE, LIPASE, ALB, PREALBUMIN, GLUCOSE in the last 72 hours.  Recent Labs      19   0351  19   0338  19   0218   RBC  3.09*  3.09*  3.17*   HEMOGLOBIN  9.7*  9.7*  10.1*   HEMATOCRIT  29.6*  29.8*  30.8*   PLATELETCT  183  199  218   PROTHROMBTM  14.6   --    --    INR  1.12   --    --      Recent Labs      19   0351  19   0338  19   0218   WBC  6.1  6.7  6.4   NEUTSPOLYS  51.90  57.30  67.50   LYMPHOCYTES  28.80  26.40  20.90*   MONOCYTES  14.80*  13.20  9.80   EOSINOPHILS  3.00  1.90  0.60   BASOPHILS  0.70  0.60  0.60     Hemodynamics:  Temp (24hrs), Av.5 °C (97.7 °F), Min:36.2 °C (97.1 °F), Max:37.2 °C (98.9 °F)  Temperature: 36.8 °C (98.2 °F)  Pulse  Av.8  Min: 34  Max: 131   Blood Pressure : 106/49     Respiratory:    Respiration: 17, Pulse Oximetry: 96 %     Work Of Breathing / Effort: Mild  RUL Breath Sounds: Clear, RML Breath Sounds: Diminished, RLL Breath Sounds: Clear, CAROL Breath Sounds: Clear, LLL Breath Sounds: Diminished  Fluids:    Intake/Output Summary (Last 24 hours) at 19 1027  Last data filed at 19 0852   Gross per 24 hour   Intake              240 ml   Output             1300 ml   Net            -1060 ml     Weight: 58.1 kg (128 lb 1.4 oz)  GI/Nutrition:  Orders Placed This Encounter   Procedures   • Diet NPO     Standing Status:   Standing     Number of Occurrences:   8     Order Specific Question:   Restrict to:     Answer:   Sips with Medications [3]     Medical Decision Making, by Problem:  Active Hospital Problems    Diagnosis   • *Diabetic ketoacidosis without coma associated with type 2 diabetes mellitus (HCC) [E11.10]   • Acute blood loss anemia [D62]   • NSTEMI (non-ST elevated myocardial infarction) (HCC) [I21.4]   • Leukocytosis [D72.829]   • Colitis [K52.9]   • Urine retention [R33.9]   • Type 2 diabetes mellitus with hyperglycemia, with long-term current use of insulin (HCC) [E11.65, Z79.4]   • Acute respiratory  failure with hypoxia (HCC) [J96.01]   • Essential hypertension [I10]   • Macrocytic anemia [D53.9]   • Positive D dimer [R79.89]   • GI bleeding [K92.2]   • Acute pulmonary edema (HCC) [J81.0]   • Generalized abdominal pain [R10.84]     6/19/19 SBFT  1.  11.6 mm in diameter intraluminal filling defect identified in a loop of the ileum consistent with the intraluminal filling defect seen on recent endoscopy. This may represent benign or malignant neoplasm such as small bowel polyp.  2.  Otherwise normal small bowel follow-through examination.  3.  Normal upper GI examination.    Problems:  - Occult overt GI bleeding, suspect from TI mass with bleeding, as seen on VCE and SBFT  - Questionable terminal ileum mass, intraluminal  - Fecal hemoccult-positive stools  - Anemia from chronic blood loss, transfusion requiring  - Non-ST elevation myocardial infarction.  - History of melena.  - Multivessel coronary artery disease.  - Diabetic ketoacidosis.  - Poorly controlled diabetes  - History of stroke  - Urinary retention  - Hyperlipidemia  - History of GERD  - Hypertension  - Benign prostate hypertrophy  - History of cardiac catheterization, 3-vessel disease, recommended CABG when stable from other medical problems but never pursued  - History of left below-knee amputation  - Cardiac pacemaker.  - Ex-smoker, 30 pack-year history  - Complex patient with significant comorbidities at increased risk for adverse outcomes     Recommendations:   - Avoid NSAIDs.  - PPI  - Cl liq diet  - I contacted colorectal surgeon Dr. Painter. He will take a look of the patient at his earliest convenience, and discuss with the patient where to go from here.   - Will sign off and stand by. Please contact us again if we can be of further assistance.       Quality-Core Measures   Reviewed items::  Medications reviewed, Labs reviewed and EKG reviewed

## 2019-06-19 NOTE — CARE PLAN
Problem: Safety  Goal: Will remain free from injury  Outcome: PROGRESSING AS EXPECTED  Educated patient on use of call light, no slip socks on, bed lowest position. All needs attended to. Patient verbalized understanding.     Problem: Infection  Goal: Will remain free from infection  Outcome: PROGRESSING AS EXPECTED  Patient demonstrated proper hand hygiene after toileting. Hand sanitizing wipes provided to patient for bedside use. Patient educated on the importance of hand hygiene among staff and patients in preventing the spread of infection. Patient verbalized understanding.

## 2019-06-19 NOTE — PROGRESS NOTES
Patient stated they were feeling clammy and not well. BG checked at 2313 with result of 45. Hypoglycemia protocol initiated with x2 doses of glucose tabs before pt was NPO at midnight. Final recheck . Pt states feeling better. Will discuss with day shift about insulin dosing, told pt is not eating adequately. NPO at this time for XR in the morning.

## 2019-06-19 NOTE — CONSULTS
6/19  ATSP by Dr Cruz for Small Bowel Mass, GI Bleed    HPI: 68y M here admitted with a lower GI bleed.  This has been present since last week.  He does not recall any past abdominal surgeries but has had endoscopy before.  He has had GI bleeds in the past as well but never any source identified.  He received a transfusion on this admission  Since then further workup has been done with video capsule endoscopy which revealed a 2cm mass int he terminal ileum.  He also had a SBFT which firmed an ileal mass of about this same size.  No apparent obstruction present at this time.      PMHx: GI Bleed; CAD, recent MI, Diabetes, Diabetic Ketoacidosis, Stroke, Urinary Retention, HLD, GERD, HTN, Prostatic hypertrophy.      PSHx: Cardiac Cath (3-vessel disease, reccommended fo CABG when stable, left BKA, Cardiac Pacemaker    Meds: see Med Rec, no anticoagulation    All: Tree Tea Oil    FamHx: no colon/rectal cancers, no other pertinent family history    SocHx: No Tob/Drugs, occasional EtOH, former smoker with 30pack/yr history      ROS: negative except as above    Consitutional- above  HEENT- no visual changes, no sneezing or runny nose  Skin- no rashes or itching  Cardiovascular- no chest pain or palpatations  Respiratory- no SOB or cough  GI- above  - no dysurea  Neuro- no weakness or syncope  Musculoskeletal- no muscle or joint pain  Heme- no bleeding or bruising  Lymphatic- no enlarged nodes or previous splenectomy  Endocrine- No sweating or heat/cold intolerance  Allergy- No asthma or hives  Psychiatric- no depression or anxiety        Physical Exam:   AFVSS  A@O x3, NAD  NCAT, no scleral icterus  Neck nontender, no lymphadenopathy  Normal respiratory effort, no chest wall masses  RRR, 2+ pulses  Abdomen soft, no peritonitis, no masses, small umbilical hernia present  Extremities warm and well perfused  No skin rashes or lesions    Labs: WBC 6.4, Hct 31, Plt 218    Radiology: UGI: 1.  11.6 mm in diameter intraluminal  filling defect identified in a loop of the ileum consistent with the intraluminal filling defect seen on recent endoscopy. This may represent benign or malignant neoplasm such as small bowel polyp.    2.  Otherwise normal small bowel follow-through examination.    3.  Normal upper GI examination.    A/P: 68y M with a small bowel mass that is likely the source of his ongoing GI bleed.  2cm should be amenable to surgical resection and palpable on exam.  Explained laparoscopic vs open small bowel resection to him and he agrees with plan at this time.     Unfortunately he has some complex cardiac issues as well.  No emergency to procedure and patient is currently taking plavix.  I think he first needs to be cleared for surgery by cardiology then we can figure out a time when he can be off plavix for 5 days preop.  Will follow along with primary team and check back again tomorrow regarding cardiac issues and surgical planning.

## 2019-06-20 PROBLEM — I21.4 NSTEMI (NON-ST ELEVATED MYOCARDIAL INFARCTION) (HCC): Status: RESOLVED | Noted: 2018-03-22 | Resolved: 2019-06-20

## 2019-06-20 PROBLEM — I25.9: Status: ACTIVE | Noted: 2019-06-20

## 2019-06-20 LAB
BASOPHILS # BLD AUTO: 0.3 % (ref 0–1.8)
BASOPHILS # BLD: 0.02 K/UL (ref 0–0.12)
EOSINOPHIL # BLD AUTO: 0.09 K/UL (ref 0–0.51)
EOSINOPHIL NFR BLD: 1.5 % (ref 0–6.9)
ERYTHROCYTE [DISTWIDTH] IN BLOOD BY AUTOMATED COUNT: 47.9 FL (ref 35.9–50)
GLUCOSE BLD-MCNC: 124 MG/DL (ref 65–99)
GLUCOSE BLD-MCNC: 131 MG/DL (ref 65–99)
GLUCOSE BLD-MCNC: 147 MG/DL (ref 65–99)
GLUCOSE BLD-MCNC: 206 MG/DL (ref 65–99)
GLUCOSE BLD-MCNC: 71 MG/DL (ref 65–99)
HCT VFR BLD AUTO: 29.1 % (ref 42–52)
HGB BLD-MCNC: 9.7 G/DL (ref 14–18)
IMM GRANULOCYTES # BLD AUTO: 0.03 K/UL (ref 0–0.11)
IMM GRANULOCYTES NFR BLD AUTO: 0.5 % (ref 0–0.9)
LYMPHOCYTES # BLD AUTO: 1.94 K/UL (ref 1–4.8)
LYMPHOCYTES NFR BLD: 32 % (ref 22–41)
MCH RBC QN AUTO: 32.2 PG (ref 27–33)
MCHC RBC AUTO-ENTMCNC: 33.3 G/DL (ref 33.7–35.3)
MCV RBC AUTO: 96.7 FL (ref 81.4–97.8)
MONOCYTES # BLD AUTO: 0.59 K/UL (ref 0–0.85)
MONOCYTES NFR BLD AUTO: 9.7 % (ref 0–13.4)
NEUTROPHILS # BLD AUTO: 3.4 K/UL (ref 1.82–7.42)
NEUTROPHILS NFR BLD: 56 % (ref 44–72)
NRBC # BLD AUTO: 0 K/UL
NRBC BLD-RTO: 0 /100 WBC
PLATELET # BLD AUTO: 245 K/UL (ref 164–446)
PMV BLD AUTO: 9 FL (ref 9–12.9)
RBC # BLD AUTO: 3.01 M/UL (ref 4.7–6.1)
WBC # BLD AUTO: 6.1 K/UL (ref 4.8–10.8)

## 2019-06-20 PROCEDURE — A9270 NON-COVERED ITEM OR SERVICE: HCPCS | Performed by: STUDENT IN AN ORGANIZED HEALTH CARE EDUCATION/TRAINING PROGRAM

## 2019-06-20 PROCEDURE — 700102 HCHG RX REV CODE 250 W/ 637 OVERRIDE(OP): Performed by: STUDENT IN AN ORGANIZED HEALTH CARE EDUCATION/TRAINING PROGRAM

## 2019-06-20 PROCEDURE — 36415 COLL VENOUS BLD VENIPUNCTURE: CPT

## 2019-06-20 PROCEDURE — 770020 HCHG ROOM/CARE - TELE (206)

## 2019-06-20 PROCEDURE — 85025 COMPLETE CBC W/AUTO DIFF WBC: CPT

## 2019-06-20 PROCEDURE — 99233 SBSQ HOSP IP/OBS HIGH 50: CPT | Mod: GC | Performed by: INTERNAL MEDICINE

## 2019-06-20 PROCEDURE — 82962 GLUCOSE BLOOD TEST: CPT | Mod: 91

## 2019-06-20 PROCEDURE — 99222 1ST HOSP IP/OBS MODERATE 55: CPT | Performed by: INTERNAL MEDICINE

## 2019-06-20 RX ORDER — LANOLIN ALCOHOL/MO/W.PET/CERES
CREAM (GRAM) TOPICAL 3 TIMES DAILY PRN
Status: DISCONTINUED | OUTPATIENT
Start: 2019-06-20 | End: 2019-06-22 | Stop reason: HOSPADM

## 2019-06-20 RX ADMIN — METOPROLOL TARTRATE 25 MG: 25 TABLET, FILM COATED ORAL at 06:22

## 2019-06-20 RX ADMIN — METOPROLOL TARTRATE 25 MG: 25 TABLET, FILM COATED ORAL at 17:29

## 2019-06-20 RX ADMIN — CYANOCOBALAMIN TAB 500 MCG 1000 MCG: 500 TAB at 06:22

## 2019-06-20 RX ADMIN — OMEPRAZOLE 40 MG: 20 CAPSULE, DELAYED RELEASE ORAL at 06:22

## 2019-06-20 RX ADMIN — ATORVASTATIN CALCIUM 80 MG: 80 TABLET, FILM COATED ORAL at 17:29

## 2019-06-20 RX ADMIN — CLOPIDOGREL BISULFATE 75 MG: 75 TABLET ORAL at 06:22

## 2019-06-20 RX ADMIN — ACETAMINOPHEN 650 MG: 325 TABLET, FILM COATED ORAL at 15:58

## 2019-06-20 RX ADMIN — LISINOPRIL 5 MG: 5 TABLET ORAL at 06:22

## 2019-06-20 RX ADMIN — INSULIN GLARGINE 10 UNITS: 100 INJECTION, SOLUTION SUBCUTANEOUS at 17:34

## 2019-06-20 RX ADMIN — TAMSULOSIN HYDROCHLORIDE 0.4 MG: 0.4 CAPSULE ORAL at 08:30

## 2019-06-20 RX ADMIN — OMEPRAZOLE 40 MG: 20 CAPSULE, DELAYED RELEASE ORAL at 17:28

## 2019-06-20 ASSESSMENT — ENCOUNTER SYMPTOMS
HEADACHES: 0
DOUBLE VISION: 0
ORTHOPNEA: 0
ADENOPATHY: 0
INSOMNIA: 0
EYE DISCHARGE: 0
FALLS: 0
DIARRHEA: 0
APNEA: 0
ABDOMINAL PAIN: 0
COLOR CHANGE: 0
DIAPHORESIS: 0
SORE THROAT: 0
CONSTIPATION: 0
NAUSEA: 0
DIZZINESS: 0
EYE ITCHING: 0
COUGH: 0
FEVER: 0
DEPRESSION: 0
BLOOD IN STOOL: 1
FATIGUE: 0
MYALGIAS: 0
SPUTUM PRODUCTION: 0
ACTIVITY CHANGE: 0
VOMITING: 0
WEAKNESS: 0
SHORTNESS OF BREATH: 0
AGITATION: 0
APPETITE CHANGE: 0
BACK PAIN: 0
CHILLS: 0
FOCAL WEAKNESS: 0
NERVOUS/ANXIOUS: 0
ARTHRALGIAS: 0
BRUISES/BLEEDS EASILY: 0
ABDOMINAL DISTENTION: 0
BLURRED VISION: 0

## 2019-06-20 ASSESSMENT — LIFESTYLE VARIABLES: SUBSTANCE_ABUSE: 0

## 2019-06-20 NOTE — PROGRESS NOTES
6/20  Pt seen and examined, stable overnight, no further bleeding.  Resting comfortably this am.   Exam benign  Surgery pending cardiology evaluation and clear ence for Small bowel mass

## 2019-06-20 NOTE — PROGRESS NOTES
Internal Medicine Interval Note  Note Author: Meagan Thomas M.D.     Name Jarret Bright DOB 1950   Age/Sex 68 y.o. male   MRN [id number]   Code Status Full Code     After 5PM or if no immediate response to page, please call for cross-coverage  Attending/Team: Dr Duran/ Ramiro See Patient List for primary contact information  Call [phone] to page    1st Call - Day Intern (R1):   Dr Keller 2nd Call - Day Sr. Resident (R2/R3):   Dr Thomas         Reason for interval visit  (Principal Problem)   NSTEMI  GI bleed, likely upper  Pulmonary edema  DKA, resolved      Interval Problem Daily Status Update  (24 hours, problem oriented, brief subjective history, new lab/imaging data pertinent to that problem)     Patient seen and examined at bedside this AM, stable, NAD, no acute events overnight.   Ilial mass discovered, bowel resection indicated per surgery.  Being evaluated by Cardiology for surgical risk, they recommend MPI tomorrow.  Hgb stable. Tolerating diet and requesting advance.      Review of Systems   Constitutional: Negative for chills and fever.   HENT: Negative for congestion and sore throat.    Eyes: Negative for blurred vision and double vision.   Respiratory: Negative for cough, sputum production and shortness of breath.    Cardiovascular: Negative for chest pain, orthopnea and leg swelling.   Gastrointestinal: Positive for blood in stool and melena. Negative for abdominal pain, constipation, diarrhea, nausea and vomiting.   Genitourinary: Negative for dysuria, frequency and urgency.   Musculoskeletal: Negative for back pain, falls, joint pain and myalgias.   Skin: Negative for itching and rash.   Neurological: Negative for dizziness, focal weakness and headaches.   Endo/Heme/Allergies: Negative for environmental allergies. Does not bruise/bleed easily.   Psychiatric/Behavioral: Negative for depression and substance abuse. The patient is not nervous/anxious and does not have insomnia.         Disposition/Barriers to discharge:   Continue acute care for assessment and treatment of ileal mass    Consultants/Specialty  Cardiology  Gastroenterology  Pulmonology  PCP: Prashanth Ocasio      Quality Measures  Quality-Core Measures   Reviewed items::  EKG reviewed, Labs reviewed, Medications reviewed and Radiology images reviewed  Reed catheter::  No Reed  DVT prophylaxis pharmacological::  Contraindicated - High bleeding risk  DVT prophylaxis - mechanical:  SCDs  Ulcer Prophylaxis::  Not indicated          Physical Exam       Vitals:    06/19/19 1810 06/19/19 2110 06/20/19 0051 06/20/19 0500   BP: 113/78 117/62 121/86 135/66   Pulse: 93 76 85 83   Resp: 18 18 18 18   Temp: 37.1 °C (98.7 °F) 36.5 °C (97.7 °F) 36.3 °C (97.3 °F) 36.3 °C (97.4 °F)   TempSrc: Temporal Temporal Temporal Temporal   SpO2: 98% 97% 99% 98%   Weight:  57.3 kg (126 lb 5.2 oz)     Height:         Body mass index is 19.79 kg/m². Weight: 57.3 kg (126 lb 5.2 oz) (without prostetic)  Oxygen Therapy:  Pulse Oximetry: 98 %, O2 (LPM): 0, O2 Delivery: None (Room Air)    Physical Exam   Constitutional: He is oriented to person, place, and time and well-developed, well-nourished, and in no distress.   HENT:   Head: Normocephalic and atraumatic.   Right Ear: External ear normal.   Left Ear: External ear normal.   Nose: Nose normal.   Mouth/Throat: No oropharyngeal exudate.   Eyes: Pupils are equal, round, and reactive to light. Conjunctivae are normal. No scleral icterus.   Neck: Normal range of motion. Neck supple. No thyromegaly present.   Cardiovascular: Normal rate, regular rhythm, normal heart sounds and intact distal pulses.  Exam reveals no gallop and no friction rub.    No murmur heard.  Pulmonary/Chest: Effort normal and breath sounds normal. No respiratory distress. He has no wheezes. He has no rales.   Abdominal: Soft. Bowel sounds are normal. He exhibits no distension. There is no tenderness. There is no guarding.   Musculoskeletal:  Normal range of motion. He exhibits no edema.   RLE s/p remote BKA with prosthesis in place   Lymphadenopathy:     He has no cervical adenopathy.   Neurological: He is alert and oriented to person, place, and time. No cranial nerve deficit.   Skin: Skin is warm and dry.   Psychiatric: Mood and affect normal.       Assessment/Plan     * Diabetic ketoacidosis without coma associated with type 2 diabetes mellitus (HCC)- (present on admission)   Assessment & Plan    Resolved.  A1C 6.9.  It is unclear whether patient is compliant with medications on outpatient basis.  -continue 10 units insulin glargine nightly  -continue insulin sliding scale with hypoglycemia protocol     Acute blood loss anemia   Assessment & Plan    Likely secondary to upper GI bleed/small intestinal bleed, history of esophageal ulcer, significant heavy alcohol consumption history.  Monitoring H&H, transfuse for Hb<8 as concomitant severe cardiac disease.     NSTEMI (non-ST elevated myocardial infarction) ([facility])- (present on admission)   Assessment & Plan    Initially thought to have Type II MI, then troponins increased to 30. Cardiology called and plan was for cath, cath held due to possible bleeding. However, they note that patients EKG revealed improvement with out intervention, only treating DKA. Likely CAD exacerbated by DKA. Will correct DKA and monitor Cardiac fucntion.   Plan  -Statin, beta-blockers  -Patient started on clopidogrel on 6/13/2019  -Continue lisinopril 5 mg  -Cardiology re-consulted to evaluate surgical risk, NPI ordered     Leukocytosis- (present on admission)   Assessment & Plan    Resolved     Colitis   Assessment & Plan    CT: thick walled appearance colon which may be due to the fact that it is decompressed. Mild colitis is in the differential.  Possible related to DKA episode  Ileal mass per imaging. Resection indicated per surgery, cardiology clearance requested.  -Cardiology assessment in process  -proceed with bowel  resection if appropriate     Urine retention- (present on admission)   Assessment & Plan    Acute urinary retention. Mcnair was placed in outside facility however patient was having significant discomfort with mcnair therefore removed here.   Plan  - Continue flomax  - Serial bladder scans; if patient is unable to void, will need to replace mcnair     Type 2 diabetes mellitus with hyperglycemia, with long-term current use of insulin (HCC)- (present on admission)   Assessment & Plan    On  insulin at home per med rec, patient currently on 10 units glargine and insulin sliding scale with hypoglycemia protocol.  Medication compliance questionable  Brittle diabetes with widely fluctuating blood sugars, evaluated by diabetes educator  Plan  -Continue glargine at 10 nightly, SSI, hypoglycemia protocol, he consistently finishes 80 - 100% of his meals     Acute respiratory failure with hypoxia (HCC)- (present on admission)   Assessment & Plan    Notified by nursing staff the patient was having increased work of breathing in ICU, acute pulmonary edema and hypoxia.  Patient received intravenous diuresis, patient was also placed on BiPAP PRN. Patient's respiratory status much improved.  Currently saturating well on room air  Plan  -Continue to monitor     Positive D dimer   Assessment & Plan    Patient was noted to have a positive d-dimer.  Ultrasound lower extremities did not reveal any thrombi.  Patient currently has SCDs.  D-dimer could be elevated due to age and patient's cardiac issues.  Plan  -Continue to monitor     Acute pulmonary edema (HCC)   Assessment & Plan    Patient was noted to have pulmonary edema on PE and imaging.  Likely secondary to necessary intravenous volume resuscitation.  Patient was diuresed and respiratory status improved. Lasix discontinued.  -monitor     GI bleeding   Assessment & Plan    Patient initially had hematemesis 20 mL, followed by melenic stools with drop in Hb from 10 to <7, was transfused  2 PRBCs, monitoring H&H  Has had several EGDs in the past showing active and healed esophageal ulcers as well as a gastric emptying study, most recent EGD 3/19, [facility] with [facility]  Significant alcohol consumption history but has not consumed alcohol for several years, no history of esophageal varices  Takes several Tylenol a day for his headaches, also takes aspirin  Ileal lesion per capsule endoscopy, small bowel follow through  Surgery requested cardiology clearance.  -proceed with bowel resection if cleared  -Transfuse as needed to maintain Hb>8     Essential hypertension- (present on admission)   Assessment & Plan    On lisinopril/HCTZ 10/12/5 mg daily per med rec. Lisinopril restarted at 5.  -continue lisinopril     Macrocytic anemia- (present on admission)   Assessment & Plan    Likely secondary to B12 deficiency  Plan  B12 supplementation

## 2019-06-20 NOTE — DIETARY
Nutrition Services: Update   Day 11 of admit.  Jarret Bright is a 68 y.o. male with admitting DX of NSTEMI (non-ST elevated myocardial infarction), DKA- type 2, Occult GI bleeding    Pt followed to ensure adequate PO intake. Pt upgraded from CLD to low fiber (GI soft) this morning. Intake per ADLs was variable, some meals <25% and another at %, though pt on clear liquids and this diet is not sufficient to meet nutrition needs. Will continue to follow for tolerance to GI soft diet with PO >50%.     Malnutrition Risk noted on 6/17: Pt at risk r/t NPO/Clear liquid diet off/on since admit x8 days. Not enough criteria to diagnose at this time. Update: pt upgraded to low fiber (GI soft) diet this morning, following for adequate PO intake.     Recommendations/Plan:  1. Encourage intake of meals  2. Document intake of all meals as % taken in ADL's to provide interdisciplinary communication across all shifts.   3. Monitor weight.  4. Nutrition rep will continue to see patient for ongoing meal and snack preferences.  5. Obtain supplement order per RD as needed.    RD following

## 2019-06-20 NOTE — PROGRESS NOTES
Pt sitting on EOB talking with family member on phone, blood sugar checked per pt request, reading of 71, 8oz of apple juice given and 2 orange jello obtained from another unit given (not the sugar free jello), pt request to check blood sugar again between 0011-4044.

## 2019-06-20 NOTE — PROGRESS NOTES
Received bedside report from RN, pt care assumed, VSS, pt assessment complete. Pt AAOx4, chronic c/o  pain at this time. No signs of acute distress noted at this time. POC discussd with pt and verbalizes no questions. Pt denies any additional needs at this time. Bed in lowest position, bed alarm on, pt educated on fall risk and verbalized understanding, call light within reach, hourly rounding initiated. In a sinus rhythm.

## 2019-06-20 NOTE — CARE PLAN
Problem: Safety  Goal: Will remain free from injury  Outcome: PROGRESSING AS EXPECTED  Bed in low position, call light in reach, non slip socks on pt, pt prosthesis at bedside    Problem: Knowledge Deficit  Goal: Knowledge of the prescribed therapeutic regimen will improve  Outcome: PROGRESSING AS EXPECTED  Educated on blood sugar checks and importance of following regimen

## 2019-06-20 NOTE — CARE PLAN
Problem: Nutritional:  Goal: Achieve adequate nutritional intake  Advance diet as feasible and patient will consume >50% of meals   Outcome: PROGRESSING SLOWER THAN EXPECTED  See RD note

## 2019-06-20 NOTE — CONSULTS
Cardiology Initial Consultation    Date of Service  6/20/2019    Referring Physician  Júnior Duran M.D.    Reason for Consultation  Perioperative evaluation    History of Presenting Illness  Jarret Bright is a 68 y.o. male with a past medical history of obliterative and inoperable coronary artery disease.  He was admitted here on the ninth with a GI bleed.  He had a small type II myocardial infarction and was seen by my partner.  Watchful waiting, no further work-up because of the situation.    In March 2018 he had a similar situation after his BKA.  Angiogram showed severe obliterative three-vessel disease turned down for surgery because of his fragility and multiple other risk factors.  He has had multiple TIAs.  Pacemaker for heart block in 2018.    Very low level function because of his leg and his debility.  They have seen a mass in his abdomen/intestines and want to do surgery.    He is maintained on his insulin for his diabetes  No active bleeding in the hospital    Review of Systems  Review of Systems   Constitutional: Negative for activity change, appetite change, diaphoresis and fatigue.   HENT: Negative for congestion and dental problem.    Eyes: Negative for discharge and itching.   Respiratory: Negative for apnea and shortness of breath.    Cardiovascular: Negative for chest pain and leg swelling.   Gastrointestinal: Negative for abdominal distention, abdominal pain and nausea.   Endocrine: Negative for cold intolerance and heat intolerance.   Genitourinary: Negative for difficulty urinating and dysuria.   Musculoskeletal: Negative for arthralgias and back pain.   Skin: Negative for color change and pallor.   Allergic/Immunologic: Negative for environmental allergies and food allergies.   Neurological: Negative for dizziness and weakness.   Hematological: Negative for adenopathy. Does not bruise/bleed easily.   Psychiatric/Behavioral: Negative for agitation, behavioral problems and self-injury.    All other systems reviewed and are negative.      Past Medical History   has a past medical history of CAD (coronary artery disease); Diabetes (HCC); BKA (HCC); Hypertension; and Pacemaker.    Surgical History   has a past surgical history that includes zzz cardiac cath (03/23/2018); amputation, below the knee (Left); pacemaker insertion; capsule endoscopy administration (N/A, 6/17/2019); and capsule endoscopy retrieval (6/17/2019).    Family History  family history includes Heart Attack (age of onset: 60) in his mother.    Social History   reports that he quit smoking about 17 months ago. His smoking use included Cigarettes and Cigars. He quit after 30.00 years of use. He has never used smokeless tobacco. He reports that he does not drink alcohol or use drugs.    Medications  Prior to Admission Medications   Prescriptions Last Dose Informant Patient Reported? Taking?   aspirin EC (ECOTRIN) 81 MG Tablet Delayed Response unk at unk Significant Other Yes Yes   Sig: Take 81 mg by mouth every bedtime.   clopidogrel (PLAVIX) 75 MG Tab unk at unk Significant Other No No   Sig: Take 1 Tab by mouth every day.   furosemide (LASIX) 40 MG Tab unk at unk Significant Other Yes Yes   Sig: Take 40 mg by mouth 1 time daily as needed (ankle swelling).   glipiZIDE (GLUCOTROL) 5 MG Tab unk at unk Significant Other Yes Yes   Sig: Take 2.5 mg by mouth 2 times a day, with meals.   metformin (GLUCOPHAGE) 1000 MG tablet unk at unk Significant Other Yes Yes   Sig: Take 1,000 mg by mouth 2 times a day, with meals.   metoprolol (LOPRESSOR) 25 MG Tab unk at unk Significant Other No No   Sig: Take 0.5 Tabs by mouth 2 times a day.   pantoprazole (PROTONIX) 40 MG Tablet Delayed Response unk at unk Significant Other Yes Yes   Sig: Take 40 mg by mouth every day.   simvastatin (ZOCOR) 10 MG Tab unk at unk Significant Other Yes Yes   Sig: Take 10 mg by mouth every evening.   tamsulosin (FLOMAX) 0.4 MG capsule unk at unk Significant Other No No    Sig: Take 1 Cap by mouth ONE-HALF HOUR AFTER BREAKFAST.   therapeutic multivitamin-minerals (THERAGRAN-M) Tab unk at unk Significant Other Yes Yes   Sig: Take 1 Tab by mouth every day.   vitamin D (CHOLECALCIFEROL) 1000 UNIT Tab unk at unk Significant Other Yes Yes   Sig: Take 2,000 Units by mouth every day.      Facility-Administered Medications: None       Allergies  Allergies   Allergen Reactions   • Tea Tree Oil Rash     Unspecified       Vital signs in last 24 hours  Temp:  [36.1 °C (97 °F)-37.1 °C (98.7 °F)] 36.1 °C (97 °F)  Pulse:  [71-93] 71  Resp:  [18] 18  BP: (113-135)/(59-86) 113/59  SpO2:  [96 %-99 %] 96 %    Physical Exam  Physical Exam   Constitutional: He is oriented to person, place, and time. No distress.   Talkative lying flat cachectic with multiple ecchymoses   HENT:   Head: Normocephalic and atraumatic.   Eyes: Pupils are equal, round, and reactive to light. No scleral icterus.   Neck: No JVD present. No thyromegaly present.   Cardiovascular: Normal rate, regular rhythm and intact distal pulses.    No murmur heard.  Pulmonary/Chest: Breath sounds normal. He exhibits no tenderness.   Abdominal: Soft. Bowel sounds are normal. There is no tenderness.   Musculoskeletal: He exhibits edema (Right leg, left leg amputation.  1+ no stasis changes). He exhibits no tenderness.   Neurological: He is alert and oriented to person, place, and time.   Skin: Skin is warm and dry. He is not diaphoretic.   Psychiatric: He has a normal mood and affect. His behavior is normal.       Lab Review  Lab Results   Component Value Date/Time    WBC 6.1 06/20/2019 02:13 AM    RBC 3.01 (L) 06/20/2019 02:13 AM    HEMOGLOBIN 9.7 (L) 06/20/2019 02:13 AM    HEMATOCRIT 29.1 (L) 06/20/2019 02:13 AM    MCV 96.7 06/20/2019 02:13 AM    MCH 32.2 06/20/2019 02:13 AM    MCHC 33.3 (L) 06/20/2019 02:13 AM    MPV 9.0 06/20/2019 02:13 AM      Lab Results   Component Value Date/Time    SODIUM 140 06/14/2019 02:49 AM    POTASSIUM 3.6  06/14/2019 02:49 AM    CHLORIDE 108 06/14/2019 02:49 AM    CO2 24 06/14/2019 02:49 AM    GLUCOSE 71 06/14/2019 02:49 AM    BUN 13 06/14/2019 02:49 AM    CREATININE 0.73 06/14/2019 02:49 AM      Lab Results   Component Value Date/Time    ASTSGOT 19 06/09/2019 10:34 PM    ALTSGPT 10 06/09/2019 10:34 PM     Lab Results   Component Value Date/Time    CHOLSTRLTOT 118 03/25/2018 02:07 AM    LDL 70 03/25/2018 02:07 AM    HDL 31 (A) 03/25/2018 02:07 AM    TRIGLYCERIDE 83 03/25/2018 02:07 AM    TROPONINI 13.99 (H) 06/11/2019 12:35 AM             Cardiac Imaging and Procedures Review  EKG:  My personal interpretation of the EKG dated the 14th sinus rhythm normal intervals  Echocardiogram: Parisa 10 ejection fraction 60% no significant valve disease    Cardiac Catheterization: As above    Imaging  Chest X-Ray: Small infiltrates June 11      Assessment/Plan    Perioperative risk stratification  Moderate risk surgery which is urgent based on the need to rule out cancer and bleeding  Probable high risk patient based on coronary anatomy, we have no solution to this  Would urge nuclear perfusion imaging study.  Reasoning: If low risk with little ischemia his chance of having sudden death or acute infarct during surgery will be less.  If it is high risk with large ischemia, I think surgery may well be not an option that is reasonable  A nuclear test will not rule in or rule out the need for an angiogram.  I do not think this is an option at this point particularly with him bleeding and needing surgery    Pacemaker, no evidence of malfunction.    Coronary disease  Tolerating some medicines but obviously not antiplatelets  Prognosis very very guarded  Consider surgery if above    Thank you for allowing me to participate in the care of this patient.    Please contact me with any questions.    Alice Welch M.D.   Cardiologist, [facility]  [phone]

## 2019-06-20 NOTE — PROGRESS NOTES
Telemetry Shift Summary    Rhythm SR  HR Range 77-96  Ectopy (r)PVC  Measurements .18/.08/.38          Normal Values  Rhythm SR  HR Range    Measurements 0.12-0.20 / 0.06-0.10  / 0.30-0.52

## 2019-06-21 ENCOUNTER — APPOINTMENT (OUTPATIENT)
Dept: RADIOLOGY | Facility: MEDICAL CENTER | Age: 69
DRG: 637 | End: 2019-06-21
Attending: NURSE PRACTITIONER
Payer: MEDICARE

## 2019-06-21 LAB
GLUCOSE BLD-MCNC: 170 MG/DL (ref 65–99)
TROPONIN I SERPL-MCNC: 0.13 NG/ML (ref 0–0.04)

## 2019-06-21 PROCEDURE — A9270 NON-COVERED ITEM OR SERVICE: HCPCS | Performed by: STUDENT IN AN ORGANIZED HEALTH CARE EDUCATION/TRAINING PROGRAM

## 2019-06-21 PROCEDURE — 99233 SBSQ HOSP IP/OBS HIGH 50: CPT | Performed by: INTERNAL MEDICINE

## 2019-06-21 PROCEDURE — 700111 HCHG RX REV CODE 636 W/ 250 OVERRIDE (IP)

## 2019-06-21 PROCEDURE — 700102 HCHG RX REV CODE 250 W/ 637 OVERRIDE(OP): Performed by: STUDENT IN AN ORGANIZED HEALTH CARE EDUCATION/TRAINING PROGRAM

## 2019-06-21 PROCEDURE — 99233 SBSQ HOSP IP/OBS HIGH 50: CPT | Mod: GC | Performed by: INTERNAL MEDICINE

## 2019-06-21 PROCEDURE — 770020 HCHG ROOM/CARE - TELE (206)

## 2019-06-21 PROCEDURE — 82962 GLUCOSE BLOOD TEST: CPT

## 2019-06-21 PROCEDURE — 84484 ASSAY OF TROPONIN QUANT: CPT

## 2019-06-21 PROCEDURE — A9502 TC99M TETROFOSMIN: HCPCS

## 2019-06-21 RX ORDER — LISINOPRIL 10 MG/1
10 TABLET ORAL
Status: DISCONTINUED | OUTPATIENT
Start: 2019-06-22 | End: 2019-06-22 | Stop reason: HOSPADM

## 2019-06-21 RX ORDER — REGADENOSON 0.08 MG/ML
INJECTION, SOLUTION INTRAVENOUS
Status: COMPLETED
Start: 2019-06-21 | End: 2019-06-21

## 2019-06-21 RX ADMIN — CYANOCOBALAMIN TAB 500 MCG 1000 MCG: 500 TAB at 05:30

## 2019-06-21 RX ADMIN — OMEPRAZOLE 40 MG: 20 CAPSULE, DELAYED RELEASE ORAL at 05:30

## 2019-06-21 RX ADMIN — LISINOPRIL 5 MG: 5 TABLET ORAL at 05:30

## 2019-06-21 RX ADMIN — INSULIN GLARGINE 10 UNITS: 100 INJECTION, SOLUTION SUBCUTANEOUS at 17:07

## 2019-06-21 RX ADMIN — ACETAMINOPHEN 650 MG: 325 TABLET, FILM COATED ORAL at 23:06

## 2019-06-21 RX ADMIN — REGADENOSON 0.4 MG: 0.08 INJECTION, SOLUTION INTRAVENOUS at 10:28

## 2019-06-21 RX ADMIN — ATORVASTATIN CALCIUM 80 MG: 80 TABLET, FILM COATED ORAL at 16:54

## 2019-06-21 RX ADMIN — METOPROLOL TARTRATE 25 MG: 25 TABLET, FILM COATED ORAL at 05:30

## 2019-06-21 RX ADMIN — METOPROLOL TARTRATE 25 MG: 25 TABLET, FILM COATED ORAL at 16:55

## 2019-06-21 RX ADMIN — CLOPIDOGREL BISULFATE 75 MG: 75 TABLET ORAL at 05:30

## 2019-06-21 RX ADMIN — OMEPRAZOLE 40 MG: 20 CAPSULE, DELAYED RELEASE ORAL at 16:54

## 2019-06-21 ASSESSMENT — ENCOUNTER SYMPTOMS
VOMITING: 0
CHILLS: 0
FOCAL WEAKNESS: 0
SHORTNESS OF BREATH: 0
SPUTUM PRODUCTION: 0
MYALGIAS: 0
ABDOMINAL PAIN: 0
SORE THROAT: 0
FALLS: 0
CHOKING: 0
CONSTIPATION: 0
BRUISES/BLEEDS EASILY: 0
INSOMNIA: 0
HEADACHES: 0
DEPRESSION: 0
CHEST TIGHTNESS: 0
DIZZINESS: 0
APNEA: 0
DIARRHEA: 0
BLOOD IN STOOL: 1
NAUSEA: 0
WHEEZING: 0
FEVER: 0
BLURRED VISION: 0
STRIDOR: 0
NERVOUS/ANXIOUS: 0
ORTHOPNEA: 0
DOUBLE VISION: 0
BACK PAIN: 0
COUGH: 0

## 2019-06-21 ASSESSMENT — LIFESTYLE VARIABLES: SUBSTANCE_ABUSE: 0

## 2019-06-21 NOTE — PROGRESS NOTES
Received bedside report from RN, pt care assumed, VSS, pt assessment complete. Pt AAOx4, chronic c/o 2/10 pain at this time. No signs of acute distress noted at this time. POC discussed with pt and verbalizes no questions. Pt denies any additional needs at this time. Bed in lowest position, bed alarm on, pt educated on fall risk and verbalized understanding, call light within reach, hourly rounding initiated. In a sinus rhythm. NPO for stress test today.

## 2019-06-21 NOTE — PROGRESS NOTES
Internal Medicine Interval Note  Note Author: Telly Keller M.D.     Name Jarret Bright     1950   Age/Sex 68 y.o. male   MRN [id number]   Code Status Full Code     After 5PM or if no immediate response to page, please call for cross-coverage  Attending/Team: Dr Duran/ Ramiro See Patient List for primary contact information  Call [phone] to page    1st Call - Day Intern (R1):   Dr Keller 2nd Call - Day Sr. Resident (R2/R3):   Dr Thomas         Reason for interval visit  (Principal Problem)   NSTEMI  GI bleed, likely upper  Ileal mass, preop risk stratification  Pulmonary edema  DKA, resolved      Interval Problem Daily Status Update  (24 hours, problem oriented, brief subjective history, new lab/imaging data pertinent to that problem)     Patient seen and examined at bedside, stable, NAD, no acute events overnight.  Per MPI scan multivessel CAD not amenable to PCI/CABG, mixed sized area of ischemia mid lateral wall with akinesis, EF 60%.  General surgery consulted on further course of action.  Lisinopril increased to 10 in view of moderately raised blood pressures.  Sliding scale insulin changed to short-acting to avoid nocturnal hypoglycemia.      Review of Systems   Constitutional: Negative for chills and fever.   HENT: Negative for congestion and sore throat.    Eyes: Negative for blurred vision and double vision.   Respiratory: Negative for cough, sputum production and shortness of breath.    Cardiovascular: Negative for chest pain, orthopnea and leg swelling.   Gastrointestinal: Positive for blood in stool and melena. Negative for abdominal pain, constipation, diarrhea, nausea and vomiting.   Genitourinary: Negative for dysuria, frequency and urgency.   Musculoskeletal: Negative for back pain, falls, joint pain and myalgias.   Skin: Negative for itching and rash.   Neurological: Negative for dizziness, focal weakness and headaches.   Endo/Heme/Allergies: Negative for environmental allergies.  Does not bruise/bleed easily.   Psychiatric/Behavioral: Negative for depression and substance abuse. The patient is not nervous/anxious and does not have insomnia.        Disposition/Barriers to discharge:   Continue acute care for assessment and treatment of ileal mass    Consultants/Specialty  Cardiology  Gastroenterology  Pulmonology  PCP: Prashanth Ocasio      Quality Measures  Quality-Core Measures   Reviewed items::  EKG reviewed, Labs reviewed, Medications reviewed and Radiology images reviewed  Reed catheter::  No Reed  DVT prophylaxis pharmacological::  Contraindicated - High bleeding risk  DVT prophylaxis - mechanical:  SCDs  Ulcer Prophylaxis::  Not indicated          Physical Exam       Vitals:    06/20/19 2000 06/21/19 0000 06/21/19 0400 06/21/19 0800   BP: 144/69 120/65 153/69 154/58   Pulse: 84 82 85 74   Resp: 18 18 20 17   Temp: 36.7 °C (98.1 °F) 37.3 °C (99.1 °F) 37 °C (98.6 °F) 36.7 °C (98.1 °F)   TempSrc: Temporal Temporal Temporal Temporal   SpO2: 98% 98% 95% 96%   Weight: 59.5 kg (131 lb 2.8 oz)      Height:         Body mass index is 20.54 kg/m². Weight: 59.5 kg (131 lb 2.8 oz)  Oxygen Therapy:  Pulse Oximetry: 96 %, O2 (LPM): 0, O2 Delivery: None (Room Air)    Physical Exam   Constitutional: He is oriented to person, place, and time and well-developed, well-nourished, and in no distress.   HENT:   Head: Normocephalic and atraumatic.   Right Ear: External ear normal.   Left Ear: External ear normal.   Nose: Nose normal.   Mouth/Throat: No oropharyngeal exudate.   Eyes: Pupils are equal, round, and reactive to light. Conjunctivae are normal. No scleral icterus.   Neck: Normal range of motion. Neck supple. No thyromegaly present.   Cardiovascular: Normal rate, regular rhythm, normal heart sounds and intact distal pulses.  Exam reveals no gallop and no friction rub.    No murmur heard.  Pulmonary/Chest: Effort normal and breath sounds normal. No respiratory distress. He has no wheezes. He has no  rales.   Abdominal: Soft. Bowel sounds are normal. He exhibits no distension. There is no tenderness. There is no guarding.   Musculoskeletal: Normal range of motion. He exhibits no edema.   RLE s/p remote BKA with prosthesis in place   Lymphadenopathy:     He has no cervical adenopathy.   Neurological: He is alert and oriented to person, place, and time. No cranial nerve deficit.   Skin: Skin is warm and dry.   Psychiatric: Mood and affect normal.       Assessment/Plan     * Diabetic ketoacidosis without coma associated with type 2 diabetes mellitus (HCC)- (present on admission)   Assessment & Plan    Resolved.  A1C 6.9.  It is unclear whether patient is compliant with medications on outpatient basis.  -continue 10 units insulin glargine nightly  -continue insulin sliding scale with hypoglycemia protocol     Acute blood loss anemia   Assessment & Plan    Likely secondary to ileal mass/polyp, visualized as filling defect on barium follow-through  Monitoring H&H, transfuse for Hb<8 as concomitant severe cardiac disease     NSTEMI (non-ST elevated myocardial infarction) ([facility])- (present on admission)   Assessment & Plan    Initially thought to have Type II MI, then troponins increased to 30. Cardiology called and plan was for cath, cath held due to possible bleeding. However, they note that patients EKG revealed improvement with out intervention, only treating DKA. Likely CAD exacerbated by DKA. Will correct DKA and monitor Cardiac fucntion.   Plan  -Statin, beta-blockers  -Patient started on clopidogrel on 6/13/2019  -Lisinopril to 10 mg  -Per MPI high risk surgery lateral wall akinesia and area of ischemia of variable size     Leukocytosis- (present on admission)   Assessment & Plan    Resolved     Colitis   Assessment & Plan    CT: thick walled appearance colon which may be due to the fact that it is decompressed. Mild colitis is in the differential.  Possible related to DKA episode  Ileal mass per imaging.  Resection indicated per surgery, cardiology clearance requested.  -High risk surgery per cardiology and MPI showing mixed sized area of ischemia with lateral wall akinesis  -proceed with bowel resection if appropriate, general surgery consulted for further course of action     Urine retention- (present on admission)   Assessment & Plan    Acute urinary retention. Mcnair was placed in outside facility however patient was having significant discomfort with mcnair therefore removed here.   Plan  - Continue flomax  - Serial bladder scans; if patient is unable to void, will need to replace mcnair     Type 2 diabetes mellitus with hyperglycemia, with long-term current use of insulin (HCC)- (present on admission)   Assessment & Plan    On  insulin at home per med rec, patient currently on 10 units glargine and insulin sliding scale with hypoglycemia protocol.  Medication compliance questionable  Brittle diabetes with widely fluctuating blood sugars, evaluated by diabetes educator  Plan  -Continue glargine at 10 nightly, hypoglycemia protocol, he consistently finishes 80 - 100% of his meals  -SSI changed to short acting insulin 3 times daily     Acute respiratory failure with hypoxia (HCC)- (present on admission)   Assessment & Plan    Notified by nursing staff the patient was having increased work of breathing in ICU, acute pulmonary edema and hypoxia.  Patient received intravenous diuresis, patient was also placed on BiPAP PRN. Patient's respiratory status much improved.  Currently saturating well on room air  Plan  -Continue to monitor     Positive D dimer   Assessment & Plan    Patient was noted to have a positive d-dimer.  Ultrasound lower extremities did not reveal any thrombi.  Patient currently has SCDs.  D-dimer could be elevated due to age and patient's cardiac issues.  Plan  -Continue to monitor     Acute pulmonary edema (HCC)   Assessment & Plan    Patient was noted to have pulmonary edema on PE and imaging.   Likely secondary to necessary intravenous volume resuscitation.  Patient was diuresed and respiratory status improved. Lasix discontinued.  -monitor     GI bleeding   Assessment & Plan    Ileal lesion per capsule endoscopy, small bowel follow through  Surgery requested cardiology clearance, myocardial perfusion imaging today  -proceed with bowel resection if cleared  -Transfuse as needed to maintain Hb>8     Essential hypertension- (present on admission)   Assessment & Plan    On lisinopril/HCTZ 10/12/5 mg daily per med rec. Lisinopril restarted at 5.  -continue lisinopril     Macrocytic anemia- (present on admission)   Assessment & Plan    Likely secondary to B12 deficiency  Plan  B12 supplementation

## 2019-06-21 NOTE — CARE PLAN
Problem: Safety  Goal: Will remain free from falls  Outcome: PROGRESSING AS EXPECTED  Pt fall risk assessment completed, pt a low fall risk. Pt educated on fall program rationale and verbalized understanding. Bed is locked and in lowest position,  non-skid socks in place. All fall precautions and hourly rounding in place.     Problem: Infection  Goal: Will remain free from infection  Outcome: PROGRESSING AS EXPECTED  Hand hygiene completed before and after patient care. Pt educated about infection prevention and verbalized understanding. RN follows all protocols for infection prevention.

## 2019-06-21 NOTE — PROGRESS NOTES
Report received from day shift RN, assumed care of pt. Pt A&O4, in no apparent distress, no reports of chest pain. Plan of care discussed with pt, no questions or needs at this time. Pt to be NPO at midnight. Tele box on, rhythm verified. Call light within reach, bed locked and in lowest position. All fall precautions and hourly rounding in place. Will continue to monitor.

## 2019-06-21 NOTE — PROGRESS NOTES
"Patient made death threat to roommate in bed 1. Patient stated, \"You'd better hope he's alive in the morning.\" Reinforced boundary and offered patient ear plugs since he states annoyance due to roommate \"rambling.\" Patient declined earplugs and headphones.  "

## 2019-06-21 NOTE — PROGRESS NOTES
Cardiology Follow Up Progress Note    Date of Service  6/21/2019    Attending Physician  Júnior Duran M.D.    Reason for consultation     Preop evaluation for recent findings of small bowel mass    History of     multivessel CAD turned down for bypass due to poor targets as well as due to multiple medical comorbidities, diabetes, hypertension, hyperlipidemia, left below the knee amputation, pacemaker 2018 secondary to high degree block, CVA      Left heart catheterization March 23, 2018  IMPRESSION:  1.  Multivessel coronary artery disease with nonobstructive left main coronary   artery disease, high-grade mid left anterior descending artery and distal   left anterior descending artery stenosis, high-grade proximal diagonal branch   stenosis, high-grade ramus stenosis, high-grade proximal obtuse marginal   branch stenosis and chronic total occlusion of the mid right coronary artery   with distal vessel filling via left to right collaterals.  2.  Normal left ventricular systolic function with ejection fraction of 55%.  3.  Mildly elevated left ventricular end-diastolic pressu        Admitted for       Lower GI bleed requiring transfusion status post video capsule endoscopy revealed 2 cm mass in terminal ileum, small bowel follow-through confirmed an ileal mass         Interim Events    6/21/19 completed MPI, no rhythm issues overnight, denies dyspnea, no angina    Review of Systems  Review of Systems   Respiratory: Negative for apnea, cough, choking, chest tightness, shortness of breath, wheezing and stridor.    Cardiovascular: Negative for chest pain.       Vital signs in last 24 hours  Temp:  [36.1 °C (97 °F)-37.3 °C (99.1 °F)] 37 °C (98.6 °F)  Pulse:  [71-91] 85  Resp:  [18-20] 20  BP: (120-153)/(61-69) 153/69  SpO2:  [95 %-98 %] 95 %    Physical Exam  Physical Exam   Constitutional: He is oriented to person, place, and time.   HENT:   Head: Normocephalic.   Eyes: Conjunctivae are normal.   Neck: No JVD  present. No thyromegaly present.   Pulmonary/Chest: He has no wheezes.   Abdominal: Soft.   Musculoskeletal: He exhibits no edema.   Neurological: He is alert and oriented to person, place, and time.   Skin: Skin is warm and dry.       Lab Review  Lab Results   Component Value Date/Time    WBC 6.1 06/20/2019 02:13 AM    RBC 3.01 (L) 06/20/2019 02:13 AM    HEMOGLOBIN 9.7 (L) 06/20/2019 02:13 AM    HEMATOCRIT 29.1 (L) 06/20/2019 02:13 AM    MCV 96.7 06/20/2019 02:13 AM    MCH 32.2 06/20/2019 02:13 AM    MCHC 33.3 (L) 06/20/2019 02:13 AM    MPV 9.0 06/20/2019 02:13 AM      Lab Results   Component Value Date/Time    SODIUM 140 06/14/2019 02:49 AM    POTASSIUM 3.6 06/14/2019 02:49 AM    CHLORIDE 108 06/14/2019 02:49 AM    CO2 24 06/14/2019 02:49 AM    GLUCOSE 71 06/14/2019 02:49 AM    BUN 13 06/14/2019 02:49 AM    CREATININE 0.73 06/14/2019 02:49 AM      Lab Results   Component Value Date/Time    ASTSGOT 19 06/09/2019 10:34 PM    ALTSGPT 10 06/09/2019 10:34 PM     Lab Results   Component Value Date/Time    CHOLSTRLTOT 118 03/25/2018 02:07 AM    LDL 70 03/25/2018 02:07 AM    HDL 31 (A) 03/25/2018 02:07 AM    TRIGLYCERIDE 83 03/25/2018 02:07 AM    TROPONINI 0.13 (H) 06/21/2019 06:25 AM               Assessment/Plan    Lower GI bleed status post transfusion  -Video capsule endoscopy revealed 2 cm mass in the terminal ileum  -Small bowel follow-through confirmed ileal mass about the same size  -H/H9 0.7/29  -Needs to undergo laparoscopy versus open small bowel resection  -Cardiology consultation for preop clearance, underwent MPI 6/21/19      Multivessel CAD not amenable to PCI or CABG secondary to multiple comorbidities and poor targets  -GDMT, on Lipitor 80, Plavix 75 mg, lisinopril 10, metoprolol 25 mg twice daily  -MPI 6/21/19 mixed sized area of ischemia mid lateral wall with akinesis  -Will preserved EF by echo, EF 60%, 6/10/2019  -No rhythm issues overnight    Will discuss with Dr. Welch

## 2019-06-22 ENCOUNTER — PATIENT OUTREACH (OUTPATIENT)
Dept: HEALTH INFORMATION MANAGEMENT | Facility: OTHER | Age: 69
End: 2019-06-22

## 2019-06-22 VITALS
OXYGEN SATURATION: 96 % | RESPIRATION RATE: 18 BRPM | DIASTOLIC BLOOD PRESSURE: 88 MMHG | TEMPERATURE: 98 F | BODY MASS INDEX: 20.83 KG/M2 | SYSTOLIC BLOOD PRESSURE: 156 MMHG | HEART RATE: 75 BPM | HEIGHT: 67 IN | WEIGHT: 132.72 LBS

## 2019-06-22 PROBLEM — K52.9 COLITIS: Status: RESOLVED | Noted: 2019-06-09 | Resolved: 2019-06-22

## 2019-06-22 PROBLEM — K63.89 POLYP OF ILEUM: Status: ACTIVE | Noted: 2019-06-22

## 2019-06-22 PROBLEM — K92.2 GI BLEEDING: Status: RESOLVED | Noted: 2019-06-11 | Resolved: 2019-06-22

## 2019-06-22 PROBLEM — E11.10 DIABETIC KETOACIDOSIS WITHOUT COMA ASSOCIATED WITH TYPE 2 DIABETES MELLITUS (HCC): Status: RESOLVED | Noted: 2019-06-10 | Resolved: 2019-06-22

## 2019-06-22 PROBLEM — R33.9 URINE RETENTION: Status: RESOLVED | Noted: 2018-03-23 | Resolved: 2019-06-22

## 2019-06-22 PROBLEM — J81.0 ACUTE PULMONARY EDEMA (HCC): Status: RESOLVED | Noted: 2019-06-11 | Resolved: 2019-06-22

## 2019-06-22 PROBLEM — J96.01 ACUTE RESPIRATORY FAILURE WITH HYPOXIA (HCC): Status: RESOLVED | Noted: 2018-03-22 | Resolved: 2019-06-22

## 2019-06-22 PROBLEM — R10.84 GENERALIZED ABDOMINAL PAIN: Status: RESOLVED | Noted: 2019-06-10 | Resolved: 2019-06-22

## 2019-06-22 LAB
BASOPHILS # BLD AUTO: 0.3 % (ref 0–1.8)
BASOPHILS # BLD: 0.02 K/UL (ref 0–0.12)
EOSINOPHIL # BLD AUTO: 0.08 K/UL (ref 0–0.51)
EOSINOPHIL NFR BLD: 1 % (ref 0–6.9)
ERYTHROCYTE [DISTWIDTH] IN BLOOD BY AUTOMATED COUNT: 49.1 FL (ref 35.9–50)
GLUCOSE BLD-MCNC: 129 MG/DL (ref 65–99)
GLUCOSE BLD-MCNC: 345 MG/DL (ref 65–99)
HCT VFR BLD AUTO: 27.2 % (ref 42–52)
HGB BLD-MCNC: 9 G/DL (ref 14–18)
IMM GRANULOCYTES # BLD AUTO: 0.03 K/UL (ref 0–0.11)
IMM GRANULOCYTES NFR BLD AUTO: 0.4 % (ref 0–0.9)
LYMPHOCYTES # BLD AUTO: 1.91 K/UL (ref 1–4.8)
LYMPHOCYTES NFR BLD: 24.5 % (ref 22–41)
MCH RBC QN AUTO: 32.4 PG (ref 27–33)
MCHC RBC AUTO-ENTMCNC: 33.1 G/DL (ref 33.7–35.3)
MCV RBC AUTO: 97.8 FL (ref 81.4–97.8)
MONOCYTES # BLD AUTO: 0.55 K/UL (ref 0–0.85)
MONOCYTES NFR BLD AUTO: 7.1 % (ref 0–13.4)
NEUTROPHILS # BLD AUTO: 5.2 K/UL (ref 1.82–7.42)
NEUTROPHILS NFR BLD: 66.7 % (ref 44–72)
NRBC # BLD AUTO: 0 K/UL
NRBC BLD-RTO: 0 /100 WBC
PLATELET # BLD AUTO: 192 K/UL (ref 164–446)
PMV BLD AUTO: 8.9 FL (ref 9–12.9)
RBC # BLD AUTO: 2.78 M/UL (ref 4.7–6.1)
WBC # BLD AUTO: 7.8 K/UL (ref 4.8–10.8)

## 2019-06-22 PROCEDURE — 82962 GLUCOSE BLOOD TEST: CPT | Mod: 91

## 2019-06-22 PROCEDURE — A9270 NON-COVERED ITEM OR SERVICE: HCPCS | Performed by: STUDENT IN AN ORGANIZED HEALTH CARE EDUCATION/TRAINING PROGRAM

## 2019-06-22 PROCEDURE — 99239 HOSP IP/OBS DSCHRG MGMT >30: CPT | Mod: GC | Performed by: INTERNAL MEDICINE

## 2019-06-22 PROCEDURE — 700102 HCHG RX REV CODE 250 W/ 637 OVERRIDE(OP): Performed by: STUDENT IN AN ORGANIZED HEALTH CARE EDUCATION/TRAINING PROGRAM

## 2019-06-22 PROCEDURE — 85025 COMPLETE CBC W/AUTO DIFF WBC: CPT

## 2019-06-22 PROCEDURE — 99232 SBSQ HOSP IP/OBS MODERATE 35: CPT | Performed by: INTERNAL MEDICINE

## 2019-06-22 RX ORDER — LANOLIN ALCOHOL/MO/W.PET/CERES
5 CREAM (GRAM) TOPICAL 3 TIMES DAILY PRN
Qty: 100 G | Refills: 1 | Status: SHIPPED | OUTPATIENT
Start: 2019-06-22

## 2019-06-22 RX ORDER — ASPIRIN 81 MG/1
81 TABLET, CHEWABLE ORAL DAILY
Status: DISCONTINUED | OUTPATIENT
Start: 2019-06-22 | End: 2019-06-22 | Stop reason: HOSPADM

## 2019-06-22 RX ORDER — ATORVASTATIN CALCIUM 80 MG/1
80 TABLET, FILM COATED ORAL EVERY EVENING
Qty: 30 TAB | Refills: 3 | Status: SHIPPED | OUTPATIENT
Start: 2019-06-22

## 2019-06-22 RX ORDER — NITROGLYCERIN 0.4 MG/1
0.4 TABLET SUBLINGUAL PRN
Qty: 30 TAB | Refills: 3 | Status: SHIPPED | OUTPATIENT
Start: 2019-06-22

## 2019-06-22 RX ORDER — OMEPRAZOLE 40 MG/1
40 CAPSULE, DELAYED RELEASE ORAL 2 TIMES DAILY
Qty: 30 CAP | Refills: 3 | Status: SHIPPED | OUTPATIENT
Start: 2019-06-22

## 2019-06-22 RX ORDER — ACETAMINOPHEN 325 MG/1
650 TABLET ORAL EVERY 6 HOURS PRN
Qty: 30 TAB | Refills: 0 | Status: SHIPPED | OUTPATIENT
Start: 2019-06-22

## 2019-06-22 RX ORDER — LISINOPRIL 10 MG/1
10 TABLET ORAL DAILY
Qty: 30 TAB | Refills: 3 | Status: SHIPPED | OUTPATIENT
Start: 2019-06-23

## 2019-06-22 RX ADMIN — LISINOPRIL 10 MG: 10 TABLET ORAL at 05:44

## 2019-06-22 RX ADMIN — TAMSULOSIN HYDROCHLORIDE 0.4 MG: 0.4 CAPSULE ORAL at 09:02

## 2019-06-22 RX ADMIN — ASPIRIN 81 MG 81 MG: 81 TABLET ORAL at 09:02

## 2019-06-22 RX ADMIN — METOPROLOL TARTRATE 25 MG: 25 TABLET, FILM COATED ORAL at 05:44

## 2019-06-22 RX ADMIN — OMEPRAZOLE 40 MG: 20 CAPSULE, DELAYED RELEASE ORAL at 05:43

## 2019-06-22 RX ADMIN — CYANOCOBALAMIN TAB 500 MCG 1000 MCG: 500 TAB at 05:43

## 2019-06-22 ASSESSMENT — ENCOUNTER SYMPTOMS
CHOKING: 0
CONSTIPATION: 0
INSOMNIA: 0
FEVER: 0
WHEEZING: 0
FOCAL WEAKNESS: 0
STRIDOR: 0
BLOOD IN STOOL: 1
BRUISES/BLEEDS EASILY: 0
BACK PAIN: 0
SPUTUM PRODUCTION: 0
NERVOUS/ANXIOUS: 0
VOMITING: 0
NAUSEA: 0
FALLS: 0
MYALGIAS: 0
SHORTNESS OF BREATH: 0
APNEA: 0
BLURRED VISION: 0
CHILLS: 0
CHEST TIGHTNESS: 0
ABDOMINAL PAIN: 0
DEPRESSION: 0
ORTHOPNEA: 0
SORE THROAT: 0
DIARRHEA: 0
COUGH: 0
HEADACHES: 0
DIZZINESS: 0
DOUBLE VISION: 0

## 2019-06-22 ASSESSMENT — LIFESTYLE VARIABLES: SUBSTANCE_ABUSE: 0

## 2019-06-22 NOTE — PROGRESS NOTES
6/22  Pt seen and examined.  High risk per cardiology for any surgery and he does not want to undergo an operation at this time.  Per cardiology he can hold Plavix for surgery if need be.  Tentatively, we had planned to do surgery as an inpatient next week.  Pt does not want to pursue this route though.  I did advise him that he is at high risk for repeat bleeding without surgery.  He will follow up with me prn as desired.

## 2019-06-22 NOTE — PROGRESS NOTES
Internal Medicine Interval Note  Note Author: Telly Keller M.D.     Name Jarret Bright     1950   Age/Sex 68 y.o. male   MRN [id number]   Code Status Full Code     After 5PM or if no immediate response to page, please call for cross-coverage  Attending/Team: Dr Duran/ Ramiro See Patient List for primary contact information  Call [phone] to page    1st Call - Day Intern (R1):   Dr Keller 2nd Call - Day Sr. Resident (R2/R3):   Dr Thomas         Reason for interval visit  (Principal Problem)   NSTEMI  GI bleed, likely upper  Ileal mass, preop risk stratification  DKA, resolved      Interval Problem Daily Status Update  (24 hours, problem oriented, brief subjective history, new lab/imaging data pertinent to that problem)     Patient seen and examined at bedside this AM, stable, NAD, Hb stable, no further GI bleeding/ dark/ tarry stool.  Per cardiology Dr Welch pt is medium to high risk for surgery, Plavix to be stopped for 5 days prior to surgery.  Patient advised about high risk of surgery including risk of death from cardiac causes perioperatively.  Aspirin started in interim. Appreciate cardiology, general surgery recs.    Patient elects to not have surgery, wishes to go home. Risks of not having surgery including recurrence of bleed and health risks explained to patient, he voices understanding.    Per Cardiology follow up recommended, no further inpatient intervention indicated.      Review of Systems   Constitutional: Negative for chills and fever.   HENT: Negative for congestion and sore throat.    Eyes: Negative for blurred vision and double vision.   Respiratory: Negative for cough, sputum production and shortness of breath.    Cardiovascular: Negative for chest pain, orthopnea and leg swelling.   Gastrointestinal: Positive for blood in stool and melena. Negative for abdominal pain, constipation, diarrhea, nausea and vomiting.   Genitourinary: Negative for dysuria, frequency and urgency.    Musculoskeletal: Negative for back pain, falls, joint pain and myalgias.   Skin: Negative for itching and rash.   Neurological: Negative for dizziness, focal weakness and headaches.   Endo/Heme/Allergies: Negative for environmental allergies. Does not bruise/bleed easily.   Psychiatric/Behavioral: Negative for depression and substance abuse. The patient is not nervous/anxious and does not have insomnia.        Disposition/Barriers to discharge:   Medically clear for discharge    Consultants/Specialty  Cardiology  Gastroenterology  Pulmonology  General surgery  PCP: Prashanth Ocasio      Quality Measures  Quality-Core Measures   Reviewed items::  EKG reviewed, Labs reviewed, Medications reviewed and Radiology images reviewed  Reed catheter::  No Reed  DVT prophylaxis pharmacological::  Contraindicated - High bleeding risk  DVT prophylaxis - mechanical:  SCDs  Ulcer Prophylaxis::  Not indicated          Physical Exam       Vitals:    06/21/19 2000 06/22/19 0000 06/22/19 0400 06/22/19 0800   BP: 125/80 138/74 130/67 156/88   Pulse: 80 80 78 75   Resp: 18 18 18 18   Temp: 36.3 °C (97.4 °F) 36.6 °C (97.8 °F) 37.2 °C (98.9 °F) 36.7 °C (98 °F)   TempSrc: Temporal Temporal Temporal Temporal   SpO2: 98% 96% 98% 96%   Weight: 60.2 kg (132 lb 11.5 oz)      Height:         Body mass index is 20.79 kg/m². Weight: 60.2 kg (132 lb 11.5 oz)  Oxygen Therapy:  Pulse Oximetry: 96 %, O2 (LPM): 0, O2 Delivery: None (Room Air)    Physical Exam   Constitutional: He is oriented to person, place, and time and well-developed, well-nourished, and in no distress.   HENT:   Head: Normocephalic and atraumatic.   Right Ear: External ear normal.   Left Ear: External ear normal.   Nose: Nose normal.   Mouth/Throat: No oropharyngeal exudate.   Eyes: Pupils are equal, round, and reactive to light. Conjunctivae are normal. No scleral icterus.   Neck: Normal range of motion. Neck supple. No thyromegaly present.   Cardiovascular: Normal rate, regular  rhythm, normal heart sounds and intact distal pulses.  Exam reveals no gallop and no friction rub.    No murmur heard.  Pulmonary/Chest: Effort normal and breath sounds normal. No respiratory distress. He has no wheezes. He has no rales.   Abdominal: Soft. Bowel sounds are normal. He exhibits no distension. There is no tenderness. There is no guarding.   Musculoskeletal: Normal range of motion. He exhibits no edema.   RLE s/p remote BKA with prosthesis in place   Lymphadenopathy:     He has no cervical adenopathy.   Neurological: He is alert and oriented to person, place, and time. No cranial nerve deficit.   Skin: Skin is warm and dry.   Psychiatric: Mood and affect normal.       Assessment/Plan     * Diabetic ketoacidosis without coma associated with type 2 diabetes mellitus (HCC)- (present on admission)   Assessment & Plan    Resolved.  A1C 6.9.  It is unclear whether patient is compliant with medications on outpatient basis.  -continue 10 units insulin glargine nightly  -continue insulin sliding scale with hypoglycemia protocol     Acute blood loss anemia   Assessment & Plan    Likely secondary to ileal mass/polyp, visualized as filling defect on barium follow-through  Monitoring H&H, transfuse for Hb<8 as concomitant severe cardiac disease  Patient advised to seek urgent medical attention if bleeding recurs, counseled on recognizing signs of anemia     NSTEMI (non-ST elevated myocardial infarction) (HCC)- (present on admission)   Assessment & Plan    Initially thought to have Type II MI, then troponins increased to 30. Cardiology called and plan was for cath, cath held due to possible bleeding. However, they note that patients EKG revealed improvement with out intervention, only treating DKA. Likely CAD exacerbated by DKA. Will correct DKA and monitor Cardiac fucntion.   Plan  -Statin, beta-blockers  -Patient started on clopidogrel on 6/13/2019  -Lisinopril to 10 mg  -Per MPI high risk surgery lateral wall  akinesia and area of ischemia of variable size  -Follow up with Cardiology recommended     Leukocytosis- (present on admission)   Assessment & Plan    Resolved     Colitis   Assessment & Plan    CT: thick walled appearance colon which may be due to the fact that it is decompressed. Mild colitis is in the differential.  Possible related to DKA episode  Ileal mass per imaging. Resection indicated per surgery, cardiology clearance requested.  -High risk surgery per cardiology and MPI showing mixed sized area of ischemia with lateral wall akinesis  -Plavix discontinued per Cardiology recs, Aspirin started  - pt wishes to not have surgery, wishes to go home - being discharged     Urine retention- (present on admission)   Assessment & Plan    resolved     Type 2 diabetes mellitus with hyperglycemia, with long-term current use of insulin (HCC)- (present on admission)   Assessment & Plan    On  insulin at home per med rec, patient currently on 10 units glargine and insulin sliding scale with hypoglycemia protocol.  Medication compliance questionable  Brittle diabetes with widely fluctuating blood sugars, evaluated by diabetes educator  Plan  -pt to be discharged on long acting Insulin/ Glargine and Humalog TID AC SSI     Acute respiratory failure with hypoxia (HCC)- (present on admission)   Assessment & Plan    Notified by nursing staff the patient was having increased work of breathing in ICU, acute pulmonary edema and hypoxia.  Patient received intravenous diuresis, patient was also placed on BiPAP PRN. Patient's respiratory status much improved.  Currently saturating well on room air  Plan  -Continue to monitor, off supplemental oxygen     Ileal mass   Assessment & Plan    Ileal mass possibly polyp on capsule endoscopy confirmed on barium follow through  High risk surgery to resect in view of recent NSTEMI and area of myocardial ischemia  Patient elects not to have surgery done, is being discharged in stable  condition  He is aware of the risk of bleeding, anemia and heart failure and is advised to seek urgent medical attention in this setting, he voices understanding     Positive D dimer   Assessment & Plan    Patient was noted to have a positive d-dimer.  Ultrasound lower extremities did not reveal any thrombi.  Patient currently has SCDs.  D-dimer could be elevated due to age and patient's cardiac issues.  Plan  -Continue to monitor     Acute pulmonary edema (HCC)   Assessment & Plan    Patient was noted to have pulmonary edema on PE and imaging.  Likely secondary to necessary intravenous volume resuscitation.  Patient was diuresed and respiratory status improved. Lasix discontinued.  -monitor     GI bleeding   Assessment & Plan    Ileal lesion per capsule endoscopy, small bowel follow through  Currently Hb stable, no dark stools  Pt counseled on possibility of recurrence     Essential hypertension- (present on admission)   Assessment & Plan    On lisinopril/HCTZ 10/12/5 mg daily per med rec. Lisinopril restarted at 5.  -continue lisinopril at 10 mg     Macrocytic anemia- (present on admission)   Assessment & Plan    Likely secondary to B12 deficiency  Plan  B12 supplementation

## 2019-06-22 NOTE — DISCHARGE SUMMARY
Internal Medicine Discharge Summary  Note Author: Telly Keller M.D.       Name Jarret Bright 1950   Age/Sex 68 y.o. male   MRN [id number]         Admit Date:  2019       Discharge Date:   2019    Service:   [facility] Internal Medicine White Team  Attending Physician(s):   Dr Duran       Senior Resident(s):   Dr Thomas  Basil Resident(s):   Dr Keller  PCP: Prashanth Ocasio      Primary Diagnosis:   Gastrointestinal bleeding secondary to ileal mass  Diabetic ketoacidosis associated with type 2 diabetes mellitus, insulin-dependent  Non-ST elevation myocardial infarction, likely type II secondary to demand ischemia    Secondary Diagnoses:                Principal Problem (Resolved):    Diabetic ketoacidosis without coma associated with type 2 diabetes mellitus (HCC) POA: Yes  Active Problems:    NSTEMI (non-ST elevated myocardial infarction) (HCC) POA: Yes    Acute blood loss anemia POA: No    Type 2 diabetes mellitus with hyperglycemia, with long-term current use of insulin (HCC) POA: Yes    Leukocytosis POA: Yes    Macrocytic anemia POA: Yes    Essential hypertension POA: Yes    Positive D dimer POA: Unknown    Supply ischemia of myocardium POA: Unknown    Ileal mass POA: Unknown  Resolved Problems:    High anion gap metabolic acidosis POA: Unknown    Acute respiratory failure with hypoxia (HCC) POA: Yes    Urine retention POA: Yes    Colitis POA: Unknown    Generalized abdominal pain POA: Unknown    GI bleeding POA: Unknown    Acute pulmonary edema (HCC) POA: Unknown      Hospital Summary (Brief Narrative):       Mr. Bright is a 68-year-old gentleman who was transferred from [facility] with symptoms of nausea, vomiting, abdominal pain for a day and transferred to [facility], on admission found to have a WBC count of 15, bicarbonate 11, beta hydroxybutyrate >8, anion gap 30, lactate 1.9, EKG finding of ST segment depression in anterolateral leads, troponin 0.18 on admission which  barber to 0.3.  A CAT scan of his abdomen showed signs of colitis and esophagitis.  He was found to be in diabetic ketoacidosis with concurrent high anion gap metabolic acidosis.  He was admitted to the intensive care unit where he was also found to have a non-ST segment elevation myocardial infarction, his troponin kept rising and reached a level of 31 on 6/10, cardiology was consulted but due to his significant comorbidities cardiac catheterization was contraindicated, it was postulated to be secondary to demand ischemia/type II in the setting of DKA.  His troponin slowly trended down and reached a low of 14 on 6/11.  During this time his anion gap also down trended from 20 to 8 and his ketoacidosis resolved.  He was started on Plavix on 6/13/2019 per cardiology recommendation.  Of note he developed pulmonary edema and acute hypoxic respiratory failure secondary to intravenous fluid resuscitation for ketoacidosis, and was diuresed with intravenous Lasix and was on supplemental oxygen which was weaned off subsequently as his respiratory failure improved and then resolved.  He during this time developed anemia of down from his baseline hemoglobin of 10 on 6/9/2019 to a low of 6.4 on 6/11/2019.  This was concerning for a gastrointestinal bleed and he also had melenic stool.  This is in the background of him having had 2 recent esophagogastroduodenoscopies in 12/18 showing a active esophageal ulcer and 3/19 showing resolution of that same ulcer and a more recent gastric emptying study which was essentially normal, which was done for his gastroparesis secondary to long standing diabetes mellitus.  He was transfused in all 3 units of packed red cells and his hemoglobin improved and has remained stable since.  He was transferred out of the ICU and onto the medical floor.  Gastroneurology was consulted for his GI bleeding and capsule endoscopy was done on 6/17/2019 which was suspicious for an ileal mass, this was confirmed  on subsequent barium follow-through study [3 cm lesion, possibly polyp] and accordingly ileal mass resection was advised.  He underwent an myocardial perfusion imaging study for risk stratification to delineate the area of ischemia of his myocardium and he was found to have a variable size ischemic region with wall akinesia in the lateral wall and deemed moderate to high risk for surgery according to cardiology.  General surgery was consulted also and it was planned for him to go to surgery on 6/25/2019 after stopping Plavix.    Palliative care was consulted in order to have a goals of care discussion with the patient in for advanced care planning as well as detailed discussion with the patient about the risks versus benefits and alternatives to exploratory laparotomy for ileal mass resection in the setting of high cardiac risk for intraoperative mortality.  However, he politely declined surgery and wishes to be discharged home.  He is aware of the risks of not proceeding with ideal resection and the possibility of recurrence of gastrointestinal bleed from the ileal mass and has been counseled about the risks of not undergoing surgery versus proceeding with surgery.  He voices understanding of these risks.  He also understands that he can return and seek medical attention at any time did he experiences a recurrence of his gastrointestinal bleed/symptoms of anemia or in the event of symptoms that brought him into the hospital/symptoms of hypoglycemia/ketoacidosis.  He received diabetes education while in the hospital, he is being discharged with long-acting insulin/glargine pen and Humalog pen for sliding scale administration thrice a day with meals.  His Plavix has been discontinued and he has been started on aspirin for secondary prevention and is being discharged in stable condition with advice to adhere to his diabetic, cardiac medication and follow-up with cardiology regularly and with general surgery as  needed.      Patient /Hospital Summary (Details -- Problem Oriented) :          Acute blood loss anemia   Assessment & Plan    Likely secondary to ileal mass/polyp, visualized as filling defect on barium follow-through  Monitoring H&H, transfuse for Hb<8 as concomitant severe cardiac disease  Patient advised to seek urgent medical attention if bleeding recurs, counseled on recognizing signs of anemia     NSTEMI (non-ST elevated myocardial infarction) (HCC)   Assessment & Plan    Initially thought to have Type II MI, then troponins increased to 30. Cardiology called and plan was for cath, cath held due to possible bleeding. However, they note that patients EKG revealed improvement with out intervention, only treating DKA. Likely CAD exacerbated by DKA. Will correct DKA and monitor Cardiac fucntion.   Plan  -Statin, beta-blockers  -Patient started on clopidogrel on 6/13/2019  -Lisinopril to 10 mg  -Per MPI high risk surgery lateral wall akinesia and area of ischemia of variable size  -Follow up with Cardiology recommended     High anion gap metabolic acidosis-resolved as of 6/13/2019   Assessment & Plan    Resolved     * Diabetic ketoacidosis without coma associated with type 2 diabetes mellitus (HCC)-resolved as of 6/22/2019   Assessment & Plan    Resolved.  A1C 6.9.  It is unclear whether patient is compliant with medications on outpatient basis.  -continue 10 units insulin glargine nightly  -continue insulin sliding scale with hypoglycemia protocol     Leukocytosis   Assessment & Plan    Resolved     Type 2 diabetes mellitus with hyperglycemia, with long-term current use of insulin ([facility])   Assessment & Plan    On  insulin at home per med rec, patient currently on 10 units glargine and insulin sliding scale with hypoglycemia protocol.  Medication compliance questionable  Brittle diabetes with widely fluctuating blood sugars, evaluated by diabetes educator  Plan  -pt to be discharged on long acting Insulin/ Glargine  and Humalog TID AC SSI     Colitis-resolved as of 6/22/2019   Assessment & Plan    CT: thick walled appearance colon which may be due to the fact that it is decompressed. Mild colitis is in the differential.  Possible related to DKA episode  Ileal mass per imaging. Resection indicated per surgery, cardiology clearance requested.  -High risk surgery per cardiology and MPI showing mixed sized area of ischemia with lateral wall akinesis  -Plavix discontinued per Cardiology recs, Aspirin started  - pt wishes to not have surgery, wishes to go home - being discharged     Urine retention-resolved as of 6/22/2019   Assessment & Plan    resolved     Acute respiratory failure with hypoxia (HCC)-resolved as of 6/22/2019   Assessment & Plan    Notified by nursing staff the patient was having increased work of breathing in ICU, acute pulmonary edema and hypoxia.  Patient received intravenous diuresis, patient was also placed on BiPAP PRN. Patient's respiratory status much improved.  Currently saturating well on room air  Plan  -Continue to monitor, off supplemental oxygen     Ileal mass   Assessment & Plan    Ileal mass possibly polyp on capsule endoscopy confirmed on barium follow through  High risk surgery to resect in view of recent NSTEMI and area of myocardial ischemia  Patient elects not to have surgery done, is being discharged in stable condition  He is aware of the risk of bleeding, anemia and heart failure and is advised to seek urgent medical attention in this setting, he voices understanding     Positive D dimer   Assessment & Plan    Patient was noted to have a positive d-dimer.  Ultrasound lower extremities did not reveal any thrombi.  Patient currently has SCDs.  D-dimer could be elevated due to age and patient's cardiac issues.  Plan  -Continue to monitor     Acute pulmonary edema (HCC)-resolved as of 6/22/2019   Assessment & Plan    Patient was noted to have pulmonary edema on PE and imaging.  Likely secondary  to necessary intravenous volume resuscitation.  Patient was diuresed and respiratory status improved. Lasix discontinued.  -monitor     GI bleeding-resolved as of 6/22/2019   Assessment & Plan    Ileal lesion per capsule endoscopy, small bowel follow through  Currently Hb stable, no dark stools  Pt counseled on possibility of recurrence     Essential hypertension   Assessment & Plan    On lisinopril/HCTZ 10/12/5 mg daily per med rec. Lisinopril restarted at 5.  -continue lisinopril at 10 mg     Macrocytic anemia   Assessment & Plan    Likely secondary to B12 deficiency  Plan  B12 supplementation         Consultants:     Pulmonology  Cardiology  Gastroenterology  General surgery    Procedures:        N/A    Imaging/ Testing:      NM-CARDIAC STRESS TEST   Final Result      DX-UPPER GI-SMALL BOWEL FOLLOW THRU   Final Result      1.  11.6 mm in diameter intraluminal filling defect identified in a loop of the ileum consistent with the intraluminal filling defect seen on recent endoscopy. This may represent benign or malignant neoplasm such as small bowel polyp.      2.  Otherwise normal small bowel follow-through examination.      3.  Normal upper GI examination.      PD-AMLFFOH-6 VIEW   Final Result      1.  There is no radiopaque device in shape of a capsule to suggest a retained ingested capsule.   2.  The bowel gas pattern is nonobstructive.      IR-MIDLINE CATHETER INSERTION WO GUIDANCE > AGE 3   Final Result                  Ultrasound-guided midline placement performed by qualified nursing staff    as above.          US-EXTREMITY VENOUS LOWER BILAT   Final Result      DX-CHEST-LIMITED (1 VIEW)   Final Result      Perihilar and bibasilar opacities likely represent pulmonary edema. Superimposed infection is not excluded.            EC-ECHOCARDIOGRAM COMPLETE W/O CONT   Final Result      DX-CHEST-PORTABLE (1 VIEW)   Final Result         1.  No acute cardiopulmonary disease.      CT-FOREIGN FILM CAT SCAN   Final  Result            Discharge Medications:         Medication Reconciliation: Completed       Medication List      START taking these medications      Instructions   acetaminophen 325 MG Tabs  Commonly known as:  TYLENOL   Take 2 Tabs by mouth every 6 hours as needed (Mild Pain; (Pain scale 1-3); Temp greater than 100.5 F).  Dose:  650 mg     atorvastatin 80 MG tablet  Commonly known as:  LIPITOR   Take 1 Tab by mouth every evening.  Dose:  80 mg     benzocaine-menthol 15-3.6 MG Lozg  Commonly known as:  CEPACOL   Spray 1 Lozenge in mouth/throat every 2 hours as needed.  Dose:  1 Lozenge     cyanocobalamin 1000 MCG Tabs  Start taking on:  6/23/2019  Commonly known as:  VITAMIN B12   Take 1 Tab by mouth every day.  Dose:  1000 mcg     eucerin Crea   Apply 5 g to affected area(s) 3 times a day as needed (dry skin/rash).  Dose:  5 g     insulin glargine 100 UNIT/ML Sopn injection  Commonly known as:  LANTUS SOLOSTAR   Inject 10 Units as instructed every evening.  Dose:  10 Units     insulin lispro 100 UNIT/ML Soln  Commonly known as:  HUMALOG   Doctor's comments:  For Glucose 70   - 150  mg/dL =    0 Units 151 - 200  mg/dL =    2 Units 201 - 250  mg/dL =    3 Units 251 - 300  mg/dL  =   5 Units 301 - 350  mg/dL  =   6 Units 351 - 400 mg/dL   =   8 Units Over 400 mg/dL   =   9 Units  Inject 2-9 Units as instructed 3 times a day before meals.  Dose:  2-9 Units     Insulin Pen Needle 32 G x 4 mm   Inject 10 Units/day as instructed every bedtime.  Dose:  10 Units/day     lisinopril 10 MG Tabs  Start taking on:  6/23/2019  Commonly known as:  PRINIVIL   Take 1 Tab by mouth every day.  Dose:  10 mg     nitroglycerin 0.4 MG Subl  Commonly known as:  NITROSTAT   Place 1 Tab under tongue as needed for Chest Pain (up to 3 doses (if SBP greater than 90 mmHg)).  Dose:  0.4 mg     omeprazole 40 MG delayed-release capsule  Commonly known as:  PRILOSEC   Take 1 Cap by mouth 2 Times a Day.  Dose:  40 mg        CONTINUE taking these  medications      Instructions   aspirin EC 81 MG Tbec  Commonly known as:  ECOTRIN   Take 81 mg by mouth every bedtime.  Dose:  81 mg     furosemide 40 MG Tabs  Commonly known as:  LASIX   Take 40 mg by mouth 1 time daily as needed (ankle swelling).  Dose:  40 mg     metoprolol 25 MG Tabs  Commonly known as:  LOPRESSOR   Take 0.5 Tabs by mouth 2 times a day.  Dose:  12.5 mg     simvastatin 10 MG Tabs  Commonly known as:  ZOCOR   Take 10 mg by mouth every evening.  Dose:  10 mg     tamsulosin 0.4 MG capsule  Commonly known as:  FLOMAX   Take 1 Cap by mouth ONE-HALF HOUR AFTER BREAKFAST.  Dose:  0.4 mg     therapeutic multivitamin-minerals Tabs   Take 1 Tab by mouth every day.  Dose:  1 Tab     vitamin D 1000 UNIT Tabs  Commonly known as:  cholecalciferol   Take 2,000 Units by mouth every day.  Dose:  2000 Units        STOP taking these medications    clopidogrel 75 MG Tabs  Commonly known as:  PLAVIX     glipiZIDE 5 MG Tabs  Commonly known as:  GLUCOTROL     metformin 1000 MG tablet  Commonly known as:  GLUCOPHAGE     pantoprazole 40 MG Tbec  Commonly known as:  PROTONIX                Disposition:   To home    Diet:   Cardiac, diabetic diet    Activity:   As tolerated    Instructions:        The patient was instructed to return to the ER in the event of worsening symptoms. I have counseled the patient on the importance of compliance and the patient has agreed to proceed with all medical recommendations and follow up plan indicated above.   The patient understands that all medications come with benefits and risks. Risks may include permanent injury or death and these risks can be minimized with close reassessment and monitoring.        Primary Care Provider:    Prashanth Oacsio MD  Discharge summary faxed to primary care provider:  Deferred  Copy of discharge summary given to the patient: Deferred      Follow up appointment details :      No future appointments.  No follow-up provider specified.      Pending Studies:         N/A    Time spent on discharge day patient visit, preparing discharge paperwork and arranging for patient follow up.    Summary of follow up issues:   Ileal mass    Discharge Time (Minutes) :    60 min  Hospital Course Type:  Inpatient Stay >2 midnights      Condition on Discharge    ______________________________________________________________________    Interval history/exam for day of discharge:     Patient seen and examined at bedside this AM, stable, NAD, Hb stable, no further GI bleeding/ dark/ tarry stool.  Per cardiology Dr Welch pt is medium to high risk for surgery, Plavix to be stopped for 5 days prior to surgery.  Patient advised about high risk of surgery including risk of death from cardiac causes perioperatively.  Aspirin started in interim. Appreciate cardiology, general surgery recs.     Patient elects to not have surgery, wishes to go home. Risks of not having surgery including recurrence of bleed and health risks explained to patient, he voices understanding.     Per Cardiology follow up recommended, no further inpatient intervention indicated.      Most Recent Labs:    Lab Results   Component Value Date/Time    WBC 7.8 06/22/2019 03:08 AM    RBC 2.78 (L) 06/22/2019 03:08 AM    HEMOGLOBIN 9.0 (L) 06/22/2019 03:08 AM    HEMATOCRIT 27.2 (L) 06/22/2019 03:08 AM    MCV 97.8 06/22/2019 03:08 AM    MCH 32.4 06/22/2019 03:08 AM    MCHC 33.1 (L) 06/22/2019 03:08 AM    MPV 8.9 (L) 06/22/2019 03:08 AM    NEUTSPOLYS 66.70 06/22/2019 03:08 AM    LYMPHOCYTES 24.50 06/22/2019 03:08 AM    MONOCYTES 7.10 06/22/2019 03:08 AM    EOSINOPHILS 1.00 06/22/2019 03:08 AM    BASOPHILS 0.30 06/22/2019 03:08 AM      Lab Results   Component Value Date/Time    SODIUM 140 06/14/2019 02:49 AM    POTASSIUM 3.6 06/14/2019 02:49 AM    CHLORIDE 108 06/14/2019 02:49 AM    CO2 24 06/14/2019 02:49 AM    GLUCOSE 71 06/14/2019 02:49 AM    BUN 13 06/14/2019 02:49 AM    CREATININE 0.73 06/14/2019 02:49 AM      Lab Results    Component Value Date/Time    ALTSGPT 10 06/09/2019 10:34 PM    ASTSGOT 19 06/09/2019 10:34 PM    ALKPHOSPHAT 43 06/09/2019 10:34 PM    TBILIRUBIN 0.5 06/09/2019 10:34 PM    ALBUMIN 3.8 06/09/2019 10:34 PM    GLOBULIN 2.1 06/09/2019 10:34 PM    PREALBUMIN 28.0 06/09/2019 10:34 PM    INR 1.12 06/17/2019 03:51 AM     Lab Results   Component Value Date/Time    PROTHROMBTM 14.6 06/17/2019 03:51 AM    INR 1.12 06/17/2019 03:51 AM

## 2019-06-22 NOTE — PROGRESS NOTES
Assumed care of patient via bedside shift report from night shift RN. Patient awake and alert expressing wishes to go home today. Call bell and personal items within reach, bed locked in low position. Hourly rounding in place.

## 2019-06-22 NOTE — PROGRESS NOTES
Cardiology Follow Up Progress Note    Date of Service  6/22/2019    Attending Physician  Júnior Duran M.D.    Reason for consultation     Preop evaluation for recent findings of small bowel mass    History of     multivessel CAD turned down for bypass due to poor targets as well as due to multiple medical comorbidities, diabetes, hypertension, hyperlipidemia, left below the knee amputation, pacemaker 2018 secondary to high degree block, CVA      Left heart catheterization March 23, 2018  IMPRESSION:  1.  Multivessel coronary artery disease with nonobstructive left main coronary   artery disease, high-grade mid left anterior descending artery and distal   left anterior descending artery stenosis, high-grade proximal diagonal branch   stenosis, high-grade ramus stenosis, high-grade proximal obtuse marginal   branch stenosis and chronic total occlusion of the mid right coronary artery   with distal vessel filling via left to right collaterals.  2.  Normal left ventricular systolic function with ejection fraction of 55%.  3.  Mildly elevated left ventricular end-diastolic pressu        Admitted for       Lower GI bleed requiring transfusion status post video capsule endoscopy revealed 2 cm mass in terminal ileum, small bowel follow-through confirmed an ileal mass         Interim Events    6/22/19, no rhythm issues overnight, denies angina, no dyspnea    6/21/19 completed MPI, no rhythm issues overnight, denies dyspnea, no angina    Review of Systems  Review of Systems   Respiratory: Negative for apnea, cough, choking, chest tightness, shortness of breath, wheezing and stridor.    Cardiovascular: Negative for chest pain.       Vital signs in last 24 hours  Temp:  [36.1 °C (96.9 °F)-37.2 °C (98.9 °F)] 37.2 °C (98.9 °F)  Pulse:  [76-80] 78  Resp:  [18] 18  BP: (125-157)/(66-80) 130/67  SpO2:  [96 %-99 %] 98 %    Physical Exam  Physical Exam   Constitutional: He is oriented to person, place, and time.   HENT:   Head:  Normocephalic.   Eyes: Conjunctivae are normal.   Neck: No JVD present. No thyromegaly present.   Pulmonary/Chest: He has no wheezes.   Abdominal: Soft.   Musculoskeletal: He exhibits no edema.   Neurological: He is alert and oriented to person, place, and time.   Skin: Skin is warm and dry.       Lab Review  Lab Results   Component Value Date/Time    WBC 7.8 06/22/2019 03:08 AM    RBC 2.78 (L) 06/22/2019 03:08 AM    HEMOGLOBIN 9.0 (L) 06/22/2019 03:08 AM    HEMATOCRIT 27.2 (L) 06/22/2019 03:08 AM    MCV 97.8 06/22/2019 03:08 AM    MCH 32.4 06/22/2019 03:08 AM    MCHC 33.1 (L) 06/22/2019 03:08 AM    MPV 8.9 (L) 06/22/2019 03:08 AM      Lab Results   Component Value Date/Time    SODIUM 140 06/14/2019 02:49 AM    POTASSIUM 3.6 06/14/2019 02:49 AM    CHLORIDE 108 06/14/2019 02:49 AM    CO2 24 06/14/2019 02:49 AM    GLUCOSE 71 06/14/2019 02:49 AM    BUN 13 06/14/2019 02:49 AM    CREATININE 0.73 06/14/2019 02:49 AM      Lab Results   Component Value Date/Time    ASTSGOT 19 06/09/2019 10:34 PM    ALTSGPT 10 06/09/2019 10:34 PM     Lab Results   Component Value Date/Time    CHOLSTRLTOT 118 03/25/2018 02:07 AM    LDL 70 03/25/2018 02:07 AM    HDL 31 (A) 03/25/2018 02:07 AM    TRIGLYCERIDE 83 03/25/2018 02:07 AM    TROPONINI 0.13 (H) 06/21/2019 06:25 AM               Assessment/Plan    Lower GI bleed status post transfusion  -Video capsule endoscopy revealed 2 cm mass in the terminal ileum  -Small bowel follow-through confirmed ileal mass about the same size  -H/H9 0.7/29  -Needs to undergo laparoscopy versus open small bowel resection  -Cardiology consultation for preop clearance, underwent MPI 6/21/19  -The result of the study suggested moderate to high risk to undergo surgery  -Discussed with Dr. Welch  -No further cardiac work-up, cardiology will sign off      Multivessel CAD not amenable to PCI or CABG secondary to multiple comorbidities and poor targets  -GDMT, on Lipitor 80, Plavix 75 mg, lisinopril 10, metoprolol  25 mg twice daily  -MPI 6/21/19 mixed sized area of ischemia mid lateral wall with akinesis  -Will preserved EF by echo, EF 60%, 6/10/2019  -No rhythm issues overnight

## 2019-06-22 NOTE — PROGRESS NOTES
Discharge instructions given to patient. Prescriptions given to patient. Discharge instructions given to patient at bedside, verbalizes understanding and states plans for follow-up with PCP. New and home medication review, post-discharge activity level and worsening of symptoms needing follow-up care discussed. Telemetry monitor/IV cathlon removed. All belongings accounted for, all questions answered at this time. Patient refused wheelchair and escort out, patient walked out with wife and grandchildren.

## 2019-06-22 NOTE — DISCHARGE INSTRUCTIONS
Non-ST Segment Elevation Heart Attack  A heart attack (myocardial infarction) happens when some of the heart muscle is injured or dies because it does not get enough oxygen. A non-ST segment elevation heart attack is a type of heart attack. It happens when the body does not get enough oxygen because an artery carrying blood to the heart muscles (coronary artery) becomes partly or temporarily blocked. This type of heart attack is usually less severe than the type of heart attack in which a coronary artery becomes completely blocked.  CAUSES  The most common cause of this condition is a blocked coronary artery. A coronary artery can become blocked from a gradual buildup of cholesterol, fat, and plaque. A blood clot can form over the plaque and block blood flow.  RISK FACTORS  This condition is more likely to develop in:  · Smokers.  · Males.  · Older adults.  · Overweight and obese adults.  · People with high blood pressure (hypertension), high cholesterol, or diabetes.  · People with a family history of heart disease.  · People who do not get enough exercise.  · People who are under a lot of stress.  · People who drink too much alcohol.  · People who use illegal street drugs that increase the heart rate, such as cocaine and methamphetamines.  SYMPTOMS  Symptoms of this condition include:  · Chest pain or a feeling of pressure in the chest. It may feel like something is crushing or squeezing the chest.  · Discomfort in the upper back or in the area between the shoulder blades.  · Upper back pain.  · Tingling in the hands and arms.  · Shortness of breath.  · Heartburn or indigestion.  · Sudden cold sweats.  · Unexplained sweating.  · Sudden lightheadedness.  · Unexplained feelings of nervousness or anxiety.  · Feeling of tiredness, or not feeling well.  DIAGNOSIS  This condition is diagnosed based on a person's signs and symptoms and a physical exam. You may also have tests done, including:  · Blood tests.  · A chest  X-ray.  · An test to measure the electrical activity of the heart (electrocardiogram).  · A test that uses sound waves to produce a picture of the heart (echocardiogram).  · A test to look at the heart arteries (coronary angiogram).  If you are still having chest pain after 12-24 hours, or if your health care providers think your heart is at risk, you may have a procedure called cardiac catheterization. In this procedure, a long, thin tube is inserted into an artery in your groin and moved up to the arteries in your heart. This procedure helps your health care provider figure out the source of the problem.   TREATMENT  This condition may be treated with:  · Bed rest in the hospital.  · Medicines to relieve chest pain.  · Medicines to protect the heart.  · If you have a blockage, a procedure in which the artery is opened (angioplasty) and a stent is placed to keep the artery open.  After initial treatment you may need to take medicine to:  · Keep your blood from clotting too easily.  · Control your blood pressure.  · Lower your cholesterol.  · Control abnormal heart rhythms (arrhythmias).  HOME CARE INSTRUCTIONS  · Take medicines only as directed by your health care provider.  · Do not take the following medicines unless your health care provider approves:  ¨ Nonsteroidal anti-inflammatory drugs (NSAIDs), such as ibuprofen, naproxen, or celecoxib.  ¨ Vitamin supplements that contain vitamin A, vitamin E, or both.  ¨ Hormone replacement therapy that contains estrogen with or without progestin.  · Make lifestyle changes as directed by your health care provider. These may include:  ¨ Using no tobacco products, including cigarettes, chewing tobacco, and electronic cigarettes. If you are struggling to quit, ask your health care provider for help.  ¨ Exercising as directed by your health care provider. Ask for a list of activities that are safe for you.  ¨ Eating a heart-healthy diet. Work with a registered dietitian to  learn healthy eating options.  ¨ Maintaining a healthy weight.  ¨ Managing other medical conditions, like diabetes.  ¨ Reducing stress.  ¨ Limiting how much alcohol you drink as directed by your health care provider.  SEEK IMMEDIATE MEDICAL CARE IF:  · You have any symptoms of this condition.  This information is not intended to replace advice given to you by your health care provider. Make sure you discuss any questions you have with your health care provider.  Document Released: 07/17/2006 Document Revised: 03/11/2013 Document Reviewed: 11/25/2015  [employer] Interactive Patient Education © 2017 [employer]  Gastrointestinal Bleeding  Introduction  Gastrointestinal bleeding is bleeding somewhere along the path food travels through the body (digestive tract). This path is anywhere between the mouth and the opening of the butt (anus). You may have blood in your poop (stools) or have black poop. If you throw up (vomit), there may be blood in it.  This condition can be mild, serious, or even life-threatening. If you have a lot of bleeding, you may need to stay in the hospital.  Follow these instructions at home:  · Take over-the-counter and prescription medicines only as told by your doctor.  · Eat foods that have a lot of fiber in them. These foods include whole grains, fruits, and vegetables. You can also try eating 1-3 prunes each day.  · Drink enough fluid to keep your pee (urine) clear or pale yellow.  · Keep all follow-up visits as told by your doctor. This is important.  Contact a doctor if:  · Your symptoms do not get better.  Get help right away if:  · Your bleeding gets worse.  · You feel dizzy or you pass out (faint).  · You feel weak.  · You have very bad cramps in your back or belly (abdomen).  · You pass large clumps of blood (clots) in your poop.  · Your symptoms are getting worse.  This information is not intended to replace advice given to you by your health care provider. Make sure you discuss any  questions you have with your health care provider.  Document Released: 09/26/2009 Document Revised: 05/25/2017 Document Reviewed: 06/06/2016  © 2017 [employer]  Diabetic Ketoacidosis  Diabetic ketoacidosis is a life-threatening complication of diabetes. If it is not treated, it can cause severe dehydration and organ damage and can lead to a coma or death.  What are the causes?  This condition develops when there is not enough of the hormone insulin in the body. Insulin helps the body to break down sugar for energy. Without insulin, the body cannot break down sugar, so it breaks down fats instead. This leads to the production of acids that are called ketones. Ketones are poisonous at high levels.  This condition can be triggered by:  · Stress on the body that is brought on by an illness.  · Medicines that raise blood glucose levels.  · Not taking diabetes medicine.  What are the signs or symptoms?  Symptoms of this condition include:  · Fatigue.  · Weight loss.  · Excessive thirst.  · Light-headedness.  · Fruity or sweet-smelling breath.  · Excessive urination.  · Vision changes.  · Confusion or irritability.  · Nausea.  · Vomiting.  · Rapid breathing.  · Abdominal pain.  · Feeling flushed.  How is this diagnosed?  This condition is diagnosed based on a medical history, a physical exam, and blood tests. You may also have a urine test that checks for ketones.  How is this treated?  This condition may be treated with:  · Fluid replacement. This may be done to correct dehydration.  · Insulin injections. These may be given through the skin or through an IV tube.  · Electrolyte replacement. Electrolytes, such as potassium and sodium, may be given in pill form or through an IV tube.  · Antibiotic medicines. These may be prescribed if your condition was caused by an infection.  Follow these instructions at home:  Eating and drinking  · Drink enough fluids to keep your urine clear or pale yellow.  · If you cannot eat,  alternate between drinking fluids with sugar (such as juice) and salty fluids (such as broth or bouillon).  · If you can eat, follow your usual diet and drink sugar-free liquids, such as water.  Other Instructions   · Take insulin as directed by your health care provider. Do not skip insulin injections. Do not use  insulin.  · If your blood sugar is over 240 mg/dL, monitor your urine ketones every 4-6 hours.  · If you were prescribed an antibiotic medicine, finish all of it even if you start to feel better.  · Rest and exercise only as directed by your health care provider.  · If you get sick, call your health care provider and begin treatment quickly. Your body often needs extra insulin to fight an illness.  · Check your blood glucose levels regularly. If your blood glucose is high, drink plenty of fluids. This helps to flush out ketones.  Contact a health care provider if:  · Your blood glucose level is too high or too low.  · You have ketones in your urine.  · You have a fever.  · You cannot eat.  · You cannot tolerate fluids.  · You have been vomiting for more than 2 hours.  · You continue to have symptoms of this condition.  · You develop new symptoms.  Get help right away if:  · Your blood glucose levels continue to be high (elevated).  · Your monitor reads “high” even when you are taking insulin.  · You faint.  · You have chest pain.  · You have trouble breathing.  · You have a sudden, severe headache.  · You have sudden weakness in one arm or one leg.  · You have sudden trouble speaking or swallowing.  · You have vomiting or diarrhea that gets worse after 3 hours.  · You feel severely fatigued.  · You have trouble thinking.  · You have abdominal pain.  · You are severely dehydrated. Symptoms of severe dehydration include:  ¨ Extreme thirst.  ¨ Dry mouth.  ¨ Blue lips.  ¨ Cold hands and feet.  ¨ Rapid breathing.  This information is not intended to replace advice given to you by your health care  provider. Make sure you discuss any questions you have with your health care provider.  Document Released: 12/15/2001 Document Revised: 05/25/2017 Document Reviewed: 11/25/2015  [employer] Interactive Patient Education © 2017 [employer]  Discharge Instructions    Discharged to home by car with relative. Discharged via walking, hospital escort: Refused.  Special equipment needed: Not Applicable    Be sure to schedule a follow-up appointment with your primary care doctor or any specialists as instructed.     Discharge Plan:   Diet Plan: Discussed  Activity Level: Discussed  Confirmed Follow up Appointment: Patient to Call and Schedule Appointment  Confirmed Symptoms Management: Discussed  Medication Reconciliation Updated: Yes  Influenza Vaccine Indication: Indicated: Not available from distributor/    I understand that a diet low in cholesterol, fat, and sodium is recommended for good health. Unless I have been given specific instructions below for another diet, I accept this instruction as my diet prescription.   Other diet: Cardiac     Special Instructions: Diagnosis:  Acute Coronary Syndrome (ACS) is a diagnosis that encompasses cardiac-related chest pain and heart attack. ACS occurs when the blood flow to the heart muscle is severely reduced or cut off completely due to a slow process called atherosclerosis.  Atherosclerosis is a disease in which the coronary arteries become narrow from a buildup of fat, cholesterol, and other substances that combine to form plaque. If the plaque breaks, a blood clot will form and block the blood flow to the heart muscle. This lack of blood flow can cause damage or death to the heart muscle which is called a heart attack or Myocardial Infarction (MI). There are two different types of MIs:  ST Elevation Myocardial Infarction or STEMI (the most severe type of heart attack) and Non-ST Elevation Myocardial Infarction or NSTEMI.    Treatment Plan:  · Cardiac Diet  - Low  fat, low salt, low cholesterol   · Cardiac Rehab  - Your doctor has ordered you a referral to [facility].  Call [phone] to schedule an appointment.  · Attend my follow-up appointment with my Cardiologist.  · Take my medications as prescribed by my doctor  · Exercise daily  · Control my diabetes    Medications:  Certain medications are used to treat ACS.  Remember to always take medications as prescribed and never stop talking medications unless told by your doctor.    You have been prescribed the following medicatons:    Aspirin - Aspirin is used as a blood thinning medication and you will require this medication indefinitely.  Statin - Statin Lipitor is used to lower cholesterol.  Angiotensin Converting Enzyme (ACE) Inhibitor - Angiotensin Converting Enzyme Inhibitor Lisinopril is used to lower blood pressure and treat heart failure.    · Is patient discharged on Warfarin / Coumadin?   No     Depression / Suicide Risk    As you are discharged from this [facility] facility, it is important to learn how to keep safe from harming yourself.    Recognize the warning signs:  · Abrupt changes in personality, positive or negative- including increase in energy   · Giving away possessions  · Change in eating patterns- significant weight changes-  positive or negative  · Change in sleeping patterns- unable to sleep or sleeping all the time   · Unwillingness or inability to communicate  · Depression  · Unusual sadness, discouragement and loneliness  · Talk of wanting to die  · Neglect of personal appearance   · Rebelliousness- reckless behavior  · Withdrawal from people/activities they love  · Confusion- inability to concentrate     If you or a loved one observes any of these behaviors or has concerns about self-harm, here's what you can do:  · Talk about it- your feelings and reasons for harming yourself  · Remove any means that you might use to hurt yourself (examples: pills, rope, extension cords, firearm)  · Get  professional help from the community (Mental Health, Substance Abuse, psychological counseling)  · Do not be alone:Call your Safe Contact- someone whom you trust who will be there for you.  · Call your local CRISIS HOTLINE 578-4745 or 429-350-3594  · Call your local Children's Mobile Crisis Response Team [place] (155) 267-3406 or www.Zeus  · Call the toll free National Suicide Prevention Hotlines   · National Suicide Prevention Lifeline 168-622-DNJO (2189)  · National Hope Line Network 800-SUICIDE (239-2527)

## 2019-06-22 NOTE — ASSESSMENT & PLAN NOTE
Ileal mass possibly polyp on capsule endoscopy confirmed on barium follow through  High risk surgery to resect in view of recent NSTEMI and area of myocardial ischemia  Patient elects not to have surgery done, is being discharged in stable condition  He is aware of the risk of bleeding, anemia and heart failure and is advised to seek urgent medical attention in this setting, he voices understanding

## 2019-06-22 NOTE — PROGRESS NOTES
12 hour chart check    This is a recent snapshot of the patient's [facility] medical record.  For current drug dose and complete information and questions, call [phone] or In Basket pool, [facility] ([id number])  CSN Number:  [id number]

## 2020-01-01 ENCOUNTER — TELEPHONE (OUTPATIENT)
Dept: CARDIOLOGY | Facility: CLINIC | Age: 70
End: 2020-01-01

## 2020-01-01 ENCOUNTER — NON-PROVIDER VISIT (OUTPATIENT)
Dept: CARDIOLOGY | Facility: MEDICAL CENTER | Age: 70
End: 2020-01-01
Payer: MEDICARE

## 2020-01-01 DIAGNOSIS — I44.1 AV BLOCK, 2ND DEGREE: ICD-10-CM

## 2020-01-01 DIAGNOSIS — Z95.0 CARDIAC PACEMAKER IN SITU: ICD-10-CM

## 2020-01-01 PROCEDURE — 93280 PM DEVICE PROGR EVAL DUAL: CPT | Performed by: INTERNAL MEDICINE

## 2020-09-29 NOTE — TELEPHONE ENCOUNTER
Chart Prep    [facility] for patient checking to see if he has done any recent lab work since our last results in his chart are from 1 year ago. Left call back number.

## 2020-12-30 NOTE — CARE PLAN
Problem: Safety  Goal: Will remain free from falls  Outcome: PROGRESSING AS EXPECTED  Pt mobility assessed at beginning of shift. Pt is a 2 assist. Fall precautions in place. Non-slip socks on. Bed in lowest locked position. Bed alarm on. Call light within reach. Pt educated to call for assistance and verbalizes understanding.    Problem: Pain Management  Goal: Pain level will decrease to patient's comfort goal  Outcome: PROGRESSING AS EXPECTED  Medicated per MAR. Educated on pain scale. implemented non pharmacological methods: distraction, reposition, rest.       No

## 2021-01-01 ENCOUNTER — NON-PROVIDER VISIT (OUTPATIENT)
Dept: CARDIOLOGY | Facility: MEDICAL CENTER | Age: 71
End: 2021-01-01
Payer: MEDICARE

## 2021-01-01 ENCOUNTER — HOSPITAL ENCOUNTER (OUTPATIENT)
Dept: RADIOLOGY | Facility: MEDICAL CENTER | Age: 71
End: 2021-06-11
Payer: MEDICARE

## 2021-01-01 ENCOUNTER — APPOINTMENT (OUTPATIENT)
Dept: RADIOLOGY | Facility: MEDICAL CENTER | Age: 71
DRG: 388 | End: 2021-01-01
Attending: STUDENT IN AN ORGANIZED HEALTH CARE EDUCATION/TRAINING PROGRAM
Payer: COMMERCIAL

## 2021-01-01 ENCOUNTER — APPOINTMENT (OUTPATIENT)
Dept: CARDIOLOGY | Facility: REHABILITATION | Age: 71
DRG: 388 | End: 2021-01-01
Attending: STUDENT IN AN ORGANIZED HEALTH CARE EDUCATION/TRAINING PROGRAM
Payer: COMMERCIAL

## 2021-01-01 ENCOUNTER — HOSPITAL ENCOUNTER (INPATIENT)
Facility: MEDICAL CENTER | Age: 71
LOS: 2 days | DRG: 388 | End: 2021-06-13
Attending: EMERGENCY MEDICINE | Admitting: STUDENT IN AN ORGANIZED HEALTH CARE EDUCATION/TRAINING PROGRAM
Payer: COMMERCIAL

## 2021-01-01 VITALS
RESPIRATION RATE: 16 BRPM | HEART RATE: 106 BPM | HEIGHT: 67 IN | SYSTOLIC BLOOD PRESSURE: 137 MMHG | WEIGHT: 132.28 LBS | DIASTOLIC BLOOD PRESSURE: 58 MMHG | OXYGEN SATURATION: 95 % | BODY MASS INDEX: 20.76 KG/M2 | TEMPERATURE: 98 F

## 2021-01-01 DIAGNOSIS — K56.609 SBO (SMALL BOWEL OBSTRUCTION) (HCC): ICD-10-CM

## 2021-01-01 LAB
ANION GAP SERPL CALC-SCNC: 12 MMOL/L (ref 7–16)
ANION GAP SERPL CALC-SCNC: 13 MMOL/L (ref 7–16)
APPEARANCE UR: CLEAR
BACTERIA #/AREA URNS HPF: NEGATIVE /HPF
BASOPHILS # BLD AUTO: 0 % (ref 0–1.8)
BASOPHILS # BLD: 0 K/UL (ref 0–0.12)
BILIRUB UR QL STRIP.AUTO: NEGATIVE
BUN SERPL-MCNC: 101 MG/DL (ref 8–22)
BUN SERPL-MCNC: 101 MG/DL (ref 8–22)
CALCIUM SERPL-MCNC: 7.7 MG/DL (ref 8.5–10.5)
CALCIUM SERPL-MCNC: 8.1 MG/DL (ref 8.5–10.5)
CHLORIDE SERPL-SCNC: 110 MMOL/L (ref 96–112)
CHLORIDE SERPL-SCNC: 113 MMOL/L (ref 96–112)
CO2 SERPL-SCNC: 16 MMOL/L (ref 20–33)
CO2 SERPL-SCNC: 16 MMOL/L (ref 20–33)
COLOR UR: YELLOW
CREAT SERPL-MCNC: 3.08 MG/DL (ref 0.5–1.4)
CREAT SERPL-MCNC: 3.42 MG/DL (ref 0.5–1.4)
CREAT UR-MCNC: 74.64 MG/DL
EKG IMPRESSION: NORMAL
EOSINOPHIL # BLD AUTO: 0.05 K/UL (ref 0–0.51)
EOSINOPHIL NFR BLD: 0.8 % (ref 0–6.9)
EPI CELLS #/AREA URNS HPF: NEGATIVE /HPF
ERYTHROCYTE [DISTWIDTH] IN BLOOD BY AUTOMATED COUNT: 52.1 FL (ref 35.9–50)
GLUCOSE BLD-MCNC: 189 MG/DL (ref 65–99)
GLUCOSE SERPL-MCNC: 101 MG/DL (ref 65–99)
GLUCOSE SERPL-MCNC: 83 MG/DL (ref 65–99)
GLUCOSE UR STRIP.AUTO-MCNC: NEGATIVE MG/DL
HCT VFR BLD AUTO: 30.4 % (ref 42–52)
HGB BLD-MCNC: 10 G/DL (ref 14–18)
HYALINE CASTS #/AREA URNS LPF: ABNORMAL /LPF
KETONES UR STRIP.AUTO-MCNC: ABNORMAL MG/DL
LEUKOCYTE ESTERASE UR QL STRIP.AUTO: NEGATIVE
LYMPHOCYTES # BLD AUTO: 0.99 K/UL (ref 1–4.8)
LYMPHOCYTES NFR BLD: 14.8 % (ref 22–41)
MANUAL DIFF BLD: NORMAL
MCH RBC QN AUTO: 31.1 PG (ref 27–33)
MCHC RBC AUTO-ENTMCNC: 32.9 G/DL (ref 33.7–35.3)
MCV RBC AUTO: 94.4 FL (ref 81.4–97.8)
MICRO URNS: ABNORMAL
MONOCYTES # BLD AUTO: 0.35 K/UL (ref 0–0.85)
MONOCYTES NFR BLD AUTO: 5.2 % (ref 0–13.4)
MORPHOLOGY BLD-IMP: NORMAL
MYELOCYTES NFR BLD MANUAL: 0.9 %
NEUTROPHILS # BLD AUTO: 5.19 K/UL (ref 1.82–7.42)
NEUTROPHILS NFR BLD: 77.4 % (ref 44–72)
NITRITE UR QL STRIP.AUTO: NEGATIVE
NRBC # BLD AUTO: 0 K/UL
NRBC BLD-RTO: 0 /100 WBC
PH UR STRIP.AUTO: 5 [PH] (ref 5–8)
PLATELET # BLD AUTO: 194 K/UL (ref 164–446)
PLATELET BLD QL SMEAR: NORMAL
PMV BLD AUTO: 9.2 FL (ref 9–12.9)
POTASSIUM SERPL-SCNC: 4.1 MMOL/L (ref 3.6–5.5)
POTASSIUM SERPL-SCNC: 4.3 MMOL/L (ref 3.6–5.5)
PROMYELOCYTES NFR BLD MANUAL: 0.9 %
PROT UR QL STRIP: 30 MG/DL
RBC # BLD AUTO: 3.22 M/UL (ref 4.7–6.1)
RBC # URNS HPF: ABNORMAL /HPF
RBC BLD AUTO: PRESENT
RBC UR QL AUTO: NEGATIVE
SARS-COV-2 RNA RESP QL NAA+PROBE: NOTDETECTED
SODIUM SERPL-SCNC: 138 MMOL/L (ref 135–145)
SODIUM SERPL-SCNC: 142 MMOL/L (ref 135–145)
SODIUM UR-SCNC: 31 MMOL/L
SP GR UR STRIP.AUTO: 1.01
SPECIMEN SOURCE: NORMAL
TOXIC GRANULES BLD QL SMEAR: SLIGHT
UROBILINOGEN UR STRIP.AUTO-MCNC: 0.2 MG/DL
WBC # BLD AUTO: 6.7 K/UL (ref 4.8–10.8)
WBC #/AREA URNS HPF: ABNORMAL /HPF

## 2021-01-01 PROCEDURE — 82962 GLUCOSE BLOOD TEST: CPT | Mod: 91

## 2021-01-01 PROCEDURE — 36415 COLL VENOUS BLD VENIPUNCTURE: CPT

## 2021-01-01 PROCEDURE — U0003 INFECTIOUS AGENT DETECTION BY NUCLEIC ACID (DNA OR RNA); SEVERE ACUTE RESPIRATORY SYNDROME CORONAVIRUS 2 (SARS-COV-2) (CORONAVIRUS DISEASE [COVID-19]), AMPLIFIED PROBE TECHNIQUE, MAKING USE OF HIGH THROUGHPUT TECHNOLOGIES AS DESCRIBED BY CMS-2020-01-R: HCPCS

## 2021-01-01 PROCEDURE — 92950 HEART/LUNG RESUSCITATION CPR: CPT

## 2021-01-01 PROCEDURE — 700111 HCHG RX REV CODE 636 W/ 250 OVERRIDE (IP): Performed by: INTERNAL MEDICINE

## 2021-01-01 PROCEDURE — 82570 ASSAY OF URINE CREATININE: CPT

## 2021-01-01 PROCEDURE — 93005 ELECTROCARDIOGRAM TRACING: CPT | Performed by: STUDENT IN AN ORGANIZED HEALTH CARE EDUCATION/TRAINING PROGRAM

## 2021-01-01 PROCEDURE — 99233 SBSQ HOSP IP/OBS HIGH 50: CPT | Performed by: STUDENT IN AN ORGANIZED HEALTH CARE EDUCATION/TRAINING PROGRAM

## 2021-01-01 PROCEDURE — 92950 HEART/LUNG RESUSCITATION CPR: CPT | Performed by: INTERNAL MEDICINE

## 2021-01-01 PROCEDURE — 5A12012 PERFORMANCE OF CARDIAC OUTPUT, SINGLE, MANUAL: ICD-10-PCS | Performed by: INTERNAL MEDICINE

## 2021-01-01 PROCEDURE — 700111 HCHG RX REV CODE 636 W/ 250 OVERRIDE (IP): Performed by: STUDENT IN AN ORGANIZED HEALTH CARE EDUCATION/TRAINING PROGRAM

## 2021-01-01 PROCEDURE — 5A2204Z RESTORATION OF CARDIAC RHYTHM, SINGLE: ICD-10-PCS | Performed by: INTERNAL MEDICINE

## 2021-01-01 PROCEDURE — 700102 HCHG RX REV CODE 250 W/ 637 OVERRIDE(OP): Performed by: STUDENT IN AN ORGANIZED HEALTH CARE EDUCATION/TRAINING PROGRAM

## 2021-01-01 PROCEDURE — 36680 INSERT NEEDLE BONE CAVITY: CPT | Mod: LT | Performed by: INTERNAL MEDICINE

## 2021-01-01 PROCEDURE — U0005 INFEC AGEN DETEC AMPLI PROBE: HCPCS

## 2021-01-01 PROCEDURE — 99999 PR NO CHARGE: CPT | Performed by: INTERNAL MEDICINE

## 2021-01-01 PROCEDURE — 85007 BL SMEAR W/DIFF WBC COUNT: CPT

## 2021-01-01 PROCEDURE — 93926 LOWER EXTREMITY STUDY: CPT | Mod: 26 | Performed by: INTERNAL MEDICINE

## 2021-01-01 PROCEDURE — 93922 UPR/L XTREMITY ART 2 LEVELS: CPT | Mod: 26 | Performed by: INTERNAL MEDICINE

## 2021-01-01 PROCEDURE — 80048 BASIC METABOLIC PNL TOTAL CA: CPT

## 2021-01-01 PROCEDURE — 94799 UNLISTED PULMONARY SVC/PX: CPT

## 2021-01-01 PROCEDURE — 99223 1ST HOSP IP/OBS HIGH 75: CPT | Mod: AI | Performed by: STUDENT IN AN ORGANIZED HEALTH CARE EDUCATION/TRAINING PROGRAM

## 2021-01-01 PROCEDURE — 770006 HCHG ROOM/CARE - MED/SURG/GYN SEMI*

## 2021-01-01 PROCEDURE — 81001 URINALYSIS AUTO W/SCOPE: CPT

## 2021-01-01 PROCEDURE — 93926 LOWER EXTREMITY STUDY: CPT | Mod: RT

## 2021-01-01 PROCEDURE — 85027 COMPLETE CBC AUTOMATED: CPT

## 2021-01-01 PROCEDURE — 700105 HCHG RX REV CODE 258: Performed by: STUDENT IN AN ORGANIZED HEALTH CARE EDUCATION/TRAINING PROGRAM

## 2021-01-01 PROCEDURE — 84300 ASSAY OF URINE SODIUM: CPT

## 2021-01-01 PROCEDURE — 700101 HCHG RX REV CODE 250: Performed by: INTERNAL MEDICINE

## 2021-01-01 PROCEDURE — 93010 ELECTROCARDIOGRAM REPORT: CPT | Performed by: INTERNAL MEDICINE

## 2021-01-01 PROCEDURE — 93922 UPR/L XTREMITY ART 2 LEVELS: CPT

## 2021-01-01 PROCEDURE — 31500 INSERT EMERGENCY AIRWAY: CPT

## 2021-01-01 PROCEDURE — 99285 EMERGENCY DEPT VISIT HI MDM: CPT

## 2021-01-01 PROCEDURE — A9270 NON-COVERED ITEM OR SERVICE: HCPCS | Performed by: STUDENT IN AN ORGANIZED HEALTH CARE EDUCATION/TRAINING PROGRAM

## 2021-01-01 PROCEDURE — 07HT33Z INSERTION OF INFUSION DEVICE INTO BONE MARROW, PERCUTANEOUS APPROACH: ICD-10-PCS | Performed by: INTERNAL MEDICINE

## 2021-01-01 RX ORDER — KETOROLAC TROMETHAMINE 30 MG/ML
30 INJECTION, SOLUTION INTRAMUSCULAR; INTRAVENOUS EVERY 6 HOURS PRN
Status: DISCONTINUED | OUTPATIENT
Start: 2021-01-01 | End: 2021-01-01

## 2021-01-01 RX ORDER — AMIODARONE HYDROCHLORIDE 150 MG/3ML
INJECTION, SOLUTION INTRAVENOUS
Status: COMPLETED | OUTPATIENT
Start: 2021-01-01 | End: 2021-01-01

## 2021-01-01 RX ORDER — SODIUM CHLORIDE, SODIUM LACTATE, POTASSIUM CHLORIDE, CALCIUM CHLORIDE 600; 310; 30; 20 MG/100ML; MG/100ML; MG/100ML; MG/100ML
INJECTION, SOLUTION INTRAVENOUS CONTINUOUS
Status: DISCONTINUED | OUTPATIENT
Start: 2021-01-01 | End: 2021-01-01 | Stop reason: HOSPADM

## 2021-01-01 RX ORDER — ACETAMINOPHEN 325 MG/1
650 TABLET ORAL EVERY 4 HOURS PRN
Status: DISCONTINUED | OUTPATIENT
Start: 2021-01-01 | End: 2021-01-01 | Stop reason: HOSPADM

## 2021-01-01 RX ORDER — HYDROMORPHONE HYDROCHLORIDE 1 MG/ML
0.5 INJECTION, SOLUTION INTRAMUSCULAR; INTRAVENOUS; SUBCUTANEOUS ONCE
Status: COMPLETED | OUTPATIENT
Start: 2021-01-01 | End: 2021-01-01

## 2021-01-01 RX ORDER — GLIPIZIDE 5 MG/1
5 TABLET ORAL DAILY
COMMUNITY

## 2021-01-01 RX ORDER — MIRTAZAPINE 15 MG/1
15 TABLET, FILM COATED ORAL NIGHTLY
COMMUNITY

## 2021-01-01 RX ORDER — GABAPENTIN 300 MG/1
300 CAPSULE ORAL
COMMUNITY

## 2021-01-01 RX ORDER — EPINEPHRINE IN SOD CHLOR,ISO 1 MG/10 ML
SYRINGE (ML) INTRAVENOUS
Status: COMPLETED | OUTPATIENT
Start: 2021-01-01 | End: 2021-01-01

## 2021-01-01 RX ORDER — BACILLUS COAGULANS/INULIN 1B-250 MG
1 CAPSULE ORAL DAILY
COMMUNITY

## 2021-01-01 RX ORDER — ATORVASTATIN CALCIUM 40 MG/1
40 TABLET, FILM COATED ORAL NIGHTLY
COMMUNITY

## 2021-01-01 RX ORDER — CARVEDILOL 6.25 MG/1
6.25 TABLET ORAL 2 TIMES DAILY WITH MEALS
COMMUNITY

## 2021-01-01 RX ORDER — HEPARIN SODIUM 5000 [USP'U]/ML
5000 INJECTION, SOLUTION INTRAVENOUS; SUBCUTANEOUS EVERY 8 HOURS
Status: DISCONTINUED | OUTPATIENT
Start: 2021-01-01 | End: 2021-01-01 | Stop reason: HOSPADM

## 2021-01-01 RX ORDER — ONDANSETRON 2 MG/ML
4 INJECTION INTRAMUSCULAR; INTRAVENOUS EVERY 4 HOURS PRN
Status: DISCONTINUED | OUTPATIENT
Start: 2021-01-01 | End: 2021-01-01 | Stop reason: HOSPADM

## 2021-01-01 RX ORDER — LIDOCAINE HYDROCHLORIDE 20 MG/ML
INJECTION, SOLUTION INTRAVENOUS
Status: COMPLETED | OUTPATIENT
Start: 2021-01-01 | End: 2021-01-01

## 2021-01-01 RX ORDER — GABAPENTIN 300 MG/1
300 CAPSULE ORAL
Status: DISCONTINUED | OUTPATIENT
Start: 2021-01-01 | End: 2021-01-01 | Stop reason: HOSPADM

## 2021-01-01 RX ORDER — ONDANSETRON 4 MG/1
4 TABLET, ORALLY DISINTEGRATING ORAL EVERY 6 HOURS PRN
COMMUNITY

## 2021-01-01 RX ORDER — SODIUM CHLORIDE, SODIUM LACTATE, POTASSIUM CHLORIDE, CALCIUM CHLORIDE 600; 310; 30; 20 MG/100ML; MG/100ML; MG/100ML; MG/100ML
1000 INJECTION, SOLUTION INTRAVENOUS CONTINUOUS
Status: DISCONTINUED | OUTPATIENT
Start: 2021-01-01 | End: 2021-01-01

## 2021-01-01 RX ORDER — DEXTROSE MONOHYDRATE 25 G/50ML
50 INJECTION, SOLUTION INTRAVENOUS
Status: DISCONTINUED | OUTPATIENT
Start: 2021-01-01 | End: 2021-01-01 | Stop reason: HOSPADM

## 2021-01-01 RX ORDER — POTASSIUM CHLORIDE 750 MG/1
10 CAPSULE, EXTENDED RELEASE ORAL 2 TIMES DAILY
COMMUNITY

## 2021-01-01 RX ADMIN — EPINEPHRINE 1 MG: 0.1 INJECTION, SOLUTION ENDOTRACHEAL; INTRACARDIAC; INTRAVENOUS at 00:06

## 2021-01-01 RX ADMIN — AMIODARONE HYDROCHLORIDE 300 MG: 50 INJECTION, SOLUTION INTRAVENOUS at 00:15

## 2021-01-01 RX ADMIN — SODIUM CHLORIDE, POTASSIUM CHLORIDE, SODIUM LACTATE AND CALCIUM CHLORIDE: 600; 310; 30; 20 INJECTION, SOLUTION INTRAVENOUS at 17:35

## 2021-01-01 RX ADMIN — EPINEPHRINE 1 MG: 0.1 INJECTION, SOLUTION ENDOTRACHEAL; INTRACARDIAC; INTRAVENOUS at 00:24

## 2021-01-01 RX ADMIN — EPINEPHRINE 1 MG: 0.1 INJECTION, SOLUTION ENDOTRACHEAL; INTRACARDIAC; INTRAVENOUS at 00:10

## 2021-01-01 RX ADMIN — SODIUM CHLORIDE, POTASSIUM CHLORIDE, SODIUM LACTATE AND CALCIUM CHLORIDE: 600; 310; 30; 20 INJECTION, SOLUTION INTRAVENOUS at 07:06

## 2021-01-01 RX ADMIN — SODIUM CHLORIDE, POTASSIUM CHLORIDE, SODIUM LACTATE AND CALCIUM CHLORIDE: 600; 310; 30; 20 INJECTION, SOLUTION INTRAVENOUS at 13:00

## 2021-01-01 RX ADMIN — SODIUM CHLORIDE, POTASSIUM CHLORIDE, SODIUM LACTATE AND CALCIUM CHLORIDE: 600; 310; 30; 20 INJECTION, SOLUTION INTRAVENOUS at 01:35

## 2021-01-01 RX ADMIN — EPINEPHRINE 1 MG: 0.1 INJECTION, SOLUTION ENDOTRACHEAL; INTRACARDIAC; INTRAVENOUS at 00:19

## 2021-01-01 RX ADMIN — EPINEPHRINE 1 MG: 0.1 INJECTION, SOLUTION ENDOTRACHEAL; INTRACARDIAC; INTRAVENOUS at 00:15

## 2021-01-01 RX ADMIN — ACETAMINOPHEN 650 MG: 325 TABLET, FILM COATED ORAL at 18:34

## 2021-01-01 RX ADMIN — LIDOCAINE HYDROCHLORIDE 100 MG: 20 INJECTION, SOLUTION INTRAVENOUS at 00:25

## 2021-01-01 RX ADMIN — GABAPENTIN 300 MG: 300 CAPSULE ORAL at 21:35

## 2021-01-01 RX ADMIN — HYDROMORPHONE HYDROCHLORIDE 0.5 MG: 1 INJECTION, SOLUTION INTRAMUSCULAR; INTRAVENOUS; SUBCUTANEOUS at 01:51

## 2021-01-01 ASSESSMENT — LIFESTYLE VARIABLES
TOTAL SCORE: 0
HAVE YOU EVER FELT YOU SHOULD CUT DOWN ON YOUR DRINKING: NO
TOTAL SCORE: 0
HOW MANY TIMES IN THE PAST YEAR HAVE YOU HAD 5 OR MORE DRINKS IN A DAY: 0
CONSUMPTION TOTAL: NEGATIVE
AVERAGE NUMBER OF DAYS PER WEEK YOU HAVE A DRINK CONTAINING ALCOHOL: 0
EVER HAD A DRINK FIRST THING IN THE MORNING TO STEADY YOUR NERVES TO GET RID OF A HANGOVER: NO
EVER FELT BAD OR GUILTY ABOUT YOUR DRINKING: NO
TOTAL SCORE: 0
ALCOHOL_USE: NO
HAVE PEOPLE ANNOYED YOU BY CRITICIZING YOUR DRINKING: NO
ON A TYPICAL DAY WHEN YOU DRINK ALCOHOL HOW MANY DRINKS DO YOU HAVE: 0
DO YOU DRINK ALCOHOL: NO

## 2021-01-01 ASSESSMENT — COGNITIVE AND FUNCTIONAL STATUS - GENERAL
MOVING TO AND FROM BED TO CHAIR: A LITTLE
SUGGESTED CMS G CODE MODIFIER MOBILITY: CL
MOVING FROM LYING ON BACK TO SITTING ON SIDE OF FLAT BED: A LOT
TOILETING: A LITTLE
DAILY ACTIVITIY SCORE: 21
HELP NEEDED FOR BATHING: A LITTLE
WALKING IN HOSPITAL ROOM: A LOT
SUGGESTED CMS G CODE MODIFIER DAILY ACTIVITY: CJ
DRESSING REGULAR LOWER BODY CLOTHING: A LITTLE
MOBILITY SCORE: 14
TURNING FROM BACK TO SIDE WHILE IN FLAT BAD: A LITTLE
CLIMB 3 TO 5 STEPS WITH RAILING: A LOT
STANDING UP FROM CHAIR USING ARMS: A LOT

## 2021-01-01 ASSESSMENT — ENCOUNTER SYMPTOMS
SHORTNESS OF BREATH: 0
DIZZINESS: 0
HEADACHES: 0
PALPITATIONS: 0
BLURRED VISION: 0
HEARTBURN: 0
NAUSEA: 1
DOUBLE VISION: 0
FEVER: 0
DEPRESSION: 0
BRUISES/BLEEDS EASILY: 0
COUGH: 0
NAUSEA: 0
MYALGIAS: 0
NECK PAIN: 0
VOMITING: 1
HEMOPTYSIS: 0
PND: 0
CONSTIPATION: 1
WEAKNESS: 1
SORE THROAT: 0
CHILLS: 0
FOCAL WEAKNESS: 0
ORTHOPNEA: 0
ABDOMINAL PAIN: 1
SPUTUM PRODUCTION: 0

## 2021-01-01 ASSESSMENT — PATIENT HEALTH QUESTIONNAIRE - PHQ9
SUM OF ALL RESPONSES TO PHQ9 QUESTIONS 1 AND 2: 2
4. FEELING TIRED OR HAVING LITTLE ENERGY: SEVERAL DAYS
1. LITTLE INTEREST OR PLEASURE IN DOING THINGS: SEVERAL DAYS
SUM OF ALL RESPONSES TO PHQ9 QUESTIONS 1 AND 2: 2
6. FEELING BAD ABOUT YOURSELF - OR THAT YOU ARE A FAILURE OR HAVE LET YOURSELF OR YOUR FAMILY DOWN: NOT AL ALL
2. FEELING DOWN, DEPRESSED, IRRITABLE, OR HOPELESS: SEVERAL DAYS
9. THOUGHTS THAT YOU WOULD BE BETTER OFF DEAD, OR OF HURTING YOURSELF: NOT AT ALL
9. THOUGHTS THAT YOU WOULD BE BETTER OFF DEAD, OR OF HURTING YOURSELF: NOT AT ALL
3. TROUBLE FALLING OR STAYING ASLEEP OR SLEEPING TOO MUCH: SEVERAL DAYS
4. FEELING TIRED OR HAVING LITTLE ENERGY: SEVERAL DAYS
2. FEELING DOWN, DEPRESSED, IRRITABLE, OR HOPELESS: SEVERAL DAYS
3. TROUBLE FALLING OR STAYING ASLEEP OR SLEEPING TOO MUCH: SEVERAL DAYS
6. FEELING BAD ABOUT YOURSELF - OR THAT YOU ARE A FAILURE OR HAVE LET YOURSELF OR YOUR FAMILY DOWN: NOT AL ALL
1. LITTLE INTEREST OR PLEASURE IN DOING THINGS: SEVERAL DAYS
5. POOR APPETITE OR OVEREATING: SEVERAL DAYS
SUM OF ALL RESPONSES TO PHQ QUESTIONS 1-9: 6
8. MOVING OR SPEAKING SO SLOWLY THAT OTHER PEOPLE COULD HAVE NOTICED. OR THE OPPOSITE, BEING SO FIGETY OR RESTLESS THAT YOU HAVE BEEN MOVING AROUND A LOT MORE THAN USUAL: SEVERAL DAYS
8. MOVING OR SPEAKING SO SLOWLY THAT OTHER PEOPLE COULD HAVE NOTICED. OR THE OPPOSITE, BEING SO FIGETY OR RESTLESS THAT YOU HAVE BEEN MOVING AROUND A LOT MORE THAN USUAL: SEVERAL DAYS
5. POOR APPETITE OR OVEREATING: SEVERAL DAYS
SUM OF ALL RESPONSES TO PHQ QUESTIONS 1-9: 6
7. TROUBLE CONCENTRATING ON THINGS, SUCH AS READING THE NEWSPAPER OR WATCHING TELEVISION: NOT AT ALL
7. TROUBLE CONCENTRATING ON THINGS, SUCH AS READING THE NEWSPAPER OR WATCHING TELEVISION: NOT AT ALL

## 2021-01-01 ASSESSMENT — PAIN DESCRIPTION - PAIN TYPE
TYPE: ACUTE PAIN

## 2021-06-12 PROBLEM — K56.609 SMALL BOWEL OBSTRUCTION (HCC): Status: ACTIVE | Noted: 2021-01-01

## 2021-06-12 PROBLEM — N17.9 AKI (ACUTE KIDNEY INJURY) (HCC): Status: ACTIVE | Noted: 2021-01-01

## 2021-06-12 NOTE — ED PROVIDER NOTES
ED Provider Note    CHIEF COMPLAINT  Chief Complaint   Patient presents with   • Abdominal Pain       HPI  Jarret Bright is a 70 y.o. male who [facility] Past medical history as documented below. Patient with recent right great toe amputation. Recently at [facility]. Primarily lives in [facility]. [facility] he was initially evaluated the ER for one week of nausea, vomiting and abdominal discomfort stating that there is a pressure like sensation as if his abdominal was going to pop. CT imaging today showed small bowel obstruction. Patient denies any having any prior abdominal surgeries. He does note that he has a chronic umbilical hernia which is otherwise been asymptomatic of recent. Small amount of flatus and liquid the stool intermittently over the last week. No change in bladder. No fever chills. NG tube placed at the other facility and patient transferred to this facility for ongoing medical and possible surgical consultation if needed which [facility] could not [facility].    REVIEW OF SYSTEMS  See HPI for further details. All other systems are negative.     PAST MEDICAL HISTORY   has a past medical history of CAD (coronary artery disease), Diabetes (HCC), BKA (HCC), Hypertension, and Pacemaker.    SOCIAL HISTORY  Social History     Tobacco Use   • Smoking status: Former Smoker     Years: 30.00     Types: Cigarettes, Cigars     Quit date: 1/1/2018     Years since quitting: 3.4   • Smokeless tobacco: Never Used   Substance and Sexual Activity   • Alcohol use: No   • Drug use: No   • Sexual activity: Not on file       SURGICAL HISTORY   has a past surgical history that includes zzz cardiac cath (03/23/2018); amputation, below the knee (Left); pacemaker insertion; capsule endoscopy administration (N/A, 6/17/2019); and capsule endoscopy retrieval (6/17/2019).    CURRENT MEDICATIONS  Home Medications     Reviewed by  "Raphael Balderas (Pharmacy Tech) on 06/11/21 at 2045  Med List Status: Complete   Medication Last Dose Status   acetaminophen (TYLENOL) 325 MG Tab 6/11/2021 Active   aspirin EC (ECOTRIN) 81 MG Tablet Delayed Response 6/11/2021 Active   atorvastatin (LIPITOR) 40 MG Tab 6/10/2021 Active   atorvastatin (LIPITOR) 80 MG tablet Not Taking Active   Bacillus Coagulans-Inulin (PROBIOTIC) 1-250 BILLION-MG Cap 6/11/2021 Active   benzocaine-menthol (CEPACOL) 15-3.6 MG Lozenge Not Taking Active   carvedilol (COREG) 6.25 MG Tab 6/11/2021 Active   cyanocobalamin (VITAMIN B12) 1000 MCG Tab Not Taking Active   furosemide (LASIX) 40 MG Tab 6/4/2021 Active   gabapentin (NEURONTIN) 300 MG Cap 6/10/2021 Active   glipiZIDE (GLUCOTROL) 5 MG Tab 6/11/2021 Active   insulin glargine (LANTUS SOLOSTAR) 100 UNIT/ML Solution Pen-injector injection Not Taking Active   insulin lispro (HUMALOG) 100 UNIT/ML Solution 6/8/2021 Active   Insulin Pen Needle 32 G x 4 mm Not Taking Active   lisinopril (PRINIVIL) 10 MG Tab 6/11/2021 Active   metFORMIN (GLUCOPHAGE) 500 MG Tab 6/11/2021 Active   metoprolol (LOPRESSOR) 25 MG Tab Not Taking Active   mirtazapine (REMERON) 15 MG Tab 6/10/2021 Active   nitroglycerin (NITROSTAT) 0.4 MG SL Tab Not Taking Active   omeprazole (PRILOSEC) 40 MG delayed-release capsule 6/11/2021 Active   ondansetron (ZOFRAN ODT) 4 MG TABLET DISPERSIBLE 6/11/2021 Active   potassium chloride (MICRO-K) 10 MEQ capsule 6/8/2021 Active   Skin Protectants, Misc. (EUCERIN) Cream Not Taking Active   tamsulosin (FLOMAX) 0.4 MG capsule Not Taking Active                ALLERGIES  Allergies   Allergen Reactions   • Tea Tree Oil Rash     Unspecified       PHYSICAL EXAM  VITAL SIGNS: /63   Pulse 90   Temp 36.9 °C (98.5 °F) (Temporal)   Resp 16   Ht 1.702 m (5' 7\")   Wt 60 kg (132 lb 4.4 oz)   SpO2 96%   BMI 20.72 kg/m²  @ALBARO[[id number]::@   Pulse ox interpretation: I interpret this pulse ox as normal.  Constitutional: Alert in no apparent " distress.  HENT: No signs of trauma, Bilateral external ears normal, Nose normal. NG tube in place  Eyes: Pupils are equal and reactive  Neck: Normal range of motion, No tenderness, Supple  Cardiovascular: Regular rate and rhythm, no murmurs.   Thorax & Lungs: Normal breath sounds, No respiratory distress, No wheezing, No chest tenderness.   Abdomen: Bowel sounds hypoactive, Soft, mild ascending, easily reducible small umbilical hernia.  Skin: Warm, Dry, No erythema, No rash.   Extremities: Intact distal pulses  Musculoskeletal: Good range of motion in all major joints. Right great toe is surgically absent with bandage in place.  Neurologic: Alert , Normal motor function, Normal sensory function, No focal deficits noted.   Psychiatric: Affect normal, Judgment normal, Mood normal.       DIAGNOSTIC STUDIES / PROCEDURES      LABS  Results for orders placed or performed during the hospital encounter of 06/11/21   CBC WITH DIFFERENTIAL   Result Value Ref Range    WBC 6.7 4.8 - 10.8 K/uL    RBC 3.22 (L) 4.70 - 6.10 M/uL    Hemoglobin 10.0 (L) 14.0 - 18.0 g/dL    Hematocrit 30.4 (L) 42.0 - 52.0 %    MCV 94.4 81.4 - 97.8 fL    MCH 31.1 27.0 - 33.0 pg    MCHC 32.9 (L) 33.7 - 35.3 g/dL    RDW 52.1 (H) 35.9 - 50.0 fL    Platelet Count 194 164 - 446 K/uL    MPV 9.2 9.0 - 12.9 fL    Neutrophils-Polys 77.40 (H) 44.00 - 72.00 %    Lymphocytes 14.80 (L) 22.00 - 41.00 %    Monocytes 5.20 0.00 - 13.40 %    Eosinophils 0.80 0.00 - 6.90 %    Basophils 0.00 0.00 - 1.80 %    Nucleated RBC 0.00 /100 WBC    Neutrophils (Absolute) 5.19 1.82 - 7.42 K/uL    Lymphs (Absolute) 0.99 (L) 1.00 - 4.80 K/uL    Monos (Absolute) 0.35 0.00 - 0.85 K/uL    Eos (Absolute) 0.05 0.00 - 0.51 K/uL    Baso (Absolute) 0.00 0.00 - 0.12 K/uL    NRBC (Absolute) 0.00 K/uL   Basic Metabolic Panel   Result Value Ref Range    Sodium 138 135 - 145 mmol/L    Potassium 4.1 3.6 - 5.5 mmol/L    Chloride 110 96 - 112 mmol/L    Co2 16 (L) 20 - 33 mmol/L    Glucose 101 (H) 65  - 99 mg/dL    Bun 101 (HH) 8 - 22 mg/dL    Creatinine 3.42 (H) 0.50 - 1.40 mg/dL    Calcium 7.7 (L) 8.5 - 10.5 mg/dL    Anion Gap 12.0 7.0 - 16.0   DIFFERENTIAL MANUAL   Result Value Ref Range    Myelocytes 0.90 %    Progranulocytes 0.90 %    Manual Diff Status PERFORMED    PERIPHERAL SMEAR REVIEW   Result Value Ref Range    Peripheral Smear Review see below    PLATELET ESTIMATE   Result Value Ref Range    Plt Estimation Normal    MORPHOLOGY   Result Value Ref Range    RBC Morphology Present     Toxic Gran Slight    ESTIMATED GFR   Result Value Ref Range    GFR If  21 (A) >60 mL/min/1.73 m 2    GFR If Non  18 (A) >60 mL/min/1.73 m 2         RADIOLOGY  OUTSIDE IMAGES-DX CHEST   Final Result      OUTSIDE IMAGES-CT ABDOMEN /PELVIS   Final Result              COURSE & MEDICAL DECISION MAKING  Pertinent Labs & Imaging studies reviewed. (See chart for details)  70-year-old male presented to the emergency [facility] History as above. Upon patient arrival chart was reviewed, imaging results reviewed and laboratory valuation reviewed. I discussed case with Dr. Harris on call for general surgery which will continue consultation care. Given these factors I've additionally discussed the case with hospitals which is agreeable to ongoing primary medical management of small bowel obstruction.        FINAL IMPRESSION  1. SBO (small bowel obstruction) ([facility])            Electronically signed by: Davy Venegas M.D., 6/11/2021 9:14 PM

## 2021-06-12 NOTE — CARE PLAN
Problem: Knowledge Deficit - Standard  Goal: Patient and family/care givers will demonstrate understanding of plan of care, disease process/condition, diagnostic tests and medications  Outcome: Progressing     Problem: Pain - Standard  Goal: Alleviation of pain or a reduction in pain to the patient’s comfort goal  Outcome: Progressing     Problem: Skin Integrity  Goal: Skin integrity is maintained or improved  Outcome: Progressing     Problem: Fall Risk  Goal: Patient will remain free from falls  Outcome: Progressing     The patient is Stable - Low risk of patient condition declining or worsening    Shift Goals  Clinical Goals: pain control    Progress made toward(s) clinical / shift goals: Pain subsided post med    Patient is not progressing towards the following goals:

## 2021-06-12 NOTE — ASSESSMENT & PLAN NOTE
SBO by report on CT  My review shows minimal dilation of prox bowel  NGT in place with minimal output  Stooled  Will clamp NGT and see if tolerated

## 2021-06-12 NOTE — PROGRESS NOTES
2 RN Skin Check:     R shoulder abrasion/wound, dry gauze and tegaderm placed  Middle upper back abrasion/wound, dry gauze and tegaderm placed  Scrotal redness, blanching  L BKA stump MAGALI CDI    20 g PIV RFA CDI    R heel mepilex placed  Dry, flaky R foot  Dorsal R foot ulcer/wound, gauze and tegaderm placed, photo taken and wound consult placed    All other areas and bony prominences CDI

## 2021-06-12 NOTE — CONSULTS
CONSULT NOTE    PATIENT ID  Name:             Jarret Bright   YOB: 1950  Age:                 70 y.o.  male   MRN:               [id number]    CHIEF COMPLAINT:        Nausea vomiting, abdominal bloating    HISTORY OF PRESENT ILLNESS:  70-year-old male with a virgin abdomen presenting with a 1 day history of small bowel obstruction. [facility] and was diagnosed with a small bowel obstruction. He was managed conservatively with an NG tube for decompression. He was seen by Dr. Juárez from surgery and a request was placed for transfer here as the [facility] and Dr. Juárez was concerned that the patient would require a surgery to relieve his obstruction. There was no concern for [facility] compromise at the [facility] on exam or [facility] scan. I am unable to see the CT scan but it will be uploaded. The CT report did not come over with the patient. The patient's blood work demonstrated lactate 1.3, WBC 7.2. Blood work and imaging has not been performed here at [facility].    The patient reports a 3 to 4-day history of nausea and vomiting. He reports intermittent general pain with no localized pain. He reports that he has been passing small liquid bowel movements but has not passed a regular sized bowel movement in the past week. He reports that he has been passing gas but cannot remember the last time he passed gas. The patient reports intermittent symptoms similar to this occasion occurring over the past few years with spontaneous resolution and never requiring admission to hospital. The patient was at rehab for a recent toe amputation on the right foot. He was sent to the emergency department at their request.    REVIEW OF SYSTEMS:   Constitutional: Denies unintended weight change, night sweats, fatigue  Eyes:   Denies eye pain, redness, discharge, vision changes  Ears/Nose/Throat/Mouth: Denies hearing changes, ear pain, nasal congestion, sore throat,  dysphagia  Cardiovascular:  Denies Chest pain, shortness of breath, orthopnea, palpitations, claudication  Respiratory:  Denies cough, sputum, wheezing, dyspnea  Gastrointestinal/Hepatic:  Denies dysphagia, melena, jaundice, hematochezia, changing heartburn  Genitourinary:  Denies dysuria, increased frequency, hematuria, urgency  Musculoskeletal/Rheum: Denies changing arthralgias, myalgias, joint swelling, joint stiffness  Skin/Breast: Denies changing skin lesions, pruritis, nipple discharge, hair changes  Neurological: Denies weakness, numbness, paresthesia, syncope, dizziness  Pyschiatric: Denies acute depression, anxiety, insomnia, personality changes, delusions  Endocrine: Denies temperature intolerance, polydipsia, polyuria  Heme/Oncology/Lymph Nodes: Denies easy bruising, bleeding, lymphadenopathy  All other systems were reviewed and are negative                 Past Medical History:   Past Medical History:   Diagnosis Date   • CAD (coronary artery disease), previous NSTEMI    • Diabetes (HCC)     insulin dependent.   • Hx of BKA (HCC)    • Hypertension    • Pacemaker        Past Surgical History:  Past Surgical History:   Procedure Laterality Date   • CAPSULE ENDOSCOPY ADMINISTRATION N/A 6/17/2019    Procedure: CAPSULE ENDOSCOPY ADMINISTRATION;  Surgeon: Salud Cruz M.D.;  Location: ENDOSCOPY [facility];  Service: Gastroenterology   • CAPSULE ENDOSCOPY RETRIEVAL  6/17/2019    Procedure: CAPSULE ENDOSCOPY RETRIEVAL;  Surgeon: Salud Cruz M.D.;  Location: ENDOSCOPY [facility];  Service: Gastroenterology   • ZZZ CARDIAC CATH  03/23/2018    3 vessel disease, needs CABG when stable from other problems.   • AMPUTATION, BELOW THE KNEE Left    • PACEMAKER INSERTION         Current Outpatient Medications:  No current facility-administered medications on file prior to encounter.     Current Outpatient Medications on File Prior to Encounter   Medication Sig Dispense Refill   • mirtazapine (REMERON)  15 MG Tab Take 15 mg by mouth every evening.     • glipiZIDE (GLUCOTROL) 5 MG Tab Take 5 mg by mouth every day.     • metFORMIN (GLUCOPHAGE) 500 MG Tab Take 500 mg by mouth 2 times a day with meals.     • carvedilol (COREG) 6.25 MG Tab Take 6.25 mg by mouth 2 times a day with meals.     • atorvastatin (LIPITOR) 40 MG Tab Take 40 mg by mouth every evening.     • Bacillus Coagulans-Inulin (PROBIOTIC) 1-250 BILLION-MG Cap Take 1 capsule by mouth every day.     • gabapentin (NEURONTIN) 300 MG Cap Take 300 mg by mouth at bedtime.     • ondansetron (ZOFRAN ODT) 4 MG TABLET DISPERSIBLE Take 4 mg by mouth every 6 hours as needed for Nausea.     • potassium chloride (MICRO-K) 10 MEQ capsule Take 10 mEq by mouth 2 times a day. Takes @@ 0800 and 1200     • acetaminophen (TYLENOL) 325 MG Tab Take 2 Tabs by mouth every 6 hours as needed (Mild Pain; (Pain scale 1-3); Temp greater than 100.5 F). 30 Tab 0   • nitroglycerin (NITROSTAT) 0.4 MG SL Tab Place 1 Tab under tongue as needed for Chest Pain (up to 3 doses (if SBP greater than 90 mmHg)). (Patient not taking: Reported on 6/11/2021) 30 Tab 3   • atorvastatin (LIPITOR) 80 MG tablet Take 1 Tab by mouth every evening. (Patient not taking: Reported on 6/11/2021) 30 Tab 3   • lisinopril (PRINIVIL) 10 MG Tab Take 1 Tab by mouth every day. 30 Tab 3   • cyanocobalamin (VITAMIN B12) 1000 MCG Tab Take 1 Tab by mouth every day. (Patient not taking: Reported on 6/11/2021) 30 Tab 3   • benzocaine-menthol (CEPACOL) 15-3.6 MG Lozenge Spray 1 Lozenge in mouth/throat every 2 hours as needed. (Patient not taking: Reported on 6/11/2021) 30 Lozenge 1   • omeprazole (PRILOSEC) 40 MG delayed-release capsule Take 1 Cap by mouth 2 Times a Day. (Patient taking differently: Take 40 mg by mouth every day.) 30 Cap 3   • Skin Protectants, Misc. (EUCERIN) Cream Apply 5 g to affected area(s) 3 times a day as needed (dry skin/rash). (Patient not taking: Reported on 6/11/2021) 100 g 1   • insulin glargine  (LANTUS SOLOSTAR) 100 UNIT/ML Solution Pen-injector injection Inject 10 Units as instructed every evening. (Patient not taking: Reported on 6/11/2021) 1 PEN 3   • insulin lispro (HUMALOG) 100 UNIT/ML Solution Inject 2-9 Units as instructed 3 times a day before meals. 10 mL 3   • Insulin Pen Needle 32 G x 4 mm Inject 10 Units/day as instructed every bedtime. (Patient not taking: Reported on 6/11/2021) 20 Each 3   • aspirin EC (ECOTRIN) 81 MG Tablet Delayed Response Take 81 mg by mouth every day.     • furosemide (LASIX) 40 MG Tab Take 40 mg by mouth 2 times a day. Takes @ 0800 and 1200     • metoprolol (LOPRESSOR) 25 MG Tab Take 0.5 Tabs by mouth 2 times a day. (Patient not taking: Reported on 6/11/2021) 90 Tab 3   • tamsulosin (FLOMAX) 0.4 MG capsule Take 1 Cap by mouth ONE-HALF HOUR AFTER BREAKFAST. (Patient not taking: Reported on 6/11/2021) 30 Cap 1       Current Inpatient Medications:   No current facility-administered medications for this encounter.     Current Outpatient Medications   Medication   • mirtazapine (REMERON) 15 MG Tab   • glipiZIDE (GLUCOTROL) 5 MG Tab   • metFORMIN (GLUCOPHAGE) 500 MG Tab   • carvedilol (COREG) 6.25 MG Tab   • atorvastatin (LIPITOR) 40 MG Tab   • Bacillus Coagulans-Inulin (PROBIOTIC) 1-250 BILLION-MG Cap   • gabapentin (NEURONTIN) 300 MG Cap   • ondansetron (ZOFRAN ODT) 4 MG TABLET DISPERSIBLE   • potassium chloride (MICRO-K) 10 MEQ capsule   • acetaminophen (TYLENOL) 325 MG Tab   • nitroglycerin (NITROSTAT) 0.4 MG SL Tab   • atorvastatin (LIPITOR) 80 MG tablet   • lisinopril (PRINIVIL) 10 MG Tab   • cyanocobalamin (VITAMIN B12) 1000 MCG Tab   • benzocaine-menthol (CEPACOL) 15-3.6 MG Lozenge   • omeprazole (PRILOSEC) 40 MG delayed-release capsule   • Skin Protectants, Misc. (EUCERIN) Cream   • insulin glargine (LANTUS SOLOSTAR) 100 UNIT/ML Solution Pen-injector injection   • insulin lispro (HUMALOG) 100 UNIT/ML Solution   • Insulin Pen Needle 32 G x 4 mm   • aspirin EC (ECOTRIN)  "81 MG Tablet Delayed Response   • furosemide (LASIX) 40 MG Tab   • metoprolol (LOPRESSOR) 25 MG Tab   • tamsulosin (FLOMAX) 0.4 MG capsule       Medication Allergy/Sensitivities:  Allergies   Allergen Reactions   • Tea Tree Oil Rash     Unspecified       Family History:  Family History   Problem Relation Age of Onset   • Heart Attack Mother 60       Social History:  PCP: Vlad Sinha M.D.  Social History     Tobacco Use   Smoking Status Former Smoker   • Years: 30.00   • Types: Cigarettes, Cigars   • Quit date: 1/1/2018   • Years since quitting: 3.4   Smokeless Tobacco Never Used     Social History     Substance and Sexual Activity   Alcohol Use No     Social History     Substance and Sexual Activity   Drug Use No       PHYSICAL EXAM:  Weight/BMI: Body mass index is 20.72 kg/m².  Vitals:    06/11/21 1943 06/11/21 2000 06/11/21 2020 06/11/21 2040   BP: 125/59 115/59 121/67 107/57   Pulse: 83 83 86 86   Resp: 15 19 16 18   Temp: 36.7 °C (98.1 °F)      TempSrc: Temporal      SpO2: 95% 92% 94% 94%   Weight: 60 kg (132 lb 4.4 oz)      Height: 1.702 m (5' 7\")        Oxygen Therapy:  Pulse Oximetry: 94 %, O2 Delivery Device: None - Room Air    Constitutional: Well developed, Well nourished, No acute distress, Non-toxic appearance.    HENMT: Normocephalic, Atraumatic, Bilateral external ears normal, Oropharynx moist mucous membranes, No oral exudates, Nose normal.  No thyromegaly.   Eyes: PERRLA, EOMI, Conjunctiva normal, No discharge.   Neck: Normal range of motion, No cervical tenderness, Supple, No stridor, no JVD.  Cardiovascular: Normal heart rate, Normal rhythm, No murmurs, No rubs, No gallops.   Extremites with intact distal pulses, no cyanosis, clubbing or edema.   Lungs:  Respiratory effort is normal. Normal breath sounds, breath sounds clear to auscultation bilaterally,  no rales, no rhonchi, no wheezing.   Abdomen: Soft, No tenderness, gross distention, soft reducible small umbilical hernia, no guarding, No " rebound, No masses, No hepatosplenomegaly. -NG with blood-tinged clear drainage and food content  Skin: Warm, Dry, No erythema, No rash, no induration or crepitus.        Neurologic: Alert & oriented x 3, Normal motor function, Normal sensory function, No focal deficits noted, cranial nerves II through XII are normal.  Psychiatric: Affect normal, Judgment normal, Mood normal.     LAB DATA REVIEWED:  No results found for this or any previous visit (from the past 24 hour(s)).    IMAGING:   Images Independently Reviewed   No orders to display       ASSESSMENT/PLAN     70-year-old male with a virgin abdomen presenting with small bowel obstruction. He has no concerning findings for bowel compromise. CT scan has not been able to be reviewed and no report was sent and repeat blood work at our hospital has not yet been ordered. The CT scan images have been sent down to be loaded.    The patient will be admitted for continued conservative management with NG to low intermittent suction. Bowel rest. IV fluid rehydration.    The patient will likely require surgery for small bowel obstruction in a virgin abdomen if it does not improve in the next 24 to 48 hours or if the patient shows evidence of bowel compromise.    Patient surgical care will be continued under the [facility] acute care surgery service.    Valerie Harris MD  General Surgery  Colon and Rectal Surgery  [facility]

## 2021-06-12 NOTE — ASSESSMENT & PLAN NOTE
CT at OSH showing proximal SBO.  General surgery-Dr. Valerie Harris-consulted on admission -  recommend medical management at this time  Patient presented with NG tube connected to intermittent wall suction (minimal output - agrees with a plan to clamp)  Bowel rest  Serial abdominal examinations (monitor flatus and BM)  Pain management and IVF  Reorder home oral meds once surgery clears

## 2021-06-12 NOTE — ED NOTES
COVID result negative at [facility], copy faxed to [facility].

## 2021-06-12 NOTE — ASSESSMENT & PLAN NOTE
Labetalol as needed as patient is n.p.o. with NG tube connected to wall suction.  Resume home meds once cleared by surgery.

## 2021-06-12 NOTE — PROGRESS NOTES
"    DATE: 6/12/2021    Hospital Day 2 SBO.    Interval Events:  Stooled last PM. NGT with min output..    PHYSICAL EXAMINATION:  Constitutional:     Vital Signs: /60   Pulse 89   Temp 36.5 °C (97.7 °F) (Temporal)   Resp 16   Ht 1.702 m (5' 7\")   Wt 60 kg (132 lb 4.4 oz)   SpO2 96%    General Appearance: The patient is a pleasant  man in no critical distress.   Respiratory:   Inspection: Unlabored respirations, no intercostal retractions, paradoxical motion, or accessory muscle use.   Cardiovascular:   Inspection: The skin is warm.     Abdomen:   Inspection: Abdominal inspection reveals no scars.   Palpation: Palpation is remarkable for no significant tenderness, guarding, or peritoneal findings.     Laboratory Values:   Recent Labs     06/11/21 2154   WBC 6.7   RBC 3.22*   HEMOGLOBIN 10.0*   HEMATOCRIT 30.4*   MCV 94.4   MCH 31.1   MCHC 32.9*   RDW 52.1*   PLATELETCT 194   MPV 9.2     Recent Labs     06/11/21 2154 06/12/21  0841   SODIUM 138 142   POTASSIUM 4.1 4.3   CHLORIDE 110 113*   CO2 16* 16*   GLUCOSE 101* 83   * 101*   CREATININE 3.42* 3.08*   CALCIUM 7.7* 8.1*                Imaging:   US-CAT SINGLE LEVEL BILAT         OUTSIDE IMAGES-DX CHEST   Final Result      OUTSIDE IMAGES-CT ABDOMEN /PELVIS   Final Result      US-EXTREMITY ARTERY LOWER UNILAT RIGHT    (Results Pending)       ASSESSMENT AND PLAN:     * Small bowel obstruction (HCC)- (present on admission)  Assessment & Plan  SBO by report on CT  My review shows minimal dilation of prox bowel  NGT in place with minimal output  Stooled  Will clamp NGT and see if tolerated      DISPOSITION: Will clamp NGT and monitor output. If not tolerated, will get SBFT       ____________________________________     Tori Ornelas M.D.    DD: 6/12/2021  9:56 AM    "

## 2021-06-12 NOTE — H&P
Hospital Medicine History & Physical Note    Date of Service  6/11/2021    Primary Care Physician  Vlad Sinha M.D.    Consultants  General Surgery - Dr. Valerie Harris    Code Status  Full Code    Chief Complaint  Chief Complaint   Patient presents with   • Abdominal Pain       History of Presenting Illness  70 y.o. male with past medical history of diabetes mellitus insulin-dependent, hypertension, pacemaker, left below-knee amputation who [facility] nonbloody 3-4 episodes for the last 4 to 5 days.  He reports associated increasing abdominal distention.  He is able to pass gas.  CT abdomen pelvis showed small bowel obstruction.  NG tube was placed and patient was transferred to [facility].  He reports no fever or chills.  He is a former smoker quit 5 to 6 years ago and former alcohol user.  He is allergic to tea tree oil.  He denies any surgical history in the past.    On labs he was noted to have BUN of 101 creatinine of 3.4, sugar of 345.  General surgery, Dr. Harris, was consulted who recommended admission to medicine for small bowel obstruction as at this time medical management with NG tube is appropriate.    Review of Systems  Review of Systems   Constitutional: Negative for chills and fever.   HENT: Negative for hearing loss and tinnitus.    Eyes: Negative for blurred vision and double vision.   Respiratory: Negative for cough and hemoptysis.    Cardiovascular: Negative for chest pain and palpitations.   Gastrointestinal: Positive for vomiting. Negative for heartburn and nausea.   Genitourinary: Negative for dysuria and urgency.   Musculoskeletal: Negative for myalgias and neck pain.   Skin: Negative for itching and rash.   Neurological: Negative for dizziness and headaches.   Endo/Heme/Allergies: Does not bruise/bleed easily.   Psychiatric/Behavioral: Negative for depression and suicidal ideas.       Past Medical History   has a past  medical history of CAD (coronary artery disease), Diabetes (HCC), BKA (HCC), Hypertension, and Pacemaker.    Surgical History   has a past surgical history that includes zzz cardiac cath (03/23/2018); amputation, below the knee (Left); pacemaker insertion; capsule endoscopy administration (N/A, 6/17/2019); and capsule endoscopy retrieval (6/17/2019).     Family History  family history includes Heart Attack (age of onset: 60) in his mother.     Social History   reports that he quit smoking about 3 years ago. His smoking use included cigarettes and cigars. He quit after 30.00 years of use. He has never used smokeless tobacco. He reports that he does not drink alcohol and does not use drugs.    Allergies  Allergies   Allergen Reactions   • Tea Tree Oil Rash     Unspecified       Medications  Prior to Admission Medications   Prescriptions Last Dose Informant Patient Reported? Taking?   Bacillus Coagulans-Inulin (PROBIOTIC) 1-250 BILLION-MG Cap 6/11/2021 at 0800 MAR from Other Facility Yes Yes   Sig: Take 1 capsule by mouth every day.   Insulin Pen Needle 32 G x 4 mm Not Taking at Unknown time MAR from Other Facility No No   Sig: Inject 10 Units/day as instructed every bedtime.   Patient not taking: Reported on 6/11/2021   Skin Protectants, Misc. (EUCERIN) Cream Not Taking at Unknown time MAR from Other Facility No No   Sig: Apply 5 g to affected area(s) 3 times a day as needed (dry skin/rash).   Patient not taking: Reported on 6/11/2021   acetaminophen (TYLENOL) 325 MG Tab 6/11/2021 at 0705 MAR from Other Facility No No   Sig: Take 2 Tabs by mouth every 6 hours as needed (Mild Pain; (Pain scale 1-3); Temp greater than 100.5 F).   aspirin EC (ECOTRIN) 81 MG Tablet Delayed Response 6/11/2021 at 0800 MAR from Other Facility Yes No   Sig: Take 81 mg by mouth every day.   atorvastatin (LIPITOR) 40 MG Tab 6/10/2021 at 2000 MAR from Other Facility Yes Yes   Sig: Take 40 mg by mouth every evening.   atorvastatin (LIPITOR) 80 MG  tablet Not Taking at Unknown time MAR from Other Facility No No   Sig: Take 1 Tab by mouth every evening.   Patient not taking: Reported on 6/11/2021   benzocaine-menthol (CEPACOL) 15-3.6 MG Lozenge Not Taking at Unknown time MAR from Other Facility No No   Sig: Spray 1 Lozenge in mouth/throat every 2 hours as needed.   Patient not taking: Reported on 6/11/2021   carvedilol (COREG) 6.25 MG Tab 6/11/2021 at 0800 MAR from Other Facility Yes Yes   Sig: Take 6.25 mg by mouth 2 times a day with meals.   cyanocobalamin (VITAMIN B12) 1000 MCG Tab Not Taking at Unknown time MAR from Other Facility No No   Sig: Take 1 Tab by mouth every day.   Patient not taking: Reported on 6/11/2021   furosemide (LASIX) 40 MG Tab 6/4/2021 at 1200 MAR from Other Facility Yes No   Sig: Take 40 mg by mouth 2 times a day. Takes @ 0800 and 1200   gabapentin (NEURONTIN) 300 MG Cap 6/10/2021 at 2000 MAR from Other Facility Yes Yes   Sig: Take 300 mg by mouth at bedtime.   glipiZIDE (GLUCOTROL) 5 MG Tab 6/11/2021 at 0800 MAR from Other Facility Yes Yes   Sig: Take 5 mg by mouth every day.   insulin glargine (LANTUS SOLOSTAR) 100 UNIT/ML Solution Pen-injector injection Not Taking at Unknown time MAR from Other Facility No No   Sig: Inject 10 Units as instructed every evening.   Patient not taking: Reported on 6/11/2021   insulin lispro (HUMALOG) 100 UNIT/ML Solution 6/8/2021 at 1100 MAR from Other Facility No No   Sig: Inject 2-9 Units as instructed 3 times a day before meals.   lisinopril (PRINIVIL) 10 MG Tab 6/11/2021 at 0800 MAR from Other Facility No No   Sig: Take 1 Tab by mouth every day.   metFORMIN (GLUCOPHAGE) 500 MG Tab 6/11/2021 at 0800 MAR from Other Facility Yes Yes   Sig: Take 500 mg by mouth 2 times a day with meals.   metoprolol (LOPRESSOR) 25 MG Tab Not Taking at Unknown time MAR from Other Facility No No   Sig: Take 0.5 Tabs by mouth 2 times a day.   Patient not taking: Reported on 6/11/2021   mirtazapine (REMERON) 15 MG Tab  6/10/2021 at 2000 MAR from Other Facility Yes Yes   Sig: Take 15 mg by mouth every evening.   nitroglycerin (NITROSTAT) 0.4 MG SL Tab Not Taking at Unknown time MAR from Other Facility No No   Sig: Place 1 Tab under tongue as needed for Chest Pain (up to 3 doses (if SBP greater than 90 mmHg)).   Patient not taking: Reported on 6/11/2021   omeprazole (PRILOSEC) 40 MG delayed-release capsule 6/11/2021 at 0600 MAR from Other Facility No No   Sig: Take 1 Cap by mouth 2 Times a Day.   Patient taking differently: Take 40 mg by mouth every day.   ondansetron (ZOFRAN ODT) 4 MG TABLET DISPERSIBLE 6/11/2021 at 0600 MAR from Other Facility Yes Yes   Sig: Take 4 mg by mouth every 6 hours as needed for Nausea.   potassium chloride (MICRO-K) 10 MEQ capsule 6/8/2021 at 1200 MAR from Other Facility Yes Yes   Sig: Take 10 mEq by mouth 2 times a day. Takes @@ 0800 and 1200   tamsulosin (FLOMAX) 0.4 MG capsule Not Taking at Unknown time MAR from Other Facility No No   Sig: Take 1 Cap by mouth ONE-HALF HOUR AFTER BREAKFAST.   Patient not taking: Reported on 6/11/2021      Facility-Administered Medications: None       Physical Exam  Temp:  [36.4 °C (97.6 °F)-36.9 °C (98.5 °F)] 36.9 °C (98.5 °F)  Pulse:  [83-90] 90  Resp:  [15-19] 16  BP: (107-132)/(57-70) 131/63  SpO2:  [92 %-96 %] 96 %    Physical Exam  Vitals and nursing note reviewed.   Constitutional:       Appearance: Normal appearance.   HENT:      Head: Normocephalic and atraumatic.      Right Ear: Tympanic membrane normal.      Left Ear: Tympanic membrane and ear canal normal.      Nose: Nose normal.      Comments: NG tube in place     Mouth/Throat:      Mouth: Mucous membranes are dry.      Pharynx: Oropharynx is clear.   Eyes:      Extraocular Movements: Extraocular movements intact.      Pupils: Pupils are equal, round, and reactive to light.   Cardiovascular:      Rate and Rhythm: Normal rate and regular rhythm.      Pulses: Normal pulses.      Heart sounds: Normal heart  sounds.   Pulmonary:      Effort: Pulmonary effort is normal. No respiratory distress.      Breath sounds: Normal breath sounds. No stridor. No wheezing or rhonchi.   Abdominal:      General: Abdomen is flat. Bowel sounds are normal. There is no distension.      Palpations: Abdomen is soft. There is no mass.      Tenderness: There is no abdominal tenderness.      Hernia: No hernia is present.   Musculoskeletal:         General: Deformity (Left below-knee amputation) present. No tenderness or signs of injury.      Cervical back: Normal range of motion and neck supple. No rigidity or tenderness.   Skin:     General: Skin is warm and dry.      Capillary Refill: Capillary refill takes less than 2 seconds.      Coloration: Skin is not jaundiced or pale.      Findings: No bruising or erythema.   Neurological:      General: No focal deficit present.      Mental Status: He is alert and oriented to person, place, and time.      Cranial Nerves: No cranial nerve deficit.      Sensory: No sensory deficit.      Motor: No weakness.      Coordination: Coordination normal.   Psychiatric:         Mood and Affect: Mood normal.         Behavior: Behavior normal.         Laboratory:  Recent Labs     06/11/21  2154   WBC 6.7   RBC 3.22*   HEMOGLOBIN 10.0*   HEMATOCRIT 30.4*   MCV 94.4   MCH 31.1   MCHC 32.9*   RDW 52.1*   PLATELETCT 194   MPV 9.2     Recent Labs     06/11/21  2154   SODIUM 138   POTASSIUM 4.1   CHLORIDE 110   CO2 16*   GLUCOSE 101*   *   CREATININE 3.42*   CALCIUM 7.7*     Recent Labs     06/11/21  2154   GLUCOSE 101*         No results for input(s): NTPROBNP in the last 72 hours.      No results for input(s): TROPONINT in the last 72 hours.    Imaging:  OUTSIDE IMAGES-DX CHEST   Final Result      OUTSIDE IMAGES-CT ABDOMEN /PELVIS   Final Result            Assessment/Plan:  I anticipate this patient will require at least two midnights for appropriate medical management, necessitating inpatient admission.    *  Small bowel obstruction (HCC)  Assessment & Plan  CT at OSH showing SBO.  General surgery-Dr. Valerie Harris-consulted recommend medical management at this time  Patient presented with NG tube connected to intermittent wall suction  Bowel rest  Serial abdominal examinations  Reorder home oral meds once surgery clears       ALEN (acute kidney injury) ([facility])  Assessment & Plan  Likely pre renal. Monitor Cr daily. Check UA  Aggressive IVF Hydration with LR at 200 ml/hr   Renally adjust al medications   Avoid nephrotoxic agents  Monitor intake/output    Essential hypertension- (present on admission)  Assessment & Plan  Labetalol as needed as patient is n.p.o. with NG tube connected to wall suction.  Resume home meds once cleared by surgery.    Type 2 diabetes mellitus with hyperglycemia, with long-term current use of insulin (HCC)- (present on admission)  Assessment & Plan  NPO  SSI+frequent accuchecks + Hypoglycemia protocol

## 2021-06-12 NOTE — PROGRESS NOTES
Hospital Medicine Daily Progress Note    Date of Service  6/12/2021    Chief Complaint  70 y.o. male admitted 6/11/2021 with SBO    Hospital Course  Per Dr. Anguiano's HPI:  70 y.o. male with DM insulin-dependent, HTN, pacemaker, left below-knee amputation presented [facility] nonbloody 3-4 episodes for the last 4 to 5 days.  He reported associated increasing abdominal distention.  He is able to pass gas.  CT abdomen pelvis showed small bowel obstruction.  NG tube was placed and patient was transferred to [facility].  He reports no fever or chills.  He is a former smoker quit 5 to 6 years ago and former alcohol user. He is allergic to tea tree oil.  He denies any surgical history in the past.     On labs, he was noted to have BUN of 101, Cr 3.4, sugar of 345.  General surgery, Dr. Harris, was consulted - recommended admission to medicine for small bowel obstruction as at this time medical management with NG tube is appropriate.     Interval Problem Update  6/12 on the unit:   Vitals reviewed; afebrile.  The rest of the vitals within normal parameters.  Pain scale reported - none this AM  Blood glucose in last 24hrs - around 100s   I/O - good urine output, small brown loose BM this AM around midnight; minimal NGT input    At bedside, appeared a bit unease but agrees with the plan for current conservative management with bowel rest, pain management and IVF.     General Surgery Dr. Ornelas follow up appreciated     Labs reviewed   No leukocytosis, Hb 10  Cr 3.42 > 3.08 (Cr 0.7-0.8 in 2019)  Bicarb 16  K 4.3    Imagings done on admission reviewed   US CAT R: Borderline arterial disease demonstrated right lower extremity.     Consultants/Specialty  General Surgery Dr. Ornelas    Code Status  Full Code    Disposition  Likely home when medically cleared     Discussed with patient, patient's nurse and with multidisciplinary team during rounds including , pharmacist and charge nurse.         Review of Systems  Review of Systems   Constitutional: Positive for malaise/fatigue. Negative for chills and fever.   HENT: Negative for congestion and sore throat.    Respiratory: Negative for cough, sputum production and shortness of breath.    Cardiovascular: Negative for chest pain, palpitations, orthopnea, leg swelling and PND.   Gastrointestinal: Positive for abdominal pain, constipation, nausea and vomiting. Negative for heartburn.   Genitourinary: Negative for dysuria, frequency and urgency.   Musculoskeletal: Negative for myalgias and neck pain.   Neurological: Positive for weakness. Negative for dizziness, focal weakness and headaches.   Psychiatric/Behavioral: Negative for depression.        Physical Exam  Temp:  [36.4 °C (97.6 °F)-36.9 °C (98.5 °F)] 36.5 °C (97.7 °F)  Pulse:  [83-90] 89  Resp:  [15-19] 16  BP: (107-132)/(57-70) 113/60  SpO2:  [92 %-97 %] 96 %    Physical Exam  Vitals and nursing note reviewed.   Constitutional:       General: He is not in acute distress.     Appearance: Normal appearance. He is not ill-appearing.   HENT:      Head: Normocephalic and atraumatic.      Mouth/Throat:      Mouth: Mucous membranes are moist.      Pharynx: Oropharynx is clear.   Eyes:      General: No scleral icterus.     Conjunctiva/sclera: Conjunctivae normal.   Cardiovascular:      Rate and Rhythm: Normal rate and regular rhythm.      Pulses: Normal pulses.      Heart sounds: Normal heart sounds.   Pulmonary:      Effort: Pulmonary effort is normal. No respiratory distress.      Breath sounds: Normal breath sounds. No wheezing.   Abdominal:      General: Bowel sounds are normal. There is distension.      Palpations: Abdomen is soft. There is no mass.      Tenderness: There is abdominal tenderness. There is no guarding or rebound.   Musculoskeletal:         General: No swelling or tenderness. Normal range of motion.      Comments: Left BKA stump C/D/I   Skin:     General: Skin is warm and dry.       Capillary Refill: Capillary refill takes less than 2 seconds.   Neurological:      General: No focal deficit present.      Mental Status: He is alert and oriented to person, place, and time. Mental status is at baseline.   Psychiatric:         Mood and Affect: Mood normal.         Fluids    Intake/Output Summary (Last 24 hours) at 6/12/2021 1315  Last data filed at 6/12/2021 0400  Gross per 24 hour   Intake 483.33 ml   Output 820 ml   Net -336.67 ml       Laboratory  Recent Labs     06/11/21 2154   WBC 6.7   RBC 3.22*   HEMOGLOBIN 10.0*   HEMATOCRIT 30.4*   MCV 94.4   MCH 31.1   MCHC 32.9*   RDW 52.1*   PLATELETCT 194   MPV 9.2     Recent Labs     06/11/21 2154 06/12/21  0841   SODIUM 138 142   POTASSIUM 4.1 4.3   CHLORIDE 110 113*   CO2 16* 16*   GLUCOSE 101* 83   * 101*   CREATININE 3.42* 3.08*   CALCIUM 7.7* 8.1*                   Imaging  US-EXTREMITY ARTERY LOWER UNILAT RIGHT   Final Result      US-CAT SINGLE LEVEL BILAT   Final Result      OUTSIDE IMAGES-DX CHEST   Final Result      OUTSIDE IMAGES-CT ABDOMEN /PELVIS   Final Result           Assessment/Plan  * Small bowel obstruction (HCC)- (present on admission)  Assessment & Plan  CT at OSH showing proximal SBO.  General surgery-Dr. Valerie Harris-consulted on admission -  recommend medical management at this time  Patient presented with NG tube connected to intermittent wall suction (minimal output - agrees with a plan to clamp)  Bowel rest  Serial abdominal examinations (monitor flatus and BM)  Pain management and IVF  Reorder home oral meds once surgery clears       ALEN (acute kidney injury) (HCC)  Assessment & Plan  Likely pre renal. Monitor Cr daily.   Aggressive IVF Hydration with LR at 200 ml/hr   Renally adjust al medications   Avoid nephrotoxic agents  Monitor intake/output    Appeared to be responding to IVF - Cr trended down a bit  Will check UA and Urine Na/Cr (will defer Renal US at this point)    Essential hypertension- (present on  admission)  Assessment & Plan  Labetalol as needed as patient is n.p.o. with NG tube connected to wall suction.  Resume home meds once cleared by surgery.    Type 2 diabetes mellitus with hyperglycemia, with long-term current use of insulin (HCC)- (present on admission)  Assessment & Plan  NPO  SSI+frequent accuchecks + Hypoglycemia protocol       VTE prophylaxis: SCD

## 2021-06-12 NOTE — ED TRIAGE NOTES
"Chief Complaint   Patient presents with   • Abdominal Pain     BIB EMS to R2, pt on monitor and in gown, labs drawn and sent[facility] NG tube placed PTA. Pt reports generalized abdominal pain x3D, currently pain free. AOx4, GCS 15.     Medications given en route: none    /59   Pulse 83   Temp 36.7 °C (98.1 °F) (Temporal)   Resp 15   Ht 1.702 m (5' 7\")   Wt 60 kg (132 lb 4.4 oz)   SpO2 95%   BMI 20.72 kg/m²   "

## 2021-06-12 NOTE — PROGRESS NOTES
Assumed care of patient at 0700. Patient is alert and oriented, respirations are unlabored and regular on 1L02. Lung sounds clear throughout. Abdomen soft, tender, distended, hypoactive bowel sounds, +gas, +BM very small 6/12. NG tube to suction, yellow output. Patient states pain is a 1, declines intervention. Bed in lowest position, call light within reach, patient states no needs at this time.

## 2021-06-12 NOTE — PROGRESS NOTES
Pt arrived on GSU to T 403-2 in stable condition  No report from previous unit received  Belongings and prosthetic leg at bedside

## 2021-06-12 NOTE — ASSESSMENT & PLAN NOTE
Likely pre renal. Monitor Cr daily.   Aggressive IVF Hydration with LR at 200 ml/hr   Renally adjust al medications   Avoid nephrotoxic agents  Monitor intake/output    Appeared to be responding to IVF - Cr trended down a bit  Will check UA and Urine Na/Cr (will defer Renal US at this point)

## 2021-06-12 NOTE — ED NOTES
Med rec complete per MAR from transferring facility ([facility]). Allergies reviewed. No antibiotic use noted in past 14 days on MAR.

## 2021-06-13 NOTE — PROCEDURES
Intraosseous Needle Insertion:    Indication: Emergency vascular access    Needle: Yellow EZ IO needle w/ EZ IO drill    The greater tubercle of the proximal humerus and then the surgical neck was palpated.      IO needle w/ drill placed at a 45 degree angle with the humeral head. The yellow IO needle was inserted into the humeral head with the EZ IO Drill.    The needle stylet was removed and the catheter was connected to IV tubing.  Bone marrow was aspirated into the IV tubing, and the catheter was able to be flushed easily.

## 2021-06-13 NOTE — PROGRESS NOTES
2350 Rn was in patients room with patient sitting at bedside drinking water. No complaints of nausea or pain.  0003 Rn entered room after seeing patient laying down on foot of bed. Upon further inspection Rn noticed a pool of what appeared to be urine coming from underneath bed. Upon coming in contact with patient Rn noticed that patient had tried to vomit in trash can. Upon asking patient if they were alright pt did not respond. Rn then checked pulse with no pulse detected. RN called for help and pressed the code button at [phone]. Help arrived immediately for code. See charting for further details.

## 2021-06-13 NOTE — PROGRESS NOTES
Critical Care Code Note:    Overhead page of Code Blue Room T403 called.  I arrived to the room and the pt was supine in bed with head at foot of the bed with active CPR in progress, and vomitus that looked like it had fecalized was present on the pt's bed and on the floor.    The pt had an LMA in his airway and a right arm peripheral IV.  A left humoral head I/O was placed for additional access.  Please see procedure notes for details.    Social work was on the phone with the pt's next of kin and updating the family about the pt's condition.    I palpated the left femoral artery and could feel a pulse with compressions.  At pulse checks I did not feel a pulse and the pt had PEA on the monitor.  Approx 10 min into the code the pt had v fib.  The pt was shocked with 150 J and given 300 mg Amiodarone.  CPR resumed after the shock. Epinepherine was given every 2 min according to ACLS protocol.      At the next pulse check the pt did not have a palpable femoral pulse and CPR was resumed.  At approx 16 min into the code the pt returned to v fib and was shocked with 200 J and given 100 mg Lidocaine.  CPR was resumed after the 2nd shock of 200 J.  At the subsequent pulse check the pt did not have pulses and was noted to be in PEA.    The H's/T's were checked.     Hypovolemia - bp 137/59 prior to code  Hypoxia - pt likely aspirated and went into resp distress.  LMA in place w/ b/l breath sounds and EtCO2 reading of 14 w/ CPR.  Hydrogen ions - Labs wnl earlier in the day  Hyper/hypokalemia - Labs wnl earlier in the day  Hypothermia - Pt was warm to touch    Toxins - Pt was his normal self until vomiting due to SBO.  Tamponade - No evidence of distended neck veins or hypotension prior to code.   Tension Ptx - Breath sound b/l with BVM ventilations  Thrombosis CAD -  Pt has hx of CAD, so Acute MI could be a cause.   Thrombosis PE - Pt saturations were 95% on room air prior to code event.    All correctable H's/T's were  considered.    At approx the 20 min pulse check the pt had no pulse and was still in PEA.  The decision was made to call the code.  At 0025 hrs the code was stopped and the pt was pronounced .     All participants were asked if they had any objections to stopping CPR.      A brief review of the code was conducted with all participants present.

## 2021-06-13 NOTE — PROGRESS NOTES
Rn bagged all of patients belongings into boxes brought with patient. 4 boxes labeled with patient stickers. Wedding ring was removed and doubled bagged. Bags were placed in bottom box with Charge Rn visualizing placement of bags containing wedding ring.

## 2021-06-13 NOTE — DISCHARGE PLANNING
Medical Social Work    Referral: Code Blue    Intervention: SW responded to T403-02 for Chase Nuñez.  Pt is Jarret Bright (: 1950).  MSW contacted pt's sister Alexsandra Caballero ([phone]) who was advised of the Code.  Pt's sister states that she's in [place], [place] and is unable to drive at night.  Pt's sister requested an update via phone.  After code bedside RN contacted pt's sister via phone and notified her of pt's death.  Pt's sister requested [facility] in [place], [place].  MSW updated NAM Duglas of mortuary choice.      Plan: SW will remain available for family support as needed.

## 2021-06-13 NOTE — DISCHARGE SUMMARY
Death Summary    Cause of Death  Respiratory arrest due to aspiration pnuemonitis due to small bowel obstruction    Comorbid Conditions at the Time of Death  Principal Problem:    Small bowel obstruction (HCC) POA: Yes  Active Problems:    Type 2 diabetes mellitus with hyperglycemia, with long-term current use of insulin ([facility]) POA: Yes    PVD (peripheral vascular disease) ([facility]) POA: Yes    BPH (benign prostatic hyperplasia) POA: Yes    CVA (cerebral vascular accident) ([facility]) POA: Yes    Essential hypertension POA: Yes    S/P BKA (below knee amputation) unilateral, left (HCC) POA: Yes      Overview: 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    ALEN (acute kidney injury) ([facility]) POA: Unknown  Resolved Problems:    * No resolved hospital problems. *      History of Presenting Illness and Hospital Course  Per Dr. Anguiano's HPI:  70 y.o. male with DM insulin-dependent, HTN, pacemaker, left below-knee amputation present[facility] 6/11/2021 [facility] with vomiting nonbloody 3-4 episodes for the last 4 to 5 days.  He reported associated increasing abdominal distention.  He is able to pass gas.  CT abdomen pelvis showed small bowel obstruction.  NG tube was placed and patient was transferred to [facility].  He reports no fever or chills.  He is a former smoker quit 5 to 6 years ago and former alcohol user. He is allergic to tea tree oil.  He denies any surgical history in the past.     On labs, he was noted to have BUN of 101, Cr 3.4, sugar of 345.  General surgery, Dr. Harris, was consulted - recommended admission to medicine for small bowel obstruction as at this time medical management with NG tube is appropriate.     6/12 on the unit:   Vitals wnl; afebrile. Pain adequately controlled. Blood glucose around 100s   I/O - good urine output, small brown loose BM  around midnight; minimal NGT input yellow output.    At bedside, appeared a bit unease but agrees with the plan for current conservative management with bowel rest, pain management and IVF.   Cr trended down with IVF - urine studies sent; plan for ALEN/ARF has been to continue with IVF as renal function responding to fluid.     General Surgery Dr. Ornelas follow up appreciated - plan was to clamped NGT and if tolerated, possible SBFT. In PM, with NGT accidentally coming out in PM, surgery team was contacted. With patient clinically doing well: small BM overnight and tolerating NGT clamped, decision was made to start CLD and monitor. Pt tolerated the evening dinner (tolerated %).     However, at around 0003, per reports, Pt was seen unresponsive and no pulse. It appeared that Pt tried to vomit. Code blue activated. ACLS protocol was followed: PEA.  At 0025 hrs, the code was stopped and the pt was pronounced . Next of Kin notified by CM on call.       Death Date: 21   Death Time: 0025      Pronounced By (MD): Dr Domínguez

## 2021-06-14 NOTE — DOCUMENTATION QUERY
[facility]                                                                       Query Response Note      PATIENT:               ESTHER DE GUZMAN  ACCT #:                  [id number]  MRN:                     [id number]  :                      1950  ADMIT DATE:       2021 7:38 PM  DISCH DATE:        2021 12:25 AM  RESPONDING  PROVIDER #:        [id number]           QUERY TEXT:    CVA is documented in the DC Summary, and not mentioned elsewhere in the Medical Record.  Can this diagnosis be clarified?    NOTE: if an appropriate response is not listed below, please respond with a new note    The patient's clinical indicators include:  Per DC Summary:  Active Problems: CVA POA: yes   Risk Factors: diabetes, htn  Treatment: none identified     Thank You,  Shan Troncoso RN, BSN  Clinical    Connect via [email or web address]  Options provided:   -- History of CVA ruled in   -- Acute CVA ruled in   -- Unable to determine      Query created by: Shan Troncoso on 2021 10:46 AM    RESPONSE TEXT:    History of CVA ruled in          Electronically signed by:  AMOS ROSADO MD 2021 11:27 AM

## 2021-06-14 NOTE — DOCUMENTATION QUERY
[facility]                                                                       Query Response Note      PATIENT:               ESTHER DE GUZMAN  ACCT #:                  [id number]  MRN:                     [id number]  :                      1950  ADMIT DATE:       2021 7:38 PM  DISCH DATE:        2021 12:25 AM  RESPONDING  PROVIDER #:        [id number]           QUERY TEXT:    Respiratory arrest is documented in the DC Summary. Can a more specific diagnosis be provided to support this finding?  (includes suspected or probable)    NOTE:  If an appropriate response is not listed below, please respond with a new note.    The patient's Clinical Indicators include:  Per DC Summary:  respiratory arrest due to aspiration pneumonitis due to SBO  Per  Critical Care Note: pt had an LMA in his airway, pt likely aspirated and went into respiratory distress, LMA in place w/ b/l breath sounds and EtCO2 reading of 14 w/ CPR   Risk Factors: aspiration, aspiration pneumonitis  Treatment: CPR, LMA, bag mask ventilation w/ ambu bag     Thank You,  Shan Troncoso RN, BSN  Clinical    Connect via [employer]  Options provided:   -- Acute respiratory failure with hypoxia   -- Respiratory Distress   -- Hypoxia   -- Findings of no clinical significance   -- Unable to determine      Query created by: Shan Troncoso on 2021 12:19 PM    RESPONSE TEXT:    Acute respiratory failure with hypoxia          Electronically signed by:  AMOS ROSADO MD 2021 12:22 PM

## 2021-06-16 LAB
GLUCOSE BLD-MCNC: 112 MG/DL (ref 65–99)
GLUCOSE BLD-MCNC: 81 MG/DL (ref 65–99)
GLUCOSE BLD-MCNC: 85 MG/DL (ref 65–99)
GLUCOSE BLD-MCNC: 87 MG/DL (ref 65–99)

## 2022-03-18 NOTE — ASSESSMENT & PLAN NOTE
Post op day # 5 from left BKA   Continue wound care  Stop antibiotics  No positive culture results from outside hospital.      Immediate family member
